# Patient Record
Sex: MALE | Race: WHITE | NOT HISPANIC OR LATINO | Employment: OTHER | URBAN - METROPOLITAN AREA
[De-identification: names, ages, dates, MRNs, and addresses within clinical notes are randomized per-mention and may not be internally consistent; named-entity substitution may affect disease eponyms.]

---

## 2017-02-06 ENCOUNTER — ALLSCRIPTS OFFICE VISIT (OUTPATIENT)
Dept: OTHER | Facility: OTHER | Age: 77
End: 2017-02-06

## 2017-03-28 ENCOUNTER — GENERIC CONVERSION - ENCOUNTER (OUTPATIENT)
Dept: OTHER | Facility: OTHER | Age: 77
End: 2017-03-28

## 2017-03-28 ENCOUNTER — OFFICE VISIT (OUTPATIENT)
Dept: LAB | Facility: CLINIC | Age: 77
End: 2017-03-28
Payer: MEDICARE

## 2017-03-28 ENCOUNTER — APPOINTMENT (OUTPATIENT)
Dept: LAB | Facility: CLINIC | Age: 77
End: 2017-03-28
Payer: MEDICARE

## 2017-03-28 ENCOUNTER — APPOINTMENT (OUTPATIENT)
Dept: PREADMISSION TESTING | Facility: HOSPITAL | Age: 77
End: 2017-03-28
Payer: MEDICARE

## 2017-03-28 ENCOUNTER — TRANSCRIBE ORDERS (OUTPATIENT)
Dept: LAB | Facility: CLINIC | Age: 77
End: 2017-03-28

## 2017-03-28 DIAGNOSIS — C44.311 BASAL CELL CARCINOMA OF NASOLABIAL GROOVE: ICD-10-CM

## 2017-03-28 DIAGNOSIS — C44.311 BASAL CELL CARCINOMA OF NASOLABIAL GROOVE: Primary | ICD-10-CM

## 2017-03-28 LAB
ANION GAP SERPL CALCULATED.3IONS-SCNC: 9 MMOL/L (ref 4–13)
ATRIAL RATE: 38 BPM
BASOPHILS # BLD AUTO: 0.03 THOUSANDS/ΜL (ref 0–0.1)
BASOPHILS NFR BLD AUTO: 0 % (ref 0–1)
BUN SERPL-MCNC: 14 MG/DL (ref 5–25)
CALCIUM SERPL-MCNC: 8.8 MG/DL (ref 8.3–10.1)
CHLORIDE SERPL-SCNC: 104 MMOL/L (ref 100–108)
CO2 SERPL-SCNC: 28 MMOL/L (ref 21–32)
CREAT SERPL-MCNC: 0.87 MG/DL (ref 0.6–1.3)
EOSINOPHIL # BLD AUTO: 0.56 THOUSAND/ΜL (ref 0–0.61)
EOSINOPHIL NFR BLD AUTO: 7 % (ref 0–6)
ERYTHROCYTE [DISTWIDTH] IN BLOOD BY AUTOMATED COUNT: 13 % (ref 11.6–15.1)
GFR SERPL CREATININE-BSD FRML MDRD: >60 ML/MIN/1.73SQ M
GLUCOSE SERPL-MCNC: 119 MG/DL (ref 65–140)
HCT VFR BLD AUTO: 47.5 % (ref 36.5–49.3)
HGB BLD-MCNC: 16 G/DL (ref 12–17)
LYMPHOCYTES # BLD AUTO: 1.84 THOUSANDS/ΜL (ref 0.6–4.47)
LYMPHOCYTES NFR BLD AUTO: 23 % (ref 14–44)
MCH RBC QN AUTO: 30.5 PG (ref 26.8–34.3)
MCHC RBC AUTO-ENTMCNC: 33.7 G/DL (ref 31.4–37.4)
MCV RBC AUTO: 91 FL (ref 82–98)
MONOCYTES # BLD AUTO: 0.82 THOUSAND/ΜL (ref 0.17–1.22)
MONOCYTES NFR BLD AUTO: 10 % (ref 4–12)
NEUTROPHILS # BLD AUTO: 4.83 THOUSANDS/ΜL (ref 1.85–7.62)
NEUTS SEG NFR BLD AUTO: 60 % (ref 43–75)
PLATELET # BLD AUTO: 194 THOUSANDS/UL (ref 149–390)
PMV BLD AUTO: 11.3 FL (ref 8.9–12.7)
POTASSIUM SERPL-SCNC: 4 MMOL/L (ref 3.5–5.3)
QRS AXIS: 61 DEGREES
QRSD INTERVAL: 136 MS
QT INTERVAL: 422 MS
QTC INTERVAL: 464 MS
RBC # BLD AUTO: 5.24 MILLION/UL (ref 3.88–5.62)
SODIUM SERPL-SCNC: 141 MMOL/L (ref 136–145)
T WAVE AXIS: 18 DEGREES
VENTRICULAR RATE: 73 BPM
WBC # BLD AUTO: 8.08 THOUSAND/UL (ref 4.31–10.16)

## 2017-03-28 PROCEDURE — 93005 ELECTROCARDIOGRAM TRACING: CPT

## 2017-03-28 PROCEDURE — 80048 BASIC METABOLIC PNL TOTAL CA: CPT

## 2017-03-28 PROCEDURE — 85025 COMPLETE CBC W/AUTO DIFF WBC: CPT

## 2017-03-28 PROCEDURE — 36415 COLL VENOUS BLD VENIPUNCTURE: CPT

## 2017-03-28 RX ORDER — LANOLIN ALCOHOL/MO/W.PET/CERES
2 CREAM (GRAM) TOPICAL
COMMUNITY

## 2017-03-28 RX ORDER — CHLORAL HYDRATE 500 MG
1000 CAPSULE ORAL 2 TIMES DAILY
COMMUNITY

## 2017-03-28 RX ORDER — FUROSEMIDE 20 MG/1
20 TABLET ORAL DAILY PRN
COMMUNITY
End: 2018-01-30

## 2017-03-28 RX ORDER — DIPHENOXYLATE HYDROCHLORIDE AND ATROPINE SULFATE 2.5; .025 MG/1; MG/1
1 TABLET ORAL
COMMUNITY

## 2017-03-28 RX ORDER — SOTALOL HYDROCHLORIDE 160 MG/1
160 TABLET ORAL 2 TIMES DAILY
COMMUNITY
End: 2018-08-31 | Stop reason: DRUGHIGH

## 2017-03-28 RX ORDER — AMLODIPINE BESYLATE 5 MG/1
5 TABLET ORAL
COMMUNITY
End: 2018-07-09

## 2017-03-28 RX ORDER — VALSARTAN 160 MG/1
160 TABLET ORAL
COMMUNITY
End: 2018-03-01

## 2017-03-28 RX ORDER — WARFARIN SODIUM 5 MG/1
5 TABLET ORAL
COMMUNITY
End: 2018-01-24 | Stop reason: SDUPTHER

## 2017-03-28 RX ORDER — ATORVASTATIN CALCIUM 40 MG/1
40 TABLET, FILM COATED ORAL
COMMUNITY
End: 2020-09-21 | Stop reason: SDUPTHER

## 2017-04-02 ENCOUNTER — ANESTHESIA EVENT (OUTPATIENT)
Dept: PERIOP | Facility: HOSPITAL | Age: 77
End: 2017-04-02
Payer: MEDICARE

## 2017-04-03 ENCOUNTER — HOSPITAL ENCOUNTER (OUTPATIENT)
Facility: HOSPITAL | Age: 77
Setting detail: OUTPATIENT SURGERY
Discharge: HOME | End: 2017-04-03
Attending: SURGERY | Admitting: SURGERY
Payer: MEDICARE

## 2017-04-03 ENCOUNTER — GENERIC CONVERSION - ENCOUNTER (OUTPATIENT)
Dept: OTHER | Facility: OTHER | Age: 77
End: 2017-04-03

## 2017-04-03 ENCOUNTER — ANESTHESIA (OUTPATIENT)
Dept: PERIOP | Facility: HOSPITAL | Age: 77
End: 2017-04-03
Payer: MEDICARE

## 2017-04-03 VITALS
RESPIRATION RATE: 18 BRPM | SYSTOLIC BLOOD PRESSURE: 150 MMHG | WEIGHT: 225 LBS | TEMPERATURE: 98.8 F | DIASTOLIC BLOOD PRESSURE: 74 MMHG | HEART RATE: 95 BPM | OXYGEN SATURATION: 97 % | BODY MASS INDEX: 36.16 KG/M2 | HEIGHT: 66 IN

## 2017-04-03 DIAGNOSIS — C44.311 BASAL CELL CARCINOMA OF SKIN OF NOSE: ICD-10-CM

## 2017-04-03 PROCEDURE — 88305 TISSUE EXAM BY PATHOLOGIST: CPT | Performed by: SURGERY

## 2017-04-03 PROCEDURE — 88332 PATH CONSLTJ SURG EA ADD BLK: CPT | Performed by: SURGERY

## 2017-04-03 PROCEDURE — 88331 PATH CONSLTJ SURG 1 BLK 1SPC: CPT | Performed by: SURGERY

## 2017-04-03 RX ORDER — FENTANYL CITRATE/PF 50 MCG/ML
25 SYRINGE (ML) INJECTION
Status: DISCONTINUED | OUTPATIENT
Start: 2017-04-03 | End: 2017-04-03 | Stop reason: HOSPADM

## 2017-04-03 RX ORDER — MORPHINE SULFATE 4 MG/ML
4 INJECTION, SOLUTION INTRAMUSCULAR; INTRAVENOUS EVERY 4 HOURS PRN
Status: CANCELLED | OUTPATIENT
Start: 2017-04-03

## 2017-04-03 RX ORDER — ONDANSETRON 2 MG/ML
4 INJECTION INTRAMUSCULAR; INTRAVENOUS EVERY 6 HOURS PRN
Status: CANCELLED | OUTPATIENT
Start: 2017-04-03

## 2017-04-03 RX ORDER — SODIUM CHLORIDE, SODIUM LACTATE, POTASSIUM CHLORIDE, CALCIUM CHLORIDE 600; 310; 30; 20 MG/100ML; MG/100ML; MG/100ML; MG/100ML
INJECTION, SOLUTION INTRAVENOUS CONTINUOUS PRN
Status: DISCONTINUED | OUTPATIENT
Start: 2017-04-03 | End: 2017-04-03 | Stop reason: SURG

## 2017-04-03 RX ORDER — ACETAMINOPHEN AND CODEINE PHOSPHATE 300; 30 MG/1; MG/1
1 TABLET ORAL EVERY 4 HOURS PRN
Qty: 10 TABLET | Refills: 0 | Status: SHIPPED | OUTPATIENT
Start: 2017-04-03 | End: 2018-01-30

## 2017-04-03 RX ORDER — ONDANSETRON 2 MG/ML
4 INJECTION INTRAMUSCULAR; INTRAVENOUS ONCE
Status: DISCONTINUED | OUTPATIENT
Start: 2017-04-03 | End: 2017-04-03 | Stop reason: HOSPADM

## 2017-04-03 RX ORDER — LIDOCAINE HYDROCHLORIDE AND EPINEPHRINE 10; 10 MG/ML; UG/ML
INJECTION, SOLUTION INFILTRATION; PERINEURAL AS NEEDED
Status: DISCONTINUED | OUTPATIENT
Start: 2017-04-03 | End: 2017-04-03 | Stop reason: HOSPADM

## 2017-04-03 RX ORDER — MAGNESIUM HYDROXIDE 1200 MG/15ML
LIQUID ORAL AS NEEDED
Status: DISCONTINUED | OUTPATIENT
Start: 2017-04-03 | End: 2017-04-03 | Stop reason: HOSPADM

## 2017-04-03 RX ORDER — FENTANYL CITRATE 50 UG/ML
INJECTION, SOLUTION INTRAMUSCULAR; INTRAVENOUS AS NEEDED
Status: DISCONTINUED | OUTPATIENT
Start: 2017-04-03 | End: 2017-04-03 | Stop reason: SURG

## 2017-04-03 RX ORDER — ONDANSETRON 2 MG/ML
INJECTION INTRAMUSCULAR; INTRAVENOUS AS NEEDED
Status: DISCONTINUED | OUTPATIENT
Start: 2017-04-03 | End: 2017-04-03 | Stop reason: SURG

## 2017-04-03 RX ORDER — PROPOFOL 10 MG/ML
INJECTION, EMULSION INTRAVENOUS AS NEEDED
Status: DISCONTINUED | OUTPATIENT
Start: 2017-04-03 | End: 2017-04-03 | Stop reason: SURG

## 2017-04-03 RX ORDER — HYDROCODONE BITARTRATE AND ACETAMINOPHEN 5; 325 MG/1; MG/1
1 TABLET ORAL EVERY 4 HOURS PRN
Status: CANCELLED | OUTPATIENT
Start: 2017-04-03

## 2017-04-03 RX ADMIN — CEFAZOLIN SODIUM 2000 MG: 2 SOLUTION INTRAVENOUS at 10:17

## 2017-04-03 RX ADMIN — SODIUM CHLORIDE, SODIUM LACTATE, POTASSIUM CHLORIDE, AND CALCIUM CHLORIDE: .6; .31; .03; .02 INJECTION, SOLUTION INTRAVENOUS at 10:17

## 2017-04-03 RX ADMIN — FENTANYL CITRATE 25 MCG: 50 INJECTION INTRAMUSCULAR; INTRAVENOUS at 10:46

## 2017-04-03 RX ADMIN — FENTANYL CITRATE 50 MCG: 50 INJECTION INTRAMUSCULAR; INTRAVENOUS at 11:25

## 2017-04-03 RX ADMIN — PROPOFOL 200 MG: 10 INJECTION, EMULSION INTRAVENOUS at 10:18

## 2017-04-03 RX ADMIN — ONDANSETRON 4 MG: 2 INJECTION INTRAMUSCULAR; INTRAVENOUS at 11:25

## 2017-04-03 RX ADMIN — FENTANYL CITRATE 25 MCG: 50 INJECTION INTRAMUSCULAR; INTRAVENOUS at 10:18

## 2017-04-03 RX ADMIN — DEXAMETHASONE SODIUM PHOSPHATE 10 MG: 10 INJECTION INTRAMUSCULAR; INTRAVENOUS at 10:33

## 2017-04-07 ENCOUNTER — ALLSCRIPTS OFFICE VISIT (OUTPATIENT)
Dept: OTHER | Facility: OTHER | Age: 77
End: 2017-04-07

## 2017-04-17 ENCOUNTER — ALLSCRIPTS OFFICE VISIT (OUTPATIENT)
Dept: OTHER | Facility: OTHER | Age: 77
End: 2017-04-17

## 2017-04-19 ENCOUNTER — ALLSCRIPTS OFFICE VISIT (OUTPATIENT)
Dept: OTHER | Facility: OTHER | Age: 77
End: 2017-04-19

## 2017-04-26 ENCOUNTER — ALLSCRIPTS OFFICE VISIT (OUTPATIENT)
Dept: RADIOLOGY | Facility: CLINIC | Age: 77
End: 2017-04-26
Payer: MEDICARE

## 2017-05-01 ENCOUNTER — ALLSCRIPTS OFFICE VISIT (OUTPATIENT)
Dept: OTHER | Facility: OTHER | Age: 77
End: 2017-05-01

## 2017-05-04 ENCOUNTER — GENERIC CONVERSION - ENCOUNTER (OUTPATIENT)
Dept: OTHER | Facility: OTHER | Age: 77
End: 2017-05-04

## 2017-05-31 ENCOUNTER — ALLSCRIPTS OFFICE VISIT (OUTPATIENT)
Dept: OTHER | Facility: OTHER | Age: 77
End: 2017-05-31

## 2017-08-17 ENCOUNTER — TRANSCRIBE ORDERS (OUTPATIENT)
Dept: ADMINISTRATIVE | Facility: HOSPITAL | Age: 77
End: 2017-08-17

## 2017-08-17 ENCOUNTER — ALLSCRIPTS OFFICE VISIT (OUTPATIENT)
Dept: OTHER | Facility: OTHER | Age: 77
End: 2017-08-17

## 2017-08-17 DIAGNOSIS — G47.19 OTHER HYPERSOMNIA: ICD-10-CM

## 2017-08-17 DIAGNOSIS — E78.5 HYPERLIPIDEMIA: ICD-10-CM

## 2017-08-17 DIAGNOSIS — I87.2 VENOUS INSUFFICIENCY (CHRONIC) (PERIPHERAL): ICD-10-CM

## 2017-08-17 DIAGNOSIS — I10 ESSENTIAL (PRIMARY) HYPERTENSION: ICD-10-CM

## 2017-08-17 DIAGNOSIS — G47.19 TRANSIENT DISORDER OF INITIATING OR MAINTAINING WAKEFULNESS: Primary | ICD-10-CM

## 2017-08-28 ENCOUNTER — HOSPITAL ENCOUNTER (OUTPATIENT)
Dept: NON INVASIVE DIAGNOSTICS | Facility: CLINIC | Age: 77
Discharge: HOME | End: 2017-08-28
Payer: MEDICARE

## 2017-08-28 ENCOUNTER — GENERIC CONVERSION - ENCOUNTER (OUTPATIENT)
Dept: OTHER | Facility: OTHER | Age: 77
End: 2017-08-28

## 2017-08-28 ENCOUNTER — LAB (OUTPATIENT)
Dept: LAB | Facility: CLINIC | Age: 77
End: 2017-08-28
Payer: MEDICARE

## 2017-08-28 DIAGNOSIS — I87.2 VENOUS INSUFFICIENCY (CHRONIC) (PERIPHERAL): ICD-10-CM

## 2017-08-28 DIAGNOSIS — G47.19 OTHER HYPERSOMNIA: ICD-10-CM

## 2017-08-28 DIAGNOSIS — E78.5 HYPERLIPIDEMIA: ICD-10-CM

## 2017-08-28 DIAGNOSIS — I10 ESSENTIAL (PRIMARY) HYPERTENSION: ICD-10-CM

## 2017-08-28 DIAGNOSIS — I48.91 ATRIAL FIBRILLATION (HCC): ICD-10-CM

## 2017-08-28 LAB
ALBUMIN SERPL BCP-MCNC: 3.4 G/DL (ref 3.5–5)
ALP SERPL-CCNC: 86 U/L (ref 46–116)
ALT SERPL W P-5'-P-CCNC: 26 U/L (ref 12–78)
ANION GAP SERPL CALCULATED.3IONS-SCNC: 7 MMOL/L (ref 4–13)
AST SERPL W P-5'-P-CCNC: 16 U/L (ref 5–45)
BILIRUB SERPL-MCNC: 0.62 MG/DL (ref 0.2–1)
BUN SERPL-MCNC: 22 MG/DL (ref 5–25)
CALCIUM SERPL-MCNC: 8.8 MG/DL (ref 8.3–10.1)
CHLORIDE SERPL-SCNC: 109 MMOL/L (ref 100–108)
CO2 SERPL-SCNC: 27 MMOL/L (ref 21–32)
CREAT SERPL-MCNC: 1.04 MG/DL (ref 0.6–1.3)
GFR SERPL CREATININE-BSD FRML MDRD: 70 ML/MIN/1.73SQ M
GLUCOSE SERPL-MCNC: 118 MG/DL (ref 65–140)
INR PPP: 2.68 (ref 0.86–1.16)
POTASSIUM SERPL-SCNC: 3.9 MMOL/L (ref 3.5–5.3)
PROT SERPL-MCNC: 6.9 G/DL (ref 6.4–8.2)
PROTHROMBIN TIME: 28.9 SECONDS (ref 12.1–14.4)
SODIUM SERPL-SCNC: 143 MMOL/L (ref 136–145)

## 2017-08-28 PROCEDURE — 80053 COMPREHEN METABOLIC PANEL: CPT

## 2017-08-28 PROCEDURE — 85610 PROTHROMBIN TIME: CPT

## 2017-08-28 PROCEDURE — 36415 COLL VENOUS BLD VENIPUNCTURE: CPT

## 2017-08-28 PROCEDURE — 93225 XTRNL ECG REC<48 HRS REC: CPT

## 2017-08-28 PROCEDURE — 93306 TTE W/DOPPLER COMPLETE: CPT

## 2017-08-28 PROCEDURE — 93226 XTRNL ECG REC<48 HR SCAN A/R: CPT

## 2017-08-30 ENCOUNTER — GENERIC CONVERSION - ENCOUNTER (OUTPATIENT)
Dept: OTHER | Facility: OTHER | Age: 77
End: 2017-08-30

## 2017-08-31 ENCOUNTER — GENERIC CONVERSION - ENCOUNTER (OUTPATIENT)
Dept: OTHER | Facility: OTHER | Age: 77
End: 2017-08-31

## 2017-09-01 ENCOUNTER — ALLSCRIPTS OFFICE VISIT (OUTPATIENT)
Dept: OTHER | Facility: OTHER | Age: 77
End: 2017-09-01

## 2017-09-25 ENCOUNTER — TRANSCRIBE ORDERS (OUTPATIENT)
Dept: LAB | Facility: CLINIC | Age: 77
End: 2017-09-25

## 2017-09-25 ENCOUNTER — GENERIC CONVERSION - ENCOUNTER (OUTPATIENT)
Dept: OTHER | Facility: OTHER | Age: 77
End: 2017-09-25

## 2017-09-25 ENCOUNTER — APPOINTMENT (OUTPATIENT)
Dept: LAB | Facility: CLINIC | Age: 77
End: 2017-09-25
Payer: MEDICARE

## 2017-09-25 DIAGNOSIS — I48.91 CONTROLLED ATRIAL FIBRILLATION (HCC): ICD-10-CM

## 2017-09-25 DIAGNOSIS — I48.91 CONTROLLED ATRIAL FIBRILLATION (HCC): Primary | ICD-10-CM

## 2017-09-25 LAB
INR PPP: 2.42 (ref 0.86–1.16)
PROTHROMBIN TIME: 26.6 SECONDS (ref 12.1–14.4)

## 2017-09-25 PROCEDURE — 85610 PROTHROMBIN TIME: CPT

## 2017-09-25 PROCEDURE — 36415 COLL VENOUS BLD VENIPUNCTURE: CPT

## 2017-09-28 ENCOUNTER — ALLSCRIPTS OFFICE VISIT (OUTPATIENT)
Dept: OTHER | Facility: OTHER | Age: 77
End: 2017-09-28

## 2017-09-28 DIAGNOSIS — I48.91 ATRIAL FIBRILLATION (HCC): ICD-10-CM

## 2017-09-28 DIAGNOSIS — E78.5 HYPERLIPIDEMIA: ICD-10-CM

## 2017-09-28 DIAGNOSIS — I10 ESSENTIAL (PRIMARY) HYPERTENSION: ICD-10-CM

## 2017-09-28 DIAGNOSIS — R60.9 EDEMA: ICD-10-CM

## 2017-09-28 DIAGNOSIS — I87.2 VENOUS INSUFFICIENCY (CHRONIC) (PERIPHERAL): ICD-10-CM

## 2017-10-02 ENCOUNTER — ALLSCRIPTS OFFICE VISIT (OUTPATIENT)
Dept: OTHER | Facility: OTHER | Age: 77
End: 2017-10-02

## 2017-10-04 ENCOUNTER — GENERIC CONVERSION - ENCOUNTER (OUTPATIENT)
Dept: OTHER | Facility: OTHER | Age: 77
End: 2017-10-04

## 2017-10-10 ENCOUNTER — HOSPITAL ENCOUNTER (OUTPATIENT)
Dept: SLEEP CENTER | Facility: CLINIC | Age: 77
Discharge: HOME | End: 2017-10-10
Payer: MEDICARE

## 2017-10-10 DIAGNOSIS — G47.19 TRANSIENT DISORDER OF INITIATING OR MAINTAINING WAKEFULNESS: ICD-10-CM

## 2017-10-10 DIAGNOSIS — G47.33 OSA (OBSTRUCTIVE SLEEP APNEA): ICD-10-CM

## 2017-10-10 PROCEDURE — 95810 POLYSOM 6/> YRS 4/> PARAM: CPT

## 2017-10-17 ENCOUNTER — TRANSCRIBE ORDERS (OUTPATIENT)
Dept: SLEEP CENTER | Facility: CLINIC | Age: 77
End: 2017-10-17

## 2017-10-17 DIAGNOSIS — G47.33 OSA (OBSTRUCTIVE SLEEP APNEA): Primary | ICD-10-CM

## 2017-10-19 ENCOUNTER — HOSPITAL ENCOUNTER (OUTPATIENT)
Dept: SLEEP CENTER | Facility: CLINIC | Age: 77
Discharge: HOME | End: 2017-10-19
Payer: MEDICARE

## 2017-10-19 DIAGNOSIS — G47.33 OSA (OBSTRUCTIVE SLEEP APNEA): ICD-10-CM

## 2017-10-19 PROCEDURE — 95811 POLYSOM 6/>YRS CPAP 4/> PARM: CPT

## 2017-10-23 NOTE — PROGRESS NOTES
Assessment  Assessed    1  Hyperlipidemia (272 4) (E78 5)   2  Hypertension (401 9) (I10)   3  Chronic venous insufficiency (459 81) (I87 2)    Plan  Aortic valve disease, Edema    · Furosemide 40 MG Oral Tablet (Lasix)   Rx By: Shawna Berumen; Dispense: 0 Days ; #: Sufficient Tablet; Refill: 0;For: Aortic valve disease, Edema; HUMBERTO = N; Record; Last Updated By: Marleny Sprague; 8/17/2017 4:26:09 PM  Chronic venous insufficiency, Daytime hypersomnolence, Hyperlipidemia, Hypertension    · *Polysomnography, Sleep Study, Diagnostic; Status:Need Information - Financial  Authorization; Requested for:17Aug2017;    Perform:[facility]; Due:17Aug2018; Ordered; For:Chronic venous insufficiency, Daytime hypersomnolence, Hyperlipidemia, Hypertension; Ordered By:Pavithra Sales;  there any other medical conditions or medications that would explain these      symptoms? : No  is the sleep disturbance affecting the patient's ability to function? :      hypersomnolence, frequent naps  History/Symptoms: : excessive fatigue  Study Only or Consult : Sleep Study and Consult and F/U with Sleep Specialist   · Follow-up visit in 1 month Evaluation and Treatment  Follow-up  Status: Hold For -  Scheduling  Requested for: 17Aug2017   Ordered; For: Chronic venous insufficiency, Daytime hypersomnolence, Hyperlipidemia, Hypertension; Ordered By: Marleny Sprague Performed:  Due: 18RFY7541   · (1) COMPREHENSIVE METABOLIC PANEL; Status:Active; Requested for:28Aug2017;    Perform:[facility]; Due:28Aug2018; Ordered; For:Chronic venous insufficiency, Daytime hypersomnolence, Hyperlipidemia, Hypertension; Ordered By:Otf Sales;   · ECHO COMPLETE WITH CONTRAST IF INDICATED; Status:Hold For - Scheduling;  Requested for:17Aug2017;    Perform:[facility]; Due:17Aug2018; Ordered; For:Chronic venous insufficiency, Daytime hypersomnolence, Hyperlipidemia, Hypertension; Ordered By:Otf Sales;   · Gradient compression stocking, thigh length, 15-20 mm Hg, each; Status:Complete;    Done: 61SGR9760   Perform:Not Applicable; QTH:85ZAZ9880;WLNEWMY; For:Chronic venous insufficiency, Daytime hypersomnolence, Hyperlipidemia, Hypertension; Ordered By:Otf Sales;   · HOLTER MONITOR - 24 HOUR; Status:Hold For - Scheduling; Requested  for:17Aug2017;    Perform:[facility]; Due:17Aug2018; Ordered; For:Chronic venous insufficiency, Daytime hypersomnolence, Hyperlipidemia, Hypertension; Ordered By:Otf Sales;   · Prescription Compression Stockings; Status:Complete;   Done: 82IGC8212   Perform:Not Applicable; ZMY:31ZHK6695;NVEOJLE; For:Chronic venous insufficiency, Daytime hypersomnolence, Hyperlipidemia, Hypertension; Ordered By:Otf Sales;  Daytime hypersomnolence    · Torsemide 20 MG Oral Tablet; 1 tab po bid   Rx By: Bartolo Medrano; Dispense: 30 Days ; #:60 Tablet; Refill: 11; For: Daytime hypersomnolence; HUMBERTO = N; Verified Transmission to [address] [place] [address]; Last Updated By: [employer]; 8/17/2017 4:31:30 PM    Discussion/Summary  Cardiology Discussion Summary Free Text Note Form [facility]:   1  venous insuff edema, worse last few weeks2  pafib on sotalol/warfarin, in nsr, h/o normal lvef3  excess fatigue, hypersomnolence, clinically has obstructive sleep apnea4  hypertension, borderline suboptimal control5  hyperlipidemia, on statinPlan  Chest with patient is venous insufficiency edema  We'll stop his furosemide and start torsemide 20 mg twice a day with follow-up CMP  Discussed possible need for potassium supplementation  He takes angiotensin receptor blocker and we'll see what his potassium is an approximate 7-10 days of starting his torsemide  Also given him a prescription for compression stockings  Regards his hypersomnolence, this is likely clinically obstructive sleep apnea   Discuss obstructive sleep apnea and through old with parasystole fibrillation as well as possible chronic right-sided congestive heart failure  His 2-D echocardiograms have revealed both normal left ventricular and right ventricular systolic function the past  I'll will repeat his echo in follow-up with him in 4 weeks  Chief Complaint  Chief Complaint Free Text Note Form: OV Leg edema      History of Present Illness  Cardiology HPI Free Text Note Form [facility]: Patient here for an unscheduled visit, usual Dr Irma Mccoy patient, for progressive lower extremity edema  He takes furosemide 40 mg once daily for lower extremity swelling  In review of his history, he's had hyper-somnolence throughout the day time  Wife states that he snores excessively and looks as though that he is not going to take a breath at times  He has had a history of paroxysmal atrial fibrillation and is on sotalol  Is a history of normal left ventricular systolic function  Last stress testing was 2008 which was no other which showed no evidence of ischemia  He states that his furosemide is no longer working  He may urinate 2-3 times after taking it in the morning and certainly worse at the end of the day most improved in the morning when he wakes upHe has a history of hypertension, review of vital signs last few physician visits range 137-150  This is the systolic BP  Is on chronic and a coagulation with warfarin for his paroxysmal age fibrillation as well  She has a history of hyperlipidemia and takes atorvastatin 40 mg daily  A raphe is maintained in sinus rhythm on sotalol 80 mg two times a day  Review of Systems  Cardiology Male ROS:     Cardiac: as noted in HPI  Skin: No complaints of nonhealing sores or skin rash  Genitourinary: No complaints of recurrent urinary tract infections, frequent urination at night, difficult urination, blood in urine, kidney stones, loss of bladder control, no kidney or prostate problems, no erectile dysfunction     Psychological: No complaints of feeling depressed, anxiety, panic attacks, or difficulty concentrating  General: No complaints of trouble sleeping, lack of energy, fatigue, appetite changes, weight changes, fever, frequent infections, or night sweats  Respiratory: No complaints of shortness of breath, cough with sputum, or wheezing  HEENT: No complaints of serious problems, hearing problems, nose problems, throat problems, or snoring  Gastrointestinal: No complaints of liver problems, nausea, vomiting, heartburn, constipation, bloody stools, diarrhea, problems swallowing, adbominal pain, or rectal bleeding  Hematologic: No complaints of bleeding disorders, anemia, blood clots, or excessive brusing  Neurological: No complaints of numbness, tingling, dizziness, weakness, seizures, headaches, syncope or fainting, AM fatigue, daytime sleepiness, no witnessed apnea episodes  Musculoskeletal: No complaints of arthritis, back pain, or painfull swelling  Active Problems  Problems    1  Anxiety (300 00) (F41 9)   2  Aortic valve disease (424 1) (I35 9)   3  Atrial fibrillation (427 31) (I48 91)   4  Basal cell carcinoma of nose (173 31) (C44 311)   5  Degenerative arthritis (715 90) (M19 90)   6  Edema (782 3) (R60 9)   7  Hyperlipidemia (272 4) (E78 5)   8  Hypertension (401 9) (I10)   9  Low back pain (724 2) (M54 5)   10  Lumbar spinal stenosis (724 02) (M48 06)   11  Sacroiliac pain (724 6) (M53 3)   12  Sacroiliitis (720 2) (M46 1)   13  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)    Past Medical History  Problems    1  Anxiety (300 00) (F41 9)   2  Degenerative arthritis (715 90) (M19 90)   3  History of Gout (274 9) (M10 9)   4  History of cardioversion (V15 1) (Z98 890)   5  History of hypercholesterolemia (V12 29) (Z86 39)   6  Hypertension (401 9) (I10)   7  History of TIA (transient ischemic attack) (435 9) (G45 9)  Active Problems And Past Medical History Reviewed:    The active problems and past medical history were reviewed and updated today  Surgical History  Problems    1  History of Prostatectomy Radical  Surgical History Reviewed: The surgical history was reviewed and updated today  Family History  Mother    1  Family history of    2  Family history of Hypertension  Father    3  Family history of Alzheimer disease   4  Family history of    5  Family history of Hypertension  Uncle    6  Family history of Stroke  Family History Reviewed: The family history was reviewed and updated today  Social History  Problems    · Alcohol use   · Daily caffeine consumption   ·    · Never a smoker   · Non smoker (V49 89) (Z78 9)  Social History Reviewed: The social history was reviewed and updated today  Current Meds   1  Acetaminophen 325 MG CAPS; Therapy: (Recorded:2017) to Recorded   2  AmLODIPine Besylate 5 MG Oral Tablet; Take 1 tablet daily; Therapy: (Recorded:2017) to Recorded   3  Co Q 10 10 MG Oral Capsule; as directed; Therapy: 37Lbh1351 to Recorded   4  Diovan 160 MG Oral Tablet; Take 1 tablet daily; Therapy: (Recorded:2017) to Recorded   5  Fish Oil 1000 MG Oral Capsule; TAKE 1 CAPSULE DAILY; Therapy: 09IOR0072 to Recorded   6  Furosemide 40 MG Oral Tablet; take 1 tablet daily as needed; Therapy: 44GLQ6441 to  Requested for: 2017 Recorded   7  Glucosamine Chondroitin Complx Oral Capsule; TAKE 1 CAPSULE DAILY; Therapy: 26Wgf3077 to Recorded   8  Lipitor 40 MG Oral Tablet; take 1 tablet by mouth every day; Therapy: 09EZG8503 to Recorded   9  Multi-Vitamins Oral Tablet; TAKE 1 TABLET DAILY; Therapy: 14PRT9551 to Recorded   10  Sertraline HCl - 50 MG Oral Tablet; Take 1 tablet daily; Therapy: 44JPU4699 to Recorded   11  Sotalol HCl - 80 MG Oral Tablet; TAKE 2 TABLETS TWICE DAILY; Therapy: 41VQK8920 to (Evaluate:94Ovw0496)  Requested for: 12OOM0456; Last    Rx:2017 Ordered   12  Vitamin D3 CAPS; Therapy: (Recorded:69Sxg4101) to Recorded   13  Warfarin Sodium 5 MG Oral Tablet; TAKE 1 TABLET DAILY OR AS DIRECTEDRECTED; Therapy: 76IGU2440 to (Evaluate:20Hwp8774)  Requested for: 63OXV1316; Last    Rx:26Nad2868 Ordered  Medication List Reviewed: The medication list was reviewed and updated today  Allergies  Medication    1  No Known Drug Allergies    Vitals  Vital Signs    Recorded: 17Aug2017 04:02PM   Heart Rate 60   Systolic 972, RUE, Sitting   Diastolic 80, RUE, Sitting   Height 5 ft 6 in   Weight 229 lb 6 oz   BMI Calculated 37 02   BSA Calculated 2 12     Physical Exam    Constitutional   General appearance: No acute distress, well appearing and well nourished  Eyes   Conjunctiva and Sclera examination: Conjunctiva pink, sclera anicteric  Ears, Nose, Mouth, and Throat - Oropharynx: Clear, nares are clear, mucous membranes are moist    Neck   Neck and thyroid: Normal, supple, trachea midline, no thyromegaly  Pulmonary   Respiratory effort: No increased work of breathing or signs of respiratory distress  Auscultation of lungs: Clear to auscultation, no rales, no rhonchi, no wheezing, good air movement  Cardiovascular   Auscultation of heart: Normal rate and rhythm, normal S1 and S2, no murmurs  Carotid pulses: Normal, 2+ bilaterally  Peripheral vascular exam: Normal pulses throughout, no tenderness, erythema or swelling  Pedal pulses: Normal, 2+ bilaterally  Examination of extremities for edema and/or varicosities: Abnormal  --b03+ with chronic venous stasis changes  Abdomen   Abdomen: Non-tender and no distention  Liver and spleen: No hepatomegaly or splenomegaly  Musculoskeletal Gait and station: Normal gait  --s5Yzibrj and nails: Normal without clubbing or cyanosis  --i3Epanveiixe/palpation of joints, bones, and muscles: Normal, ROM normal     Skin - Skin and subcutaneous tissue: Normal without rashes or lesions  Skin is warm and well perfused, normal turgor      Neurologic - Cranial nerves: II - XII intact  --j0Kjdzhb: Normal     Psychiatric - Orientation to person, place, and time: Normal --b0Mood and affect: Normal       Future Appointments    Date/Time Provider Specialty Site   10/06/2017 09:20 AM REJI Connolly   Cardiology [facility] CARDIOLOGY [place]   09/01/2017 10:00 AM REJI Serrato  Plastic Surgery BODY EVOLUTION PLASTIC REC[facility] [id number]     Signatures   Electronically signed by : Glenford Jeans, DO; Aug 17 2017  4:39PM EST                       (Author)

## 2017-10-25 ENCOUNTER — APPOINTMENT (OUTPATIENT)
Dept: LAB | Facility: CLINIC | Age: 77
End: 2017-10-25
Payer: MEDICARE

## 2017-10-25 DIAGNOSIS — R60.9 EDEMA: ICD-10-CM

## 2017-10-25 DIAGNOSIS — E78.5 HYPERLIPIDEMIA: ICD-10-CM

## 2017-10-25 DIAGNOSIS — I87.2 VENOUS INSUFFICIENCY (CHRONIC) (PERIPHERAL): ICD-10-CM

## 2017-10-25 DIAGNOSIS — I48.91 ATRIAL FIBRILLATION (HCC): ICD-10-CM

## 2017-10-25 DIAGNOSIS — I10 ESSENTIAL (PRIMARY) HYPERTENSION: ICD-10-CM

## 2017-10-25 LAB
INR PPP: 2.51 (ref 0.86–1.16)
PROTHROMBIN TIME: 27.4 SECONDS (ref 12.1–14.4)

## 2017-10-25 PROCEDURE — 85610 PROTHROMBIN TIME: CPT

## 2017-10-25 PROCEDURE — 36415 COLL VENOUS BLD VENIPUNCTURE: CPT

## 2017-10-26 ENCOUNTER — GENERIC CONVERSION - ENCOUNTER (OUTPATIENT)
Dept: OTHER | Facility: OTHER | Age: 77
End: 2017-10-26

## 2017-10-31 DIAGNOSIS — I48.91 ATRIAL FIBRILLATION (HCC): ICD-10-CM

## 2017-11-08 ENCOUNTER — TRANSCRIBE ORDERS (OUTPATIENT)
Dept: SLEEP CENTER | Facility: CLINIC | Age: 77
End: 2017-11-08

## 2017-11-08 ENCOUNTER — HOSPITAL ENCOUNTER (OUTPATIENT)
Dept: SLEEP CENTER | Facility: CLINIC | Age: 77
Discharge: HOME | End: 2017-11-08
Payer: MEDICARE

## 2017-11-08 DIAGNOSIS — G47.33 OSA (OBSTRUCTIVE SLEEP APNEA): ICD-10-CM

## 2017-11-08 DIAGNOSIS — G47.33 OSA (OBSTRUCTIVE SLEEP APNEA): Primary | ICD-10-CM

## 2017-11-08 NOTE — PROGRESS NOTES
Consultation - [facility] : 1940  MRN: [id number]      Assessment:  The patient has moderate obstructive sleep apnea in conjunction with atrial fibrillation and hypertension  Treatment with positive airway pressure is necessary  Plan:  Initiate CPAP of 11 cm through [facility]    Follow up:  6 weeks for compliance check    History of Present Illness:  78 y  o male with history of atrial fibrillation as well as loud snoring and witnessed apneas  His wife has been, neck for years about his apneic episodes during sleep  He was urged by his cardiologist to undergo evaluation  His diagnostic study demonstrated AHI = 16 8 and his minimum oxygen saturation was 83%  His AHI was much higher during REM at 57 4  He was subsequently treated with CPAP of 11 cm for elimination of respiratory events and snoring  Sleep architecture was much improved with positive airway pressure therapy  He questions the necessity of treatment for CHASIDY, since he feels that he is sleeping well and is not drowsy during the day  After our discussion, he did recognize the relationship between CHASIDY and his atrial fibrillation and hypertension  His past medical history includes chronic venous insufficiency with lower extremity edema  He has normal left ventricular systolic function (ejection fraction = 60%)  Review of Systems:  The patient denies headache, bruxism, chronic pain, irrepressible sleep, sleep paralysis, restless legs, thrashing during sleep, nocturnal eating, nocturnal GERD, abnormal behavior during sleep, abnormal dreams, poor concentration or depression  He does report poor short-term memory, urinary frequency and anxiety      Historical Information    Past Medical History:  Anxiety, aortic valve insufficiency, atrial fibrillation, 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, arthritis, hypertension vertebral stenosis of the lumbar region    Past Surgical History:  Prostatectomy, removal of basal cell carcinoma of the face    Social History  History   Alcohol use: Not on file     History   Drug Use Not on file     History   Smoking Status    Not on file   Smokeless Tobacco    Not on file     Family History: non-contributory     Sleep History: See above     Sleep Schedule:  Unremarkable     Snoring/Apnea:  Yes to both    Medications/Allergies:  See chart    Objective:    Vital Signs:   See Chart    [place] Sleepiness Scale:  7    Physical Exam:    General: Alert, appropriate, cooperative, overweight    Head: NC/AT, no retrognathia    Nose: No septal deviation, nares partially obstructed, mucosa normal    Throat: Airway lumen narrowed, tongue base thickened, no tonsils visualized    Neck: Normal, no thyromegaly or lymphadenopathy, no JVD    Heart: RR, normal S1 and S2, no murmurs    Chest: Clear bilaterally    Extremity: No clubbing, cyanosis, 3+ edema    Skin: Warm, dry    Neuro: No motor abnormalities, cranial nerves appear intact    Sleep Study Results:   As above  PAP Pressure: CPAP: 11 cm  DME Provider: [employer]    Counseling / Coordination of Care  Total clinic time spent today 25 minutes  Greater than 50% of total time was spent with the patient and / or family counseling and / or coordination of care  A description of the counseling / coordination of care: We discussed the pathophysiology of obstructive sleep apnea as well as the possible treatment options  We also discussed the rationale for positive airway pressure therapy  REJI Gutierrez    Board Certified Sleep Specialist

## 2017-11-14 ENCOUNTER — ANESTHESIA EVENT (OUTPATIENT)
Dept: PERIOP | Facility: AMBULARY SURGERY CENTER | Age: 77
End: 2017-11-14

## 2017-11-15 ENCOUNTER — GENERIC CONVERSION - ENCOUNTER (OUTPATIENT)
Dept: OTHER | Facility: OTHER | Age: 77
End: 2017-11-15

## 2017-11-15 DIAGNOSIS — I48.91 ATRIAL FIBRILLATION (HCC): ICD-10-CM

## 2017-11-15 NOTE — H&P
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  Please see HPI  I recommended excision of the squamous cell carcinoma on the right cheek with frozen section, possible flap, possible full-thickness skin graft  We discussed with the patient the options, benefits, and risks of surgery such as anesthesia, bleeding, infection, scarring and the need for additional procedures  Consent was obtained and all questions answered to his satisfaction  We will plan for surgery at his earliest convenience  Chief Complaint  Chief Complaint Free Text Note Form: Right cheek skin cancer  History of Present Illness  HPI: Sean Prince is a pleasant 81-year-old male who presents to our office for 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  He was sent to us by Dr Dorinda Patel and had a biopsy done  He states that the lesion has been present for couple of months  It is not bothersome to him  It does become crusty and scaly at times  Review of Systems  Complete-Male:   Constitutional: no fever,-not feeling poorly,-no chills-and-not feeling tired  Eyes: eyesight problems-and-Wears glasses  ENT: no hearing loss  Cardiovascular: no chest pain  Respiratory: no shortness of breath  Gastrointestinal: no abdominal pain,-no nausea,-no vomiting,-no constipation,-no diarrhea-and-no blood in stools  Neurological: no headache,-no convulsions-and-no difficulty walking  Psychiatric: no anxiety-and-no depression  Hematologic/Lymphatic: a tendency for easy bleeding,-a tendency for easy bruising-and-On Coumadin  Active Problems  1  Anxiety (300 00) (F41 9)   2  Aortic valve disease (424 1) (I35 9)   3  Atrial fibrillation (427 31) (I48 91)   4  Basal cell carcinoma of nose (173 31) (C44 311)   5   Chronic venous insufficiency (459 81) (I87 2) 6  Daytime hypersomnolence (780 54) (G47 19)   7  Degenerative arthritis (715 90) (M19 90)   8  Edema (782 3) (R60 9)   9  Hyperlipidemia (272 4) (E78 5)   10  Hypertension (401 9) (I10)   11  Low back pain (724 2) (M54 5)   12  Lumbar spinal stenosis (724 02) (M48 061)   13  Sacroiliac pain (724 6) (M53 3)   14  Sacroiliitis (720 2) (M46 1)   15  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)     Past Medical History  1  Anxiety (300 00) (F41 9)   2  Degenerative arthritis (715 90) (M19 90)   3  History of Gout (274 9) (M10 9)   4  History of cardioversion (V15 1) (Z98 890)   5  History of hypercholesterolemia (V12 29) (Z86 39)   6  Hypertension (401 9) (I10)   7  History of TIA (transient ischemic attack) (435 9) (G45 9)  Active Problems And Past Medical History Reviewed: The active problems and past medical history were reviewed and updated today  Surgical History  1  History of Prostatectomy Radical  Surgical History Reviewed: The surgical history was reviewed and updated today  Family History  Mother    1  Family history of    2  Family history of Hypertension  Father    3  Family history of Alzheimer disease   4  Family history of    5  Family history of Hypertension  Uncle    6  Family history of Stroke  Family History Reviewed: The family history was reviewed and updated today  Social History   · Alcohol use   · Daily caffeine consumption   ·    · Never a smoker   · Non smoker (V49 89) (Z78 9)  Social History Reviewed: The social history was reviewed and updated today  The social history was reviewed and is unchanged  Current Meds   1  Acetaminophen 325 MG CAPS; Therapy: (Recorded:19Gxa3992) to Recorded   2  AmLODIPine Besylate 10 MG Oral Tablet; Take 1 tablet daily; Therapy: (Carmina Bushevelyn) to Recorded   3  Co Q 10 10 MG Oral Capsule; as directed; Therapy: 52Nxq1641 to Recorded   4   Fish Oil 1000 MG Oral Capsule; TAKE 1 CAPSULE DAILY; Therapy: 10Wtj9391 to Recorded   5  Glucosamine Chondroitin Complx Oral Capsule; TAKE 1 CAPSULE DAILY; Therapy: 40RVZ2782 to Recorded   6  Lipitor 40 MG Oral Tablet; take 1 tablet by mouth every day; Therapy: 09Lbv4355 to Recorded   7  Multi-Vitamins Oral Tablet; TAKE 1 TABLET DAILY; Therapy: 51Eql2215 to Recorded   8  Sertraline HCl - 50 MG Oral Tablet; Take 1 tablet daily; Therapy: 44VKF2393 to Recorded   9  Sotalol HCl - 80 MG Oral Tablet; TAKE 2 TABLETS TWICE DAILY; Therapy: 63ZYQ5854 to (Evaluate:64Bho4424)  Requested for: 29Bsw3722; Last   Rx:38Sir3059 Ordered   10  Torsemide 20 MG Oral Tablet; 1 tab PO prn; Therapy: 12ZNO2639 to  Requested for: 29PQY6489 Recorded   11  Valsartan 320 MG Oral Tablet; Take 1 tablet daily; Therapy: 33HYN5365 to (Viki Iglesias)  Requested for: 99Kar2319; Last    Rx:71Ocl9352 Ordered   12  Vicodin TABS; Therapy: (Ching Londono) to Recorded   13  Vitamin D3 CAPS; Therapy: (Recorded:62Lft8201) to Recorded   14  Warfarin Sodium 5 MG Oral Tablet; TAKE 1 TABLET DAILY OR AS DIRECTEDRECTED; Therapy: 71WDI2163 to 40-45-11-94)  Requested for: 48Avl5711; Last    Rx:41Lgz9915 Ordered  Medication List Reviewed: The medication list was reviewed and updated today  Allergies  1  No Known Drug Allergies     Vitals  Vital Signs     Recorded: 03XWY8298 01:30PM   BP Comments unobtainable   Height 5 ft 6 in   Weight 228 lb 8 oz   BMI Calculated 36 88   BSA Calculated 2 12      Physical Exam  General Complete Exam (Brief) Male:   Constitutional   General appearance: No acute distress, well appearing and well nourished  Eyes   Conjunctiva and lids: No swelling, erythema, or discharge  Pupils and irises: Equal, round and reactive to light  Ears, Nose, Mouth, and Throat   External inspection of ears and nose: Normal     Pulmonary   Respiratory effort: No increased work of breathing or signs of respiratory distress  Auscultation of lungs: Clear to auscultation, equal breath sounds bilaterally, no wheezes, no rales, no rhonci  Cardiovascular   Palpation of heart: Normal PMI, no thrills  Examination of extremities for edema and/or varicosities: Normal     Abdomen   Abdomen: Non-tender, no masses  Lymphatic   Palpation of lymph nodes in neck: No lymphadenopathy  Musculoskeletal   Gait and station: Normal     Skin   Skin and subcutaneous tissue: Abnormal  -Lesion on the right cheek area that is flat with irregular border it is skin colored and scaly  Picture taken  Neurologic   Cranial nerves: Cranial nerves 2-12 intact      Psychiatric   Orientation to person, place and time: Normal     Mood and affect: Normal

## 2017-11-20 ENCOUNTER — APPOINTMENT (OUTPATIENT)
Dept: LAB | Facility: CLINIC | Age: 77
End: 2017-11-20
Payer: MEDICARE

## 2017-11-20 DIAGNOSIS — I48.91 ATRIAL FIBRILLATION (HCC): ICD-10-CM

## 2017-11-20 LAB
INR PPP: 2.74 (ref 0.86–1.16)
PROTHROMBIN TIME: 29.4 SECONDS (ref 12.1–14.4)

## 2017-11-20 PROCEDURE — 85610 PROTHROMBIN TIME: CPT

## 2017-11-20 PROCEDURE — 36415 COLL VENOUS BLD VENIPUNCTURE: CPT

## 2017-11-21 ENCOUNTER — GENERIC CONVERSION - ENCOUNTER (OUTPATIENT)
Dept: OTHER | Facility: OTHER | Age: 77
End: 2017-11-21

## 2017-11-28 ENCOUNTER — ANESTHESIA (OUTPATIENT)
Dept: PERIOP | Facility: AMBULARY SURGERY CENTER | Age: 77
End: 2017-11-28

## 2017-12-04 ENCOUNTER — GENERIC CONVERSION - ENCOUNTER (OUTPATIENT)
Dept: OTHER | Facility: OTHER | Age: 77
End: 2017-12-04

## 2017-12-19 ENCOUNTER — ALLSCRIPTS OFFICE VISIT (OUTPATIENT)
Dept: RADIOLOGY | Facility: CLINIC | Age: 77
End: 2017-12-19
Payer: MEDICARE

## 2017-12-19 DIAGNOSIS — I48.91 ATRIAL FIBRILLATION (HCC): ICD-10-CM

## 2017-12-20 ENCOUNTER — APPOINTMENT (OUTPATIENT)
Dept: LAB | Facility: CLINIC | Age: 77
End: 2017-12-20
Payer: MEDICARE

## 2017-12-20 ENCOUNTER — GENERIC CONVERSION - ENCOUNTER (OUTPATIENT)
Dept: OTHER | Facility: OTHER | Age: 77
End: 2017-12-20

## 2017-12-20 DIAGNOSIS — I48.91 ATRIAL FIBRILLATION (HCC): ICD-10-CM

## 2017-12-20 LAB
INR PPP: 2.9 (ref 0.86–1.16)
PROTHROMBIN TIME: 30.7 SECONDS (ref 12.1–14.4)

## 2017-12-20 PROCEDURE — 85610 PROTHROMBIN TIME: CPT

## 2018-01-10 ENCOUNTER — TRANSCRIBE ORDERS (OUTPATIENT)
Dept: SLEEP CENTER | Facility: CLINIC | Age: 78
End: 2018-01-10

## 2018-01-10 ENCOUNTER — HOSPITAL ENCOUNTER (OUTPATIENT)
Dept: SLEEP CENTER | Facility: CLINIC | Age: 78
Discharge: HOME | End: 2018-01-11
Payer: MEDICARE

## 2018-01-10 DIAGNOSIS — G47.33 OSA (OBSTRUCTIVE SLEEP APNEA): ICD-10-CM

## 2018-01-10 DIAGNOSIS — G47.33 OSA (OBSTRUCTIVE SLEEP APNEA): Primary | ICD-10-CM

## 2018-01-10 NOTE — PROGRESS NOTES
Progress Note - Sleep Center   Alyce Alonso :1940 MRN: [id number]      Reason for Visit:  78 y  o male here for PAP compliance check    Assessment:  Doing well with new PAP device  Sleep quality is improved and the patient reports less daytime sleepiness  Compliance data show utilization for greater than or equal to 70% of nights for greater than or equal to 4 hours per night  Plan:  Continue same    Follow up: One year    History of Present Illness:  History of CHASIDY on PAP therapy  Fully compliant and deriving benefit  Historical Information    Past Medical History:   Past Medical History:   Diagnosis Date    [facility]fib ([facility] )     Anxiety     Arthritis     Depression     Hyperlipidemia     Hypertension     Irregular heart beat     Stroke ([facility] )     TIA (transient ischemic attack)     no residual problems         Past Surgical History:   Past Surgical History:   Procedure Laterality Date    FACIAL/NECK BIOPSY N/A 4/3/2017    Procedure: NOSE BCC EXCISION; FROZEN SECTION; RECONSTRUCTION ;  Surgeon: Jasmin Reynolds MD;  [facility];  Service:     FULL THICKNESS SKIN GRAFT N/A 4/3/2017    Procedure: FLAP VERSUS FULL THICKNESS SKIN GRAFT ;  Surgeon: Jasmin Reynolds MD;  [facility];  Service:    Daisypaulie Rasheed PROSTATECTOMY      TONSILLECTOMY AND ADENOIDECTOMY           Social History - see chart  History   Alcohol use: Not on file     History   Drug Use Not on file     History   Smoking Status    Not on file   Smokeless Tobacco    Not on file     Family History: No family history on file      Medications/Allergies:      Current Outpatient Prescriptions:     acetaminophen-codeine (TYLENOL #3) 300-30 mg per tablet, Take 1 tablet by mouth every 4 (four) hours as needed for moderate pain for up to 10 doses, Disp: 10 tablet, Rfl: 0    amLODIPine (NORVASC) 5 mg tablet, Take 5 mg by mouth daily in the early morning, Disp: , Rfl:     atorvastatin (LIPITOR) 40 mg tablet, Take 40 mg by mouth daily at bedtime, Disp: , Rfl:     Cholecalciferol (VITAMIN D-3 PO), Take 2,000 unit marking on U-100 syringe by mouth daily after dinner, Disp: , Rfl:     co-enzyme Q-10 30 MG capsule, Take 30 mg by mouth daily after dinner, Disp: , Rfl:     furosemide (LASIX) 20 mg tablet, Take 20 mg by mouth daily as needed, Disp: , Rfl:     glucosamine-chondroitin 500-400 MG tablet, Take 2 tablets by mouth daily in the early morning, Disp: , Rfl:     HYDROcodone-acetaminophen (VICODIN) 7 5-500 MG per tablet, Take 1 tablet by mouth every 8 (eight) hours as needed for moderate pain, Disp: , Rfl:     multivitamin (THERAGRAN) TABS, Take 1 tablet by mouth daily in the early morning, Disp: , Rfl:     Omega-3 Fatty Acids (FISH OIL) 1,000 mg, Take 1,000 mg by mouth 2 (two) times a day, Disp: , Rfl:     sertraline (ZOLOFT) 50 mg tablet, Take 50 mg by mouth daily in the early morning, Disp: , Rfl:     sotalol (BETAPACE) 160 MG tablet, Take 320 mg by mouth 2 (two) times a day, Disp: , Rfl:     valsartan (DIOVAN) 160 mg tablet, Take 160 mg by mouth daily at bedtime, Disp: , Rfl:     warfarin (COUMADIN) 5 mg tablet, Take 5 mg by mouth daily Takes 5 mg everyday except  will take 2 5, Disp: , Rfl:       Objective    Vital Signs:   See Chart    [place] Sleepiness Scale:     3    Physical Exam:    General: Alert, appropriate, cooperative, overweight    Head: NC/AT, mild retrognathia    Skin: Warm, dry    Neuro: No motor abnormalities, cranial nerves appear intact    Extremity: No clubbing, cyanosis    PAP settin cm  DME Provider: [employer]    Counseling / Coordination of Care  Total clinic time spent today 15 minutes  Greater than 50% of total time was spent with the patient and / or family counseling and / or coordination of care  A description of the counseling / coordination of care: Discussed equipment and response to treatment  Baby Primrose, M D    Board Certified Sleep Specialist

## 2018-01-11 NOTE — PROGRESS NOTES
REPORT NAME: Progress Notes Report  VISIT DATE: 8/28/2017  VISIT TIME: 3:31 PM EDT  PATIENT NAME: Jacquelyn Dumas Medico RECORD NUMBER: [id number]  YOB: 1940  AGE: 78  REFERRING PHYSICIAN: Leeann Tovar  SUPERVISING CLINICIAN: Jimenez Porter CARE PROVIDER: [address]   PATIENT HOME ADDRESS: [address] [place] [address]   PATIENT HOME PHONE: [phone]  SOCIAL SECURITY NUMBER:   DIAGNOSIS 1: Unspecified atrial fibrillation / I48 91  DIAGNOSIS 2:   INR RANGE: 2 - 3  INR GOAL: 2 5  TREATMENT START DATE:   TREATMENT END DATE:   NEXT VISIT: 9/25/2017  [facility]  VISIT RESULTS  ENCOUNTER NUMBER:   TEST LOCATION: [facility]  TEST TYPE:   VISIT TYPE:   CURRENT INR: 2 68 PROTIME:   SPECIMEN COL AND RPT DATE: 8/28/2017 3:31 PM  EDT  VITAL SIGNS  PULSE:  BP: / WEIGHT:  HEIGHT:  TEMP:   CURRENT ANTICOAGULANT DOSING SCHEDULE  DOSE SIZE: 5mg    ANTICOAGULANT TYPE: WARFARIN  DOSING REGIMEN  Sun       Mon       Tues      Wed       Thurs     Fri       Sat  Total/Wk  5         5         5         5         2 5       5         5         32 5  PATIENT MEDICATION INSTRUCTION: Yes  PATIENT NUTRITIONAL COUNSELING: Yes  PATIENT BRUISING INSTRUCTION: Yes  LAST EDUCATION DATE:   PREVIOUS VISIT INFORMATION  VISITDATE  INRGoal INR   Sun    Mon    Tues   Wed    Thurs  Fri    Sat  Total/wk  8/28/2017   2 5     2 68  5      5      5      5      2 5    5      5  32 5  8/1/2017    2 5     2 7   5      5      5      5      2 5    5      5  32 5  7/10/2017   2 5     3 2   5      5      2 5    5      2 5    5      5  30  5      5      5      5      2 5    5      5  32 5  6/12/2017   2 5     2 6   5      5      5      5      2 5    5      5  32 5  ADDITIONAL PREVIOUS VISIT INFORMATION  VISITDATE   PRIMARY RX               DOSE      CrCl  8/28/2017   WARFARIN                 5mg  8/1/2017    WARFARIN                 5mg  7/10/2017   WARFARIN 5mg  6/12/2017   WARFARIN                 5mg  OTHER CURRENT MEDICATIONS:  WARFARIN  PROGRESS NOTES: spoke to pt / no changes / 4 wks  PATIENT INSTRUCTIONS:   TEST LOCATION: [facility], , ,   INBOUND LAB DATA:  Lab       Lab Value Col Date                 Rpt Date                 Lab  Reference Range  Electronically signed Lainey Bucio  on 8/28/2017 3:31 PM EDT

## 2018-01-12 VITALS
WEIGHT: 229.38 LBS | BODY MASS INDEX: 36.86 KG/M2 | HEART RATE: 60 BPM | DIASTOLIC BLOOD PRESSURE: 80 MMHG | SYSTOLIC BLOOD PRESSURE: 148 MMHG | HEIGHT: 66 IN

## 2018-01-12 NOTE — CONSULTS
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  Please see HPI  I recommended excision of the squamous cell carcinoma on the right cheek with frozen section, possible flap, possible full-thickness skin graft  We discussed with the patient the options, benefits, and risks of surgery such as anesthesia, bleeding, infection, scarring and the need for additional procedures  Consent was obtained and all questions answered to his satisfaction  We will plan for surgery at his earliest convenience  Thank you very much for allowing me to participate in the care of this patient  If you have any questions, please do not hesitate to contact me        Signatures   Electronically signed by : Sushma Manuel, [facility]; Oct  2 2017  1:50PM EST                       (Author)    Electronically signed by : REJI Herrera ; Oct  9 2017 11:37AM EST

## 2018-01-12 NOTE — PROGRESS NOTES
REPORT NAME: Progress Notes Report  VISIT DATE: 9/25/2017  VISIT TIME: 3:31 PM EDT  PATIENT NAME: Jacquelyn Albrecht Medico RECORD NUMBER: [id number]  YOB: 1940  AGE: 78  REFERRING PHYSICIAN: Noé Rutledge  SUPERVISING CLINICIAN: Jimenez Porter CARE PROVIDER: [address] [place] [address]  PATIENT HOME ADDRESS: [address] [place] [address], [place]    PATIENT HOME PHONE: [phone]  SOCIAL SECURITY NUMBER:   DIAGNOSIS 1: Unspecified atrial fibrillation / I48 91  DIAGNOSIS 2:   INR RANGE: 2 - 3  INR GOAL: 2 5  TREATMENT START DATE:   TREATMENT END DATE:   NEXT VISIT: 10/23/2017  [facility]  VISIT RESULTS  ENCOUNTER NUMBER:   TEST LOCATION: [facility]  TEST TYPE:   VISIT TYPE:   CURRENT INR: 2 42 PROTIME:   SPECIMEN COL AND RPT DATE: 9/25/2017 3:31 PM  EDT  VITAL SIGNS  PULSE:  BP: / WEIGHT:  HEIGHT:  TEMP:   CURRENT ANTICOAGULANT DOSING SCHEDULE  DOSE SIZE: 5mg    ANTICOAGULANT TYPE: WARFARIN  DOSING REGIMEN  Sun       Mon       Tues      Wed       Thurs     Fri       Sat  Total/Wk  5         5         5         5         2 5       5         5         32 5  PATIENT MEDICATION INSTRUCTION: Yes  PATIENT NUTRITIONAL COUNSELING: No  PATIENT BRUISING INSTRUCTION: No  LAST EDUCATION DATE:   PREVIOUS VISIT INFORMATION  VISITDATE  INRGoal INR   Sun    Mon    Tues   Wed    Thurs  Fri    Sat  Total/wk  9/25/2017   2 5     2 42  5      5      5      5      2 5    5      5  32 5  8/28/2017   2 5     2 68  5      5      5      5      2 5    5      5  32 5  8/1/2017    2 5     2 7   5      5      5      5      2 5    5      5  32 5  7/10/2017   2 5     3 2   5      5      2 5    5      2 5    5      5  30  5      5      5      5      2 5    5      5  32 5  ADDITIONAL PREVIOUS VISIT INFORMATION  VISITDATE   PRIMARY RX               DOSE      CrCl  9/25/2017   WARFARIN                 5mg  8/28/2017   WARFARIN                 5mg  8/1/2017    WARFARIN 5mg  7/10/2017   WARFARIN                 5mg  OTHER CURRENT MEDICATIONS:  WARFARIN  PROGRESS NOTES: gave dosage to pt  retest inr in 4 wks    PATIENT INSTRUCTIONS:   TEST LOCATION: [facility], , ,   INBOUND LAB DATA:  Lab       Lab Value Col Date                 Rpt Date                 Lab  Reference Range  Electronically signed Jose Gallo on 9/25/2017 3:31 PM EDT

## 2018-01-13 VITALS
HEIGHT: 66 IN | DIASTOLIC BLOOD PRESSURE: 69 MMHG | WEIGHT: 226.13 LBS | TEMPERATURE: 98.3 F | SYSTOLIC BLOOD PRESSURE: 137 MMHG | HEART RATE: 51 BPM | BODY MASS INDEX: 36.34 KG/M2

## 2018-01-13 VITALS
SYSTOLIC BLOOD PRESSURE: 148 MMHG | HEART RATE: 52 BPM | DIASTOLIC BLOOD PRESSURE: 80 MMHG | HEIGHT: 66 IN | WEIGHT: 231.5 LBS | BODY MASS INDEX: 37.21 KG/M2

## 2018-01-13 VITALS
HEIGHT: 66 IN | WEIGHT: 226.38 LBS | HEART RATE: 60 BPM | BODY MASS INDEX: 36.38 KG/M2 | DIASTOLIC BLOOD PRESSURE: 80 MMHG | SYSTOLIC BLOOD PRESSURE: 140 MMHG

## 2018-01-13 VITALS — BODY MASS INDEX: 37.77 KG/M2 | WEIGHT: 235 LBS | HEIGHT: 66 IN

## 2018-01-13 NOTE — RESULT NOTES
Verified Results  (1) PT WITH INR [id number] 12:26PM Yael Jimenez Order Number: [id number]     Test Name Result Flag Reference   INR 2 74 H 0 86-1 16   PT 29 4 seconds H 12 1-14 4

## 2018-01-13 NOTE — MISCELLANEOUS
Message   Recorded as Task   Date: 11/14/2017 09:29 AM, Created By: Librado Butt   Task Name: Miscellaneous   Assigned To: [facility] surgery sched,Team   Regarding Patient: Wolf Brown, Status: In Progress   Rufina Jonasla - 14 Nov 2017 9:29 AM     TASK CREATED  Pt called requesting to schedule B/L SIJ inj in January  Pt last had this done on 4/26 and those injections provided excellent relief, pain started to return a couple of weeks ago  Pain is the same as prior to last procedure  Pt is having a hard time going from sitting to standing position  Pt wants to wait until January bc of insurance  Is this OK to schedule? Bolivar Jay - 14 Nov 2017 4:39 PM     TASK REPLIED TO: Previously Assigned To Libertad Martin to schedule procedure and please schedule follow-up office visit for 4-6 weeks after injection   Librado Butt - 15 Nov 2017 9:31 AM     TASK IN PROGRESS   Arnol Oliveira - 15 Nov 2017 9:32 AM     TASK EDITED  Called pt, LMOM to cb to schedule  Arnol Oliveira - 15 Nov 2017 1:38 PM     TASK EDITED  Pt returned call, scheduled B/L SIJ inj on 1/3/18 in [facility] with Dr Jen Swain  Also scheduled 4w f/u on 1/30/18  Went over pre procedure instructions, NPO 1 hr prior, if sick or on abx needs to call to rs, wear loose, comf clothing - no buttons/zippers, needs   Pt verbalized understanding  Active Problems    1  Anxiety (300 00) (F41 9)   2  Aortic valve disease (424 1) (I35 9)   3  Atrial fibrillation (427 31) (I48 91)   4  Basal cell carcinoma of nose (173 31) (C44 311)   5  Chronic venous insufficiency (459 81) (I87 2)   6  Daytime hypersomnolence (780 54) (G47 19)   7  Degenerative arthritis (715 90) (M19 90)   8  Edema (782 3) (R60 9)   9  Hyperlipidemia (272 4) (E78 5)   10  Hypertension (401 9) (I10)   11  Low back pain (724 2) (M54 5)   12  Lumbar spinal stenosis (724 02) (M48 061)   13  Sacroiliac pain (724 6) (M53 3)   14  Sacroiliitis (720 2) (M46 1)   15  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) (M41 816)   16  Squamous cell carcinoma (173 92) (C44 92)    Current Meds   1  Acetaminophen 325 MG CAPS; Therapy: (Recorded:39Xvb0027) to Recorded   2  AmLODIPine Besylate 10 MG Oral Tablet; Take 1 tablet daily; Therapy: (Vale Longoria) to Recorded   3  Co Q 10 10 MG Oral Capsule; as directed; Therapy: 91Odi5957 to Recorded   4  Fish Oil 1000 MG Oral Capsule; TAKE 1 CAPSULE DAILY; Therapy: 31Ojy7840 to Recorded   5  Glucosamine Chondroitin Complx Oral Capsule; TAKE 1 CAPSULE DAILY; Therapy: 21KJF5382 to Recorded   6  Lipitor 40 MG Oral Tablet (Atorvastatin Calcium); take 1 tablet by mouth every day; Therapy: 11Brl9022 to Recorded   7  Multi-Vitamins Oral Tablet; TAKE 1 TABLET DAILY; Therapy: 05Dua0498 to Recorded   8  Sertraline HCl - 50 MG Oral Tablet; Take 1 tablet daily; Therapy: 94JOS3869 to Recorded   9  Sotalol HCl - 80 MG Oral Tablet; TAKE 2 TABLETS TWICE DAILY; Therapy: 62VAL6099 to (Evaluate:46Wyg8039)  Requested for: 28Jub5488; Last   Rx:40Tnd4903 Ordered   10  Torsemide 20 MG Oral Tablet; 1 tab PO prn; Therapy: 64DFA3522 to  Requested for: 34FAN0605 Recorded   11  Valsartan 320 MG Oral Tablet (Diovan); Take 1 tablet daily; Therapy: 76EJT8859 to (Bacilio Valdez)  Requested for: 14Aaf8489; Last    Rx:02Hun3136 Ordered   12  Vicodin TABS; Therapy: (Vale Promise) to Recorded   13  Vitamin D3 CAPS; Therapy: (Recorded:05Vey0467) to Recorded   14  Warfarin Sodium 5 MG Oral Tablet; TAKE 1 TABLET DAILY OR AS DIRECTEDRECTED; Therapy: 14FQI7753 to (Evaluate:50Uit5767)  Requested for: 19Pqx7244; Last    Rx:81Euk9290 Ordered    Allergies    1   No Known Drug Allergies    Signatures   Electronically signed by : Michelle Saul, ; Nov 15 2017  1:38PM EST                       (Author)

## 2018-01-14 VITALS
TEMPERATURE: 98.2 F | WEIGHT: 228.38 LBS | SYSTOLIC BLOOD PRESSURE: 158 MMHG | DIASTOLIC BLOOD PRESSURE: 66 MMHG | BODY MASS INDEX: 36.7 KG/M2 | HEIGHT: 66 IN

## 2018-01-14 VITALS — BODY MASS INDEX: 37.77 KG/M2 | HEIGHT: 66 IN | WEIGHT: 235 LBS

## 2018-01-14 VITALS — HEIGHT: 66 IN | BODY MASS INDEX: 36.72 KG/M2 | WEIGHT: 228.5 LBS

## 2018-01-15 NOTE — RESULT NOTES
Verified Results  (1) PT WITH INR [id number] 11:46AM Rupert Duarte     Test Name Result Flag Reference   INR 2 42 H 0 86-1 16   PT 26 6 seconds H 12 1-14 4

## 2018-01-15 NOTE — RESULT NOTES
Verified Results  HOLTER MONITOR - 24 HOUR [id number] 07:46AM Freada Scheuermann Order Number: [id number]    - Patient Instructions: To schedule this appointment, please contact Central Scheduling at [phone]  Test Name Result Flag Reference   HOLTER MONITOR - 24 HOUR (Report)     PT NAME: Uday Oconnor   : 1940 AGE: 78 y o  GENDER: male   MRN: [id number]  PROCEDURE: Holter monitor - 24 hour     INDICATIONS: Palpitations     DESCRIPTION OF FINDINGS:   The patient was monitored for a total of 24 hours  The patient was predominantly in sinus bradycardia throughout the study  The average heart rate was 52 beats per minute  The heart rate ranged from a low of 39 beats per minute in sinus bradycardia at    7:59 AM to a maximum of 103 beats per minute in a brief 20 beat run of atrial tachycardia at 4:48 AM      There was no ventricular ectopic activity noted during the monitoring period  There was no sustained or nonsustained ventricular tachycardia  Supraventricular ectopic activity consisted of 196 beats (0 2% of total beats), most of which were single PACs  There was a 20 beat run of atrial tachycardia at 4:48 AM corresponding to the maximum heart rate at 103 beats per minute  There was no atrial    fibrillation or atrial flutter idenitified  There was a 2 second sinus pause at 4:53 AM  There was no evidence of advanced degree heart block  The accompanying patient symptom diary did not have any symptoms noted during the study period  1  Predominantly sinus bradycardia, with an average heart rate of 52 beats per minute   2   Brief atrial tachycardia at 4:48 AM      Cardiology Fellow: Ondina Tan MD   Attending Physician: Rosa Augustin MD, [facility]

## 2018-01-16 NOTE — RESULT NOTES
Message   Recorded as Task   Date: 05/03/2017 02:19 PM, Created By: Oliverio Reddy   Task Name: Follow Up   Assigned To: [facility] [place] procedure,Team   Regarding Patient: Freddy Machuca, Status: Active   CommentVersa Ou - 03 May 6934 5:95 PM     TASK CREATED  S/P B/L SIJ INJ ON 4/26/2017 W/ DR De Luna Seek - F/U SCHEDULED ON 5/31/2017   Analy Weeks - 04 May 2017 9:08 AM     TASK EDITED  Patient reports he received 50% relief from his procedure  Patient aware of the 2wk wait  pain level today is a 4  F/u call next week  S/pB/L SIJ INJ- done 4/26/17     Cheryle Hipps - 04 May 2017 1:00 PM     TASK REPLIED TO: Previously Assigned To [facility]    Electronically signed by : Chely Ferrari, ; May  4 2017  1:14PM EST                       (Author)

## 2018-01-17 NOTE — RESULT NOTES
Verified Results  (1) PT WITH INR [id number] 09:06AM Sanjuana Pearce Order Number: [id number]     Test Name Result Flag Reference   INR 2 68 H 0 86-1 16   PT 28 9 seconds H 12 1-14 4

## 2018-01-18 NOTE — RESULT NOTES
Verified Results  (1) PT WITH INR [id number] 10:34AM Rima Bailon   TW Order Number: [id number]     Test Name Result Flag Reference   INR 2 51 H 0 86-1 16   PT 27 4 seconds H 12 1-14 4

## 2018-01-18 NOTE — RESULT NOTES
Verified Results  (1) COMPREHENSIVE METABOLIC PANEL [id number] 28:31DO Mini GUDINO Order Number: [id number]     Test Name Result Flag Reference   GLUCOSE,RANDM 118 mg/dL     If the patient is fasting, the ADA then defines impaired fasting glucose as > 100 mg/dL and diabetes as > or equal to 123 mg/dL  Specimen collection should occur prior to Sulfasalazine administration due to the potential for falsely depressed results  Specimen collection should occur prior to Sulfapyridine administration due to the potential for falsely elevated results  SODIUM 143 mmol/L  136-145   POTASSIUM 3 9 mmol/L  3 5-5 3   CHLORIDE 109 mmol/L H 100-108   CARBON DIOXIDE 27 mmol/L  21-32   ANION GAP (CALC) 7 mmol/L  4-13   BLOOD UREA NITROGEN 22 mg/dL  5-25   CREATININE 1 04 mg/dL  0 60-1 30   Standardized to IDMS reference method   CALCIUM 8 8 mg/dL  8 3-10 1   BILI, TOTAL 0 62 mg/dL  0 20-1 00   ALK PHOSPHATAS 86 U/L     ALT (SGPT) 26 U/L  12-78   Specimen collection should occur prior to Sulfasalazine and/or Sulfapyridine administration due to the potential for falsely depressed results  AST(SGOT) 16 U/L  5-45   Specimen collection should occur prior to Sulfasalazine administration due to the potential for falsely depressed results  ALBUMIN 3 4 g/dL L 3 5-5 0   TOTAL PROTEIN 6 9 g/dL  6 4-8 2   eGFR 70 ml/min/1 73sq m     [facility] Disease Education Program recommendations are as follows:  GFR calculation is accurate only with a steady state creatinine  Chronic Kidney disease less than 60 ml/min/1 73 sq  meters  Kidney failure less than 15 ml/min/1 73 sq  meters

## 2018-01-20 ENCOUNTER — ANTICOAG VISIT (OUTPATIENT)
Dept: CARDIOLOGY CLINIC | Facility: CLINIC | Age: 78
End: 2018-01-20

## 2018-01-20 DIAGNOSIS — I48.91 ATRIAL FIBRILLATION, UNSPECIFIED TYPE (HCC): ICD-10-CM

## 2018-01-23 ENCOUNTER — APPOINTMENT (OUTPATIENT)
Dept: LAB | Facility: CLINIC | Age: 78
End: 2018-01-23
Payer: MEDICARE

## 2018-01-23 DIAGNOSIS — I48.91 ATRIAL FIBRILLATION (HCC): ICD-10-CM

## 2018-01-23 LAB
INR PPP: 2.73 (ref 0.86–1.16)
INR PPP: 2.73 (ref 0.86–1.16)
PROTHROMBIN TIME: 29.3 SECONDS (ref 12.1–14.4)

## 2018-01-23 PROCEDURE — 85610 PROTHROMBIN TIME: CPT

## 2018-01-23 PROCEDURE — 36415 COLL VENOUS BLD VENIPUNCTURE: CPT

## 2018-01-23 NOTE — PROGRESS NOTES
REPORT NAME: Progress Notes Report  VISIT DATE: 12/20/2017  VISIT TIME: 11:42 AM EST  PATIENT NAME: Jule Koyanagi  MEDICAL RECORD NUMBER: [id number]  YOB: 1940  AGE: 78  REFERRING PHYSICIAN: JOSH CHRISTENSEN DO  SUPERVISING CLINICIAN: Raynelle Severin DO  HEALTH CARE PROVIDER: Viki Cantu  PATIENT HOME ADDRESS: [address] [place] [address], [place] [address]   PATIENT HOME PHONE: [phone]  SOCIAL SECURITY NUMBER:   DIAGNOSIS 1: Unspecified atrial fibrillation / I48 91  DIAGNOSIS 2:   INR RANGE: 2 - 3  INR GOAL: 2 5  TREATMENT START DATE:   TREATMENT END DATE:   NEXT VISIT: 1/17/2018  [facility]  VISIT RESULTS  ENCOUNTER NUMBER:   TEST LOCATION: [facility] [place] [facility]  TEST TYPE:   VISIT TYPE:   CURRENT INR: 2 9 PROTIME:   SPECIMEN COL AND RPT DATE: 12/20/2017 11:42 AM  EST  VITAL SIGNS  PULSE:  BP: / WEIGHT:  HEIGHT:  TEMP:   CURRENT ANTICOAGULANT DOSING SCHEDULE  DOSE SIZE: 5mg    ANTICOAGULANT TYPE: WARFARIN  DOSING REGIMEN  Sun       Mon Tues Wed Thurs Fri       Sat  Total/Wk  5         5         5         5         2 5       5         5         32 5  PATIENT MEDICATION INSTRUCTION: Yes  PATIENT NUTRITIONAL COUNSELING: No  PATIENT BRUISING INSTRUCTION: No  LAST EDUCATION DATE:   PREVIOUS VISIT INFORMATION  VISITDATE  INRGoal INR   Sun    Mon    Tues   Wed    Thurs  Fri    Sat  Total/wk  12/20/2017  2 5     2 9   5      5      5      5      2 5    5      5  32 5  11/20/2017  2 5     2 7   5      5      5      5      2 5    5      5  32 5  10/25/2017  2 5     2 5   5      5      5      5      2 5    5      5  32 5  9/25/2017   2 5     2 42  5      5      5      5      2 5    5      5  32 5  ADDITIONAL PREVIOUS VISIT INFORMATION  VISITDATE   PRIMARY RX               DOSE      CrCl  12/20/2017  WARFARIN                 5mg  11/20/2017  WARFARIN                 5mg  10/25/2017  WARFARIN                 5mg  9/25/2017   WARFARIN 5mg  OTHER CURRENT MEDICATIONS:  WARFARIN  PROGRESS NOTES: gave dosage to pt  retest inr in 4 wks    PATIENT INSTRUCTIONS:   TEST LOCATION: [facility] [place] [facility], , ,   INBOUND LAB DATA:  Lab       Lab Value Col Date                 Rpt Date                 Lab  Reference Range  Electronically signed byHerminia Salazar on 12/21/2017 11:42 AM EST

## 2018-01-23 NOTE — MISCELLANEOUS
Message   Recorded as Task   Date: 12/01/2017 11:56 AM, Created By: Gabby Dodd   Task Name: Miscellaneous   Assigned To: Arnol Oliveira   Regarding Patient: Marc Winter, Status: Active   CommentSalbador Iwona - 01 Dec 2017 11:56 AM     TASK CREATED  Pt called and was scheduled for B/L SIJ inj in January  Pt called to ask if appt could be moved to a sooner date  Moved appt to Tues 12/19  Pt states his pain is worsening and now pain is going down both legs, mostly Left leg and even has pain in Left thigh, which is a new symptom  Should we keep the B/L SIJ inj scheduled or would you like pt to come in for OV to evaluate since pain has changed? Joyce Waldron - 01 Dec 2017 4:53 PM     TASK REPLIED TO: Previously Assigned To Wilfrid Garcia                    Keep bilateral SI joint injections as scheduled  Arnol Oliveira - 04 Dec 2017 8:30 AM     TASK EDITED  Called pt, advised we will keep BL SIJ INJ  went over pre procedure instructions, NPO 1 hr prior, if sick or on abx needs to call to rs, wear loose, comf clothing - no buttons/ zippers, needs , pt verbalized understanding  Active Problems    1  Anxiety (300 00) (F41 9)   2  Aortic valve disease (424 1) (I35 9)   3  Atrial fibrillation (427 31) (I48 91)   4  Basal cell carcinoma of nose (173 31) (C44 311)   5  Chronic venous insufficiency (459 81) (I87 2)   6  Daytime hypersomnolence (780 54) (G47 19)   7  Degenerative arthritis (715 90) (M19 90)   8  Edema (782 3) (R60 9)   9  Hyperlipidemia (272 4) (E78 5)   10  Hypertension (401 9) (I10)   11  Low back pain (724 2) (M54 5)   12  Lumbar spinal stenosis (724 02) (M48 061)   13  Sacroiliac pain (724 6) (M53 3)   14  Sacroiliitis (720 2) (M46 1)   15  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)   16  Squamous cell carcinoma (173 92) (C44 92)    Current Meds   1  Acetaminophen 325 MG CAPS; Therapy: (Recorded:17Aug2017) to Recorded   2   AmLODIPine Besylate 10 MG Oral Tablet; Take 1 tablet daily; Therapy: (Maira Glad) to Recorded   3  Co Q 10 10 MG Oral Capsule; as directed; Therapy: 87Hyz5264 to Recorded   4  Fish Oil 1000 MG Oral Capsule; TAKE 1 CAPSULE DAILY; Therapy: 89Alj9271 to Recorded   5  Glucosamine Chondroitin Complx Oral Capsule; TAKE 1 CAPSULE DAILY; Therapy: 44HNQ9422 to Recorded   6  Lipitor 40 MG Oral Tablet (Atorvastatin Calcium); take 1 tablet by mouth every day; Therapy: 46Iea3998 to Recorded   7  Multi-Vitamins Oral Tablet; TAKE 1 TABLET DAILY; Therapy: 01Htf1406 to Recorded   8  Sertraline HCl - 50 MG Oral Tablet; Take 1 tablet daily; Therapy: 58RNT9811 to Recorded   9  Sotalol HCl - 80 MG Oral Tablet; TAKE 2 TABLETS TWICE DAILY; Therapy: 69SHZ4404 to (Evaluate:76Bhb1260)  Requested for: 72Hkv3646; Last   Rx:65Sut5984 Ordered   10  Torsemide 20 MG Oral Tablet; 1 tab PO prn; Therapy: 62MEE7958 to  Requested for: 03OCD1356 Recorded   11  Valsartan 320 MG Oral Tablet (Diovan); Take 1 tablet daily; Therapy: 36MSJ4584 to (Sukhdev Hendrix)  Requested for: 18Edz1335; Last    Rx:62Wzu7350 Ordered   12  Vicodin TABS; Therapy: (Maira Glad) to Recorded   13  Vitamin D3 CAPS; Therapy: (Recorded:60Jbj3686) to Recorded   14  Warfarin Sodium 5 MG Oral Tablet; Take 1/2 to 1 tablet daily by mouth or as directed by    physician; Therapy: 86AGF4371 to (Evaluate:03Xdw1822)  Requested for: 41GHC6298; Last    Rx:52Qzm3661 Ordered    Allergies    1   No Known Drug Allergies    Signatures   Electronically signed by : Emma Syed, ; Dec  4 2017  8:31AM EST                       (Author)

## 2018-01-23 NOTE — RESULT NOTES
Verified Results  (1) PT WITH INR 85Hxj4403 09:12AM Alessandro    TW Order Number: [id number]     Test Name Result Flag Reference   INR 2 90 H 0 86-1 16   PT 30 7 seconds H 12 1-14 4

## 2018-01-24 DIAGNOSIS — I10 ESSENTIAL (PRIMARY) HYPERTENSION: Primary | ICD-10-CM

## 2018-01-24 RX ORDER — WARFARIN SODIUM 5 MG/1
TABLET ORAL
Qty: 30 TABLET | Refills: 0 | Status: SHIPPED | OUTPATIENT
Start: 2018-01-24 | End: 2018-01-26 | Stop reason: SDUPTHER

## 2018-01-25 ENCOUNTER — ANTICOAG VISIT (OUTPATIENT)
Dept: CARDIOLOGY CLINIC | Facility: CLINIC | Age: 78
End: 2018-01-25

## 2018-01-25 DIAGNOSIS — I48.91 ATRIAL FIBRILLATION, UNSPECIFIED TYPE (HCC): ICD-10-CM

## 2018-01-26 DIAGNOSIS — I48.91 ATRIAL FIBRILLATION, UNSPECIFIED TYPE (HCC): Primary | ICD-10-CM

## 2018-01-26 DIAGNOSIS — I10 ESSENTIAL (PRIMARY) HYPERTENSION: ICD-10-CM

## 2018-01-26 RX ORDER — WARFARIN SODIUM 5 MG/1
5 TABLET ORAL DAILY
Qty: 30 TABLET | Refills: 11 | Status: SHIPPED | OUTPATIENT
Start: 2018-01-26 | End: 2019-03-06 | Stop reason: SDUPTHER

## 2018-01-26 RX ORDER — WARFARIN SODIUM 5 MG/1
5 TABLET ORAL
Qty: 30 TABLET | Refills: 11 | Status: CANCELLED | OUTPATIENT
Start: 2018-01-26

## 2018-01-30 ENCOUNTER — CLINICAL SUPPORT (OUTPATIENT)
Dept: PAIN MEDICINE | Facility: CLINIC | Age: 78
End: 2018-01-30
Payer: MEDICARE

## 2018-01-30 VITALS
HEART RATE: 59 BPM | HEIGHT: 66 IN | WEIGHT: 240.4 LBS | BODY MASS INDEX: 38.63 KG/M2 | DIASTOLIC BLOOD PRESSURE: 67 MMHG | TEMPERATURE: 98.1 F | SYSTOLIC BLOOD PRESSURE: 162 MMHG

## 2018-01-30 DIAGNOSIS — M48.061 SPINAL STENOSIS OF LUMBAR REGION WITHOUT NEUROGENIC CLAUDICATION: ICD-10-CM

## 2018-01-30 DIAGNOSIS — M46.1 SACROILIITIS (HCC): Primary | ICD-10-CM

## 2018-01-30 DIAGNOSIS — M47.816 SPONDYLOSIS OF LUMBAR REGION WITHOUT MYELOPATHY OR RADICULOPATHY: ICD-10-CM

## 2018-01-30 PROCEDURE — 99214 OFFICE O/P EST MOD 30 MIN: CPT | Performed by: ANESTHESIOLOGY

## 2018-01-30 NOTE — PROGRESS NOTES
Assessment:  1  Sacroiliitis ([facility] [address] )    2  Spondylosis of lumbar region without myelopathy or radiculopathy    3  Spinal stenosis of lumbar region without neurogenic claudication        Plan:  56-year-old male returning for follow-up of lumbosacral back pain that radiates into the buttocks bilaterally secondary to sacroiliitis  The patient does have a history of lumbar stenosis, but denies any radicular symptoms into his lower extremities  The patient did have excellent relief from SI joint injections in April 2017, however his most recent SI joint injections did not provide nearly as much relief  He continues to take Norco 7 5/325 mg q 8 hours p r n  which is provided by his PCP  The patient is currently anticoagulated for atrial fibrillation, therefore he is unable to take NSAIDs  He also takes Tylenol p r n  1   I will schedule the patient for bilateral SI joint injections to reduce the inflammatory component of his pain  2   If the patient does not respond to bilateral SI joint injections we may consider medial and lateral branch blocks in preparation for possible SI joint RFA  3  The patient may continue with Norco as prescribed by his PCP  4  Will avoid NSAIDs secondary to anticoagulation  5  The patient may continue with Tylenol p r n  and should not exceed more than 3000 mg 24 hours  6  Patient will continue with his home exercise program  7  I will follow up the patient in 6-8 weeks after injection    My impressions and treatment recommendations were discussed in detail with the patient who verbalized understanding and had no further questions  Discharge instructions were provided  I personally saw and examined the patient and I agree with the above discussed plan of care  No orders of the defined types were placed in this encounter  No orders of the defined types were placed in this encounter        History of Present Illness:    Jayla Giles is a 78 y o  male returns for follow-up of lumbosacral back pain that radiates into the buttocks secondary to sacroiliitis  The patient also has a history of lumbar degenerative disc disease, spondylosis, and stenosis  He denies any radicular symptoms into his lower extremities  He denies any bladder or bowel incontinence or saddle anesthesia  The patient had excellent relief from bilateral SI joint injections that were done in April 2017, however his most recent SI joint injections did not provide much relief at all  He is currently taking Norco 7 5/325 mg q 8 hours p r n  which is provided by his PCP and Tylenol without much relief at all  He is unable to take NSAIDs secondary to anticoagulation  The patient rates pain 8/10 on the pain does not follow any particular pattern throughout the day  The pain is constant and described sharp and shooting  The pain is worse with standing, walking, and bending and twisting at the waist   The pain is alleviated with sitting, lying down, and relaxation  I have personally reviewed and/or updated the patient's past medical history, past surgical history, family history, social history, current medications, allergies, and vital signs today  Other than as stated above, the patient denies any interval changes in medications, medical condition, mental condition, symptoms, or allergies since the last office visit  Review of Systems:    Review of Systems   Respiratory: Negative for shortness of breath  Cardiovascular: Negative for chest pain  Gastrointestinal: Negative for constipation, diarrhea, nausea and vomiting  Musculoskeletal: Negative for arthralgias, gait problem (difficulty walking), joint swelling (joint stiffness) and myalgias  Skin: Negative for rash  Neurological: Negative for dizziness, seizures and weakness (muscle)  All other systems reviewed and are negative        Patient Active Problem List   Diagnosis    Atrial fibrillation ([facility] [address] ) [I48 91]       Past Medical History: Diagnosis Date    A-fib [facility])     Anxiety     Arthritis     Depression     Hyperlipidemia     Hypertension     Irregular heart beat     Stroke ([facility] [address] )     TIA (transient ischemic attack)     no residual problems       Past Surgical History:   Procedure Laterality Date    FACIAL/NECK BIOPSY N/A 4/3/2017    Procedure: NOSE BCC EXCISION; FROZEN SECTION; RECONSTRUCTION ;  Surgeon: Brendon Plummer MD;  [facility];  Service:     FULL THICKNESS SKIN GRAFT N/A 4/3/2017    Procedure: FLAP VERSUS FULL THICKNESS SKIN GRAFT ;  Surgeon: Brendon Plummer MD;  [facility];  Service:     PROSTATECTOMY      TONSILLECTOMY AND ADENOIDECTOMY         No family history on file  Social History     Occupational History    Not on file       Social History Main Topics    Smoking status: Never Smoker    Smokeless tobacco: Never Used    Alcohol use 8 4 oz/week     14 Glasses of wine per week    Drug use: No    Sexual activity: Not on file       Current Outpatient Prescriptions on File Prior to Visit   Medication Sig    acetaminophen-codeine (TYLENOL #3) 300-30 mg per tablet Take 1 tablet by mouth every 4 (four) hours as needed for moderate pain for up to 10 doses    amLODIPine (NORVASC) 5 mg tablet Take 5 mg by mouth daily in the early morning    atorvastatin (LIPITOR) 40 mg tablet Take 40 mg by mouth daily at bedtime    Cholecalciferol (VITAMIN D-3 PO) Take 2,000 unit marking on U-100 syringe by mouth daily after dinner    co-enzyme Q-10 30 MG capsule Take 30 mg by mouth daily after dinner    furosemide (LASIX) 20 mg tablet Take 20 mg by mouth daily as needed    glucosamine-chondroitin 500-400 MG tablet Take 2 tablets by mouth daily in the early morning    HYDROcodone-acetaminophen (VICODIN) 7 5-500 MG per tablet Take 1 tablet by mouth every 8 (eight) hours as needed for moderate pain    multivitamin (THERAGRAN) TABS Take 1 tablet by mouth daily in the early morning    Omega-3 Fatty Acids (FISH OIL) 1,000 mg Take 1,000 mg by mouth 2 (two) times a day    sertraline (ZOLOFT) 50 mg tablet Take 50 mg by mouth daily in the early morning    sotalol (BETAPACE) 160 MG tablet Take 320 mg by mouth 2 (two) times a day    valsartan (DIOVAN) 160 mg tablet Take 160 mg by mouth daily at bedtime    warfarin (COUMADIN) 5 mg tablet Take 1 tablet (5 mg total) by mouth daily Or as directed     No current facility-administered medications on file prior to visit  No Known Allergies    Physical Exam:    There were no vitals taken for this visit  Constitutional: obese  Eyes: anicteric  HEENT: grossly intact  Neck: supple, symmetric, trachea midline and no masses   Pulmonary:even and unlabored  Cardiovascular:2+ pitting edema bilateral lower extremities from the knees to the feet  Skin:Normal without rashes or lesions and well hydrated  Psychiatric:Mood and affect appropriate  Neurologic:Cranial Nerves II-XII grossly intact  Musculoskeletal:antalgic gait  Bilateral lumbar paraspinals and SI joints tender to palpation  Positive Te's, Gaenslen's, and AP compression test bilaterally  Negative seated straight leg raise bilaterally    Bilateral lower extremity strength 5/5 in all muscle groups    Imaging  Imaging reviewed

## 2018-02-09 ENCOUNTER — TELEPHONE (OUTPATIENT)
Dept: PAIN MEDICINE | Facility: CLINIC | Age: 78
End: 2018-02-09

## 2018-02-09 DIAGNOSIS — I10 ESSENTIAL (PRIMARY) HYPERTENSION: Primary | ICD-10-CM

## 2018-02-09 RX ORDER — VALSARTAN 320 MG/1
TABLET ORAL
Qty: 90 TABLET | Refills: 0 | Status: SHIPPED | OUTPATIENT
Start: 2018-02-09 | End: 2018-08-14 | Stop reason: CLARIF

## 2018-02-09 NOTE — TELEPHONE ENCOUNTER
Pt called and stated he was put on Meloxicam 7 5mg daily due to an injured finger  Pt stated it is helping his pain in his back  Would like to know if he should still have his procedure done on 2/14  Please call [phone]

## 2018-02-09 NOTE — TELEPHONE ENCOUNTER
S/W pt  Pt stated he is taking meloxicam for a finger injury and it is helping his b/l lower back pain  Pt stated "pain it a lot better now" with taking the meloxicam   Pt is asking if he should still have the procedure? Please advise      --Pt stated okay to C/B on Monday and can leave a detailed message if he does not answer--

## 2018-02-09 NOTE — TELEPHONE ENCOUNTER
The patient should avoid all NSAIDs secondary to anticoagulation, or the patient should at least clear this with the prescriber of the anticoagulation    If the patient feels he does not need the procedure then we can either cancel or postpone it

## 2018-02-12 NOTE — TELEPHONE ENCOUNTER
**FYI**    S/w pt, informed him that [address] [place] [address] wants his Coumadin provider to be aware that he is on Meloxicam, Pt states that his PCP is aware and will f/u with them to get a refill  Pt reports significant relief with taking the Meloxicam and at this time would like to postpone the procedure, cx'd his inj on 2/14 and his 6 week f/u appt   aware as well

## 2018-02-21 ENCOUNTER — APPOINTMENT (OUTPATIENT)
Dept: LAB | Facility: CLINIC | Age: 78
End: 2018-02-21
Payer: MEDICARE

## 2018-02-21 ENCOUNTER — ANTICOAG VISIT (OUTPATIENT)
Dept: CARDIOLOGY CLINIC | Facility: CLINIC | Age: 78
End: 2018-02-21

## 2018-02-21 ENCOUNTER — TRANSCRIBE ORDERS (OUTPATIENT)
Dept: LAB | Facility: CLINIC | Age: 78
End: 2018-02-21

## 2018-02-21 DIAGNOSIS — I48.91 ATRIAL FIBRILLATION, UNSPECIFIED TYPE (HCC): Primary | ICD-10-CM

## 2018-02-21 DIAGNOSIS — I48.91 ATRIAL FIBRILLATION, UNSPECIFIED TYPE (HCC): ICD-10-CM

## 2018-02-21 LAB
INR PPP: 2.53 (ref 0.86–1.16)
PROTHROMBIN TIME: 27.6 SECONDS (ref 12.1–14.4)

## 2018-02-21 PROCEDURE — 85610 PROTHROMBIN TIME: CPT

## 2018-02-21 PROCEDURE — 36415 COLL VENOUS BLD VENIPUNCTURE: CPT

## 2018-02-23 ENCOUNTER — HOSPITAL ENCOUNTER (OUTPATIENT)
Dept: RADIOLOGY | Facility: CLINIC | Age: 78
Discharge: HOME | End: 2018-02-23
Admitting: ANESTHESIOLOGY
Payer: MEDICARE

## 2018-02-23 VITALS
RESPIRATION RATE: 20 BRPM | HEART RATE: 55 BPM | DIASTOLIC BLOOD PRESSURE: 70 MMHG | OXYGEN SATURATION: 98 % | SYSTOLIC BLOOD PRESSURE: 154 MMHG | TEMPERATURE: 98.1 F

## 2018-02-23 DIAGNOSIS — M46.1 SACROILIITIS (HCC): ICD-10-CM

## 2018-02-23 PROCEDURE — 27096 INJECT SACROILIAC JOINT: CPT | Performed by: ANESTHESIOLOGY

## 2018-02-23 RX ORDER — METHYLPREDNISOLONE ACETATE 80 MG/ML
80 INJECTION, SUSPENSION INTRA-ARTICULAR; INTRALESIONAL; INTRAMUSCULAR; PARENTERAL; SOFT TISSUE ONCE
Status: COMPLETED | OUTPATIENT
Start: 2018-02-23 | End: 2018-02-23

## 2018-02-23 RX ORDER — LIDOCAINE HYDROCHLORIDE 10 MG/ML
5 INJECTION, SOLUTION EPIDURAL; INFILTRATION; INTRACAUDAL; PERINEURAL ONCE
Status: COMPLETED | OUTPATIENT
Start: 2018-02-23 | End: 2018-02-23

## 2018-02-23 RX ORDER — BUPIVACAINE HCL/PF 2.5 MG/ML
30 VIAL (ML) INJECTION ONCE
Status: COMPLETED | OUTPATIENT
Start: 2018-02-23 | End: 2018-02-23

## 2018-02-23 RX ADMIN — METHYLPREDNISOLONE ACETATE 80 MG: 80 INJECTION, SUSPENSION INTRA-ARTICULAR; INTRALESIONAL; INTRAMUSCULAR; SOFT TISSUE at 16:10

## 2018-02-23 RX ADMIN — BUPIVACAINE HYDROCHLORIDE 4 ML: 2.5 INJECTION, SOLUTION EPIDURAL; INFILTRATION; INTRACAUDAL at 16:10

## 2018-02-23 RX ADMIN — LIDOCAINE HYDROCHLORIDE 4 ML: 10 INJECTION, SOLUTION EPIDURAL; INFILTRATION; INTRACAUDAL; PERINEURAL at 16:18

## 2018-02-23 RX ADMIN — IOHEXOL 1 ML: 300 INJECTION, SOLUTION INTRAVENOUS at 16:17

## 2018-02-23 NOTE — DISCHARGE INSTRUCTIONS
Steroid Joint Injection   WHAT YOU NEED TO KNOW:   A steroid joint injection is a procedure to inject steroid medicine into a joint  Steroid medicine decreases pain and inflammation  The injection may also contain an anesthetic (numbing medicine) to decrease pain  It may be done to treat conditions such as arthritis, gout, or carpal tunnel syndrome  The injections may be given in your knee, ankle, shoulder, elbow, wrist, ankle or sacroiliac joint  1  Do not apply heat to any area that is numb  If you have discomfort or soreness at the injection site, you may apply ice today, 20 minutes on and 20 minutes off  Tomorrow you may use ice or warm, moist heat  Do not apply ice or heat directly to the skin  2  You may have an increase or change in the discomfort for 36-48 hours after your treatment  Apply ice and continue with any pain medicine you have been prescribed  3  Do not do anything strenuous today  You may shower, but no tub baths or hot tubs today  You may resume your normal activities tomorrow, but do not overdo it  Resume normal activities slowly when you are feeling better  4  If you experience redness, drainage or swelling at the injection site, or if you develop a fever above 100 degrees, please call [facility] at [phone] or go to the Emergency Room  5  Continue to take all routine medicines prescribed by your primary care physician unless otherwise instructed by our staff  Most blood thinners should be started again according to your regularly scheduled dosing  If you have any questions, please give our office a call  If you have a problem specifically related to your procedure, please call our office at [phone]  Problems not related to your procedure should be directed to your primary care physician

## 2018-02-23 NOTE — H&P
History of Present Illness: The patient is a 78 y o  male who presents with complaints of low back pain      Patient Active Problem List   Diagnosis    Atrial fibrillation ([facility] ) [I48 91]    Lumbar spinal stenosis    Sacroiliitis (HCC)    Spondylosis of lumbar region without myelopathy or radiculopathy       Past Medical History:   Diagnosis Date    [facility]     Anxiety     Arthritis     Depression     Hyperlipidemia     Hypertension     Irregular heart beat     Stroke ([facility] )     TIA (transient ischemic attack)     no residual problems       Past Surgical History:   Procedure Laterality Date    FACIAL/NECK BIOPSY N/A 4/3/2017    Procedure: NOSE BCC EXCISION; FROZEN SECTION; RECONSTRUCTION ;  Surgeon: Ruel Pelaez MD;  [facility];  Service:     FULL THICKNESS SKIN GRAFT N/A 4/3/2017    Procedure: FLAP VERSUS FULL THICKNESS SKIN GRAFT ;  Surgeon: Ruel Pelaez MD;  [facility];  Service:    Romario Bales PROSTATECTOMY      TONSILLECTOMY AND ADENOIDECTOMY           Current Outpatient Prescriptions:     warfarin (COUMADIN) 5 mg tablet, Take 1 tablet (5 mg total) by mouth daily Or as directed (Patient taking differently: Take 5 mg by mouth daily Or as directed ), Disp: 30 tablet, Rfl: 11    Acetaminophen 325 MG CAPS, Take by mouth, Disp: , Rfl:     amLODIPine (NORVASC) 5 mg tablet, Take 5 mg by mouth daily in the early morning, Disp: , Rfl:     atorvastatin (LIPITOR) 40 mg tablet, Take 40 mg by mouth daily at bedtime, Disp: , Rfl:     Cholecalciferol (VITAMIN D-3 PO), Take 2,000 unit marking on U-100 syringe by mouth daily after dinner, Disp: , Rfl:     co-enzyme Q-10 30 MG capsule, Take 30 mg by mouth daily after dinner, Disp: , Rfl:     glucosamine-chondroitin 500-400 MG tablet, Take 2 tablets by mouth daily in the early morning, Disp: , Rfl:     HYDROcodone-acetaminophen (VICODIN) 7 5-500 MG per tablet, Take 1 tablet by mouth every 8 (eight) hours as needed for moderate pain, Disp: , Rfl:     multivitamin (THERAGRAN) TABS, Take 1 tablet by mouth daily in the early morning, Disp: , Rfl:     Omega-3 Fatty Acids (FISH OIL) 1,000 mg, Take 1,000 mg by mouth 2 (two) times a day, Disp: , Rfl:     sertraline (ZOLOFT) 50 mg tablet, Take 50 mg by mouth daily in the early morning, Disp: , Rfl:     sotalol (BETAPACE) 160 MG tablet, Take 320 mg by mouth 2 (two) times a day, Disp: , Rfl:     valsartan (DIOVAN) 160 mg tablet, Take 160 mg by mouth daily at bedtime, Disp: , Rfl:     valsartan (DIOVAN) 320 MG tablet, TAKE 1 TABLET DAILY, Disp: 90 tablet, Rfl: 0    No Known Allergies    Physical Exam:   Vitals:    02/23/18 1527   BP: 152/73   Pulse: (!) 54   Resp: 20   Temp: 98 1 °F (36 7 °C)   SpO2: 97%     General: Awake, Alert, Oriented x 3, Mood and affect appropriate  Respiratory: Respirations even and unlabored  Cardiovascular: Peripheral pulses intact; no edema  Musculoskeletal Exam:   Bilateral SI joints tender to palpation  ASA Score: 3    Assessment:  Sacroiliitis    Plan: sacroiliitis - bilateral SI joint injections      Assessment:  1  Sacroiliitis ([address] )    2  Spondylosis of lumbar region without myelopathy or radiculopathy    3  Spinal stenosis of lumbar region without neurogenic claudication          Plan:  79-year-old male returning for follow-up of lumbosacral back pain that radiates into the buttocks bilaterally secondary to sacroiliitis  The patient does have a history of lumbar stenosis, but denies any radicular symptoms into his lower extremities  The patient did have excellent relief from SI joint injections in April 2017, however his most recent SI joint injections did not provide nearly as much relief  He continues to take Norco 7 5/325 mg q 8 hours p r n  which is provided by his PCP  The patient is currently anticoagulated for atrial fibrillation, therefore he is unable to take NSAIDs  He also takes Tylenol p r n    1   I will schedule the patient for bilateral SI joint injections to reduce the inflammatory component of his pain  2   If the patient does not respond to bilateral SI joint injections we may consider medial and lateral branch blocks in preparation for possible SI joint RFA  3  The patient may continue with Norco as prescribed by his PCP  4  Will avoid NSAIDs secondary to anticoagulation  5  The patient may continue with Tylenol p r n  and should not exceed more than 3000 mg 24 hours  6  Patient will continue with his home exercise program  7  I will follow up the patient in 6-8 weeks after injection     My impressions and treatment recommendations were discussed in detail with the patient who verbalized understanding and had no further questions  Discharge instructions were provided  I personally saw and examined the patient and I agree with the above discussed plan of care      No orders of the defined types were placed in this encounter      No orders of the defined types were placed in this encounter         History of Present Illness:    Nacho Coleman is a 78 y o  male returns for follow-up of lumbosacral back pain that radiates into the buttocks secondary to sacroiliitis  The patient also has a history of lumbar degenerative disc disease, spondylosis, and stenosis  He denies any radicular symptoms into his lower extremities  He denies any bladder or bowel incontinence or saddle anesthesia  The patient had excellent relief from bilateral SI joint injections that were done in April 2017, however his most recent SI joint injections did not provide much relief at all  He is currently taking Norco 7 5/325 mg q 8 hours p r n  which is provided by his PCP and Tylenol without much relief at all  He is unable to take NSAIDs secondary to anticoagulation  The patient rates pain 8/10 on the pain does not follow any particular pattern throughout the day  The pain is constant and described sharp and shooting    The pain is worse with standing, walking, and bending and twisting at the waist   The pain is alleviated with sitting, lying down, and relaxation         I have personally reviewed and/or updated the patient's past medical history, past surgical history, family history, social history, current medications, allergies, and vital signs today       Other than as stated above, the patient denies any interval changes in medications, medical condition, mental condition, symptoms, or allergies since the last office visit                                                        Review of Systems:     Review of Systems   Respiratory: Negative for shortness of breath  Cardiovascular: Negative for chest pain  Gastrointestinal: Negative for constipation, diarrhea, nausea and vomiting  Musculoskeletal: Negative for arthralgias, gait problem (difficulty walking), joint swelling (joint stiffness) and myalgias  Skin: Negative for rash  Neurological: Negative for dizziness, seizures and weakness (muscle)     All other systems reviewed and are negative             Patient Active Problem List   Diagnosis    [facility] [I48 91]         Medical History        Past Medical History:   Diagnosis Date    A-fib ([facility] )      Anxiety      Arthritis      Depression      Hyperlipidemia      Hypertension      Irregular heart beat      Stroke ([facility] )      TIA (transient ischemic attack)       no residual problems            Surgical History         Past Surgical History:   Procedure Laterality Date    FACIAL/NECK BIOPSY N/A 4/3/2017     Procedure: NOSE BCC EXCISION; FROZEN SECTION; RECONSTRUCTION ;  Surgeon: Pina Aldana MD;  [facility];  Service:     FULL THICKNESS SKIN GRAFT N/A 4/3/2017     Procedure: FLAP VERSUS FULL THICKNESS SKIN GRAFT ;  Surgeon: Pina Aldana MD;  [facility];  Service:    Imaniyordan Salmeron PROSTATECTOMY        TONSILLECTOMY AND ADENOIDECTOMY                No family history on file      Social History          Occupational History    Not on file             Social History Main Topics    Smoking status: Never Smoker    Smokeless tobacco: Never Used    Alcohol use 8 4 oz/week       14 Glasses of wine per week    Drug use: No    Sexual activity: Not on file              Current Outpatient Prescriptions on File Prior to Visit   Medication Sig    acetaminophen-codeine (TYLENOL #3) 300-30 mg per tablet Take 1 tablet by mouth every 4 (four) hours as needed for moderate pain for up to 10 doses    amLODIPine (NORVASC) 5 mg tablet Take 5 mg by mouth daily in the early morning    atorvastatin (LIPITOR) 40 mg tablet Take 40 mg by mouth daily at bedtime    Cholecalciferol (VITAMIN D-3 PO) Take 2,000 unit marking on U-100 syringe by mouth daily after dinner    co-enzyme Q-10 30 MG capsule Take 30 mg by mouth daily after dinner    furosemide (LASIX) 20 mg tablet Take 20 mg by mouth daily as needed    glucosamine-chondroitin 500-400 MG tablet Take 2 tablets by mouth daily in the early morning    HYDROcodone-acetaminophen (VICODIN) 7 5-500 MG per tablet Take 1 tablet by mouth every 8 (eight) hours as needed for moderate pain    multivitamin (THERAGRAN) TABS Take 1 tablet by mouth daily in the early morning    Omega-3 Fatty Acids (FISH OIL) 1,000 mg Take 1,000 mg by mouth 2 (two) times a day    sertraline (ZOLOFT) 50 mg tablet Take 50 mg by mouth daily in the early morning    sotalol (BETAPACE) 160 MG tablet Take 320 mg by mouth 2 (two) times a day    valsartan (DIOVAN) 160 mg tablet Take 160 mg by mouth daily at bedtime    warfarin (COUMADIN) 5 mg tablet Take 1 tablet (5 mg total) by mouth daily Or as directed      No current facility-administered medications on file prior to visit           No Known Allergies     Physical Exam:     There were no vitals taken for this visit      Constitutional: obese  Eyes: anicteric  HEENT: grossly intact  Neck: supple, symmetric, trachea midline and no masses   Pulmonary:even and unlabored  Cardiovascular:2+ pitting edema bilateral lower extremities from the knees to the feet  Skin:Normal without rashes or lesions and well hydrated  Psychiatric:Mood and affect appropriate  Neurologic:Cranial Nerves II-XII grossly intact  Musculoskeletal:antalgic gait  Bilateral lumbar paraspinals and SI joints tender to palpation  Positive Te's, Gaenslen's, and AP compression test bilaterally  Negative seated straight leg raise bilaterally    Bilateral lower extremity strength 5/5 in all muscle groups

## 2018-03-01 ENCOUNTER — TELEPHONE (OUTPATIENT)
Dept: PAIN MEDICINE | Facility: CLINIC | Age: 78
End: 2018-03-01

## 2018-03-01 DIAGNOSIS — M79.18 MYOFASCIAL PAIN: Primary | ICD-10-CM

## 2018-03-01 NOTE — TELEPHONE ENCOUNTER
[facility]- patient called back stating he feels about the same  No relief  Current level 9/10  Not taking any meds   c/b [phone]

## 2018-03-02 RX ORDER — METHOCARBAMOL 500 MG/1
500 TABLET, FILM COATED ORAL 2 TIMES DAILY PRN
Qty: 60 TABLET | Refills: 1 | Status: SHIPPED | OUTPATIENT
Start: 2018-03-02 | End: 2018-07-09

## 2018-03-02 NOTE — TELEPHONE ENCOUNTER
Methocarbamol 500 mg q 12 hours p r n  sent to pharmacy to help with muscle spasms    Agree with the patient giving it more time to determine true effectiveness of injection

## 2018-03-02 NOTE — TELEPHONE ENCOUNTER
Phone call from patient stating he is still experiencing pain and questioning what his options are  Please call patient at [phone]

## 2018-03-02 NOTE — TELEPHONE ENCOUNTER
Looks like the patient is S/P a B/L AIDEE  On 2/23/18  S/w the patient and he stated he has had no relief so far  Reviewed the postoperative instructions again and he stated that he has to give the injection another week to get the full effects of the medication  He is wondering if you prescribe a MR to help with the pain  He denies taking any other medication for pain  Emotional support provided  JW to advise   Thanks

## 2018-03-05 NOTE — TELEPHONE ENCOUNTER
S/w pt to inform him that [address] [place] [address] sent Robaxin to his pharmacy for PRN muscle spasms, reviewed instructions and s/e  Pt states that he is improving each day from the injection  He wants to know if he has to make a f/u appt, when would you like to see him or should he f/u PRN? Please advise, thanks

## 2018-03-07 NOTE — PROCEDURES
Procedure      Sacroiliac Joint Injection     Indication:   Low back/buttock pain  Preoperative Diagnosis:  1  Sacroiliac Joint Pain  2  Sacroiliitis  Postoperative Diagnosis: 1  Sacroiliac Joint Pain  2  Sacroiliitis  Procedure:  Fluoroscopically-guided injection of the bilateral sacroiliac joint(s)    After discussing the risks, benefits, and alternatives to the procedure, the patient expressed understanding and wished to proceed  The patient was brought to the fluoroscopy suite and placed in the prone position  Procedural pause conducted to verify: correct patient identity, procedure to be performed and as applicable, correct side and site, correct patient position, and availability of implants, special equipment and special requirements  Using fluoroscopy, the inferior portion of the right sacroiliac joint was identified  The skin was sterilely prepped and draped in the usual fashion using Chloraprep skin prep  The skin and subcutaneous tissue were anesthetized with 1% lidocaine  Using fluoroscopic guidance, a 3-1/2 inch 22 gauge spinal needle was advanced into joint  Proper needle positioning was confirmed using multiple fluoroscopic views  After negative aspiration, 0 5 mL of Omnipaque 300 contrast was injected, showing intraarticular spread of contrast without any evidence of intravascular uptake  A 2 5 mL solution consisting of 40 mg of Depo-Medrol in 0 25% bupivacaine was injected slowly and incrementally into the joint  Following the injection, the needle was withdrawn slightly and flushed with lidocaine as it was fully extracted  The procedure was repeated in the exact same way on the opposite side  The patient tolerated the procedure well and there were no apparent complications  After appropriate observation, the patient was dismissed from the clinic in good condition under their own power  COMMENTS  The patient received a total steroid dose of 80 mg of Depo-Medrol        Signatures   Electronically signed by : Yudith Paredes DO; Dec 19 2017 12:54PM EST                       (Author)

## 2018-03-09 LAB — HBA1C MFR BLD HPLC: 6.4 %

## 2018-03-19 ENCOUNTER — APPOINTMENT (OUTPATIENT)
Dept: LAB | Facility: CLINIC | Age: 78
End: 2018-03-19
Payer: MEDICARE

## 2018-03-19 ENCOUNTER — OFFICE VISIT (OUTPATIENT)
Dept: CARDIOLOGY CLINIC | Facility: CLINIC | Age: 78
End: 2018-03-19
Payer: MEDICARE

## 2018-03-19 ENCOUNTER — ANTICOAG VISIT (OUTPATIENT)
Dept: CARDIOLOGY CLINIC | Facility: CLINIC | Age: 78
End: 2018-03-19

## 2018-03-19 VITALS
WEIGHT: 235 LBS | BODY MASS INDEX: 37.77 KG/M2 | SYSTOLIC BLOOD PRESSURE: 138 MMHG | DIASTOLIC BLOOD PRESSURE: 84 MMHG | HEART RATE: 52 BPM | HEIGHT: 66 IN

## 2018-03-19 DIAGNOSIS — M79.662 PAIN IN BOTH LOWER LEGS: ICD-10-CM

## 2018-03-19 DIAGNOSIS — I45.10 RIGHT BUNDLE BRANCH BLOCK (RBBB): ICD-10-CM

## 2018-03-19 DIAGNOSIS — I87.2 EDEMA OF BOTH LOWER LEGS DUE TO PERIPHERAL VENOUS INSUFFICIENCY: ICD-10-CM

## 2018-03-19 DIAGNOSIS — G89.29 CHRONIC PAIN OF BOTH LOWER EXTREMITIES: ICD-10-CM

## 2018-03-19 DIAGNOSIS — M79.661 PAIN IN BOTH LOWER LEGS: ICD-10-CM

## 2018-03-19 DIAGNOSIS — I48.91 ATRIAL FIBRILLATION, UNSPECIFIED TYPE (HCC): ICD-10-CM

## 2018-03-19 DIAGNOSIS — M79.604 CHRONIC PAIN OF BOTH LOWER EXTREMITIES: ICD-10-CM

## 2018-03-19 DIAGNOSIS — I48.0 PAROXYSMAL ATRIAL FIBRILLATION (HCC): ICD-10-CM

## 2018-03-19 DIAGNOSIS — I10 HYPERTENSION, UNSPECIFIED TYPE: Primary | ICD-10-CM

## 2018-03-19 DIAGNOSIS — R60.0 EDEMA OF BOTH LOWER LEGS DUE TO PERIPHERAL VENOUS INSUFFICIENCY: ICD-10-CM

## 2018-03-19 DIAGNOSIS — M79.605 CHRONIC PAIN OF BOTH LOWER EXTREMITIES: ICD-10-CM

## 2018-03-19 LAB
INR PPP: 2.81 (ref 0.86–1.16)
PROTHROMBIN TIME: 30 SECONDS (ref 12.1–14.4)

## 2018-03-19 PROCEDURE — 36415 COLL VENOUS BLD VENIPUNCTURE: CPT

## 2018-03-19 PROCEDURE — 85610 PROTHROMBIN TIME: CPT

## 2018-03-19 PROCEDURE — 93000 ELECTROCARDIOGRAM COMPLETE: CPT | Performed by: INTERNAL MEDICINE

## 2018-03-19 PROCEDURE — 99214 OFFICE O/P EST MOD 30 MIN: CPT | Performed by: INTERNAL MEDICINE

## 2018-03-19 RX ORDER — TORSEMIDE 20 MG/1
20 TABLET ORAL DAILY
COMMUNITY
End: 2019-07-11 | Stop reason: SDUPTHER

## 2018-03-19 NOTE — PROGRESS NOTES
Cardiology Follow Up Visit    Sammy José Miguel  1940  [id number]  HEART & VASCULAR [facility]  [facility]  [address] [place] [address] [address]    1  Hypertension, unspecified type  POCT ECG    NM myocardial perfusion spect (rx stress and/or rest)   2  Atrial fibrillation, unspecified type ([facility] )  POCT ECG    NM myocardial perfusion spect (rx stress and/or rest)   3  Pain in both lower legs  NM myocardial perfusion spect (rx stress and/or rest)    VAS lower limb arterial duplex, complete bilateral   4  Right bundle branch block (RBBB)     5  Chronic pain of both lower extremities     6  Edema of both lower legs due to peripheral venous insufficiency         Interval History:     Patient seen in follow-up history of paroxysmal atrial fibrillation  He has been on sotalol and warfarin now for years  Recently, had sleep study and is wearing CPAP due to diagnosis of sleep apnea  History normal left ventricular systolic function  Chronic venous insufficiency edema temporize with stockings intermittently and daily loop diuretic  No major interval change  Has chronic low back pain, states he gets sometimes weak in his legs  He sees pain management for that  No chest discomfort lightheadedness dizziness        Patient Active Problem List   Diagnosis    Paroxysmal atrial fibrillation (HCC)    Lumbar spinal stenosis    Sacroiliitis (HCC)    Spondylosis of lumbar region without myelopathy or radiculopathy    Right bundle branch block (RBBB)    Edema of both lower legs due to peripheral venous insufficiency    Chronic pain of both lower extremities     Past Medical History:   Diagnosis Date    A-fib ([facility] )     Anxiety     Arthritis     Depression     Hyperlipidemia     Hypertension     Irregular heart beat     Stroke ([facility] )     TIA (transient ischemic attack)     no residual problems     Social History     Social History    Marital status: /Civil Union     Spouse name: N/A    Number of children: N/A    Years of education: N/A     Occupational History    Not on file  Social History Main Topics    Smoking status: Never Smoker    Smokeless tobacco: Never Used    Alcohol use 8 4 oz/week     14 Glasses of wine per week    Drug use: No    Sexual activity: Not on file     Other Topics Concern    Not on file     Social History Narrative    No narrative on file      No family history on file    Past Surgical History:   Procedure Laterality Date    FACIAL/NECK BIOPSY N/A 4/3/2017    Procedure: NOSE BCC EXCISION; FROZEN SECTION; RECONSTRUCTION ;  Surgeon: Tita Blum MD;  [facility];  Service:     FULL THICKNESS SKIN GRAFT N/A 4/3/2017    Procedure: FLAP VERSUS FULL THICKNESS SKIN GRAFT ;  Surgeon: Tita Blum MD;  [facility];  Service:    [facility] PROSTATECTOMY      TONSILLECTOMY AND ADENOIDECTOMY         Current Outpatient Prescriptions:     Acetaminophen 325 MG CAPS, Take by mouth, Disp: , Rfl:     amLODIPine (NORVASC) 5 mg tablet, Take 5 mg by mouth daily in the early morning, Disp: , Rfl:     atorvastatin (LIPITOR) 40 mg tablet, Take 40 mg by mouth daily at bedtime, Disp: , Rfl:     Cholecalciferol (VITAMIN D-3 PO), Take 2,000 unit marking on U-100 syringe by mouth daily after dinner, Disp: , Rfl:     co-enzyme Q-10 30 MG capsule, Take 30 mg by mouth daily after dinner, Disp: , Rfl:     glucosamine-chondroitin 500-400 MG tablet, Take 2 tablets by mouth daily in the early morning, Disp: , Rfl:     HYDROcodone-acetaminophen (VICODIN) 7 5-500 MG per tablet, Take 1 tablet by mouth every 8 (eight) hours as needed for moderate pain, Disp: , Rfl:     multivitamin (THERAGRAN) TABS, Take 1 tablet by mouth daily in the early morning, Disp: , Rfl:     Omega-3 Fatty Acids (FISH OIL) 1,000 mg, Take 1,000 mg by mouth 2 (two) times a day, Disp: , Rfl:     sertraline (ZOLOFT) 50 mg tablet, Take 50 mg by mouth daily in the early morning, Disp: , Rfl:     sotalol (BETAPACE) 160 MG tablet, Take 320 mg by mouth 2 (two) times a day, Disp: , Rfl:     torsemide (DEMADEX) 20 mg tablet, Take 20 mg by mouth daily, Disp: , Rfl:     valsartan (DIOVAN) 320 MG tablet, TAKE 1 TABLET DAILY, Disp: 90 tablet, Rfl: 0    warfarin (COUMADIN) 5 mg tablet, Take 1 tablet (5 mg total) by mouth daily Or as directed (Patient taking differently: Take 5 mg by mouth daily Or as directed ), Disp: 30 tablet, Rfl: 11    methocarbamol (ROBAXIN) 500 mg tablet, Take 1 tablet (500 mg total) by mouth 2 (two) times a day as needed for muscle spasms, Disp: 60 tablet, Rfl: 1  No Known Allergies    Labs:     Lab Results   Component Value Date     08/28/2017    K 3 9 08/28/2017     (H) 08/28/2017    CO2 27 08/28/2017    BUN 22 08/28/2017    CREATININE 1 04 08/28/2017    CREATININE 0 87 03/28/2017    GLUCOSE 118 08/28/2017    CALCIUM 8 8 08/28/2017       Lab Results   Component Value Date    WBC 8 08 03/28/2017    HGB 16 0 03/28/2017    HCT 47 5 03/28/2017     03/28/2017       No results found for: CHOL  No results found for: HDL  No results found for: LDLCALC  No results found for: TRIG    Lab Results   Component Value Date    ALT 26 08/28/2017    AST 16 08/28/2017       Lab Results   Component Value Date    INR 2 53 (H) 02/21/2018    INR 2 73 (H) 01/23/2018       No results found for: NTBNP    No results found for: HGBA1C    Imaging: Reviewed in epic    ECG: sinus bradycardia,  RBBB    Review of Systems:  14 systems reviewed and negative with exception of the above and the following shortness of breath, fatigue and lower extremity pain    Review of Systems    PHYSICAL EXAM:    Physical Exam    Vitals:    03/19/18 1321   BP: 138/84   Pulse: (!) 52     Body mass index is 37 93 kg/m²    Weight (last 2 days)     Date/Time   Weight    03/19/18 1321  107 (235)              Gen: No acute distress  HEENT: anicteric, mucous membranes moist  Neck: supple, no jugular venous distention, or carotid bruit  Heart: regular, normal s1 and s2, no murmur/rub or gallop  Lungs :clear to auscultation bilaterally, no rales/rhonchi or wheeze  Abdomen: soft nontender, normoactive bowel sounds, no organomegaly  Ext: warm and perfused, normal femoral pulses, no edema, or clubbing  Skin: warm, no rashes  Neuro: AAO x 3, no focal findings  Psychiatric: normal affect  Musculoskeletal: no obvious joint deformities  Discussion/Summary:    1  Paroxysmal afib, in NSR on sotalol/warfarin, h/o Normal LVEF    2  Sleep apnea, on cpap  3  Chronic back pain, pain management  4  Venous insuff edema, chronic  5  Limb pain, likely related to #3  6  Hypertension    Plan:  Patient continues to do well clinically  Encouraged more use of his compression stockings but he states he has a hard time getting them on  He is deconditioned as he does not exercise with back pain    Complaining of limb pain will obtain a vascular lower extremity arterial duplex study and since it has been several years will undergo pharmacologic nuclear stress testing given his atrial fibrillation and antiarrhythmic therapy with sotalol

## 2018-03-26 ENCOUNTER — HOSPITAL ENCOUNTER (OUTPATIENT)
Dept: NON INVASIVE DIAGNOSTICS | Facility: CLINIC | Age: 78
Discharge: HOME | End: 2018-03-26
Payer: MEDICARE

## 2018-03-26 DIAGNOSIS — M79.661 PAIN IN BOTH LOWER LEGS: ICD-10-CM

## 2018-03-26 DIAGNOSIS — M79.662 PAIN IN BOTH LOWER LEGS: ICD-10-CM

## 2018-03-26 DIAGNOSIS — I48.91 ATRIAL FIBRILLATION, UNSPECIFIED TYPE (HCC): ICD-10-CM

## 2018-03-26 DIAGNOSIS — I10 HYPERTENSION, UNSPECIFIED TYPE: ICD-10-CM

## 2018-03-26 LAB
CHEST PAIN STATEMENT: NORMAL
MAX DIASTOLIC BP: 80 MMHG
MAX HEART RATE: 100 BPM
MAX PREDICTED HEART RATE: 145 BPM
MAX. SYSTOLIC BP: 150 MMHG
PROTOCOL NAME: NORMAL
REASON FOR TERMINATION: NORMAL
TARGET HR FORMULA: NORMAL
TEST INDICATION: NORMAL
TIME IN EXERCISE PHASE: NORMAL

## 2018-03-26 PROCEDURE — 93925 LOWER EXTREMITY STUDY: CPT

## 2018-03-26 PROCEDURE — A9502 TC99M TETROFOSMIN: HCPCS

## 2018-03-26 PROCEDURE — 93923 UPR/LXTR ART STDY 3+ LVLS: CPT

## 2018-03-26 PROCEDURE — 78452 HT MUSCLE IMAGE SPECT MULT: CPT

## 2018-03-26 PROCEDURE — 93922 UPR/L XTREMITY ART 2 LEVELS: CPT | Performed by: SURGERY

## 2018-03-26 PROCEDURE — 93925 LOWER EXTREMITY STUDY: CPT | Performed by: SURGERY

## 2018-03-26 PROCEDURE — 93017 CV STRESS TEST TRACING ONLY: CPT

## 2018-03-26 RX ADMIN — REGADENOSON 0.4 MG: 0.08 INJECTION, SOLUTION INTRAVENOUS at 12:40

## 2018-03-28 ENCOUNTER — TELEPHONE (OUTPATIENT)
Dept: PAIN MEDICINE | Facility: MEDICAL CENTER | Age: 78
End: 2018-03-28

## 2018-03-28 DIAGNOSIS — M54.16 LUMBAR RADICULOPATHY: Primary | ICD-10-CM

## 2018-03-28 RX ORDER — GABAPENTIN 100 MG/1
300 CAPSULE ORAL
Qty: 90 CAPSULE | Refills: 1 | Status: SHIPPED | OUTPATIENT
Start: 2018-03-28 | End: 2018-07-09 | Stop reason: SDUPTHER

## 2018-03-28 NOTE — TELEPHONE ENCOUNTER
S/w pt, he has been having shooting pains into his L leg that he believes is from sciatica  Pt says the pain is the worst at night, and he has not been able to sleep  Pt says the pain does get better once he gets up and starts standing on it during the day  Pt was prescribed Robaxin after injection, but he said that it did not help after a few days so he stopped taking it  Pt stated that "he can not live like this", he is requesting something for his pain to at least get him to his office visit appt on 4/16  Please advise

## 2018-03-28 NOTE — TELEPHONE ENCOUNTER
Pt called stating that the pain is getting worse, now seems to be a sciatica pain shooting down his left leg  Having trouble sleeping at night  Pt is scheduled for a f/u on 4/16 but needs to know what to to until then  Can Dr Sabrina Shelley prescribe something for the pain? Pt uses [facility] (on file)  Pt can be reached at [phone]

## 2018-03-28 NOTE — TELEPHONE ENCOUNTER
S/w pt, told him that Justine Bennett would like him to try Gabapentin 100 mg and increase it based on schedule listed above till he reaches 300 mg at HS  Pt wrote down instructions and was instructed that he should not start/stop the medication abruptly  Pt also informed of common s/e to watch for  Pt verbalized understanding and will call if he has any questions/side effects

## 2018-03-28 NOTE — TELEPHONE ENCOUNTER
Gabapentin 100 mg q h s  was called into pharmacy  After 5 days the patient can increase to 200 mg q h s  and if he tolerates this for 5 days he can then increase to 300 mg q h s  and continue taking this until his next office visit

## 2018-04-06 ENCOUNTER — TELEPHONE (OUTPATIENT)
Dept: PAIN MEDICINE | Facility: MEDICAL CENTER | Age: 78
End: 2018-04-06

## 2018-04-06 DIAGNOSIS — M46.1 SACROILIITIS (HCC): Primary | ICD-10-CM

## 2018-04-06 NOTE — TELEPHONE ENCOUNTER
S/w the patient and reviewed recommendations  He stated he already saw his cardiologist and they did a stress test and ultrasound of the LE's and they couldn't find anything  He will decrease the Gabapentin down to 1 pill at [facility] and f/u at the next office visit on 4/16

## 2018-04-06 NOTE — TELEPHONE ENCOUNTER
Pt is calling stating the pain is getting to be unbearable  He says he is starting to swell up like a balloon   Please call pt back at [phone]

## 2018-04-06 NOTE — TELEPHONE ENCOUNTER
This can be a side effect of the gabapentin, however this could be cardiac the etiology as well  Have the patient decrease back down to gabapentin to see if the edema improved    The patient should contact his primary care physician or cardiologist for further evaluation

## 2018-04-06 NOTE — TELEPHONE ENCOUNTER
S/w the patient and he stated he just increased the gabapentin to 2 pills daily  He is also on the methocarbamol and he does not feel any better or relief with the medications  His pain continues in his LB and the other day it felt like a lightning bolt in his left  leg  He feels like his body is filling up with fluid, but after reviewing his symptoms  He stated he feels like it has been happening for a couple of weeks  He denies SOB/TOLLIVER, but he does feel like the fluid is in his legs  He stated his cardiologist is aware  Inquired to see if he does take a daily diuretic and he stated he does not know  He stated he feels like his pain is getting worse and his next office visit is on 4/16  JW to advise  Could this be a SE of the gabapentin and do you think he should F/U c/ cardiology?  Thanks

## 2018-04-11 RX ORDER — METHYLPREDNISOLONE 4 MG/1
TABLET ORAL
Qty: 21 TABLET | Refills: 0 | Status: SHIPPED | OUTPATIENT
Start: 2018-04-11 | End: 2018-07-09

## 2018-04-11 NOTE — TELEPHONE ENCOUNTER
Pt called stating that he decreased the Gabapentin to 1 pill daily but states that he is in terrible pain and can hardly walk  Asking if there is anything else that he can take for the pain to get through the weekend until his appt on Monday  Pt uses [facility] in [place] (on file)  Pt would like a call back to discuss at [phone]

## 2018-04-11 NOTE — TELEPHONE ENCOUNTER
S/w the patient today and reviewed the previous task  He stated the swelling has not gone down  He is only taking the gabapentin 1 pill at HS and it does nothing for the pain  It continues in the LE, the knee on down to the shin  Reiterated to follow the instructions for the medrol dose pack and will f/u on Monday at the next office visit

## 2018-04-12 NOTE — TELEPHONE ENCOUNTER
--RUY--    S/W pt  Advised pt of the same  Pt stated he will stop the gabapentin and that the medrol dose pack will be delivered to his house today by the pharmacy  Advised pt to follow the directions on the medrol dose pack  Pt verbalized understanding

## 2018-04-16 ENCOUNTER — CLINICAL SUPPORT (OUTPATIENT)
Dept: PAIN MEDICINE | Facility: CLINIC | Age: 78
End: 2018-04-16
Payer: MEDICARE

## 2018-04-16 VITALS
HEIGHT: 66 IN | TEMPERATURE: 97.9 F | WEIGHT: 230 LBS | HEART RATE: 55 BPM | BODY MASS INDEX: 36.96 KG/M2 | DIASTOLIC BLOOD PRESSURE: 71 MMHG | SYSTOLIC BLOOD PRESSURE: 166 MMHG

## 2018-04-16 DIAGNOSIS — M46.1 SACROILIITIS (HCC): ICD-10-CM

## 2018-04-16 DIAGNOSIS — M47.816 SPONDYLOSIS OF LUMBAR REGION WITHOUT MYELOPATHY OR RADICULOPATHY: ICD-10-CM

## 2018-04-16 DIAGNOSIS — M47.816 LUMBAR SPONDYLOSIS: ICD-10-CM

## 2018-04-16 DIAGNOSIS — M48.062 SPINAL STENOSIS OF LUMBAR REGION WITH NEUROGENIC CLAUDICATION: ICD-10-CM

## 2018-04-16 DIAGNOSIS — M54.16 LUMBAR RADICULOPATHY: Primary | ICD-10-CM

## 2018-04-16 PROCEDURE — 99214 OFFICE O/P EST MOD 30 MIN: CPT | Performed by: ANESTHESIOLOGY

## 2018-04-16 NOTE — PROGRESS NOTES
Assessment:  1  Lumbar radiculopathy    2  Spinal stenosis of lumbar region with neurogenic claudication    3  Spondylosis of lumbar region without myelopathy or radiculopathy    4  Sacroiliitis ([address] )    5  Lumbar spondylosis        Plan:  62-year-old male returning for follow-up of lumbosacral back pain that radiates into the buttocks and now into the anterior and posterior aspect of left lower extremity secondary to radiculopathy in the L4 and S1 distribution  The patient does have lumbar stenosis most pronounced at L4-5  His last MRI of his lumbar spine was in 2013  The patient has not had lower extremity radicular symptoms in quite some time  He denies any recent trauma or inciting event  The patient has noted significant relief with a course of oral steroids  He has 2 days left of the oral steroids  He was taking gabapentin 300 mg q h s , however this was not really working and he was noticing that his lower extremities start were starting to swell so he discontinued this medication  He was also taking methocarbamol 500 mg q 8 hours p r n  without much relief  He is unable to take NSAIDs secondary to anticoagulation  The patient was initially getting relief of his low back pain from bilateral SI joint injections which eventually lost their effectiveness over time  Considering the patient has not had any imaging of his lumbar spine since 2013 and he has a new onset radiculopathy mostly in the S1 distribution but also in the L4 distribution of the left lower extremity I think it is reasonable to update the MRI of his lumbar spine  Furthermore, I would like the patient to try some Natali based physical therapy for his radicular symptoms  I did discuss an epidural steroid injection, however would like to review an updated MRI before pursuing this option    Furthermore, the patient's pain has significantly improved with a course of oral steroids and he would like to see how long this lasts before pursuing any more interventional therapy which I think is reasonable  1   I will order an MRI of the patient's lumbar spine  2  The patient will continue and finish his course of oral steroids since he is noticing significant relief with this  3  We will avoid NSAIDs secondary to anticoagulation  4  The patient will hold off on gabapentin for now  I did discuss with him that occasionally will titrate up to 1800 mg daily before reaching optimal therapeutic effect  The patient is unsure as to whether not the gabapentin was causing the swelling in his legs as he does have a chronic longstanding history of venous insufficiency  I did discuss with the patient that if the steroids wear off and he would like to restart the gabapentin will titrate back up on this, however if his lower extremity edema returns we will discontinue this as this can be a side effect of gabapentin  5   The patient may continue with methocarbamol 500 mg q 8 hours p r n   6   I will order Natali based physical therapy for the pat7  ient's radicular symptoms  7  The patient may continue with Norco as prescribed by PCP  8  I will follow up the patient in 3 months or sooner if needed    [place] Prescription Drug Monitoring Program report was reviewed and was appropriate       My impressions and treatment recommendations were discussed in detail with the patient who verbalized understanding and had no further questions  Discharge instructions were provided  I personally saw and examined the patient and I agree with the above discussed plan of care  No orders of the defined types were placed in this encounter  No orders of the defined types were placed in this encounter  History of Present Illness:    Buelah Meigs is a 78 y o  male with a history of lumbar stenosis most pronounced at L4-5 and sacroiliitis returning for follow-up    The patient has a new complaint of radiating left leg pain in the posterior and anterior aspect down to the ankle with occasional numbness and subjective weakness  He denies any paresthesias, or bladder bowel incontinence  He denies any right lower extremity symptoms  He denies any new trauma or inciting event  The patient states that the pain in the back of his leg is more prevalent than the symptoms he is feeling on the anterior aspect of his leg  The patient was taking gabapentin 300 mg q h s  however he did not feel that this was helpful and discontinued this  The patient also noted that his lower extremities were swelling, however he is unsure as to whether not this is from the gabapentin as he does have a history of venous insufficiency  The patient also takes methocarbamol 500 mg q 8 hours p r n  without much relief  He does take Tylenol p r n  with some relief  He is unable to take NSAIDs secondary to anticoagulation  The patient was prescribed a short course of oral steroids which she has noticed significant relief of his low back and lower extremity symptoms  He has 2 more days left of this medication to take  The patient rates his pain 8/10 and the pain does not follow any particular pattern throughout the day  The pain is intermittent and described sharp and numbness  The pain is worse with standing, walking, and bending  The pain is alleviated with sitting and lying down  I have personally reviewed and/or updated the patient's past medical history, past surgical history, family history, social history, current medications, allergies, and vital signs today  Other than as stated above, the patient denies any interval changes in medications, medical condition, mental condition, symptoms, or allergies since the last office visit  Review of Systems:    Review of Systems   Respiratory: Negative for shortness of breath  Cardiovascular: Negative for chest pain  Gastrointestinal: Negative for constipation, diarrhea, nausea and vomiting     Musculoskeletal: Negative for arthralgias, gait problem and myalgias  Joint swelling: Legs         Difficulty walking, Joint stiffness    Skin: Negative for rash  Neurological: Negative for dizziness, seizures and weakness  All other systems reviewed and are negative  Patient Active Problem List   Diagnosis    Paroxysmal atrial fibrillation (HCC)    Lumbar spinal stenosis    Sacroiliitis (HCC)    Spondylosis of lumbar region without myelopathy or radiculopathy    Right bundle branch block (RBBB)    Edema of both lower legs due to peripheral venous insufficiency    Chronic pain of both lower extremities       Past Medical History:   Diagnosis Date    A-fib ([facility] )     Anxiety     Arthritis     Depression     Hyperlipidemia     Hypertension     Irregular heart beat     Stroke ([facility] )     TIA (transient ischemic attack)     no residual problems       Past Surgical History:   Procedure Laterality Date    FACIAL/NECK BIOPSY N/A 4/3/2017    Procedure: NOSE BCC EXCISION; FROZEN SECTION; RECONSTRUCTION ;  Surgeon: Buck Fields MD;  [facility];  Service:     FULL THICKNESS SKIN GRAFT N/A 4/3/2017    Procedure: FLAP VERSUS FULL THICKNESS SKIN GRAFT ;  Surgeon: Buck Fields MD;  [facility];  Service:     PROSTATECTOMY      TONSILLECTOMY AND ADENOIDECTOMY         No family history on file  Social History     Occupational History    Not on file       Social History Main Topics    Smoking status: Never Smoker    Smokeless tobacco: Never Used    Alcohol use 8 4 oz/week     14 Glasses of wine per week    Drug use: No    Sexual activity: Not on file       Current Outpatient Prescriptions on File Prior to Visit   Medication Sig    Acetaminophen 325 MG CAPS Take by mouth    amLODIPine (NORVASC) 5 mg tablet Take 5 mg by mouth daily in the early morning    atorvastatin (LIPITOR) 40 mg tablet Take 40 mg by mouth daily at bedtime    Cholecalciferol (VITAMIN D-3 PO) Take 2,000 unit marking on U-100 syringe by mouth daily after dinner    co-enzyme Q-10 30 MG capsule Take 30 mg by mouth daily after dinner    gabapentin (NEURONTIN) 100 mg capsule Take 3 capsules (300 mg total) by mouth daily at bedtime    glucosamine-chondroitin 500-400 MG tablet Take 2 tablets by mouth daily in the early morning    HYDROcodone-acetaminophen (VICODIN) 7 5-500 MG per tablet Take 1 tablet by mouth every 8 (eight) hours as needed for moderate pain    methocarbamol (ROBAXIN) 500 mg tablet Take 1 tablet (500 mg total) by mouth 2 (two) times a day as needed for muscle spasms    Methylprednisolone 4 MG TBPK Use as directed on package    multivitamin (THERAGRAN) TABS Take 1 tablet by mouth daily in the early morning    Omega-3 Fatty Acids (FISH OIL) 1,000 mg Take 1,000 mg by mouth 2 (two) times a day    sertraline (ZOLOFT) 50 mg tablet Take 50 mg by mouth daily in the early morning    sotalol (BETAPACE) 160 MG tablet Take 320 mg by mouth 2 (two) times a day    torsemide (DEMADEX) 20 mg tablet Take 20 mg by mouth daily    valsartan (DIOVAN) 320 MG tablet TAKE 1 TABLET DAILY    warfarin (COUMADIN) 5 mg tablet Take 1 tablet (5 mg total) by mouth daily Or as directed (Patient taking differently: Take 5 mg by mouth daily Or as directed )     No current facility-administered medications on file prior to visit  No Known Allergies    Physical Exam:    /71   Pulse 55   Temp 97 9 °F (36 6 °C)   Ht 5' 6" (1 676 m)   Wt 104 kg (230 lb)   BMI 37 12 kg/m²     Constitutional: overweight  Eyes: anicteric  HEENT: grossly intact  Neck: supple, symmetric, trachea midline and no masses   Pulmonary:even and unlabored  Cardiovascular:No edema or pitting edema present  Skin:Normal without rashes or lesions and well hydrated  Psychiatric:Mood and affect appropriate  Neurologic:Cranial Nerves II-XII grossly intact  Musculoskeletal:normal gait  Bilateral lumbar paraspinals and SI joints tender to palpation    Bilateral lumbar paraspinals ropy in texture  Negative seated straight leg raise bilaterally    Bilateral lower extremity strength 5/5 in all muscle groups    Imaging  Imaging reviewed

## 2018-04-18 ENCOUNTER — ANTICOAG VISIT (OUTPATIENT)
Dept: CARDIOLOGY CLINIC | Facility: CLINIC | Age: 78
End: 2018-04-18

## 2018-04-18 ENCOUNTER — APPOINTMENT (OUTPATIENT)
Dept: LAB | Facility: CLINIC | Age: 78
End: 2018-04-18
Payer: MEDICARE

## 2018-04-18 ENCOUNTER — TRANSCRIBE ORDERS (OUTPATIENT)
Dept: LAB | Facility: CLINIC | Age: 78
End: 2018-04-18

## 2018-04-18 DIAGNOSIS — I48.91 CONTROLLED ATRIAL FIBRILLATION (HCC): Primary | ICD-10-CM

## 2018-04-18 DIAGNOSIS — I48.0 PAROXYSMAL ATRIAL FIBRILLATION (HCC): ICD-10-CM

## 2018-04-18 DIAGNOSIS — I48.91 CONTROLLED ATRIAL FIBRILLATION (HCC): ICD-10-CM

## 2018-04-18 LAB
INR PPP: 2.58 (ref 0.86–1.16)
PROTHROMBIN TIME: 28 SECONDS (ref 12.1–14.4)

## 2018-04-18 PROCEDURE — 85610 PROTHROMBIN TIME: CPT

## 2018-04-18 PROCEDURE — 36415 COLL VENOUS BLD VENIPUNCTURE: CPT

## 2018-04-23 ENCOUNTER — HOSPITAL ENCOUNTER (OUTPATIENT)
Dept: MRI IMAGING | Facility: HOSPITAL | Age: 78
Discharge: HOME | End: 2018-04-23
Attending: ANESTHESIOLOGY
Payer: MEDICARE

## 2018-04-23 DIAGNOSIS — M54.16 LUMBAR RADICULOPATHY: ICD-10-CM

## 2018-04-23 PROCEDURE — 72148 MRI LUMBAR SPINE W/O DYE: CPT

## 2018-04-26 ENCOUNTER — TELEPHONE (OUTPATIENT)
Dept: PAIN MEDICINE | Facility: MEDICAL CENTER | Age: 78
End: 2018-04-26

## 2018-04-26 ENCOUNTER — EVALUATION (OUTPATIENT)
Dept: PHYSICAL THERAPY | Facility: REHABILITATION | Age: 78
End: 2018-04-26
Payer: MEDICARE

## 2018-04-26 DIAGNOSIS — M54.16 LUMBAR RADICULOPATHY: Primary | ICD-10-CM

## 2018-04-26 PROCEDURE — 97110 THERAPEUTIC EXERCISES: CPT | Performed by: PHYSICAL THERAPIST

## 2018-04-26 PROCEDURE — G8990 OTHER PT/OT CURRENT STATUS: HCPCS | Performed by: PHYSICAL THERAPIST

## 2018-04-26 PROCEDURE — 97112 NEUROMUSCULAR REEDUCATION: CPT | Performed by: PHYSICAL THERAPIST

## 2018-04-26 PROCEDURE — 97161 PT EVAL LOW COMPLEX 20 MIN: CPT | Performed by: PHYSICAL THERAPIST

## 2018-04-26 PROCEDURE — G8991 OTHER PT/OT GOAL STATUS: HCPCS | Performed by: PHYSICAL THERAPIST

## 2018-04-26 RX ORDER — MELOXICAM 7.5 MG/1
7.5 TABLET ORAL DAILY
COMMUNITY
End: 2018-09-17

## 2018-04-26 NOTE — TELEPHONE ENCOUNTER
Please notify the patient the MRI of his lumbar spine revealed degenerative disc disease and spondylosis/arthritis from L1-2 to L5-S1  He has some small disc herniations/bulges at L1-2, L3-4, and L5-S1  There is mild left foraminal stenosis at L1-2  There was mild left foraminal stenosis at L3-4  There is moderate bilateral foraminal stenosis and mild central stenosis at L4-5    I can offer the patient a left L4 TFESI

## 2018-04-26 NOTE — TELEPHONE ENCOUNTER
S/w the patient and reviewed the previous findings  He stated he just started PT today and he would like to wait a couple of weeks to see if it is helping  He is also on coumadin and we would need to receive consent to hold from Dr Ines Duarte  The patient stated he will CB and start the process if his pain gets worse

## 2018-04-26 NOTE — PROGRESS NOTES
PT Evaluation     Today's date: 2018  Patient name: Senia El  : 1940  MRN: [id number]  Referring provider: Rolando Roberts DO  Dx:   Encounter Diagnosis     ICD-10-CM    1  Lumbar radiculopathy M54 16 Ambulatory referral to Physical Therapy       Start Time: 1100  Stop Time: 1200  Total time in clinic (min): 60 minutes    Assessment  Impairments: abnormal muscle firing, abnormal muscle tone, abnormal or restricted ROM, abnormal movement, impaired physical strength, lacks appropriate home exercise program and pain with function    Assessment details: Senia El is a 70 y o  male who presents with complaints of Lumbar radiculopathy  (primary encounter diagnosis)  No further referral appears necessary at this time based upon examination results  Probable movement impairment diagnosis of lumbar extension rotation syndrom resulting in pathoanatomical symptoms of low back pain radiating down posterior aspect of LLE not into foot  No changes in strength or sensation noted at this time  Patient reports occasional lateral femoral cutaneous sensation changes that most likely is contributed to compression when sitting  Pain is present with transitions and does not limit patient from sitting for long periods of time nor with standing/walking  Prognosis is good given HEP compliance and PT 2-3x/wk  Positive prognostic indicators include positive attitude toward recovery  Please contact me if you have any questions or recommendations  Thank you for the opportunity to share in  Sergio's care       Understanding of Dx/Px/POC: good   Prognosis: good    Plan  Patient would benefit from: skilled PT  Planned therapy interventions: joint mobilization, strengthening, stretching, therapeutic exercise, therapeutic training, transfer training, graded activity, graded exercise, graded motor, home exercise program, functional ROM exercises, flexibility and abdominal trunk stabilization  Frequency: 2x week  Duration in weeks: 8  Treatment plan discussed with: PCP        Subjective Evaluation    History of Present Illness  Mechanism of injury: Patient reports chronic history of low back pain  Had 3 cortisone injections in the past with slight relief  Reports he had recent MRI and is awaiting results  Had aquatic therapy about 5 years ago with slight to no improvement  Patient denies bowel/bladder dysfunction, nor cough/sneeze, nor saddle parasthesias  Patient reports pain with transitions, ascending/descending steps, rising from seated position, and getting out of chair  Doctor may be recommending epidural injection (patient worrisome and does not want surgery)  No pain with sitting  Standing and walking are not as irritating  Reports bilateral weakness in legs  Patient reports limitations with bending forward  Pain reported as shooting down posterior aspect of leg and down ankle  Slight decrease in sensation reported left lateral leg  Lumbar pain reported midline low back, patient also reports bilateral lower extremity edema  Recurrent probem    Quality of life: good    Pain  Current pain ratin  At best pain rating: 3  At worst pain ratin  Quality: burning, sharp, dull ache, needle-like and radiating    Social Support  Steps to enter house: yes  Stairs in house: yes     Patient Goals  Patient goals for therapy: increased motion, decreased pain, increased strength and independence with ADLs/IADLs          Objective    GAIT: decreased gait speed  Squat assess: WNL  ¼ squat assess: decreased SLS BLE      Lumbar  % of normal   Flex  75%   Extn  100   SB Left 75%   SB Right 100   ROT Left 100   ROT Right 100   Repetitive testing: extension= no change    Flexion= no change      MMT         AROM    Hip       L       R        L           R   Flex  5 5     Extn  4 4 5 5   Abd  5 5     Add  4 4     IR  4+ 4+     ER  4+ 4+            G  Max       G  Med  Iliop                       MMT    Knee         L R   Flex  5 5   Extn  5 5                     MMT    Ankle       L        R   PF 4 4   DF  5 5   EHL 5 5   Inv  5 5   Ever  5 5     Palpation:   Myotomes (L/R):intact BLE  Dermatome: (pinprick- L/R): Intact BLE     Reflexes:  (L/R) L4:  2+ BLE      S1: 2+ BLE           Babinski=  -    Clonus= 1 beat BLE    Slump test: L=   -  R=  -     Straight leg raise:   L=   -   R=  -           Transverse abdominis: Bent knee fall out= Poor     Hip/SI joint:   [place] scan= -  Hamstring dominance test=  +        Hip abd/lat rot  =  +       prone knee flexion=  +    Multifidus activation = fair  Joint mobility:  L2 hypomobility         Precautions: Hx of TIA, HTN, CHF, A-fib     Daily Treatment Diary     Manual                                                                                   Exercise Diary  4/26            Patient education FB            TA BKFO 2x10            Sit to stand 2x10            glute set with TA 2x10, 5"            SKTC 10x10" ea  Modalities                                                         Short Term:  1  Pt will report decreased levels of pain by at least 2 subjective ratings in 4 weeks  2  Pt will demonstrate improved ROM by at least 10 degrees in 4 weeks  3  Pt will demonstrate improved strength by 1/2 grade  4  Pt will be able to rise from seated position in 4 weeks without pain  Long Term:   1  Pt will be independent in their HEP in 8 weeks  2  Pt will be be pain free with IADL's  3  Pt will demonstrate improved FOTO, > 13  4   Pt will be able to get out car and bed without limitations or assistance in 8 weeks

## 2018-04-26 NOTE — TELEPHONE ENCOUNTER
Pt is calling asking about the results of his MRI on 4/23   Pt would like a call back at [phone]

## 2018-05-02 ENCOUNTER — OFFICE VISIT (OUTPATIENT)
Dept: PHYSICAL THERAPY | Facility: REHABILITATION | Age: 78
End: 2018-05-02
Payer: MEDICARE

## 2018-05-02 DIAGNOSIS — M54.16 LUMBAR RADICULOPATHY: Primary | ICD-10-CM

## 2018-05-02 PROCEDURE — 97110 THERAPEUTIC EXERCISES: CPT | Performed by: PHYSICAL THERAPIST

## 2018-05-02 PROCEDURE — 97112 NEUROMUSCULAR REEDUCATION: CPT | Performed by: PHYSICAL THERAPIST

## 2018-05-02 NOTE — PROGRESS NOTES
Daily Note     Today's date: 2018  Patient name: David Edmonds  : 1940  MRN: [id number]  Referring provider: Imani Salinas DO  Dx:   Encounter Diagnosis     ICD-10-CM    1  Lumbar radiculopathy M54 16        Start Time: 1000  Stop Time: 7202  Total time in clinic (min): 45 minutes    Subjective: Patient reports good compliance with HEP  Reports doing well today  Objective: See treatment diary below  Precautions: Hx of TIA, HTN, CHF, A-fib      Daily Treatment Diary      Manual                                                                                                                                                      Exercise Diary                     Patient education FB                     TA BKFO- 1 leg straight 2x10 2x10                   Sit to stand 2x10  1x10 (HEP)                   glute set with TA 2x10, 5"  HEP                   SKTC 10x10" ea   HEP                    recumbent bike   8'                    TA iso    10x10"                    hip add iso    2x10, 5"                    SL-hip hiker   1x10, 3" ea                     standing wall pelvic tilt    1x5, 5"                         Assessment: Tolerated treatment well  Patient reported slight fatigue with treatment today  Cueing to avoid lumbar extension with sit to stand  Plan: Continue per plan of care

## 2018-05-04 ENCOUNTER — OFFICE VISIT (OUTPATIENT)
Dept: PHYSICAL THERAPY | Facility: REHABILITATION | Age: 78
End: 2018-05-04
Payer: MEDICARE

## 2018-05-04 DIAGNOSIS — M54.16 LUMBAR RADICULOPATHY: Primary | ICD-10-CM

## 2018-05-04 PROCEDURE — 97112 NEUROMUSCULAR REEDUCATION: CPT | Performed by: PHYSICAL THERAPIST

## 2018-05-04 PROCEDURE — 97110 THERAPEUTIC EXERCISES: CPT | Performed by: PHYSICAL THERAPIST

## 2018-05-04 PROCEDURE — 97140 MANUAL THERAPY 1/> REGIONS: CPT | Performed by: PHYSICAL THERAPIST

## 2018-05-04 NOTE — PROGRESS NOTES
Daily Note     Today's date: 2018  Patient name: Kit Holstein  : 1940  MRN: [id number]  Referring provider: Jayro Chisholm DO  Dx:   Encounter Diagnosis     ICD-10-CM    1  Lumbar radiculopathy M54 16      1 on 1 with PT/PTA  Start Time: 1045  Stop Time: 1145  Total time in clinic (min): 60 minutes    Subjective: Patient reports that is he having numbness left lateral thigh area  Patient reports that he felt good following treatment last visit  Reports that he is no longer getting sharp shooting pain  Objective: See treatment diary below  Precautions: Hx of TIA, HTN, CHF, A-fib      Daily Treatment Diary      Manual                         P/A L2-3 FB                                                                                                                           Exercise Diary                     Patient education FB                     TA BKFO- 1 leg straight 2x10 2x10  2x10                 Sit to stand 2x10  1x10 (HEP)  np                 glute set, mini bridge 2x10, 5"  HEP 2x10, 5"                 SKTC 10x10" ea   HEP                    recumbent bike   8'  10'                  TA iso    10x10"  10x10"                  hip add iso    2x10, 5"  2x10, 5"                  SL-hip hiker   1x10, 3" ea   2x10, 3"                  standing wall pelvic tilt    1x5, 5"                         Assessment: Patient reported reduced leg symptoms with treatment today  Improved core control demonstrated leading to reduced lumbar lordosis  Plan: Continue per plan of care

## 2018-05-08 ENCOUNTER — OFFICE VISIT (OUTPATIENT)
Dept: PHYSICAL THERAPY | Facility: REHABILITATION | Age: 78
End: 2018-05-08
Payer: MEDICARE

## 2018-05-08 DIAGNOSIS — M54.16 LUMBAR RADICULOPATHY: Primary | ICD-10-CM

## 2018-05-08 PROCEDURE — 97110 THERAPEUTIC EXERCISES: CPT | Performed by: PHYSICAL THERAPIST

## 2018-05-08 PROCEDURE — 97112 NEUROMUSCULAR REEDUCATION: CPT | Performed by: PHYSICAL THERAPIST

## 2018-05-08 NOTE — PROGRESS NOTES
Daily Note     Today's date: 2018  Patient name: Raquel Mack  : 1940  MRN: [id number]  Referring provider: Heather Taylor DO  Dx:   Encounter Diagnosis     ICD-10-CM    1  Lumbar radiculopathy M54 16               1on1 with 2 PT's entire session  Subjective: Pt reports less pain overall, noting it is no longer constant  Objective: See treatment diary below  Manual                       P/A L2-3 FB NP                                                                                                                         Exercise Diary                 Patient education FB                     TA BKFO- 1 leg straight 2x10 2x10  2x10  2x10               Sit to stand 2x10  1x10 (HEP)  np                 glute set, mini bridge 2x10, 5"  HEP 2x10, 5"  2x10, 5"               SKTC 10x10" ea   HEP                    recumbent bike   8'  10'  10'                TA iso    10x10"  10x10"  10"x15                hip add iso    2x10, 5"  2x10, 5"  2x10  5"                SL-hip hiker   1x10, 3" ea   2x10, 3"  2x10, 3"                standing wall pelvic tilt    1x5, 5"    5"x10                     Assessment: Tolerated treatment well  VC's required for correct technique and dosage with TE's  Patient would benefit from continued PT      Plan: Continue per plan of care

## 2018-05-10 ENCOUNTER — OFFICE VISIT (OUTPATIENT)
Dept: PHYSICAL THERAPY | Facility: REHABILITATION | Age: 78
End: 2018-05-10
Payer: MEDICARE

## 2018-05-10 DIAGNOSIS — M54.16 LUMBAR RADICULOPATHY: Primary | ICD-10-CM

## 2018-05-10 PROCEDURE — 97110 THERAPEUTIC EXERCISES: CPT

## 2018-05-10 PROCEDURE — 97112 NEUROMUSCULAR REEDUCATION: CPT

## 2018-05-10 NOTE — PROGRESS NOTES
Daily Note     Today's date: 5/10/2018  Patient name: Neetu Silva  : 1940  MRN: [id number]  Referring provider: Francisco Aparicio DO  Dx:   Encounter Diagnosis     ICD-10-CM    1  Lumbar radiculopathy M54 16                Subjective: Pt reports having less pain overall but notes that he is just going to have to live with the pain  Notes having pain 1/10 today in his low back  Objective: See treatment diary below  Manual                       P/A L2-3 FB NP                                                                                                                         Exercise Diary  4/26  5/2  5/4     5/8  5/10             Patient education FB                     TA BKFO- 1 leg straight 2x10 2x10  2x10  2x10  2x10             Sit to stand 2x10  1x10 (HEP)  np                 glute set, mini bridge 2x10, 5"  HEP 2x10, 5"  2x10, 5"  2x10 5"             SKTC 10x10" ea   HEP                    recumbent bike   8'  10'  10'  10'              TA iso    10x10"  10x10"  10"x15  10"x15              hip add iso    2x10, 5"  2x10, 5"  2x10  5"  2x10 5"              SL-hip hiker   1x10, 3" ea   2x10, 3"  2x10, 3"  2x10 3"              standing wall pelvic tilt    1x5, 5"    5"x10  5"x10                   Assessment: Tolerated treatment well  VC's required for correct technique and dosage with TE's  Patient would benefit from continued PT      Plan: Continue per plan of care

## 2018-05-15 ENCOUNTER — OFFICE VISIT (OUTPATIENT)
Dept: PHYSICAL THERAPY | Facility: REHABILITATION | Age: 78
End: 2018-05-15
Payer: MEDICARE

## 2018-05-15 DIAGNOSIS — M54.16 LUMBAR RADICULOPATHY: Primary | ICD-10-CM

## 2018-05-15 PROCEDURE — 97110 THERAPEUTIC EXERCISES: CPT | Performed by: PHYSICAL THERAPIST

## 2018-05-15 PROCEDURE — 97112 NEUROMUSCULAR REEDUCATION: CPT | Performed by: PHYSICAL THERAPIST

## 2018-05-15 PROCEDURE — G8990 OTHER PT/OT CURRENT STATUS: HCPCS | Performed by: PHYSICAL THERAPIST

## 2018-05-15 PROCEDURE — G8991 OTHER PT/OT GOAL STATUS: HCPCS | Performed by: PHYSICAL THERAPIST

## 2018-05-15 NOTE — PROGRESS NOTES
Daily Note     Today's date: 5/15/2018  Patient name: Brad Camacho  : 1940  MRN: [id number]  Referring provider: Severiano Saunders, DO  Dx:   Encounter Diagnosis     ICD-10-CM    1  Lumbar radiculopathy M54 16        Start Time:   Stop Time: 1220    Subjective: Reports doing better overall but does have tingling lateral thigh region  Objective: See treatment diary below  Manual   5/4  5/8  5/15                  P/A L2-3 in sidely  FB NP  FB                                                                                                                       Exercise Diary  4/26  5/2  5/4     5/8  5/10  5/15       HEP- SKTC, sit>stand, SKTC, glute set, TA is           TA BKFO- 1 leg straight 2x10 2x10  2x10  2x10  2x10 2x10       glute set, mini bridge 2x10, 5"  HEP 2x10, 5"  2x10, 5"  2x10 5"  2x10, 5"        recumbent bike   8'  10'  10'  10'  10'        TA iso    10x10"  10x10"  10"x15  10"x15  np        hip add iso    2x10, 5"  2x10, 5"  2x10  5"  2x10 5"  2x10, 5"        SL-hip hiker   1x10, 3" ea   2x10, 3"  2x10, 3"  2x10 3"  2x10, 3"        standing wall pelvic tilt    1x5, 5"    5"x10  5"x10  np       Standing extn      1x10           Assessment: Patient reported relief in symptoms with manual intervention  Cueing needed to avoid sucking stomach in when performing TA based exercises  Good tolerance to addition of standing repeated extension  Plan: Continue per plan of care

## 2018-05-17 ENCOUNTER — TRANSCRIBE ORDERS (OUTPATIENT)
Dept: LAB | Facility: CLINIC | Age: 78
End: 2018-05-17

## 2018-05-17 ENCOUNTER — APPOINTMENT (OUTPATIENT)
Dept: LAB | Facility: CLINIC | Age: 78
End: 2018-05-17
Payer: MEDICARE

## 2018-05-17 ENCOUNTER — OFFICE VISIT (OUTPATIENT)
Dept: PHYSICAL THERAPY | Facility: REHABILITATION | Age: 78
End: 2018-05-17
Payer: MEDICARE

## 2018-05-17 ENCOUNTER — ANTICOAG VISIT (OUTPATIENT)
Dept: CARDIOLOGY CLINIC | Facility: CLINIC | Age: 78
End: 2018-05-17

## 2018-05-17 DIAGNOSIS — I48.91 ATRIAL FIBRILLATION, UNSPECIFIED TYPE (HCC): Primary | ICD-10-CM

## 2018-05-17 DIAGNOSIS — I48.91 ATRIAL FIBRILLATION, UNSPECIFIED TYPE (HCC): ICD-10-CM

## 2018-05-17 DIAGNOSIS — I48.0 PAROXYSMAL ATRIAL FIBRILLATION (HCC): ICD-10-CM

## 2018-05-17 DIAGNOSIS — M54.16 LUMBAR RADICULOPATHY: Primary | ICD-10-CM

## 2018-05-17 LAB
INR PPP: 3.14 (ref 0.86–1.09)
PROTHROMBIN TIME: 32.3 SECONDS (ref 11.8–14.2)

## 2018-05-17 PROCEDURE — 85610 PROTHROMBIN TIME: CPT

## 2018-05-17 PROCEDURE — 36415 COLL VENOUS BLD VENIPUNCTURE: CPT

## 2018-05-17 PROCEDURE — 97112 NEUROMUSCULAR REEDUCATION: CPT | Performed by: PHYSICAL THERAPIST

## 2018-05-17 PROCEDURE — 97110 THERAPEUTIC EXERCISES: CPT | Performed by: PHYSICAL THERAPIST

## 2018-05-17 PROCEDURE — 97140 MANUAL THERAPY 1/> REGIONS: CPT | Performed by: PHYSICAL THERAPIST

## 2018-05-17 NOTE — PROGRESS NOTES
Daily Note     Today's date: 2018  Patient name: Brad Camacho  : 1940  MRN: [id number]  Referring provider: Severiano Saunders, DO  Dx:   Encounter Diagnosis     ICD-10-CM    1  Lumbar radiculopathy M54 16      Start Time: 6333  Stop Time: 1100    Subjective: Patient reports leg pain in the morning that improves when he gets up and starts moving  Objective: See treatment diary below  Manual   5/4  5/8  5/15  5/17                P/A L2-3 in sidely  FB NP  FB  FB                                                                                                                     Exercise Diary  4/26  5/2  5/4     5/8  5/10  5/15  5/17     HEP- SKTC, sit>stand, SKTC, glute set, TA is           TA BKFO- 1 leg straight 2x10 2x10  2x10  2x10  2x10 2x10  np     glute set, mini bridge 2x10, 5"  HEP 2x10, 5"  2x10, 5"  2x10 5"  2x10, 5"  np     Nu-step       10'     recumbent bike   8'  10'  10'  10'  10'  np      TA iso    10x10"  10x10"  10"x15  10"x15  np  np      hip add iso    2x10, 5"  2x10, 5"  2x10  5"  2x10 5"  2x10, 5"  2x10, 5"      SL-hip hiker   1x10, 3" ea   2x10, 3"  2x10, 3"  2x10 3"  2x10, 3"  D/C      standing wall pelvic tilt    1x5, 5"    5"x10  5"x10  np  np     Standing extn      1x10 1x10    Multifidus press       2x10, 3" OTB ea  LLE clam shell       2x10, 3"        Assessment: Patient reported feeling good with treatment today  Cueing needed for gluteal activation with addition of multifidus press exercise  Plan: Continue per plan of care

## 2018-05-21 ENCOUNTER — TELEPHONE (OUTPATIENT)
Dept: PAIN MEDICINE | Facility: CLINIC | Age: 78
End: 2018-05-21

## 2018-05-21 ENCOUNTER — OFFICE VISIT (OUTPATIENT)
Dept: PHYSICAL THERAPY | Facility: REHABILITATION | Age: 78
End: 2018-05-21
Payer: MEDICARE

## 2018-05-21 DIAGNOSIS — M54.16 LUMBAR RADICULOPATHY: Primary | ICD-10-CM

## 2018-05-21 PROCEDURE — 97110 THERAPEUTIC EXERCISES: CPT

## 2018-05-21 PROCEDURE — 97112 NEUROMUSCULAR REEDUCATION: CPT

## 2018-05-21 NOTE — PROGRESS NOTES
Daily Note     Today's date: 2018  Patient name: Sofi Fairbanks  : 1940  MRN: [id number]  Referring provider: Marilu Silva DO  Dx:   Encounter Diagnosis     ICD-10-CM    1  Lumbar radiculopathy M54 16              Subjective: Patient states that his legs are swollen today  Reports back pain getting in and out of the car  Objective: See treatment diary below  Manual   5/4  5/8  5/15  5/17                P/A L2-3 in sidely  FB NP  FB  FB                                                                                                                     Exercise Diary  4/26  5/2  5/4     5/8  5/10  5/15  5/17  5/21   HEP- SKTC, sit>stand, SKTC, glute set, TA is           TA BKFO- 1 leg straight 2x10 2x10  2x10  2x10  2x10 2x10  np  2x10 ea   glute set, mini bridge 2x10, 5"  HEP 2x10, 5"  2x10, 5"  2x10 5"  2x10, 5"  np  5"   2x10   Nu-step       10' np    recumbent bike   8'  10'  10'  10'  10'  np  10'    TA iso    10x10"  10x10"  10"x15  10"x15  np  np  10"x20    hip add iso    2x10, 5"  2x10, 5"  2x10  5"  2x10 5"  2x10, 5"  2x10, 5"  5"  2x10    standing wall pelvic tilt    1x5, 5"    5"x10  5"x10  np  np     Standing extn      1x10 1x10 1x10   Multifidus press       2x10, 3" OTB ea  3"  2x10 ea   LLE clam shell       2x10, 3" 5" 2x10 ea       Assessment: Pt reports that he is going to the chiropractor later this week  Reports no increase in pain during or post tx  Plan: Continue per plan of care

## 2018-05-21 NOTE — TELEPHONE ENCOUNTER
----- Message from Claudell Carmin sent at 5/19/2018 11:18 PM EDT -----  Regarding: Test Results Question  Contact: [phone]  I have a question about MRI LUMBAR SPINE WO CONTRAST resulted on 4/24/18, 1:19 PM     How bad is my situation? Physical therapy is helping somewhat

## 2018-05-21 NOTE — TELEPHONE ENCOUNTER
S/w pt, he said he does not remember why he sent the message, that it was nothing significant and he can not really talk at the moment b/c he was going out the door  Informed pt to call us back if he wants to discuss further or has any questions  Pt verbalized understanding

## 2018-05-24 ENCOUNTER — OFFICE VISIT (OUTPATIENT)
Dept: PHYSICAL THERAPY | Facility: REHABILITATION | Age: 78
End: 2018-05-24
Payer: MEDICARE

## 2018-05-24 DIAGNOSIS — M54.16 LUMBAR RADICULOPATHY: Primary | ICD-10-CM

## 2018-05-24 PROCEDURE — 97112 NEUROMUSCULAR REEDUCATION: CPT

## 2018-05-24 PROCEDURE — 97110 THERAPEUTIC EXERCISES: CPT

## 2018-05-24 PROCEDURE — 97530 THERAPEUTIC ACTIVITIES: CPT

## 2018-05-24 NOTE — PROGRESS NOTES
Daily Note     Today's date: 2018  Patient name: Eric Cardoso  : 1940  MRN: [id number]  Referring provider: Kannan Nolasco DO  Dx:   Encounter Diagnosis     ICD-10-CM    1  Lumbar radiculopathy M54 16              Subjective: Patient reports still having the most pain getting in and out of the car  Objective: See treatment diary below  Manual   5/4  5/8  5/15  5/17                P/A L2-3 in sidely  FB NP  FB  FB                                                                                                                     Exercise Diary   5/10  5/15  5/17  5/21 5/24    HEP- SKTC, sit>stand, SKTC, glute set, TA is         TA BKFO- 1 leg straight  2x10 2x10  np  2x10 ea 2x10    glute set, mini bridge  2x10 5"  2x10, 5"  np  5"   2x10     Nu-step   10' np      recumbent bike  10'  10'  np  10' 10'     TA iso   10"x15  np  np  10"x20      hip add iso   2x10 5"  2x10, 5"  2x10, 5"  5"  2x10 5" 2x10     standing wall pelvic tilt  5"x10  np  np       Standing extn  1x10 1x10 1x10     Multifidus press   2x10, 3" OTB ea  3"  2x10 ea 3" 2x10    LLE clam shell   2x10, 3" 5" 2x10 ea 5" 2x10    Pt  transfer education     10'    Sit to stand high/low table     2x10        Assessment: educated on body mechanics with entering/exiting car as well as in and out of his bed  Patient reports decreased pain post session  Plan: Continue per plan of care

## 2018-05-29 ENCOUNTER — EVALUATION (OUTPATIENT)
Dept: PHYSICAL THERAPY | Facility: REHABILITATION | Age: 78
End: 2018-05-29
Payer: MEDICARE

## 2018-05-29 DIAGNOSIS — M54.16 LUMBAR RADICULOPATHY: Primary | ICD-10-CM

## 2018-05-29 PROCEDURE — 97112 NEUROMUSCULAR REEDUCATION: CPT | Performed by: PHYSICAL THERAPIST

## 2018-05-29 PROCEDURE — 97110 THERAPEUTIC EXERCISES: CPT | Performed by: PHYSICAL THERAPIST

## 2018-05-29 PROCEDURE — 97530 THERAPEUTIC ACTIVITIES: CPT | Performed by: PHYSICAL THERAPIST

## 2018-05-29 NOTE — PROGRESS NOTES
Re-evaluation    Today's date: 2018  Patient name: Lanetta Lesch  : 1940  MRN: [id number]  Referring provider: Veronica Ortega DO  Dx:   Encounter Diagnosis     ICD-10-CM    1  Lumbar radiculopathy M54 16      Start Time: 1030  Stop Time: 1125    Subjective: Patient reports feeling good with regards to his back  He reports good compliance with HEP at this time and felt great following last treatment session  Has been practicing getting in and out of car which has helped greatly  He reports stair navigation sideways  Reports that he has been sleeping better without radiating of symptoms  Pain  Current pain ratin  At best pain ratin  At worst pain rating: 3  Quality: dull ache    Objective: See treatment diary below  Manual   5/4  5/8  5/15  5/17          P/A L2-3 in sidely  FB NP  FB  FB                                                                                       Exercise Diary   5/10  5/15  5/17  5/21 5/24 5/29   HEP- SKTC, sit>stand, SKTC, glute set, TA is         TA BKFO- 1 leg straight  2x10 2x10  np  2x10 ea 2x10    glute set, mini bridge  2x10 5"  2x10, 5"  np  5"   2x10     Nu-step   10' np      recumbent bike  10'  10'  np  10' 10' 10'    TA iso   10"x15  np  np  10"x20      hip add iso   2x10 5"  2x10, 5"  2x10, 5"  5"  2x10 5" 2x10 np   Standing march with GTB hold      2x15   Multifidus press   2x10, 3" OTB ea  3"  2x10 ea 3" 2x10 3" 2x10 OTB ea  LLE clam shell   2x10, 3" 5" 2x10 ea 5" 2x10    Pt  transfer education     10' np   Sit to stand high/low table     2x10    Ant  Step up, blue      2x10 ea  Stand hip abd  3x10 ea  GAIT: decreased gait speed  Squat assess: WNL  ¼ squat assess: decreased SLS BLE        Lumbar  % of normal   Flex  75%   Extn  100   SB Left    SB Right 100   ROT Left 100   ROT Right 100      Repetitive testing: extension= no change    Flexion= no change            MMT          AROM     Hip       L       R        L           R   Flex  5 5       Extn  4 5 5 5   Abd  5 5       Add  4+ 4+       IR  4+ 4+       ER  4+ 4+                   G  Max           G  Med            Iliop                           MMT     Ankle       L        R   PF 4 4   DF  5 5   EHL 5 5   Inv  5 5   Ever  5 5      Palpation:   Myotomes (L/R):intact BLE  Dermatome: (pinprick- L/R): Intact BLE                Reflexes:  (L/R) L4:  2+ BLE      S1: 2+ BLE           Babinski=  -    Clonus= 1 beat BLE     Slump test: L=   -  R=  -     Straight leg raise:   L=   -   R=  -            Transverse abdominis: Bent knee fall out= Fair +      Hip/SI joint:   [place] scan= -  Hamstring dominance test=  +        Hip abd/lat rot  =  -       prone knee flexion=  -    Multifidus activation = good  Joint mobility:  L2 hypomobility- improved          Assessment: Patient is making great progress at this time  Difficulty noted with ascending step with LLE at this time  Improved core activation noted overall  He will continue to benefit from treatment to address remaining functional deficits  Plan: Continue per plan of care  1-2 weeks progressing to HEP/independent fitness program     Short Term:  1  Pt will report decreased levels of pain by at least 2 subjective ratings in 4 weeks  Met  2  Pt will demonstrate improved ROM by at least 10 degrees in 4 weeks  Met  3  Pt will demonstrate improved strength by 1/2 grade  Met  4  Pt will be able to rise from seated position in 4 weeks without pain  Met  Long Term:   1  Pt will be independent in their HEP in 8 weeks  met  2  Pt will be be pain free with IADL's  75% improved  3  Pt will demonstrate improved FOTO, > 13  met  4  Pt will be able to get out car and bed without limitations or assistance in 8 weeks  75% improved  5  Pt will be able to ascend steps with reciprocal gait pattern  Not met

## 2018-05-31 ENCOUNTER — OFFICE VISIT (OUTPATIENT)
Dept: PHYSICAL THERAPY | Facility: REHABILITATION | Age: 78
End: 2018-05-31
Payer: MEDICARE

## 2018-05-31 DIAGNOSIS — M54.16 LUMBAR RADICULOPATHY: Primary | ICD-10-CM

## 2018-05-31 PROCEDURE — 97112 NEUROMUSCULAR REEDUCATION: CPT | Performed by: PHYSICAL THERAPIST

## 2018-05-31 PROCEDURE — 97110 THERAPEUTIC EXERCISES: CPT | Performed by: PHYSICAL THERAPIST

## 2018-05-31 NOTE — PROGRESS NOTES
Discharge Summary    Today's date: 2018  Patient name: Neetu Silva  : 1940  MRN: [id number]  Referring provider: Francisco Aparicio DO  Dx:   Encounter Diagnosis     ICD-10-CM    1  Lumbar radiculopathy M54 16        Start Time: 6  Stop Time: 1130  Total time in clinic (min): 45 minutes    Subjective: Patient reports feeling good overall, no longer having pain with transitions  Reports good compliance with HEP      Exercise Diary   5/10  5/15  5/17  5/21 5/24 5/29 5/31   HEP- SKTC, sit>stand, SKTC, glute set, TA is                TA BKFO- 1 leg straight  2x10 2x10  np  2x10 ea 2x10      glute set, mini bridge  2x10 5"  2x10, 5"  np  5"   2x10        Nu-step     10' np         recumbent bike  10'  10'  np  10' 10' 10' 10'    TA iso   10"x15  np  np  10"x20         hip add iso   2x10 5"  2x10, 5"  2x10, 5"  5"  2x10 5" 2x10 np    Standing march with GTB hold           2x15    Multifidus press     2x10, 3" OTB ea  3"  2x10 ea 3" 2x10 3" 2x10 OTB ea  LLE clam shell     2x10, 3" 5" 2x10 ea 5" 2x10      Pt  transfer education         10' np    Sit to stand high/low table         2x10      Ant  Step up, blue           2x10 ea  Stand hip abd            3x10 ea      Precautions: Hx of TIA, HTN, CHF, A-fib     Objective: See treatment diary below      Assessment: Tolerated treatment well  Patient demonstrated good exercise performance today and HEP recall  Plan: discharge to HEP  Patient to contact clinic via phone PRN

## 2018-06-07 ENCOUNTER — APPOINTMENT (OUTPATIENT)
Dept: LAB | Facility: CLINIC | Age: 78
End: 2018-06-07
Payer: MEDICARE

## 2018-06-07 ENCOUNTER — ANTICOAG VISIT (OUTPATIENT)
Dept: CARDIOLOGY CLINIC | Facility: CLINIC | Age: 78
End: 2018-06-07

## 2018-06-07 DIAGNOSIS — I48.91 ATRIAL FIBRILLATION, UNSPECIFIED TYPE (HCC): ICD-10-CM

## 2018-06-07 DIAGNOSIS — I48.0 PAROXYSMAL ATRIAL FIBRILLATION (HCC): ICD-10-CM

## 2018-06-07 LAB
INR PPP: 2.95 (ref 0.86–1.17)
PROTHROMBIN TIME: 30.8 SECONDS (ref 11.8–14.2)

## 2018-06-07 PROCEDURE — 85610 PROTHROMBIN TIME: CPT

## 2018-06-07 PROCEDURE — 36415 COLL VENOUS BLD VENIPUNCTURE: CPT

## 2018-06-18 ENCOUNTER — TELEPHONE (OUTPATIENT)
Dept: CARDIOLOGY CLINIC | Facility: CLINIC | Age: 78
End: 2018-06-18

## 2018-06-18 DIAGNOSIS — I10 ESSENTIAL (PRIMARY) HYPERTENSION: Primary | ICD-10-CM

## 2018-06-18 RX ORDER — AMLODIPINE BESYLATE 10 MG/1
TABLET ORAL
Qty: 90 TABLET | Refills: 0 | Status: SHIPPED | OUTPATIENT
Start: 2018-06-18 | End: 2019-06-10 | Stop reason: SDUPTHER

## 2018-07-06 ENCOUNTER — ANTICOAG VISIT (OUTPATIENT)
Dept: CARDIOLOGY CLINIC | Facility: CLINIC | Age: 78
End: 2018-07-06

## 2018-07-06 ENCOUNTER — TRANSCRIBE ORDERS (OUTPATIENT)
Dept: LAB | Facility: CLINIC | Age: 78
End: 2018-07-06

## 2018-07-06 ENCOUNTER — APPOINTMENT (OUTPATIENT)
Dept: LAB | Facility: CLINIC | Age: 78
End: 2018-07-06
Payer: MEDICARE

## 2018-07-06 DIAGNOSIS — I48.91 ATRIAL FIBRILLATION, UNSPECIFIED TYPE (HCC): Primary | ICD-10-CM

## 2018-07-06 DIAGNOSIS — I48.0 PAROXYSMAL ATRIAL FIBRILLATION (HCC): ICD-10-CM

## 2018-07-06 LAB
INR PPP: 2.97 (ref 0.86–1.17)
PROTHROMBIN TIME: 30.9 SECONDS (ref 11.8–14.2)

## 2018-07-06 PROCEDURE — 36415 COLL VENOUS BLD VENIPUNCTURE: CPT

## 2018-07-06 PROCEDURE — 85610 PROTHROMBIN TIME: CPT

## 2018-07-09 ENCOUNTER — CLINICAL SUPPORT (OUTPATIENT)
Dept: PAIN MEDICINE | Facility: CLINIC | Age: 78
End: 2018-07-09
Payer: MEDICARE

## 2018-07-09 VITALS
HEART RATE: 54 BPM | DIASTOLIC BLOOD PRESSURE: 76 MMHG | SYSTOLIC BLOOD PRESSURE: 142 MMHG | BODY MASS INDEX: 38.41 KG/M2 | WEIGHT: 238 LBS

## 2018-07-09 DIAGNOSIS — M46.1 SACROILIITIS (HCC): ICD-10-CM

## 2018-07-09 DIAGNOSIS — M54.16 LUMBAR RADICULOPATHY: ICD-10-CM

## 2018-07-09 DIAGNOSIS — M47.816 LUMBAR SPONDYLOSIS: ICD-10-CM

## 2018-07-09 DIAGNOSIS — M48.061 SPINAL STENOSIS OF LUMBAR REGION, UNSPECIFIED WHETHER NEUROGENIC CLAUDICATION PRESENT: Primary | ICD-10-CM

## 2018-07-09 PROCEDURE — 99214 OFFICE O/P EST MOD 30 MIN: CPT | Performed by: ANESTHESIOLOGY

## 2018-07-09 RX ORDER — GABAPENTIN 100 MG/1
300 CAPSULE ORAL
Qty: 90 CAPSULE | Refills: 2 | Status: SHIPPED | OUTPATIENT
Start: 2018-07-09 | End: 2018-11-13

## 2018-07-09 NOTE — PROGRESS NOTES
Pt has lower back pain  Assessment:  1  Spinal stenosis of lumbar region, unspecified whether neurogenic claudication present    2  Lumbar radiculopathy    3  Lumbar spondylosis    4  Sacroiliitis [facility])         Plan:  37-year-old male returning for follow-up of lumbosacral back pain with radiculopathy in the L4 and S1 distribution of the left lower extremity  The patient recently had an MRI of his lumbar spine revealing lumbar degenerative disc disease and spondylosis with disc herniations in bulges at L1-2, L3-4, and L5-S1  There is varying degrees of foraminal stenosis at L1-2, L3-4, and L4-5; most pronounced at L4-5  The patient has been doing physical therapy and finished a course of oral steroids just after his last visit which has significantly improved the patient's low back and lower extremity radicular symptoms  He does continue to take gabapentin 300 milligrams q h s  and methocarbamol 500 milligrams q 8 hours p r n  with about 90 percent relief  He denies any side effects to the medication  1   If the patient's radicular symptoms return or worsen we will consider a left L4 TFESI  Since the patient is doing so well with conservative therapy will hold off on interventional therapy at this time  2   The patient will continue with gabapentin 300 milligrams q h s   3   The patient may continue with methocarbamol 500 milligrams q 8 hours p r n   4   The patient will continue with physical therapy  5  I will follow up the patient in 3 months or sooner if needed      History of Present Illness: The patient is a 70 y o  male who presents for a follow up office visit in regards to Back Pain  The patients current symptoms include low back pain that radiates into the anterior and posterior aspect of the left lower extremity to the foot with occasional subjective weakness  He denies any right lower extremity symptoms, bladder bowel incontinence, or saddle anesthesia      The patient recently had an MRI of his lumbar spine revealing lumbar degenerative disc disease and spondylosis with disc herniations in bulges at L1-2, L3-4, and L5-S1  There is varying degrees of foraminal stenosis at L1-2, L3-4, and L4-5; most pronounced at L4-5  Current pain medications includes:  Gabapentin 300 milligrams q h s  and methocarbamol 500 milligrams q 8 hours p r n     The patient reports that this regimen is providing 90 % pain relief  The patient is reporting no side effects from this pain medication regimen  I have personally reviewed and/or updated the patient's past medical history, past surgical history, family history, social history, current medications, allergies, and vital signs today  Review of Systems  Review of Systems   Respiratory: Negative for shortness of breath  Cardiovascular: Negative for chest pain  Gastrointestinal: Negative for constipation, diarrhea, nausea and vomiting  Musculoskeletal: Negative for arthralgias, gait problem, joint swelling and myalgias  Difficulty walking     Skin: Negative for rash  Neurological: Negative for dizziness, seizures and weakness  All other systems reviewed and are negative  Past Medical History:   Diagnosis Date    A-fib ([facility] )     Anxiety     Arthritis     Depression     Hyperlipidemia     Hypertension     Irregular heart beat     Stroke ([facility] )     TIA (transient ischemic attack)     no residual problems       Past Surgical History:   Procedure Laterality Date    FACIAL/NECK BIOPSY N/A 4/3/2017    Procedure: NOSE BCC EXCISION; FROZEN SECTION; RECONSTRUCTION ;  Surgeon: Lianne Long MD;  [facility];  Service:     FULL THICKNESS SKIN GRAFT N/A 4/3/2017    Procedure: FLAP VERSUS FULL THICKNESS SKIN GRAFT ;  Surgeon: Lianne Long MD;  [facility];  Service:     PROSTATECTOMY      TONSILLECTOMY AND ADENOIDECTOMY         No family history on file      Social History     Occupational History    Not on file      Social History Main Topics    Smoking status: Never Smoker    Smokeless tobacco: Never Used    Alcohol use 8 4 oz/week     14 Glasses of wine per week    Drug use: No    Sexual activity: Not on file         Current Outpatient Prescriptions:     Acetaminophen 325 MG CAPS, Take by mouth, Disp: , Rfl:     amLODIPine (NORVASC) 10 mg tablet, TAKE 1 TABLET DAILY, Disp: 90 tablet, Rfl: 0    atorvastatin (LIPITOR) 40 mg tablet, Take 40 mg by mouth daily at bedtime, Disp: , Rfl:     Cholecalciferol (VITAMIN D-3 PO), Take 2,000 unit marking on U-100 syringe by mouth daily after dinner, Disp: , Rfl:     co-enzyme Q-10 30 MG capsule, Take 30 mg by mouth daily after dinner, Disp: , Rfl:     gabapentin (NEURONTIN) 100 mg capsule, Take 3 capsules (300 mg total) by mouth daily at bedtime, Disp: 90 capsule, Rfl: 1    glucosamine-chondroitin 500-400 MG tablet, Take 2 tablets by mouth daily in the early morning, Disp: , Rfl:     meloxicam (MOBIC) 7 5 mg tablet, Take 7 5 mg by mouth daily, Disp: , Rfl:     multivitamin (THERAGRAN) TABS, Take 1 tablet by mouth daily in the early morning, Disp: , Rfl:     Omega-3 Fatty Acids (FISH OIL) 1,000 mg, Take 1,000 mg by mouth 2 (two) times a day, Disp: , Rfl:     sertraline (ZOLOFT) 50 mg tablet, Take 50 mg by mouth daily in the early morning, Disp: , Rfl:     sotalol (BETAPACE) 160 MG tablet, Take 320 mg by mouth 2 (two) times a day, Disp: , Rfl:     torsemide (DEMADEX) 20 mg tablet, Take 20 mg by mouth daily, Disp: , Rfl:     valsartan (DIOVAN) 320 MG tablet, TAKE 1 TABLET DAILY, Disp: 90 tablet, Rfl: 0    warfarin (COUMADIN) 5 mg tablet, Take 1 tablet (5 mg total) by mouth daily Or as directed (Patient taking differently: Take 5 mg by mouth daily Or as directed ), Disp: 30 tablet, Rfl: 11    No Known Allergies    Physical Exam:    /76   Pulse (!) 54   Wt 108 kg (238 lb)   BMI 38 41 kg/m²     Constitutional:overweight  Eyes:anicteric  HEENT:grossly intact  Neck:supple, symmetric, trachea midline and no masses   Pulmonary:even and unlabored  Cardiovascular:2+ pitting edema of bilateral lower extremities from the knees to the feet  Skin:Normal without rashes or lesions and well hydrated  Psychiatric:Mood and affect appropriate  Neurologic:Cranial Nerves II-XII grossly intact  Musculoskeletal:normal gait  Bilateral lumbar paraspinals mildly tender to palpation  Bilateral SI joints mildly tender to palpation  Bilateral lower extremity strength 5/5 in all muscle groups  Negative seated straight leg raise bilaterally  Imaging  No orders to display   MRI lumbar spine without contrast   Status: Final result   PACS Images     Show images for MRI lumbar spine without contrast   Order Report      Order Details   Study Result     MRI LUMBAR SPINE WITHOUT CONTRAST     INDICATION: M54 16: Radiculopathy, lumbar region chronic, worsening low back pain radiating into the legs      COMPARISON:  None      TECHNIQUE:  Sagittal T1, sagittal T2, sagittal inversion recovery, axial T1 and axial T2, coronal T2        IMAGE QUALITY:  Diagnostic     FINDINGS:     ALIGNMENT:  Mild S-shaped scoliotic deformity noted with a slight dextroscoliosis of the lower thoracic spine and a mild levoscoliosis mid lumbar spine  Trace grade 1 anterolisthesis of L4 on L5 noted      MARROW SIGNAL:  Scattered degenerative endplate changes  No focally suspicious marrow lesions  No bone marrow edema or compression abnormality      DISTAL CORD AND CONUS:  Normal size and signal within the distal cord and conus  The conus ends at the L2 level      PARASPINAL SOFT TISSUES:  A normal kidney is not identified in the expected location of the right renal fossa  There is a right pelvic kidney noted  It has a few small T2 hyperintense lesions, some of which are too small to characterize others likely   cysts    Visualized portions of the left kidney are unremarkable; the left kidney located in the left renal fossa      SACRUM:  Normal signal within the sacrum  No evidence of insufficiency or stress fracture      LOWER THORACIC DISC SPACES:  Multilevel loss of disc height and signal      T10-T11: There is no focal disk herniation, central canal or neural foraminal narrowing      T11-T12: Small central disc protrusion  Mild central canal narrowing  Neural foramina bilaterally patent      T12-L1: There is no focal disk herniation, central canal or neural foraminal narrowing      LUMBAR DISC SPACES:     L1-L2:  Small left paracentral disc protrusion  There is bilateral facet hypertrophy  Mild left neural foraminal narrowing  Central canal and right neural foramen patent      L2-L3:  There is no focal disk herniation, central canal or neural foraminal narrowing  Bilateral facet hypertrophy noted      L3-L4:  There is a left neural foraminal disc protrusion  There is bilateral facet hypertrophy  Mild left neural foraminal narrowing  Central canal and right neural foramen patent      L4-L5:  There is uncovering the intervertebral disc space  There is bilateral facet hypertrophy  There is no focal disc herniation  There is moderate bilateral neural foraminal narrowing  Mild central canal narrowing      L5-S1:  There is a diffuse disk bulge  No significant central canal or neural foraminal narrowing  Bilateral facet hypertrophy noted      IMPRESSION:        1  Multilevel spondylosis with a mild S-shaped scoliosis as described  2   Right pelvic kidney         Workstation performed: [id number]      Imaging     MRI lumbar spine without contrast (Order #[id number]) on 4/23/2018 - Imaging Information      No orders of the defined types were placed in this encounter

## 2018-08-02 ENCOUNTER — APPOINTMENT (OUTPATIENT)
Dept: LAB | Facility: CLINIC | Age: 78
End: 2018-08-02
Payer: MEDICARE

## 2018-08-02 ENCOUNTER — ANTICOAG VISIT (OUTPATIENT)
Dept: CARDIOLOGY CLINIC | Facility: CLINIC | Age: 78
End: 2018-08-02

## 2018-08-02 DIAGNOSIS — I48.91 ATRIAL FIBRILLATION, UNSPECIFIED TYPE (HCC): ICD-10-CM

## 2018-08-02 DIAGNOSIS — I48.0 PAROXYSMAL ATRIAL FIBRILLATION (HCC): ICD-10-CM

## 2018-08-02 LAB
INR PPP: 3.46 (ref 0.86–1.17)
PROTHROMBIN TIME: 34.8 SECONDS (ref 11.8–14.2)

## 2018-08-02 PROCEDURE — 85610 PROTHROMBIN TIME: CPT

## 2018-08-02 PROCEDURE — 36415 COLL VENOUS BLD VENIPUNCTURE: CPT

## 2018-08-14 DIAGNOSIS — I10 ESSENTIAL (PRIMARY) HYPERTENSION: ICD-10-CM

## 2018-08-14 DIAGNOSIS — I10 ESSENTIAL HYPERTENSION: Primary | ICD-10-CM

## 2018-08-14 RX ORDER — LOSARTAN POTASSIUM 100 MG/1
100 TABLET ORAL DAILY
Qty: 90 TABLET | Refills: 3 | Status: SHIPPED | OUTPATIENT
Start: 2018-08-14 | End: 2018-08-28 | Stop reason: SDUPTHER

## 2018-08-16 ENCOUNTER — ANTICOAG VISIT (OUTPATIENT)
Dept: CARDIOLOGY CLINIC | Facility: CLINIC | Age: 78
End: 2018-08-16

## 2018-08-16 ENCOUNTER — APPOINTMENT (OUTPATIENT)
Dept: LAB | Facility: CLINIC | Age: 78
End: 2018-08-16
Payer: MEDICARE

## 2018-08-16 DIAGNOSIS — I48.91 ATRIAL FIBRILLATION, UNSPECIFIED TYPE (HCC): ICD-10-CM

## 2018-08-16 DIAGNOSIS — I48.0 PAROXYSMAL ATRIAL FIBRILLATION (HCC): ICD-10-CM

## 2018-08-16 LAB
INR PPP: 1.84 (ref 0.86–1.17)
PROTHROMBIN TIME: 21.3 SECONDS (ref 11.8–14.2)

## 2018-08-16 PROCEDURE — 85610 PROTHROMBIN TIME: CPT

## 2018-08-16 PROCEDURE — 36415 COLL VENOUS BLD VENIPUNCTURE: CPT

## 2018-08-21 DIAGNOSIS — G47.33 OSA (OBSTRUCTIVE SLEEP APNEA): Primary | ICD-10-CM

## 2018-08-27 DIAGNOSIS — I10 ESSENTIAL HYPERTENSION: ICD-10-CM

## 2018-08-27 RX ORDER — LOSARTAN POTASSIUM 100 MG/1
100 TABLET ORAL DAILY
Qty: 90 TABLET | Refills: 0 | Status: CANCELLED | OUTPATIENT
Start: 2018-08-27

## 2018-08-28 DIAGNOSIS — I10 ESSENTIAL HYPERTENSION: ICD-10-CM

## 2018-08-28 RX ORDER — LOSARTAN POTASSIUM 100 MG/1
100 TABLET ORAL DAILY
Qty: 90 TABLET | Refills: 3 | Status: SHIPPED | OUTPATIENT
Start: 2018-08-28 | End: 2019-08-07 | Stop reason: SDUPTHER

## 2018-08-31 ENCOUNTER — OFFICE VISIT (OUTPATIENT)
Dept: CARDIOLOGY CLINIC | Facility: CLINIC | Age: 78
End: 2018-08-31
Payer: MEDICARE

## 2018-08-31 VITALS
WEIGHT: 238 LBS | DIASTOLIC BLOOD PRESSURE: 68 MMHG | HEIGHT: 66 IN | HEART RATE: 60 BPM | SYSTOLIC BLOOD PRESSURE: 130 MMHG | BODY MASS INDEX: 38.25 KG/M2

## 2018-08-31 DIAGNOSIS — I48.0 PAROXYSMAL ATRIAL FIBRILLATION (HCC): Primary | ICD-10-CM

## 2018-08-31 DIAGNOSIS — I45.10 RIGHT BUNDLE BRANCH BLOCK (RBBB): ICD-10-CM

## 2018-08-31 PROCEDURE — 99214 OFFICE O/P EST MOD 30 MIN: CPT | Performed by: INTERNAL MEDICINE

## 2018-08-31 RX ORDER — SOTALOL HYDROCHLORIDE 80 MG/1
80 TABLET ORAL 2 TIMES DAILY
Qty: 180 TABLET | Refills: 3 | Status: SHIPPED | OUTPATIENT
Start: 2018-08-31 | End: 2019-09-06 | Stop reason: SDUPTHER

## 2018-08-31 NOTE — PROGRESS NOTES
Cardiology Follow Up Visit    Feroz Reeves  1940  [id number]  [address] [facility] [address] [facility] [place] [address]  [phone]    1  Paroxysmal atrial fibrillation (HCC)  sotalol (BETAPACE) 80 mg tablet    Ambulatory referral to Cardiac Electrophysiology       Interval History:     Patient presents to the office today follow-up history of paroxysmal atrial fibrillation  As detailed previously, on chronic higher dose of sotalol with unknown details presumed paroxysmal afib (prior Dr Shree Ferrara)  Did have Holter monitoring last year which showed no evidence of recurrent paroxysmal atrial fibrillation as well as an echocardiogram and stress test which showed no evidence of ischemia and normal left ventricular systolic function  His chronic venous insufficiency edema for which he takes diuretics for  Also on amlodipine 10 mg daily  Denies any palpitations or provide any insight into his details of paroxysmal atrial fibrillation  He is on anticoagulation  Patient Active Problem List   Diagnosis    Paroxysmal atrial fibrillation (HCC)    Lumbar spinal stenosis    Sacroiliitis (HCC)    Spondylosis of lumbar region without myelopathy or radiculopathy    Right bundle branch block (RBBB)    Edema of both lower legs due to peripheral venous insufficiency    Chronic pain of both lower extremities    Lumbar radiculopathy    Lumbar spondylosis     Past Medical History:   Diagnosis Date    [facility] ([facility] )     Anxiety     Arthritis     Depression     Hyperlipidemia     Hypertension     Irregular heart beat     [facility]     TIA (transient ischemic attack)     no residual problems     Social History     Social History    Marital status: /Civil Union     Spouse name: N/A    Number of children: N/A    Years of education: N/A     Occupational History    Not on file       Social History Main Topics    Smoking status: Never Smoker    Smokeless tobacco: Never Used    Alcohol use 8 4 oz/week     14 Glasses of wine per week    Drug use: No    Sexual activity: Not on file     Other Topics Concern    Not on file     Social History Narrative    No narrative on file      No family history on file    Past Surgical History:   Procedure Laterality Date    FACIAL/NECK BIOPSY N/A 4/3/2017    Procedure: NOSE BCC EXCISION; FROZEN SECTION; RECONSTRUCTION ;  Surgeon: Kay Bridges MD;  [facility];  Service:     FULL THICKNESS SKIN GRAFT N/A 4/3/2017    Procedure: FLAP VERSUS FULL THICKNESS SKIN GRAFT ;  Surgeon: Kay Bridges MD;  [facility];  Service:    Mayme Roma PROSTATECTOMY      TONSILLECTOMY AND ADENOIDECTOMY         Current Outpatient Prescriptions:     Acetaminophen 325 MG CAPS, Take by mouth, Disp: , Rfl:     amLODIPine (NORVASC) 10 mg tablet, TAKE 1 TABLET DAILY, Disp: 90 tablet, Rfl: 0    atorvastatin (LIPITOR) 40 mg tablet, Take 40 mg by mouth daily at bedtime, Disp: , Rfl:     Cholecalciferol (VITAMIN D-3 PO), Take 2,000 unit marking on U-100 syringe by mouth daily after dinner, Disp: , Rfl:     co-enzyme Q-10 30 MG capsule, Take 30 mg by mouth daily after dinner, Disp: , Rfl:     gabapentin (NEURONTIN) 100 mg capsule, Take 3 capsules (300 mg total) by mouth daily at bedtime (Patient taking differently: Take 300 mg by mouth daily at bedtime 2 tabs daily ), Disp: 90 capsule, Rfl: 2    glucosamine-chondroitin 500-400 MG tablet, Take 2 tablets by mouth daily in the early morning, Disp: , Rfl:     losartan (COZAAR) 100 MG tablet, Take 1 tablet (100 mg total) by mouth daily, Disp: 90 tablet, Rfl: 3    multivitamin (THERAGRAN) TABS, Take 1 tablet by mouth daily in the early morning, Disp: , Rfl:     Omega-3 Fatty Acids (FISH OIL) 1,000 mg, Take 1,000 mg by mouth 2 (two) times a day, Disp: , Rfl:     sertraline (ZOLOFT) 50 mg tablet, Take 50 mg by mouth daily in the early morning, Disp: , Rfl:     torsemide (DEMADEX) 20 mg tablet, Take 20 mg by mouth daily, Disp: , Rfl:     warfarin (COUMADIN) 5 mg tablet, Take 1 tablet (5 mg total) by mouth daily Or as directed (Patient taking differently: Take 5 mg by mouth daily Or as directed ), Disp: 30 tablet, Rfl: 11    meloxicam (MOBIC) 7 5 mg tablet, Take 7 5 mg by mouth daily, Disp: , Rfl:     sotalol (BETAPACE) 80 mg tablet, Take 1 tablet (80 mg total) by mouth 2 (two) times a day, Disp: 180 tablet, Rfl: 3  No Known Allergies      Social, Family, Medication history reviewed and updated as necessary      Labs:     Lab Results   Component Value Date     08/28/2017    K 3 9 08/28/2017     (H) 08/28/2017    CO2 27 08/28/2017    BUN 22 08/28/2017    CREATININE 1 04 08/28/2017    CREATININE 0 87 03/28/2017    CALCIUM 8 8 08/28/2017       Lab Results   Component Value Date    WBC 8 08 03/28/2017    HGB 16 0 03/28/2017    HCT 47 5 03/28/2017     03/28/2017       No results found for: CHOL  No results found for: HDL  No results found for: LDLCALC  No results found for: TRIG    Lab Results   Component Value Date    ALT 26 08/28/2017    AST 16 08/28/2017       Lab Results   Component Value Date    INR 1 84 (H) 08/16/2018    INR 3 46 (H) 08/02/2018       No results found for: NTBNP    Lab Results   Component Value Date    HGBA1C 6 4 03/09/2018           Imaging: Reviewed in epic        Review of Systems:  14 systems reviewed and negative with exception of the above    Review of Systems    PHYSICAL EXAM:    Physical Exam    Vitals:    08/31/18 0959   BP: 130/68   Pulse: 60     Body mass index is 38 41 kg/m²    Weight (last 2 days)     Date/Time   Weight    08/31/18 0959  108 (238)              Gen: No acute distress  HEENT: anicteric, mucous membranes moist  Neck: supple, no jugular venous distention, or carotid bruit  Heart: regular, normal s1 and s2, no murmur/rub or gallop  Lungs :clear to auscultation bilaterally, no rales/rhonchi or wheeze  Abdomen: soft nontender, normoactive bowel sounds, no organomegaly  Ext: warm and perfused, normal femoral pulses, 2+ edema, or clubbing  Skin: warm, no rashes  Neuro: AAO x 3, no focal findings  Psychiatric: normal affect  Musculoskeletal: no obvious joint deformities  Discussion/Summary:    1  Paroxysmal afib, in NSR on sotalol/warfarin, h/o Normal LVEF     2  Sleep apnea, on cpap  3  Chronic back pain, pain management, normal fatou's   4  Venous insuff edema, chronic  6  Hypertension    Plan: details of pafib unknown, long standing patient of Dr Renetta Reyes and cannot see comment for high dose sotalol  Therefore, will reduce to 80mg bid given underlying RBBB/bradycardia  and obtain holter after dose adjustment    EP eval for need continued sotalol/dose  Compression stockings/diuretics for venous insuff edema

## 2018-09-04 ENCOUNTER — PROCEDURE VISIT (OUTPATIENT)
Dept: CARDIOLOGY CLINIC | Facility: CLINIC | Age: 78
End: 2018-09-04
Payer: MEDICARE

## 2018-09-04 DIAGNOSIS — I48.91 ATRIAL FIBRILLATION, UNSPECIFIED TYPE (HCC): Primary | ICD-10-CM

## 2018-09-04 PROCEDURE — 93224 XTRNL ECG REC UP TO 48 HRS: CPT | Performed by: INTERNAL MEDICINE

## 2018-09-06 ENCOUNTER — HOSPITAL ENCOUNTER (OUTPATIENT)
Dept: NON INVASIVE DIAGNOSTICS | Facility: HOSPITAL | Age: 78
Discharge: HOME | End: 2018-09-06
Payer: MEDICARE

## 2018-09-06 DIAGNOSIS — I48.0 PAROXYSMAL ATRIAL FIBRILLATION (HCC): ICD-10-CM

## 2018-09-06 PROCEDURE — 93227 XTRNL ECG REC<48 HR R&I: CPT | Performed by: INTERNAL MEDICINE

## 2018-09-06 PROCEDURE — 93226 XTRNL ECG REC<48 HR SCAN A/R: CPT

## 2018-09-06 PROCEDURE — 93225 XTRNL ECG REC<48 HRS REC: CPT

## 2018-09-11 ENCOUNTER — APPOINTMENT (OUTPATIENT)
Dept: LAB | Facility: CLINIC | Age: 78
End: 2018-09-11
Payer: MEDICARE

## 2018-09-11 ENCOUNTER — ANTICOAG VISIT (OUTPATIENT)
Dept: CARDIOLOGY CLINIC | Facility: CLINIC | Age: 78
End: 2018-09-11

## 2018-09-11 DIAGNOSIS — I48.91 ATRIAL FIBRILLATION, UNSPECIFIED TYPE (HCC): Primary | ICD-10-CM

## 2018-09-11 DIAGNOSIS — I48.0 PAROXYSMAL ATRIAL FIBRILLATION (HCC): ICD-10-CM

## 2018-09-11 DIAGNOSIS — I48.91 ATRIAL FIBRILLATION, UNSPECIFIED TYPE (HCC): ICD-10-CM

## 2018-09-11 LAB
INR PPP: 3.55 (ref 0.86–1.17)
PROTHROMBIN TIME: 35.5 SECONDS (ref 11.8–14.2)

## 2018-09-11 PROCEDURE — 85610 PROTHROMBIN TIME: CPT

## 2018-09-11 PROCEDURE — 36415 COLL VENOUS BLD VENIPUNCTURE: CPT

## 2018-09-13 ENCOUNTER — OFFICE VISIT (OUTPATIENT)
Dept: SLEEP CENTER | Facility: CLINIC | Age: 78
End: 2018-09-13
Payer: MEDICARE

## 2018-09-13 VITALS
DIASTOLIC BLOOD PRESSURE: 66 MMHG | BODY MASS INDEX: 37.77 KG/M2 | HEART RATE: 76 BPM | HEIGHT: 66 IN | WEIGHT: 235 LBS | RESPIRATION RATE: 16 BRPM | SYSTOLIC BLOOD PRESSURE: 142 MMHG

## 2018-09-13 DIAGNOSIS — G47.33 OSA (OBSTRUCTIVE SLEEP APNEA): ICD-10-CM

## 2018-09-13 PROCEDURE — 99214 OFFICE O/P EST MOD 30 MIN: CPT | Performed by: INTERNAL MEDICINE

## 2018-09-13 NOTE — PROGRESS NOTES
Progress Note - Sleep Center   Sofi Fairbanks :1940 MRN: [id number]      Reason for Visit:  70 y o male here for annual follow-up    Assessment:  Doing well on current therapy of 11 cm for sleep apnea AHI 16 8 and during REM 57 4    Plan:  Continue same    Follow up: One year    History of Present Illness:  History of CHASIDY on PAP therapy  Fully compliant and deriving benefit  Pt reports that he is sleeping well  No snoring  Does not wake up unless he has to go to the bathroom but its not everyday  Wearing it every night  Does not wake up drowsy or sleeping but does get tired during the day and needs a nap  Has not been getting supplies       Review of Systems      Genitourinary none   Cardiology ankle/leg swelling   Gastrointestinal none   Neurology muscle weakness and balance problems   Constitutional fatigue   Integumentary none   Psychiatry anxiety   Musculoskeletal muscle aches, back pain and sciatica   Pulmonary none   ENT none   Endocrine none   Hematological none       Historical Information    Past Medical History:   Past Medical History:   Diagnosis Date    [facility]fib ([facility] )     Anxiety     Arthritis     Depression     Hyperlipidemia     Hypertension     Irregular heart beat     Stroke ([facility] )     TIA (transient ischemic attack)     no residual problems         Past Surgical History:   Past Surgical History:   Procedure Laterality Date    FACIAL/NECK BIOPSY N/A 4/3/2017    Procedure: NOSE BCC EXCISION; FROZEN SECTION; RECONSTRUCTION ;  Surgeon: Mckinley Castillo MD;  [facility];  Service:     FULL THICKNESS SKIN GRAFT N/A 4/3/2017    Procedure: FLAP VERSUS FULL THICKNESS SKIN GRAFT ;  Surgeon: Mckinley Castillo MD;  [facility];  Service:    [facility] PROSTATECTOMY      TONSILLECTOMY AND ADENOIDECTOMY         Social History:   Social History     Social History    Marital status: /Civil Union     Spouse name: N/A    Number of children: N/A    Years of education: N/A Social History Main Topics    Smoking status: Never Smoker    Smokeless tobacco: Never Used    Alcohol use 8 4 oz/week     14 Glasses of wine per week    Drug use: No    Sexual activity: Not Currently     Other Topics Concern    None     Social History Narrative    None       Family History: History reviewed  No pertinent family history      Medications/Allergies:      Current Outpatient Prescriptions:     Acetaminophen 325 MG CAPS, Take by mouth, Disp: , Rfl:     amLODIPine (NORVASC) 10 mg tablet, TAKE 1 TABLET DAILY, Disp: 90 tablet, Rfl: 0    atorvastatin (LIPITOR) 40 mg tablet, Take 40 mg by mouth daily at bedtime, Disp: , Rfl:     Cholecalciferol (VITAMIN D-3 PO), Take 2,000 unit marking on U-100 syringe by mouth daily after dinner, Disp: , Rfl:     co-enzyme Q-10 30 MG capsule, Take 30 mg by mouth daily after dinner, Disp: , Rfl:     gabapentin (NEURONTIN) 100 mg capsule, Take 3 capsules (300 mg total) by mouth daily at bedtime (Patient taking differently: Take 300 mg by mouth daily at bedtime 2 tabs daily ), Disp: 90 capsule, Rfl: 2    glucosamine-chondroitin 500-400 MG tablet, Take 2 tablets by mouth daily in the early morning, Disp: , Rfl:     losartan (COZAAR) 100 MG tablet, Take 1 tablet (100 mg total) by mouth daily, Disp: 90 tablet, Rfl: 3    meloxicam (MOBIC) 7 5 mg tablet, Take 7 5 mg by mouth daily, Disp: , Rfl:     multivitamin (THERAGRAN) TABS, Take 1 tablet by mouth daily in the early morning, Disp: , Rfl:     Omega-3 Fatty Acids (FISH OIL) 1,000 mg, Take 1,000 mg by mouth 2 (two) times a day, Disp: , Rfl:     sertraline (ZOLOFT) 50 mg tablet, Take 50 mg by mouth daily in the early morning, Disp: , Rfl:     sotalol (BETAPACE) 80 mg tablet, Take 1 tablet (80 mg total) by mouth 2 (two) times a day, Disp: 180 tablet, Rfl: 3    torsemide (DEMADEX) 20 mg tablet, Take 20 mg by mouth daily, Disp: , Rfl:     warfarin (COUMADIN) 5 mg tablet, Take 1 tablet (5 mg total) by mouth daily Or as directed (Patient taking differently: Take 5 mg by mouth daily Or as directed ), Disp: 30 tablet, Rfl: 11          Objective      Vital Signs:   Vitals:    18 1300   BP: 142/66   Pulse: 76   Resp: 16     Randolph Sleepiness Scale: Total score: 4        Physical Exam:    General: Alert, appropriate, cooperative, overweight    Head: NC/AT    Skin: Warm, dry    Neuro: No motor abnormalities, cranial nerves appear intact    Extremity: No clubbing, cyanosis, edema B/L LE       DME Provider:  [place]   PAP Settin cm

## 2018-09-17 ENCOUNTER — CLINICAL SUPPORT (OUTPATIENT)
Dept: PAIN MEDICINE | Facility: CLINIC | Age: 78
End: 2018-09-17
Payer: MEDICARE

## 2018-09-17 VITALS
SYSTOLIC BLOOD PRESSURE: 138 MMHG | BODY MASS INDEX: 37.93 KG/M2 | DIASTOLIC BLOOD PRESSURE: 74 MMHG | RESPIRATION RATE: 18 BRPM | HEIGHT: 66 IN | WEIGHT: 236 LBS | HEART RATE: 76 BPM

## 2018-09-17 DIAGNOSIS — M47.816 LUMBAR SPONDYLOSIS: ICD-10-CM

## 2018-09-17 DIAGNOSIS — M54.16 LUMBAR RADICULOPATHY: ICD-10-CM

## 2018-09-17 DIAGNOSIS — G89.4 CHRONIC PAIN SYNDROME: Primary | ICD-10-CM

## 2018-09-17 DIAGNOSIS — M48.062 SPINAL STENOSIS OF LUMBAR REGION WITH NEUROGENIC CLAUDICATION: ICD-10-CM

## 2018-09-17 DIAGNOSIS — M47.816 SPONDYLOSIS OF LUMBAR REGION WITHOUT MYELOPATHY OR RADICULOPATHY: ICD-10-CM

## 2018-09-17 DIAGNOSIS — G47.33 OSA (OBSTRUCTIVE SLEEP APNEA): Primary | ICD-10-CM

## 2018-09-17 PROCEDURE — 99214 OFFICE O/P EST MOD 30 MIN: CPT | Performed by: ANESTHESIOLOGY

## 2018-09-17 RX ORDER — TRAMADOL HYDROCHLORIDE 50 MG/1
50 TABLET ORAL 2 TIMES DAILY PRN
Qty: 60 TABLET | Refills: 2 | Status: SHIPPED | OUTPATIENT
Start: 2018-09-17 | End: 2020-09-30 | Stop reason: SDUPTHER

## 2018-09-17 NOTE — PROGRESS NOTES
Assessment:  1  Chronic pain syndrome    2  Lumbar radiculopathy    3  Spondylosis of lumbar region without myelopathy or radiculopathy    4  Lumbar spondylosis    5  Spinal stenosis of lumbar region with neurogenic claudication        Plan:  80-year-old male returning for follow-up of lumbosacral back pain with radiculopathy which was initially in the L4 and S1 distribution of left lower extremity, now in the L4 and S1 distribution of bilateral lower extremities  MRI of his lumbar spine reveals multilevel degenerative disc disease and spondylosis with varying degrees of foraminal stenosis at L1-2, L3-4, and L4-5 with this stenosis most severe at L4-5  The patient initially was doing quite well open after a course of oral steroids  He is also taking gabapentin 300 mg q h s , however this has caused some swelling in his lower extremities and he has been unable to increase the dose as result  He is also taking methocarbamol 500 mg q 8 hours with minimal relief  The patient did previously take hydrocodone which was prescribed by his PCP which did provide some moderate relief, however the patient states his PCP no longer wants to prescribe opioid medications  The patient has done physical therapy without much lasting relief  He does continue to do his home exercise program daily  1   I will trial the patient on tramadol 50 mg q 12 hours p r n     The patient will sign an opioid contract with us today and we will obtain a urine drug screen at today's visit  2   The patient may continue with gabapentin 300 mg q h s , however I did discuss with the patient about weaning off of the medication to see if his lower extremity edema improved  The patient states that he would like to start weaning off of the medication to see if it improves which I think is reasonable  I advised the patient to decrease his dose to 200 mg q h s  x5 days, then 100 mg q h s  x5 days, then off the medication completely    3   I asked the patient to discontinue methocarbamol since this is not providing any relief  4  Will avoid NSAIDs secondary to anticoagulation  5  I did offer the patient bilateral L4 TFESI, however the patient wished to avoid interventional therapy at this time  6  The patient will continue with his home exercise program  7  I will follow up the patient in 3 months    There are risks associated with opioid medications, including dependence, addiction and tolerance  The patient understands and agrees to use these medications only as prescribed  Potential side effects of the medications include, but are not limited to, constipation, drowsiness, addiction, impaired judgment and risk of fatal overdose if not taken as prescribed  The patient was warned against driving while taking sedation medications  Sharing medications is a felony  At this point in time, the patient is showing no signs of addiction, abuse, diversion or suicidal ideation  A urine drug screen was collected at today's office visit as part of our medication management protocol  The point of care testing results were appropriate for what was being prescribed  The specimen will be sent for confirmatory testing  The drug screen is medically necessary because the patient is either dependent on opioid medication or is being considered for opioid medication therapy and the results could impact ongoing or future treatment  The drug screen is to evaluate for the presences or absence of prescribed, non-prescribed, and/or illicit drugs/substances  [place] Prescription Drug Monitoring Program report was reviewed and was appropriate     My impressions and treatment recommendations were discussed in detail with the patient who verbalized understanding and had no further questions  Discharge instructions were provided  I personally saw and examined the patient and I agree with the above discussed plan of care      No orders of the defined types were placed in this encounter  No orders of the defined types were placed in this encounter  History of Present Illness:  Neetu Silva is a 70 y o  male returning for follow-up of lumbosacral back pain with radiculopathy which was initially in the L4 and S1 distribution of left lower extremity, now in the L4 and S1 distribution of bilateral lower extremities  He denies any bladder or bowel incontinence or saddle anesthesia  MRI of his lumbar spine reveals multilevel degenerative disc disease and spondylosis with varying degrees of foraminal stenosis at L1-2, L3-4, and L4-5 with this stenosis most severe at L4-5  The patient initially was doing quite well open after a course of oral steroids  He is also taking gabapentin 300 mg q h s , however this has caused some swelling in his lower extremities and he has been unable to increase the dose as result  He is also taking methocarbamol 500 mg q 8 hours with minimal relief  The patient did previously take hydrocodone which was prescribed by his PCP which did provide some moderate relief, however the patient states his PCP no longer wants to prescribe opioid medications  The patient has done physical therapy without much lasting relief  He does continue to do his home exercise program daily  He is getting approximately 10% relief from his current pain medicine regimen and denies any other side effects other than those mentioned above  The patient rates his pain a 2/10 and the pain is worse in the evening and morning  The pain is intermittent and described as dull and aching  The pain is worse with standing, walking, and changing position  The pain is alleviated with sitting and lying down  Pain Contract Signed: September 17, 2018    I have personally reviewed and/or updated the patient's past medical history, past surgical history, family history, social history, current medications, allergies, and vital signs today       Other than as stated above, the patient denies any interval changes in medications, medical condition, mental condition, symptoms, or allergies since the last office visit  Review of Systems   Respiratory: Negative for shortness of breath  Cardiovascular: Negative for chest pain  Gastrointestinal: Negative for constipation, diarrhea, nausea and vomiting  Musculoskeletal: Positive for arthralgias and gait problem  Negative for joint swelling and myalgias  DROM  Swelling: Feet, Ankles, Legs   Skin: Negative for rash  Neurological: Negative for dizziness, seizures and weakness  All other systems reviewed and are negative  Patient Active Problem List   Diagnosis    Paroxysmal atrial fibrillation (HCC)    Lumbar spinal stenosis    Sacroiliitis (HCC)    Spondylosis of lumbar region without myelopathy or radiculopathy    Right bundle branch block (RBBB)    Edema of both lower legs due to peripheral venous insufficiency    Chronic pain of both lower extremities    Lumbar radiculopathy    Lumbar spondylosis       Past Medical History:   Diagnosis Date    [facility]fib ([facility] )     Anxiety     Arthritis     Depression     Hyperlipidemia     Hypertension     Irregular heart beat     Stroke ([facility] )     TIA (transient ischemic attack)     no residual problems       Past Surgical History:   Procedure Laterality Date    FACIAL/NECK BIOPSY N/A 4/3/2017    Procedure: NOSE BCC EXCISION; FROZEN SECTION; RECONSTRUCTION ;  Surgeon: Cameron Mohs, MD;  [facility];  Service:     FULL THICKNESS SKIN GRAFT N/A 4/3/2017    Procedure: FLAP VERSUS FULL THICKNESS SKIN GRAFT ;  Surgeon: Cameron Mohs, MD;  [facility];  Service:     PROSTATECTOMY      TONSILLECTOMY AND ADENOIDECTOMY         No family history on file  Social History     Occupational History    Not on file       Social History Main Topics    Smoking status: Never Smoker    Smokeless tobacco: Never Used    Alcohol use 8 4 oz/week     14 Glasses of wine per week    Drug use: No    Sexual activity: Not Currently       Current Outpatient Prescriptions on File Prior to Visit   Medication Sig    Acetaminophen 325 MG CAPS Take by mouth    amLODIPine (NORVASC) 10 mg tablet TAKE 1 TABLET DAILY    atorvastatin (LIPITOR) 40 mg tablet Take 40 mg by mouth daily at bedtime    Cholecalciferol (VITAMIN D-3 PO) Take 2,000 unit marking on U-100 syringe by mouth daily after dinner    co-enzyme Q-10 30 MG capsule Take 30 mg by mouth daily after dinner    gabapentin (NEURONTIN) 100 mg capsule Take 3 capsules (300 mg total) by mouth daily at bedtime (Patient taking differently: Take 300 mg by mouth daily at bedtime 2 tabs daily )    glucosamine-chondroitin 500-400 MG tablet Take 2 tablets by mouth daily in the early morning    losartan (COZAAR) 100 MG tablet Take 1 tablet (100 mg total) by mouth daily    meloxicam (MOBIC) 7 5 mg tablet Take 7 5 mg by mouth daily    multivitamin (THERAGRAN) TABS Take 1 tablet by mouth daily in the early morning    Omega-3 Fatty Acids (FISH OIL) 1,000 mg Take 1,000 mg by mouth 2 (two) times a day    sertraline (ZOLOFT) 50 mg tablet Take 50 mg by mouth daily in the early morning    sotalol (BETAPACE) 80 mg tablet Take 1 tablet (80 mg total) by mouth 2 (two) times a day    torsemide (DEMADEX) 20 mg tablet Take 20 mg by mouth daily    warfarin (COUMADIN) 5 mg tablet Take 1 tablet (5 mg total) by mouth daily Or as directed (Patient taking differently: Take 5 mg by mouth daily Or as directed )     No current facility-administered medications on file prior to visit  No Known Allergies    Physical Exam:    There were no vitals taken for this visit      Constitutional:overweight  Eyes:anicteric  HEENT:grossly intact  Neck:supple, symmetric, trachea midline and no masses   Pulmonary:even and unlabored  Cardiovascular:2+ pretibial pitting edema  Skin:Normal without rashes or lesions and well hydrated  Psychiatric:Mood and affect appropriate  Neurologic:Cranial Nerves II-XII grossly intact  Musculoskeletal:antalgic gait  Bilateral lumbar paraspinals mildly tender to palpation from L3-L5  Bilateral lower extremity strength 5/5 in all muscle groups  Negative seated straight leg raise bilaterally      Imaging  Imaging reviewed

## 2018-09-19 ENCOUNTER — TELEPHONE (OUTPATIENT)
Dept: PAIN MEDICINE | Facility: MEDICAL CENTER | Age: 78
End: 2018-09-19

## 2018-09-19 NOTE — TELEPHONE ENCOUNTER
Lizbeth called stating that the Tramadol 50 mg needs an override from the doctor   Phone # to call is [phone]

## 2018-09-20 NOTE — TELEPHONE ENCOUNTER
S/w the pharmacist  Addi Davila Would be awesome if the call room clarified  He needs a PA to be started for the Tramadol  The number to call is [phone]  Can you please start the process?  Thanks

## 2018-10-03 ENCOUNTER — ANTICOAG VISIT (OUTPATIENT)
Dept: CARDIOLOGY CLINIC | Facility: CLINIC | Age: 78
End: 2018-10-03

## 2018-10-03 ENCOUNTER — APPOINTMENT (OUTPATIENT)
Dept: LAB | Facility: CLINIC | Age: 78
End: 2018-10-03
Payer: MEDICARE

## 2018-10-03 DIAGNOSIS — I48.0 PAROXYSMAL ATRIAL FIBRILLATION (HCC): ICD-10-CM

## 2018-10-03 LAB
INR PPP: 2.35 (ref 0.86–1.17)
PROTHROMBIN TIME: 25.8 SECONDS (ref 11.8–14.2)

## 2018-10-03 PROCEDURE — 85610 PROTHROMBIN TIME: CPT

## 2018-10-03 PROCEDURE — 36415 COLL VENOUS BLD VENIPUNCTURE: CPT

## 2018-10-04 ENCOUNTER — OFFICE VISIT (OUTPATIENT)
Dept: CARDIOLOGY CLINIC | Facility: CLINIC | Age: 78
End: 2018-10-04
Payer: MEDICARE

## 2018-10-04 VITALS
HEIGHT: 67 IN | WEIGHT: 233.2 LBS | HEART RATE: 50 BPM | BODY MASS INDEX: 36.6 KG/M2 | DIASTOLIC BLOOD PRESSURE: 76 MMHG | SYSTOLIC BLOOD PRESSURE: 128 MMHG

## 2018-10-04 DIAGNOSIS — I45.10 RIGHT BUNDLE BRANCH BLOCK (RBBB): Primary | ICD-10-CM

## 2018-10-04 DIAGNOSIS — I48.19 PERSISTENT ATRIAL FIBRILLATION (HCC): ICD-10-CM

## 2018-10-04 PROCEDURE — 99204 OFFICE O/P NEW MOD 45 MIN: CPT | Performed by: INTERNAL MEDICINE

## 2018-10-04 PROCEDURE — 93000 ELECTROCARDIOGRAM COMPLETE: CPT | Performed by: INTERNAL MEDICINE

## 2018-10-04 NOTE — PROGRESS NOTES
[facility] [address]    Outpatient New Consult   Today's Date: 10/04/18        Patient name: Joleen Newman  YOB: 1940  Sex: male         Chief Complaint: New consult for afib per Dr Jeison Aponte      ASSESSMENT:  71 yo male  1) H/o persistetn afib approximately 10 years ago, cardioverted by Dr Rob Manuel and put on Sotalol  HE has no symptomatic recurrence of afib in 10 years and was on sotalol 120mg bid all this time  Holter this year sows 200s   2) Anticoagulation on warfarin, does not like getting levels checked  [id number] 5  3) Prior TIA x 2 remotely,   4) RBBB   5) Sinus bradycardia 50bpm, has some fatigue  6) CHASIDY  7) ETOH 2 drinks/day        PLAN:    1  DIscussed options including weaning sotalol and seeing if maintains NSR w/o, loop recorder and seeing if any asympatomtic burden of afib  He actually seems satisfied w current care  Agree w Dr Jeison Aponte about lowering dose to 80mg bid given sinus cadence and Rbbb  2) Will look into cost of NOAC for him  Follow up in: 1 year    Orders Placed This Encounter   Procedures    POCT ECG     There are no discontinued medications    HPI/Subjective: 71 yo male  1) H/o persistetn afib approximately 10 years ago, cardioverted by Dr Rob Manuel and put on Sotalol  HE has no symptomatic recurrence of afib in 10 years and was on sotalol 120mg bid all this time  Holter this year sows 200s   2) Anticoagulation on warfarin, does not like getting levels checked  [id number] 5  3) Prior TIA x 2 remotely,   4) RBBB   5) Sinus bradycardia 50bpm, has some fatigue  6) CHASIDY  7) ETOH 2 drinks/day    He complains of chronic edema, non pitting  Denies CP/SOB  Does not have palpitations or any known afib in years  No syncope/dizzyness      Please note HPI is listed by problem with with update following it, it is copied again in the assessment above and reflects medical decision making as well  Complete 12 point ROS reviewed and otherwise non pertinent or negative except as per HPI pertinent positives in Cardiovascular and Respiratory emphasized  Please see paper chart for outpatient clinic patients where the patient completed the 12 point ROS survey  Past Medical History:   Diagnosis Date    [facility]fib ([facility] )     Anxiety     Arthritis     Depression     Hyperlipidemia     Hypertension     Irregular heart beat     Stroke ([facility] )     TIA (transient ischemic attack)     no residual problems       No Known Allergies  I reviewed the Home Medication list and Allergies in the chart     Scheduled Meds:  Current Outpatient Prescriptions   Medication Sig Dispense Refill    Acetaminophen 325 MG CAPS Take by mouth      amLODIPine (NORVASC) 10 mg tablet TAKE 1 TABLET DAILY 90 tablet 0    atorvastatin (LIPITOR) 40 mg tablet Take 40 mg by mouth daily at bedtime      Cholecalciferol (VITAMIN D-3 PO) Take 2,000 unit marking on U-100 syringe by mouth daily after dinner      co-enzyme Q-10 30 MG capsule Take 30 mg by mouth daily after dinner      gabapentin (NEURONTIN) 100 mg capsule Take 3 capsules (300 mg total) by mouth daily at bedtime (Patient taking differently: Take 100 mg by mouth daily at bedtime 2 tabs daily at bedtime ) 90 capsule 2    glucosamine-chondroitin 500-400 MG tablet Take 2 tablets by mouth daily in the early morning      losartan (COZAAR) 100 MG tablet Take 1 tablet (100 mg total) by mouth daily 90 tablet 3    multivitamin (THERAGRAN) TABS Take 1 tablet by mouth daily in the early morning      Omega-3 Fatty Acids (FISH OIL) 1,000 mg Take 1,000 mg by mouth 2 (two) times a day      sertraline (ZOLOFT) 50 mg tablet Take 50 mg by mouth daily in the early morning      sotalol (BETAPACE) 80 mg tablet Take 1 tablet (80 mg total) by mouth 2 (two) times a day 180 tablet 3    torsemide (DEMADEX) 20 mg tablet Take 20 mg by mouth daily      traMADol (ULTRAM) 50 mg tablet Take 1 tablet (50 mg total) by mouth 2 (two) times a day as needed for severe pain 60 tablet 2    warfarin (COUMADIN) 5 mg tablet Take 1 tablet (5 mg total) by mouth daily Or as directed (Patient taking differently: Take 5 mg by mouth daily Or as directed ) 30 tablet 11     No current facility-administered medications for this visit  PRN Meds:  No family history on file  Social History     Social History    Marital status: /Civil Union     Spouse name: N/A    Number of children: N/A    Years of education: N/A     Occupational History    Not on file  Social History Main Topics    Smoking status: Never Smoker    Smokeless tobacco: Never Used    Alcohol use 8 4 oz/week     14 Glasses of wine per week    Drug use: No    Sexual activity: Not Currently     Other Topics Concern    Not on file     Social History Narrative    No narrative on file         OBJECTIVE:    /76 (BP Location: Left arm, Patient Position: Sitting, Cuff Size: Large)   Pulse (!) 50   Ht 5' 7" (1 702 m)   Wt 106 kg (233 lb 3 2 oz)   BMI 36 52 kg/m²   Vitals:    10/04/18 1053   Weight: 106 kg (233 lb 3 2 oz)     GEN: No acute distress, Alert and oriented, well appearing  HEENT:Head, neck, ears, oral pharynx: Mucus membranes moist, oral pharynx clear, nares clear  External ears normal  EYES: Pupils equal, sclera anicteric, midline, normal conjuctiva  NECK: No JVD, supple, no obvious masses or thryomegaly or goiter  CARDIOVASCULAR:  RRR, No murmur, rub, gallops S1,S2  LUNGS: Clear To auscultation bilaterally, normal effort, no rales, rhonchi, crackles  ABDOMEN:  nondistended,  without obvious organomegaly or ascites  EXTREMITIES/VASCULAR: bilatera nonpitting edema  Radial pulses intact, pedal pulses difficult to palpate, warm an well perfused    PSYCH: Normal Affect, no overt suicidal ideation, linear speech pattern without evidence of psychosis  NEURO: Grossly intact, moving all extremiteis equal, face symmetric, alert and responsive, no obvious focal defecits  GAIT:  Ambulates normally without difficulty  HEME: No bleeding, bruising, petechia, purpura  SKIN: No significant rashes, warm, no diaphoresis or pallor  Lab Results:       LABS:      Chemistry        Component Value Date/Time     2017 0906    K 3 9 2017 0906     (H) 2017 0906    CO2 27 2017 0906    BUN 22 2017 0906    CREATININE 1 04 2017 0906        Component Value Date/Time    CALCIUM 8 8 2017 0906    ALKPHOS 86 2017 0906    AST 16 2017 0906    ALT 26 2017 0906            No results found for: CHOL  No results found for: HDL  No results found for: LDLCALC  No results found for: TRIG  No components found for: CHOLHDL    IMAGING: No results found  Cardiac testing:   Results for orders placed during the hospital encounter of 17   Echo complete with contrast if indicated    Narrative [address]  [phone]    Transthoracic Echocardiogram  2D, M-mode, Doppler, and Color Doppler    Study date:  28-Aug-2017    Patient: Alex Way  MR number: [id number]  Account number: [de-identified]  : 1940  Age: 78 years  Gender: Male  Status: Outpatient  Location: [address] [place] [address] and [facility] [place]  Height: 66 in  Weight: 228 6 lb  BP: 148/ 80 mmHg    Indications: Hypertension, hyperlipidemia    Diagnoses: E78 5 - Hyperlipidemia, unspecified, I10  - Essential (primary) hypertension    Sonographer:  Eulogoi Maldonado BS, RDCS, RVT, RDMS  Primary Physician:  Martinez Viveros MD  Referring Physician:  Anna Soto DO  Group:  [id number]  Interpreting Physician:  Won Aquino MD    SUMMARY    LEFT VENTRICLE:  Systolic function was normal  Ejection fraction was estimated to be 60 %    There were no regional wall motion abnormalities  Wall thickness was mildly increased  LEFT ATRIUM:  The atrium was mildly dilated  AORTIC VALVE:  There was trace to mild regurgitation  TRICUSPID VALVE:  There was mild regurgitation  Pulmonary artery systolic pressure was mildly increased  PULMONIC VALVE:  There was trace regurgitation  AORTA:  The root exhibited mild dilatation  3 8 cm  There was mild dilatation of the ascending aorta  4 0 cm    COMPARISONS:  There has been no significant interval change  Comparison was made with the previous study of 17-Jun-2016  HISTORY: PRIOR HISTORY: H/O A-fib and cardioversion, TIA, edema, HTN, HLD, venous insufficiency, hypersomnia, obesity with a BMI of 36  PROCEDURE: The study was performed in the [address] [facility] [place]  This was a routine study  The transthoracic approach was used  The study included complete 2D imaging, M-mode, complete spectral Doppler, and color Doppler  Images were obtained from the parasternal, apical, subcostal, and suprasternal notch acoustic windows  Image quality was adequate  LEFT VENTRICLE: Size was normal  Systolic function was normal  Ejection fraction was estimated to be 60 %  There were no regional wall motion abnormalities  Wall thickness was mildly increased  No evidence of apical thrombus  DOPPLER: Left  ventricular diastolic function parameters were normal     RIGHT VENTRICLE: The size was normal  Systolic function was normal  Wall thickness was normal     LEFT ATRIUM: The atrium was mildly dilated  RIGHT ATRIUM: Size was normal     MITRAL VALVE: Valve structure was normal  There was normal leaflet separation  DOPPLER: The transmitral velocity was within the normal range  There was no evidence for stenosis  There was no significant regurgitation  AORTIC VALVE: The valve was trileaflet  Leaflets exhibited mildly increased thickness and normal cuspal separation   DOPPLER: Transaortic velocity was within the normal range  There was no evidence for stenosis  There was trace to mild  regurgitation  TRICUSPID VALVE: The valve structure was normal  There was normal leaflet separation  DOPPLER: The transtricuspid velocity was within the normal range  There was no evidence for stenosis  There was mild regurgitation  Pulmonary artery  systolic pressure was mildly increased  PULMONIC VALVE: Leaflets exhibited normal thickness, no calcification, and normal cuspal separation  DOPPLER: The transpulmonic velocity was within the normal range  There was trace regurgitation  PERICARDIUM: There was no pericardial effusion  The pericardium was normal in appearance  AORTA: The root exhibited mild dilatation  3 8 cm There was mild dilatation of the ascending aorta  4 0 cm    SYSTEMIC VEINS: IVC: The inferior vena cava was normal in size  SYSTEM MEASUREMENT TABLES    2D  %FS: 35 72 %  AV Diam: 3 83 cm  EDV(Teich): 79 27 ml  EF(Cube): 73 44 %  EF(Teich): 65 61 %  ESV(Cube): 19 9 ml  ESV(Teich): 27 26 ml  IVSd: 1 16 cm  LA Area: 17 95 cm2  LA Diam: 4 04 cm  LVEDV MOD A4C: 126 ml  LVEF MOD A4C: 68 42 %  LVESV MOD A4C: 39 79 ml  LVIDd: 4 22 cm  LVIDs: 2 71 cm  LVLd A4C: 8 68 cm  LVLs A4C: 6 92 cm  LVPWd: 1 17 cm  RA Area: 16 39 cm2  RV Diam : 4 73 cm  SI(Cube): 25 95 ml/m2  SI(Teich): 24 53 ml/m2  SV MOD A4C: 86 21 ml  SV(Cube): 55 02 ml  SV(Teich): 52 01 ml    CW  AV Vmax: 1 4 m/s  AV maxP 89 mmHg  TR Vmax: 2 75 m/s  TR maxP 26 mmHg    MM  TAPSE: 2 56 cm    PW  E': 0 07 m/s  E/E': 9 49  LVOT Vmax: 1 18 m/s  LVOT maxP 6 mmHg  MV A Bronson: 0 89 m/s  MV Dec Henderson: 3 07 m/s2  MV DecT: 227 19 ms  MV E Bronson: 0 7 m/s  MV E/A Ratio: 0 78    [employer] Accredited Echocardiography Laboratory    Prepared and electronically signed by    Stacey Veliz MD  Signed [id number] 08:50:33       No results found for this or any previous visit  No results found for this or any previous visit    No results found for this or any previous visit          I reviewed and interpreted the following LABS/EKG/TELE/IMAGING and below is summary of my interpretation (if data available):    LABS:    Current EKG and Rhythm Strip: Sinus cadence QTc wnl  475ms, 50bpm rbbb  HOLTER/EVENT Monitor:Sinus cadence w PAC aberage 54bpm, 244 PAcs

## 2018-10-04 NOTE — LETTER
October 4, 2018     Joe Christianson [address] [place] [address]    Patient: Yohan Allison   YOB: 1940   Date of Visit: 10/4/2018       Dear Dr Rocio Luciano:    Thank you for referring Shruti Rosales to me for evaluation  Below are my notes for this consultation  If you have questions, please do not hesitate to call me  I look forward to following your patient along with you  Sincerely,        Aj Mauro MD        CC: No Recipients  Aj Mauro MD  10/4/2018 12:56 PM  Sign at close encounter  [facility]    Outpatient New Consult   Today's Date: 10/04/18        Patient name: Yohan Allison  YOB: 1940  Sex: male         Chief Complaint: New consult for afib per Dr Rocio Luciano      ASSESSMENT:  71 yo male  1) H/o persistetn afib approximately 10 years ago, cardioverted by Dr Lisset Reddy and put on Sotalol  HE has no symptomatic recurrence of afib in 10 years and was on sotalol 120mg bid all this time  Holter this year sows 200s   2) Anticoagulation on warfarin, does not like getting levels checked  [id number] 5  3) Prior TIA x 2 remotely,   4) RBBB   5) Sinus bradycardia 50bpm, has some fatigue  6) CHASIDY  7) ETOH 2 drinks/day        PLAN:    1  DIscussed options including weaning sotalol and seeing if maintains NSR w/o, loop recorder and seeing if any asympatomtic burden of afib  He actually seems satisfied w current care  Agree w Dr Rocio Luciano about lowering dose to 80mg bid given sinus cadence and Rbbb  2) Will look into cost of NOAC for him  Follow up in: 1 year    Orders Placed This Encounter   Procedures    POCT ECG     There are no discontinued medications        HPI/Subjective: 71 yo male  1) H/o persistetn afib approximately 10 years ago, cardioverted by Dr Lisset Reddy and put on Sotalol  HE has no symptomatic recurrence of afib in 10 years and was on sotalol 120mg bid all this time  Holter this year sows 200s   2) Anticoagulation on warfarin, does not like getting levels checked  [id number] 5  3) Prior TIA x 2 remotely,   4) RBBB   5) Sinus bradycardia 50bpm, has some fatigue  6) CHASIDY  7) ETOH 2 drinks/day    He complains of chronic edema, non pitting  Denies CP/SOB  Does not have palpitations or any known afib in years  No syncope/dizzyness  Please note HPI is listed by problem with with update following it, it is copied again in the assessment above and reflects medical decision making as well  Complete 12 point ROS reviewed and otherwise non pertinent or negative except as per HPI pertinent positives in Cardiovascular and Respiratory emphasized  Please see paper chart for outpatient clinic patients where the patient completed the 12 point ROS survey  Past Medical History:   Diagnosis Date    [facility] ([facility] )     Anxiety     Arthritis     Depression     Hyperlipidemia     Hypertension     Irregular heart beat     Stroke ([facility] )     TIA (transient ischemic attack)     no residual problems       No Known Allergies  I reviewed the Home Medication list and Allergies in the chart     Scheduled Meds:  Current Outpatient Prescriptions   Medication Sig Dispense Refill    Acetaminophen 325 MG CAPS Take by mouth      amLODIPine (NORVASC) 10 mg tablet TAKE 1 TABLET DAILY 90 tablet 0    atorvastatin (LIPITOR) 40 mg tablet Take 40 mg by mouth daily at bedtime      Cholecalciferol (VITAMIN D-3 PO) Take 2,000 unit marking on U-100 syringe by mouth daily after dinner      co-enzyme Q-10 30 MG capsule Take 30 mg by mouth daily after dinner      gabapentin (NEURONTIN) 100 mg capsule Take 3 capsules (300 mg total) by mouth daily at bedtime (Patient taking differently: Take 100 mg by mouth daily at bedtime 2 tabs daily at bedtime ) 90 capsule 2    glucosamine-chondroitin 500-400 MG tablet Take 2 tablets by mouth daily in the early morning      losartan (COZAAR) 100 MG tablet Take 1 tablet (100 mg total) by mouth daily 90 tablet 3    multivitamin (THERAGRAN) TABS Take 1 tablet by mouth daily in the early morning      Omega-3 Fatty Acids (FISH OIL) 1,000 mg Take 1,000 mg by mouth 2 (two) times a day      sertraline (ZOLOFT) 50 mg tablet Take 50 mg by mouth daily in the early morning      sotalol (BETAPACE) 80 mg tablet Take 1 tablet (80 mg total) by mouth 2 (two) times a day 180 tablet 3    torsemide (DEMADEX) 20 mg tablet Take 20 mg by mouth daily      traMADol (ULTRAM) 50 mg tablet Take 1 tablet (50 mg total) by mouth 2 (two) times a day as needed for severe pain 60 tablet 2    warfarin (COUMADIN) 5 mg tablet Take 1 tablet (5 mg total) by mouth daily Or as directed (Patient taking differently: Take 5 mg by mouth daily Or as directed ) 30 tablet 11     No current facility-administered medications for this visit  PRN Meds:  No family history on file  Social History     Social History    Marital status: /Civil Union     Spouse name: N/A    Number of children: N/A    Years of education: N/A     Occupational History    Not on file  Social History Main Topics    Smoking status: Never Smoker    Smokeless tobacco: Never Used    Alcohol use 8 4 oz/week     14 Glasses of wine per week    Drug use: No    Sexual activity: Not Currently     Other Topics Concern    Not on file     Social History Narrative    No narrative on file         OBJECTIVE:    /76 (BP Location: Left arm, Patient Position: Sitting, Cuff Size: Large)   Pulse (!) 50   Ht 5' 7" (1 702 m)   Wt 106 kg (233 lb 3 2 oz)   BMI 36 52 kg/m²    Vitals:    10/04/18 1053   Weight: 106 kg (233 lb 3 2 oz)     GEN: No acute distress, Alert and oriented, well appearing  HEENT:Head, neck, ears, oral pharynx: Mucus membranes moist, oral pharynx clear, nares clear   External ears normal  EYES: Pupils equal, sclera anicteric, midline, normal conjuctiva  NECK: No JVD, supple, no obvious masses or thryomegaly or goiter  CARDIOVASCULAR:  RRR, No murmur, rub, gallops S1,S2  LUNGS: Clear To auscultation bilaterally, normal effort, no rales, rhonchi, crackles  ABDOMEN:  nondistended,  without obvious organomegaly or ascites  EXTREMITIES/VASCULAR: bilatera nonpitting edema  Radial pulses intact, pedal pulses difficult to palpate, warm an well perfused  PSYCH: Normal Affect, no overt suicidal ideation, linear speech pattern without evidence of psychosis  NEURO: Grossly intact, moving all extremiteis equal, face symmetric, alert and responsive, no obvious focal defecits  GAIT:  Ambulates normally without difficulty  HEME: No bleeding, bruising, petechia, purpura  SKIN: No significant rashes, warm, no diaphoresis or pallor  Lab Results:       LABS:      Chemistry        Component Value Date/Time     2017 0906    K 3 9 2017 0906     (H) 2017 0906    CO2 27 2017 0906    BUN 22 2017 0906    CREATININE 1 04 2017 0906        Component Value Date/Time    CALCIUM 8 8 2017 0906    ALKPHOS 86 2017 0906    AST 16 2017 0906    ALT 26 2017 0906            No results found for: CHOL  No results found for: HDL  No results found for: LDLCALC  No results found for: TRIG  No components found for: CHOLHDL    IMAGING: No results found       Cardiac testing:   Results for orders placed during the hospital encounter of 17   Echo complete with contrast if indicated    Narrative [address]  [facility], [facility]  [phone]    Transthoracic Echocardiogram  2D, M-mode, Doppler, and Color Doppler    Study date:  28-Aug-2017    Patient: Leticia Castillo  MR number: [id number]  Account number: [de-identified]  : 1940  Age: 78 years  Gender: Male  Status: Outpatient  Location: [address] [place] [address] and [facility]  Height: 66 in  Weight: 228 6 lb  BP: 148/ 80 mmHg    Indications: Hypertension, hyperlipidemia    Diagnoses: E78 5 - Hyperlipidemia, unspecified, I10  - Essential (primary) hypertension    Sonographer:  Maira Alvarez BS, RDCS, RVT, RDMS  Primary Physician:  Edil Sommer MD  Referring Physician:  Dionte Rodrigez DO  Group:  [id number]  Interpreting Physician:  Sohan Thompson MD    SUMMARY    LEFT VENTRICLE:  Systolic function was normal  Ejection fraction was estimated to be 60 %  There were no regional wall motion abnormalities  Wall thickness was mildly increased  LEFT ATRIUM:  The atrium was mildly dilated  AORTIC VALVE:  There was trace to mild regurgitation  TRICUSPID VALVE:  There was mild regurgitation  Pulmonary artery systolic pressure was mildly increased  PULMONIC VALVE:  There was trace regurgitation  AORTA:  The root exhibited mild dilatation  3 8 cm  There was mild dilatation of the ascending aorta  4 0 cm    COMPARISONS:  There has been no significant interval change  Comparison was made with the previous study of 17-Jun-2016  HISTORY: PRIOR HISTORY: H/O A-fib and cardioversion, TIA, edema, HTN, HLD, venous insufficiency, hypersomnia, obesity with a BMI of 36  PROCEDURE: The study was performed in the [address]  and [facility] [place]  This was a routine study  The transthoracic approach was used  The study included complete 2D imaging, M-mode, complete spectral Doppler, and color Doppler  Images were obtained from the parasternal, apical, subcostal, and suprasternal notch acoustic windows  Image quality was adequate  LEFT VENTRICLE: Size was normal  Systolic function was normal  Ejection fraction was estimated to be 60 %  There were no regional wall motion abnormalities  Wall thickness was mildly increased  No evidence of apical thrombus   DOPPLER: Left  ventricular diastolic function parameters were normal     RIGHT VENTRICLE: The size was normal  Systolic function was normal  Wall thickness was normal     LEFT ATRIUM: The atrium was mildly dilated  RIGHT ATRIUM: Size was normal     MITRAL VALVE: Valve structure was normal  There was normal leaflet separation  DOPPLER: The transmitral velocity was within the normal range  There was no evidence for stenosis  There was no significant regurgitation  AORTIC VALVE: The valve was trileaflet  Leaflets exhibited mildly increased thickness and normal cuspal separation  DOPPLER: Transaortic velocity was within the normal range  There was no evidence for stenosis  There was trace to mild  regurgitation  TRICUSPID VALVE: The valve structure was normal  There was normal leaflet separation  DOPPLER: The transtricuspid velocity was within the normal range  There was no evidence for stenosis  There was mild regurgitation  Pulmonary artery  systolic pressure was mildly increased  PULMONIC VALVE: Leaflets exhibited normal thickness, no calcification, and normal cuspal separation  DOPPLER: The transpulmonic velocity was within the normal range  There was trace regurgitation  PERICARDIUM: There was no pericardial effusion  The pericardium was normal in appearance  AORTA: The root exhibited mild dilatation  3 8 cm There was mild dilatation of the ascending aorta  4 0 cm    SYSTEMIC VEINS: IVC: The inferior vena cava was normal in size      SYSTEM MEASUREMENT TABLES    2D  %FS: 35 72 %  AV Diam: 3 83 cm  EDV(Teich): 79 27 ml  EF(Cube): 73 44 %  EF(Teich): 65 61 %  ESV(Cube): 19 9 ml  ESV(Teich): 27 26 ml  IVSd: 1 16 cm  LA Area: 17 95 cm2  LA Diam: 4 04 cm  LVEDV MOD A4C: 126 ml  LVEF MOD A4C: 68 42 %  LVESV MOD A4C: 39 79 ml  LVIDd: 4 22 cm  LVIDs: 2 71 cm  LVLd A4C: 8 68 cm  LVLs A4C: 6 92 cm  LVPWd: 1 17 cm  RA Area: 16 39 cm2  RV Diam : 4 73 cm  SI(Cube): 25 95 ml/m2  SI(Teich): 24 53 ml/m2  SV MOD A4C: 86 21 ml  SV(Cube): 55 02 ml  SV(Teich): 52 01 ml    CW  AV Vmax: 1 4 m/s  AV maxP 89 mmHg  TR Vmax: 2 75 m/s  TR maxP 26 mmHg    MM  TAPSE: 2 56 cm    PW  E': 0 07 m/s  E/E': 9 49  LVOT Vmax: 1 18 m/s  LVOT maxP 6 mmHg  MV A Bronson: 0 89 m/s  MV Dec Sanborn: 3 07 m/s2  MV DecT: 227 19 ms  MV E Bronson: 0 7 m/s  MV E/A Ratio: 0 78    Inters\[facility] [place] [facility]\"" Commission Accredited Echocardiography Laboratory    Prepared and electronically signed by    Paul Dobson MD  Signed [id number] 08:50:33       No results found for this or any previous visit  No results found for this or any previous visit  No results found for this or any previous visit          I reviewed and interpreted the following LABS/EKG/TELE/IMAGING and below is summary of my interpretation (if data available):    LABS:    Current EKG and Rhythm Strip: Sinus cadence QTc wnl  475ms, 50bpm rbbb  HOLTER/EVENT Monitor:Sinus cadence w PAC aberage 54bpm, 244 PAcs

## 2018-10-24 ENCOUNTER — APPOINTMENT (OUTPATIENT)
Dept: LAB | Facility: CLINIC | Age: 78
End: 2018-10-24
Payer: MEDICARE

## 2018-10-24 ENCOUNTER — ANTICOAG VISIT (OUTPATIENT)
Dept: CARDIOLOGY CLINIC | Facility: CLINIC | Age: 78
End: 2018-10-24

## 2018-10-24 DIAGNOSIS — I48.0 PAROXYSMAL ATRIAL FIBRILLATION (HCC): ICD-10-CM

## 2018-11-13 ENCOUNTER — OFFICE VISIT (OUTPATIENT)
Dept: PAIN MEDICINE | Facility: CLINIC | Age: 78
End: 2018-11-13
Payer: MEDICARE

## 2018-11-13 VITALS
TEMPERATURE: 97.9 F | SYSTOLIC BLOOD PRESSURE: 155 MMHG | HEIGHT: 67 IN | WEIGHT: 234 LBS | BODY MASS INDEX: 36.73 KG/M2 | HEART RATE: 66 BPM | DIASTOLIC BLOOD PRESSURE: 65 MMHG

## 2018-11-13 DIAGNOSIS — M47.816 LUMBAR SPONDYLOSIS: ICD-10-CM

## 2018-11-13 DIAGNOSIS — M48.062 SPINAL STENOSIS OF LUMBAR REGION WITH NEUROGENIC CLAUDICATION: ICD-10-CM

## 2018-11-13 DIAGNOSIS — M47.816 SPONDYLOSIS OF LUMBAR REGION WITHOUT MYELOPATHY OR RADICULOPATHY: ICD-10-CM

## 2018-11-13 DIAGNOSIS — M54.16 LUMBAR RADICULOPATHY: Primary | ICD-10-CM

## 2018-11-13 PROCEDURE — 99214 OFFICE O/P EST MOD 30 MIN: CPT | Performed by: NURSE PRACTITIONER

## 2018-11-13 RX ORDER — METHOCARBAMOL 500 MG/1
500 TABLET, FILM COATED ORAL 4 TIMES DAILY
COMMUNITY
End: 2019-11-20

## 2018-11-13 NOTE — PROGRESS NOTES
Assessment:  1  Lumbar radiculopathy    2  Spondylosis of lumbar region without myelopathy or radiculopathy    3  Lumbar spondylosis    4  Spinal stenosis of lumbar region with neurogenic claudication        Plan:  1  I will schedule the patient for a bilateral L4 TFESI with Dr Gin Hunter for the inflammatory component of the patient's pain once we get clearance to hold the patient's Coumadin  Complete risks and benefits including bleeding, infection, tissue reaction, nerve injury and allergic reaction were discussed  The approach was demonstrated using models and literature was provided  Verbal consent was obtained  2   Patient can continue on tramadol 50 mg q 12 hours p r n  I advised the patient take the medication a little more frequently until we can get him relief from our interventional procedures  The patient still has 2 refills left on his RX, therefore he does not need a refill at today's office visit  [place] Prescription Drug Monitoring Program report was reviewed and was appropriate     There are risks associated with opioid medications, including dependence, addiction and tolerance  The patient understands and agrees to use these medications only as prescribed  Potential side effects of the medications include, but are not limited to, constipation, drowsiness, addiction, impaired judgment and risk of fatal overdose if not taken as prescribed  The patient was warned against driving while taking sedation medications  Sharing medications is a felony  At this point in time, the patient is showing no signs of addiction, abuse, diversion or suicidal ideation  3   Patient may benefit from bilateral L3-5 MBB  We will see how the patient responds to the epidural injection first   4   I will avoid NSAIDs secondary to anticoagulation  5  The patient will continue with his home exercise program  6  The patient will follow-up in 2 months for medication prescription refill and reevaluation   The patient was advised to contact the office should their symptoms worsen in the interim  The patient was agreeable and verbalized an understanding  History of Present Illness: The patient is a 70 y o  male last seen on 9/17/18 who presents for a follow up office visit in regards to chronic lumbosacral back pain with radiculopathy in an L4 dermatomal distribution of the left lower extremity secondary to lumbar degenerative disc disease, lumbar stenosis and spondylosis  Patient currently denies any radiating symptoms into the right lower extremity, bowel or bladder incontinence, or saddle anesthesia  Patient has had bilateral SI joint injections x2 in the past without any significant relief to his low back pain  MRI of the lumbar spine reveals multilevel degenerative disc disease and spondylosis with varying degrees of foraminal stenosis at L1-2, L3-4, and L4-5 with stenosis being most severe at L4-5  The patient also has followed up with cardiology regarding his bilateral lower extremity edema  He states he was diagnosed with chronic venous insufficiency  At today's office visit, the patient's main pain complaint is his bilateral low back pain, left greater than right, with numbness and tingling in the anterior thigh of the left lower extremity  He states the back pain wraps around to his hips and occasionally radiates to the buttocks  He currently rates his pain a 9/10 on the numeric pain rating scale  He states his pain is intermittent in nature most bothersome in the morning  He characterizes the pain as dull aching, sharp, shooting and numbness  He states the pain is exacerbated with changing positions from sitting to standing, and when standing still for long periods of time  Current pain medications includes:   Tramadol 50 mg 1 tablet Q 12 p r n  Pain  The patient states he has not been taking it because he does not feel it was working, only takes it a few times throughout the week   He currently weaned off of Gabapentin secondary to worsening lower extremity edema  The patient reports that this regimen is providing 0% pain relief  The patient is reporting no side effects from this pain medication regimen  Pain Contract Signed: 9/17/18  Last Urine Drug Screen: 9/17/18    I have personally reviewed and/or updated the patient's past medical history, past surgical history, family history, social history, current medications, allergies, and vital signs today  Review of Systems:    Review of Systems   Respiratory: Negative for shortness of breath  Cardiovascular: Negative for chest pain  Gastrointestinal: Negative for constipation, diarrhea, nausea and vomiting  Musculoskeletal: Positive for gait problem and joint swelling  Negative for arthralgias and myalgias  Decreased ROM, swelling, pain in extremity   Skin: Negative for rash  Neurological: Positive for weakness  Negative for dizziness and seizures  All other systems reviewed and are negative  Past Medical History:   Diagnosis Date    [facility]fib ([facility] )     Anxiety     Arthritis     Depression     Hyperlipidemia     Hypertension     Irregular heart beat     Stroke ([facility] )     TIA (transient ischemic attack)     no residual problems       Past Surgical History:   Procedure Laterality Date    FACIAL/NECK BIOPSY N/A 4/3/2017    Procedure: NOSE BCC EXCISION; FROZEN SECTION; RECONSTRUCTION ;  Surgeon: Ollie Owens MD;  [facility];  Service:     FULL THICKNESS SKIN GRAFT N/A 4/3/2017    Procedure: FLAP VERSUS FULL THICKNESS SKIN GRAFT ;  Surgeon: Ollie Owens MD;  [facility];  Service:     PROSTATECTOMY      TONSILLECTOMY AND ADENOIDECTOMY         No family history on file  Social History     Occupational History    Not on file       Social History Main Topics    Smoking status: Never Smoker    Smokeless tobacco: Never Used    Alcohol use 8 4 oz/week     14 Glasses of wine per week    Drug use: No    Sexual activity: Not Currently         Current Outpatient Prescriptions:     Acetaminophen 325 MG CAPS, Take by mouth, Disp: , Rfl:     amLODIPine (NORVASC) 10 mg tablet, TAKE 1 TABLET DAILY, Disp: 90 tablet, Rfl: 0    atorvastatin (LIPITOR) 40 mg tablet, Take 40 mg by mouth daily at bedtime, Disp: , Rfl:     Cholecalciferol (VITAMIN D-3 PO), Take 2,000 unit marking on U-100 syringe by mouth daily after dinner, Disp: , Rfl:     co-enzyme Q-10 30 MG capsule, Take 30 mg by mouth daily after dinner, Disp: , Rfl:     glucosamine-chondroitin 500-400 MG tablet, Take 2 tablets by mouth daily in the early morning, Disp: , Rfl:     losartan (COZAAR) 100 MG tablet, Take 1 tablet (100 mg total) by mouth daily, Disp: 90 tablet, Rfl: 3    methocarbamol (ROBAXIN) 500 mg tablet, Take 500 mg by mouth 4 (four) times a day, Disp: , Rfl:     multivitamin (THERAGRAN) TABS, Take 1 tablet by mouth daily in the early morning, Disp: , Rfl:     Omega-3 Fatty Acids (FISH OIL) 1,000 mg, Take 1,000 mg by mouth 2 (two) times a day, Disp: , Rfl:     sertraline (ZOLOFT) 50 mg tablet, Take 50 mg by mouth daily in the early morning, Disp: , Rfl:     sotalol (BETAPACE) 80 mg tablet, Take 1 tablet (80 mg total) by mouth 2 (two) times a day, Disp: 180 tablet, Rfl: 3    torsemide (DEMADEX) 20 mg tablet, Take 20 mg by mouth daily, Disp: , Rfl:     traMADol (ULTRAM) 50 mg tablet, Take 1 tablet (50 mg total) by mouth 2 (two) times a day as needed for severe pain, Disp: 60 tablet, Rfl: 2    warfarin (COUMADIN) 5 mg tablet, Take 1 tablet (5 mg total) by mouth daily Or as directed (Patient taking differently: Take 5 mg by mouth daily Or as directed ), Disp: 30 tablet, Rfl: 11    No Known Allergies    Tramadol last taken 11/10/18    Physical Exam:    /65   Pulse 66   Temp 97 9 °F (36 6 °C) (Oral)   Ht 5' 7" (1 702 m)   Wt 106 kg (234 lb)   BMI 36 65 kg/m²     Constitutional:overweight  Eyes:anicteric  HEENT:grossly intact  Neck:supple, symmetric, trachea midline and no masses   Pulmonary:even and unlabored  Cardiovascular:No edema or pitting edema present  Skin:Normal without rashes or lesions and well hydrated  Psychiatric:Mood and affect appropriate  Neurologic:Cranial Nerves II-XII grossly intact  Musculoskeletal:antalgic gait  Bilateral lumbar paraspinals tender to palpation from L3-L5, left greater than right  Bilateral lower extremity strength 5/5 in all muscle groups  Axial back pain reproduced with lumbar extension  Negative straight leg raise bilaterally  Positive Te's test on the left and negative on the right  Negative Stinchfield test bilaterally  Imaging  FL spine and pain procedure    (Results Pending)   MRI LUMBAR SPINE WITHOUT CONTRAST     INDICATION: M54 [id number]: Radiculopathy, lumbar region chronic, worsening low back pain radiating into the legs      COMPARISON:  None      TECHNIQUE:  Sagittal T1, sagittal T2, sagittal inversion recovery, axial T1 and axial T2, coronal T2        IMAGE QUALITY:  Diagnostic     FINDINGS:     ALIGNMENT:  Mild S-shaped scoliotic deformity noted with a slight dextroscoliosis of the lower thoracic spine and a mild levoscoliosis mid lumbar spine  Trace grade 1 anterolisthesis of L4 on L5 noted      MARROW SIGNAL:  Scattered degenerative endplate changes  No focally suspicious marrow lesions  No bone marrow edema or compression abnormality      DISTAL CORD AND CONUS:  Normal size and signal within the distal cord and conus  The conus ends at the L2 level      PARASPINAL SOFT TISSUES:  A normal kidney is not identified in the expected location of the right renal fossa  There is a right pelvic kidney noted  It has a few small T2 hyperintense lesions, some of which are too small to characterize others likely   cysts  Visualized portions of the left kidney are unremarkable; the left kidney located in the left renal fossa      SACRUM:  Normal signal within the sacrum  No evidence of insufficiency or stress fracture      LOWER THORACIC DISC SPACES:  Multilevel loss of disc height and signal      T10-T11: There is no focal disk herniation, central canal or neural foraminal narrowing      T11-T12: Small central disc protrusion  Mild central canal narrowing  Neural foramina bilaterally patent      T12-L1: There is no focal disk herniation, central canal or neural foraminal narrowing      LUMBAR DISC SPACES:     L1-L2:  Small left paracentral disc protrusion  There is bilateral facet hypertrophy  Mild left neural foraminal narrowing  Central canal and right neural foramen patent      L2-L3:  There is no focal disk herniation, central canal or neural foraminal narrowing  Bilateral facet hypertrophy noted      L3-L4:  There is a left neural foraminal disc protrusion  There is bilateral facet hypertrophy  Mild left neural foraminal narrowing  Central canal and right neural foramen patent      L4-L5:  There is uncovering the intervertebral disc space  There is bilateral facet hypertrophy  There is no focal disc herniation  There is moderate bilateral neural foraminal narrowing  Mild central canal narrowing      L5-S1:  There is a diffuse disk bulge  No significant central canal or neural foraminal narrowing  Bilateral facet hypertrophy noted      IMPRESSION:        1  Multilevel spondylosis with a mild S-shaped scoliosis as described  2   Right pelvic kidney        Orders Placed This Encounter   Procedures    [place] spine and pain procedure

## 2018-11-19 ENCOUNTER — ANTICOAG VISIT (OUTPATIENT)
Dept: CARDIOLOGY CLINIC | Facility: CLINIC | Age: 78
End: 2018-11-19

## 2018-11-19 ENCOUNTER — TELEPHONE (OUTPATIENT)
Dept: CARDIOLOGY CLINIC | Facility: CLINIC | Age: 78
End: 2018-11-19

## 2018-11-19 ENCOUNTER — APPOINTMENT (OUTPATIENT)
Dept: LAB | Facility: CLINIC | Age: 78
End: 2018-11-19
Payer: MEDICARE

## 2018-11-19 DIAGNOSIS — Z92.29 HX OF LONG TERM USE OF BLOOD THINNERS: Primary | ICD-10-CM

## 2018-11-19 DIAGNOSIS — I48.0 PAROXYSMAL ATRIAL FIBRILLATION (HCC): ICD-10-CM

## 2018-11-19 NOTE — TELEPHONE ENCOUNTER
Jacqueline Rodney called, he wants to get an epidural injection in his back and needs to hold Coumadin  Typically they hold 5 days prior  I did not receive request at this point but Pt states it was sent during o/v 11/13  Please advise     C/b # [phone]

## 2018-11-21 NOTE — TELEPHONE ENCOUNTER
S/w pt, scheduled him for TFESI on 12/4 to arrive at 10:35  LD of Coumadin 11/28, hold 11/29  Instructed pt he will have to get PT/INR on the morning of the procedure across the [facility] at outpatient lab prior to the procedure  Pt will go at 9:45  Reviewed instructions: pt will need a , NPO one hour prior, wear loose fitting clothing and call back if he gets sick or started on any abx  Pt verbalized understanding

## 2018-12-04 ENCOUNTER — ANTICOAG VISIT (OUTPATIENT)
Dept: CARDIOLOGY CLINIC | Facility: CLINIC | Age: 78
End: 2018-12-04

## 2018-12-04 ENCOUNTER — HOSPITAL ENCOUNTER (OUTPATIENT)
Dept: RADIOLOGY | Facility: CLINIC | Age: 78
Discharge: HOME | End: 2018-12-04
Attending: ANESTHESIOLOGY | Admitting: ANESTHESIOLOGY
Payer: MEDICARE

## 2018-12-04 ENCOUNTER — APPOINTMENT (OUTPATIENT)
Dept: LAB | Facility: HOSPITAL | Age: 78
End: 2018-12-04
Attending: INTERNAL MEDICINE
Payer: MEDICARE

## 2018-12-04 VITALS
RESPIRATION RATE: 18 BRPM | DIASTOLIC BLOOD PRESSURE: 98 MMHG | SYSTOLIC BLOOD PRESSURE: 179 MMHG | HEART RATE: 56 BPM | OXYGEN SATURATION: 96 % | TEMPERATURE: 98.3 F

## 2018-12-04 DIAGNOSIS — I48.0 PAROXYSMAL ATRIAL FIBRILLATION (HCC): ICD-10-CM

## 2018-12-04 DIAGNOSIS — M54.16 LUMBAR RADICULOPATHY: ICD-10-CM

## 2018-12-04 DIAGNOSIS — Z92.29 HX OF LONG TERM USE OF BLOOD THINNERS: ICD-10-CM

## 2018-12-04 PROCEDURE — 64483 NJX AA&/STRD TFRM EPI L/S 1: CPT | Performed by: ANESTHESIOLOGY

## 2018-12-04 RX ORDER — LIDOCAINE HYDROCHLORIDE 10 MG/ML
5 INJECTION, SOLUTION EPIDURAL; INFILTRATION; INTRACAUDAL; PERINEURAL ONCE
Status: COMPLETED | OUTPATIENT
Start: 2018-12-04 | End: 2018-12-04

## 2018-12-04 RX ORDER — PAPAVERINE HCL 150 MG
20 CAPSULE, EXTENDED RELEASE ORAL ONCE
Status: COMPLETED | OUTPATIENT
Start: 2018-12-04 | End: 2018-12-04

## 2018-12-04 RX ADMIN — DEXAMETHASONE SODIUM PHOSPHATE 15 MG: 10 INJECTION, SOLUTION INTRAMUSCULAR; INTRAVENOUS at 11:02

## 2018-12-04 RX ADMIN — LIDOCAINE HYDROCHLORIDE 2 ML: 20 INJECTION, SOLUTION EPIDURAL; INFILTRATION; INTRACAUDAL; PERINEURAL at 11:02

## 2018-12-04 RX ADMIN — IOHEXOL 2 ML: 300 INJECTION, SOLUTION INTRAVENOUS at 11:00

## 2018-12-04 RX ADMIN — LIDOCAINE HYDROCHLORIDE 4 ML: 10 INJECTION, SOLUTION EPIDURAL; INFILTRATION; INTRACAUDAL; PERINEURAL at 10:55

## 2018-12-04 NOTE — DISCHARGE INSTRUCTIONS
Epidural Steroid Injection   WHAT YOU NEED TO KNOW:   An epidural steroid injection (TIN) is a procedure to inject steroid medicine into the epidural space  The epidural space is between your spinal cord and vertebrae  Steroids reduce inflammation and fluid buildup in your spine that may be causing pain  You may be given pain medicine along with the steroids  ACTIVITY  · Do not drive or operate machinery today  · No strenuous activity today - bending, lifting, etc   · You may resume normal activites starting tomorrow - start slowly and as tolerated  · You may shower today, but no tub baths or hot tubs  · You may have numbness for several hours from the local anesthetic  Please use caution and common sense, especially with weight-bearing activities  CARE OF THE INJECTION SITE  · If you have soreness or pain, apply ice to the area today (20 minutes on/20 minutes off)  · Starting tomorrow, you may use warm, moist heat or ice if needed  · You may have an increase or change in your discomfort for 36-48 hours after your treatment  · Apply ice and continue with any pain medication you have been prescribed  · Notify [facility] if you have any of the following: redness, drainage, swelling, headache, stiff neck or fever above 100°F     SPECIAL INSTRUCTIONS  · Our office will contact you in approximately 7 days for a progress report  MEDICATIONS  · Continue to take all routine medications  · Our office may have instructed you to hold some medications  If you have a problem specifically related to your procedure, please call our office at [phone]  Problems not related to your procedure should be directed to your primary care physician

## 2018-12-04 NOTE — H&P
History of Present Illness: The patient is a 70 y o  male who presents with complaints of low back and leg pain      Patient Active Problem List   Diagnosis    Paroxysmal atrial fibrillation (HCC)    Lumbar spinal stenosis    Sacroiliitis (HCC)    Spondylosis of lumbar region without myelopathy or radiculopathy    Right bundle branch block (RBBB)    Edema of both lower legs due to peripheral venous insufficiency    Chronic pain of both lower extremities    Lumbar radiculopathy    Lumbar spondylosis    Chronic pain syndrome       Past Medical History:   Diagnosis Date    [facility] )     Anxiety     Arthritis     Depression     Hyperlipidemia     Hypertension     Irregular heart beat     [facility] )     TIA (transient ischemic attack)     no residual problems       Past Surgical History:   Procedure Laterality Date    FACIAL/NECK BIOPSY N/A 4/3/2017    Procedure: NOSE BCC EXCISION; FROZEN SECTION; RECONSTRUCTION ;  Surgeon: Elana Cox MD;  [facility];  Service:     FULL THICKNESS SKIN GRAFT N/A 4/3/2017    Procedure: FLAP VERSUS FULL THICKNESS SKIN GRAFT ;  Surgeon: Elana Cox MD;  [facility];  Service:    Juan Fowler PROSTATECTOMY      TONSILLECTOMY AND ADENOIDECTOMY           Current Outpatient Prescriptions:     Acetaminophen 325 MG CAPS, Take by mouth, Disp: , Rfl:     amLODIPine (NORVASC) 10 mg tablet, TAKE 1 TABLET DAILY, Disp: 90 tablet, Rfl: 0    atorvastatin (LIPITOR) 40 mg tablet, Take 40 mg by mouth daily at bedtime, Disp: , Rfl:     Cholecalciferol (VITAMIN D-3 PO), Take 2,000 unit marking on U-100 syringe by mouth daily after dinner, Disp: , Rfl:     co-enzyme Q-10 30 MG capsule, Take 30 mg by mouth daily after dinner, Disp: , Rfl:     glucosamine-chondroitin 500-400 MG tablet, Take 2 tablets by mouth daily in the early morning, Disp: , Rfl:     losartan (COZAAR) 100 MG tablet, Take 1 tablet (100 mg total) by mouth daily, Disp: 90 tablet, Rfl: 3   methocarbamol (ROBAXIN) 500 mg tablet, Take 500 mg by mouth 4 (four) times a day, Disp: , Rfl:     multivitamin (THERAGRAN) TABS, Take 1 tablet by mouth daily in the early morning, Disp: , Rfl:     Omega-3 Fatty Acids (FISH OIL) 1,000 mg, Take 1,000 mg by mouth 2 (two) times a day, Disp: , Rfl:     sertraline (ZOLOFT) 50 mg tablet, Take 50 mg by mouth daily in the early morning, Disp: , Rfl:     sotalol (BETAPACE) 80 mg tablet, Take 1 tablet (80 mg total) by mouth 2 (two) times a day, Disp: 180 tablet, Rfl: 3    torsemide (DEMADEX) 20 mg tablet, Take 20 mg by mouth daily, Disp: , Rfl:     traMADol (ULTRAM) 50 mg tablet, Take 1 tablet (50 mg total) by mouth 2 (two) times a day as needed for severe pain, Disp: 60 tablet, Rfl: 2    warfarin (COUMADIN) 5 mg tablet, Take 1 tablet (5 mg total) by mouth daily Or as directed (Patient taking differently: Take 5 mg by mouth daily Or as directed ), Disp: 30 tablet, Rfl: 11    No Known Allergies    Physical Exam:   Vitals:    12/04/18 1030   BP: (!) 176/81   Pulse: 59   Resp: 20   Temp: 98 3 °F (36 8 °C)   SpO2: 95%     General: Awake, Alert, Oriented x 3, Mood and affect appropriate  Respiratory: Respirations even and unlabored  Cardiovascular: Peripheral pulses intact; no edema  Musculoskeletal Exam:  Bilateral lumbar paraspinals tender to palpation  ASA Score: 3    Patient/Chart Verification  Patient ID Verified: Verbal  ID Band Applied: Yes  Consents Confirmed: Procedural  H&P( within 30 days) Verified: To be obtained in the Pre-Procedure area  Interval H&P(within 24 hr) Complete (required for Outpatients and Surgery Admit only): To be obtained in the Pre-Procedure area  Allergies Reviewed: Yes  Anticoag/NSAID held?: Yes (stopped coumadin on 11/29)  Currently on antibiotics?: No  Pre-op Lab/Test Results Available: N/A    Assessment:   1   Lumbar radiculopathy        Plan: B/L L4 TFESI

## 2018-12-11 ENCOUNTER — TELEPHONE (OUTPATIENT)
Dept: PAIN MEDICINE | Facility: CLINIC | Age: 78
End: 2018-12-11

## 2018-12-11 NOTE — TELEPHONE ENCOUNTER
Aware, will see how the patient does over the next week, if no further improvement will re-evaluate at next office visit

## 2018-12-18 ENCOUNTER — ANTICOAG VISIT (OUTPATIENT)
Dept: CARDIOLOGY CLINIC | Facility: CLINIC | Age: 78
End: 2018-12-18

## 2018-12-18 ENCOUNTER — APPOINTMENT (OUTPATIENT)
Dept: LAB | Facility: CLINIC | Age: 78
End: 2018-12-18
Payer: MEDICARE

## 2018-12-18 DIAGNOSIS — I48.0 PAROXYSMAL ATRIAL FIBRILLATION (HCC): ICD-10-CM

## 2018-12-26 ENCOUNTER — TELEPHONE (OUTPATIENT)
Dept: PAIN MEDICINE | Facility: MEDICAL CENTER | Age: 78
End: 2018-12-26

## 2018-12-26 NOTE — TELEPHONE ENCOUNTER
S/w patient requesting to speak w/nurse or [facility]   Per patient states she was doing ok post procedure 12/4/18, then on 12/23/18 is in severe pain, can not walk without a cane, symptoms remain to date    Call back # [phone]

## 2018-12-26 NOTE — TELEPHONE ENCOUNTER
S/w pt, he woke up on Sunday and developed "horrible pain" in his legs from his thighs to his ankles  Unable to describe his pain other than "pain"  Pt said he was doing fine after his injection but now he got this pain out of nowhere  Pt said he can barely walk and feels like he is going to fall every time he tries to walk  Pt said he has been having to use a cane now  Pt worried that he is going to become paralyzed from this  Pt said he does not think he can make till his next appt on 1/10, he is requesting stronger pain medication  Please advise

## 2018-12-26 NOTE — TELEPHONE ENCOUNTER
The injection seemed to provide great relief for approximately 3 weeks so we can either repeat the injection if we get clearance to hold Coumadin again or I can provide him with a surgical referral to discuss surgical intervention  If the pain is too severe, he can be seen in the ED  We do not adjust opioid medication without an office visit

## 2019-01-10 ENCOUNTER — OFFICE VISIT (OUTPATIENT)
Dept: PAIN MEDICINE | Facility: CLINIC | Age: 79
End: 2019-01-10
Payer: MEDICARE

## 2019-01-10 VITALS
DIASTOLIC BLOOD PRESSURE: 82 MMHG | SYSTOLIC BLOOD PRESSURE: 145 MMHG | BODY MASS INDEX: 36.26 KG/M2 | WEIGHT: 231 LBS | HEIGHT: 67 IN

## 2019-01-10 DIAGNOSIS — M54.16 LUMBAR RADICULOPATHY: Primary | ICD-10-CM

## 2019-01-10 PROCEDURE — 99213 OFFICE O/P EST LOW 20 MIN: CPT | Performed by: NURSE PRACTITIONER

## 2019-01-10 NOTE — PROGRESS NOTES
Assessment:  1  Lumbar radiculopathy        Plan:  Patient is doing well s/p B/L L4 TFESI  He received 90% relief from this procedure for approximately 1 month, and continues with ongoing 50% relief at this time  1   We can repeat B/L L4 TFESI PRN, however the patient agrees to hold off at this time as he continues to experience ongoing relief  If we were to repeat, he would need to hold his Coumadin  2   Patient is possibly interested in returning to physical therapy  A referral was provided at today's office visit  3  Patient may continue on tramadol 50mg Q12 hours prn pain  He hardly uses this medication, he thinks maybe over a month ago was the last time  He does not need a refill of this medication at todays office visit  4  Patient may continue on Tylenol p r n  should not exceed more than 3000 mg daily  5  Patient may benefit from bilateral L3-5 medial branch blocks in the future for his axial low back pain  6  Patient may continue with his home exercise program  7  I will avoid NSAIDs secondary to anticoagulation on Coumadin  8  The patient will follow-up in 3 months for medication prescription refill and reevaluation  The patient was advised to contact the office should their symptoms worsen in the interim  The patient was agreeable and verbalized an understanding  [facility] Prescription Drug Monitoring Program report was reviewed and was appropriate     History of Present Illness: The patient is a 70 y o  male last seen on 11/13/18 who presents for a follow up office visit in regards to chronic lumbosacral back pain with radiculopathy in the L4 dermatomal distribution of the bilateral lower extremities secondary to lumbar degenerative disc disease, lumbar stenosis and spondylosis  The patient is status post bilateral L4 TFESI  Per the patient, he received 90% relief from this procedure that lasted approximately 1 month    He then had a flare of significant pain in his lower extremities which are most caused him to go to the emergency room, however it subsided after few days  He states he has continued with ongoing 50% relief of his leg pain from this procedure at this time  He states he still has trouble initiating movement when he goes from a sitting position to standing, however the pain is not nearly as severe  He has had bilateral SI joint injections x2 in the past without any significant relief of his pain  MRI of the lumbar spine reveals multilevel degenerative disc disease and spondylosis with varying degrees of foraminal stenosis at L1-2, L3-4, and L4-5 with stenosis being most severe at L4-5  The patient also has followed up with cardiology regarding his bilateral lower extremity edema  He states he was diagnosed with chronic venous insufficiency  The patient currently rates his pain a 5/10 on the numeric pain rating scale  He states his pain is occasional in nature and follows no particular pattern throughout the day  He characterizes the pain as a dull aching  Current pain medications includes:   Tramadol 50 mg 1 tablet b i d  p r n  and Tylenol p r n     The patient reports that this regimen is providing 50% pain relief  The patient is reporting no side effects from this pain medication regimen  Pain Contract Signed: 11/13/18  Last Urine Drug Screen: 11/13/18  Tramadol 12/1/18    I have personally reviewed and/or updated the patient's past medical history, past surgical history, family history, social history, current medications, allergies, and vital signs today  Review of Systems:    Review of Systems   Respiratory: Negative for shortness of breath  Cardiovascular: Negative for chest pain  Gastrointestinal: Negative for constipation, diarrhea, nausea and vomiting  Musculoskeletal: Positive for gait problem (Difficulty walking)  Negative for arthralgias, joint swelling and myalgias  Joint stiffness   Skin: Negative for rash     Neurological: Negative for dizziness, seizures and weakness  All other systems reviewed and are negative  Past Medical History:   Diagnosis Date    [facility] [address] )     Anxiety     Arthritis     Depression     Hyperlipidemia     Hypertension     Irregular heart beat     [facility]     TIA (transient ischemic attack)     no residual problems       Past Surgical History:   Procedure Laterality Date    FACIAL/NECK BIOPSY N/A 4/3/2017    Procedure: NOSE BCC EXCISION; FROZEN SECTION; RECONSTRUCTION ;  Surgeon: Litzy Acevedo MD;  [facility];  Service:     FULL THICKNESS SKIN GRAFT N/A 4/3/2017    Procedure: FLAP VERSUS FULL THICKNESS SKIN GRAFT ;  Surgeon: Litzy Acevedo MD;  [facility];  Service:     PROSTATECTOMY      TONSILLECTOMY AND ADENOIDECTOMY         No family history on file  Social History     Occupational History    Not on file       Social History Main Topics    Smoking status: Never Smoker    Smokeless tobacco: Never Used    Alcohol use 8 4 oz/week     14 Glasses of wine per week    Drug use: No    Sexual activity: Not Currently         Current Outpatient Prescriptions:     Acetaminophen 325 MG CAPS, Take by mouth, Disp: , Rfl:     amLODIPine (NORVASC) 10 mg tablet, TAKE 1 TABLET DAILY, Disp: 90 tablet, Rfl: 0    atorvastatin (LIPITOR) 40 mg tablet, Take 40 mg by mouth daily at bedtime, Disp: , Rfl:     Cholecalciferol (VITAMIN D-3 PO), Take 2,000 unit marking on U-100 syringe by mouth daily after dinner, Disp: , Rfl:     co-enzyme Q-10 30 MG capsule, Take 30 mg by mouth daily after dinner, Disp: , Rfl:     glucosamine-chondroitin 500-400 MG tablet, Take 2 tablets by mouth daily in the early morning, Disp: , Rfl:     losartan (COZAAR) 100 MG tablet, Take 1 tablet (100 mg total) by mouth daily, Disp: 90 tablet, Rfl: 3    methocarbamol (ROBAXIN) 500 mg tablet, Take 500 mg by mouth 4 (four) times a day, Disp: , Rfl:     multivitamin (THERAGRAN) TABS, Take 1 tablet by mouth daily in the early morning, Disp: , Rfl:     Omega-3 Fatty Acids (FISH OIL) 1,000 mg, Take 1,000 mg by mouth 2 (two) times a day, Disp: , Rfl:     sertraline (ZOLOFT) 50 mg tablet, Take 50 mg by mouth daily in the early morning, Disp: , Rfl:     sotalol (BETAPACE) 80 mg tablet, Take 1 tablet (80 mg total) by mouth 2 (two) times a day, Disp: 180 tablet, Rfl: 3    torsemide (DEMADEX) 20 mg tablet, Take 20 mg by mouth daily, Disp: , Rfl:     traMADol (ULTRAM) 50 mg tablet, Take 1 tablet (50 mg total) by mouth 2 (two) times a day as needed for severe pain, Disp: 60 tablet, Rfl: 2    warfarin (COUMADIN) 5 mg tablet, Take 1 tablet (5 mg total) by mouth daily Or as directed (Patient taking differently: Take 5 mg by mouth daily Or as directed ), Disp: 30 tablet, Rfl: 11    No Known Allergies    Physical Exam:    /82   Ht 5' 7" (1 702 m)   Wt 105 kg (231 lb)   BMI 36 18 kg/m²     Constitutional:overweight  Eyes:anicteric  HEENT:grossly intact  Neck:supple, symmetric, trachea midline and no masses   Pulmonary:even and unlabored  Cardiovascular:No edema or pitting edema present  Skin:Normal without rashes or lesions and well hydrated  Psychiatric:Mood and affect appropriate  Neurologic:Cranial Nerves II-XII grossly intact  Musculoskeletal:Slightly antalgic gait, but steady without the use of assistive devices      Imaging  No orders to display     MRI LUMBAR SPINE WITHOUT CONTRAST     INDICATION: M54 [id number]: Radiculopathy, lumbar region chronic, worsening low back pain radiating into the legs      COMPARISON:  None      TECHNIQUE:  Sagittal T1, sagittal T2, sagittal inversion recovery, axial T1 and axial T2, coronal T2        IMAGE QUALITY:  Diagnostic     FINDINGS:     ALIGNMENT:  Mild S-shaped scoliotic deformity noted with a slight dextroscoliosis of the lower thoracic spine and a mild levoscoliosis mid lumbar spine    Trace grade 1 anterolisthesis of L4 on L5 noted      MARROW SIGNAL:  Scattered degenerative endplate changes  No focally suspicious marrow lesions  No bone marrow edema or compression abnormality      DISTAL CORD AND CONUS:  Normal size and signal within the distal cord and conus  The conus ends at the L2 level      PARASPINAL SOFT TISSUES:  A normal kidney is not identified in the expected location of the right renal fossa  There is a right pelvic kidney noted  It has a few small T2 hyperintense lesions, some of which are too small to characterize others likely   cysts  Visualized portions of the left kidney are unremarkable; the left kidney located in the left renal fossa      SACRUM:  Normal signal within the sacrum  No evidence of insufficiency or stress fracture      LOWER THORACIC DISC SPACES:  Multilevel loss of disc height and signal      T10-T11: There is no focal disk herniation, central canal or neural foraminal narrowing      T11-T12: Small central disc protrusion  Mild central canal narrowing  Neural foramina bilaterally patent      T12-L1: There is no focal disk herniation, central canal or neural foraminal narrowing      LUMBAR DISC SPACES:     L1-L2:  Small left paracentral disc protrusion  There is bilateral facet hypertrophy  Mild left neural foraminal narrowing  Central canal and right neural foramen patent      L2-L3:  There is no focal disk herniation, central canal or neural foraminal narrowing  Bilateral facet hypertrophy noted      L3-L4:  There is a left neural foraminal disc protrusion  There is bilateral facet hypertrophy  Mild left neural foraminal narrowing  Central canal and right neural foramen patent      L4-L5:  There is uncovering the intervertebral disc space  There is bilateral facet hypertrophy  There is no focal disc herniation  There is moderate bilateral neural foraminal narrowing  Mild central canal narrowing      L5-S1:  There is a diffuse disk bulge  No significant central canal or neural foraminal narrowing    Bilateral facet hypertrophy noted      IMPRESSION:        1  Multilevel spondylosis with a mild S-shaped scoliosis as described  2   Right pelvic kidney      Orders Placed This Encounter   Procedures    Ambulatory referral to Physical Therapy

## 2019-01-14 ENCOUNTER — ANTICOAG VISIT (OUTPATIENT)
Dept: CARDIOLOGY CLINIC | Facility: CLINIC | Age: 79
End: 2019-01-14

## 2019-01-14 ENCOUNTER — APPOINTMENT (OUTPATIENT)
Dept: LAB | Facility: CLINIC | Age: 79
End: 2019-01-14
Payer: MEDICARE

## 2019-01-14 DIAGNOSIS — I48.0 PAROXYSMAL ATRIAL FIBRILLATION (HCC): ICD-10-CM

## 2019-02-12 RX ORDER — METHOCARBAMOL 500 MG/1
TABLET, FILM COATED ORAL
Qty: 60 TABLET | Refills: 0 | OUTPATIENT
Start: 2019-02-12

## 2019-02-14 ENCOUNTER — ANTICOAG VISIT (OUTPATIENT)
Dept: CARDIOLOGY CLINIC | Facility: CLINIC | Age: 79
End: 2019-02-14

## 2019-02-14 ENCOUNTER — APPOINTMENT (OUTPATIENT)
Dept: LAB | Facility: CLINIC | Age: 79
End: 2019-02-14
Payer: MEDICARE

## 2019-02-14 DIAGNOSIS — I48.0 PAROXYSMAL ATRIAL FIBRILLATION (HCC): ICD-10-CM

## 2019-02-17 ENCOUNTER — TRANSCRIBE ORDERS (OUTPATIENT)
Dept: LAB | Facility: HOSPITAL | Age: 79
End: 2019-02-17

## 2019-03-06 DIAGNOSIS — I48.91 ATRIAL FIBRILLATION, UNSPECIFIED TYPE (HCC): ICD-10-CM

## 2019-03-06 RX ORDER — WARFARIN SODIUM 5 MG/1
TABLET ORAL
Qty: 30 TABLET | Refills: 0 | Status: SHIPPED | OUTPATIENT
Start: 2019-03-06 | End: 2019-03-29 | Stop reason: SDUPTHER

## 2019-03-15 ENCOUNTER — APPOINTMENT (OUTPATIENT)
Dept: LAB | Facility: CLINIC | Age: 79
End: 2019-03-15
Payer: MEDICARE

## 2019-03-15 ENCOUNTER — ANTICOAG VISIT (OUTPATIENT)
Dept: CARDIOLOGY CLINIC | Facility: CLINIC | Age: 79
End: 2019-03-15

## 2019-03-15 DIAGNOSIS — I48.0 PAROXYSMAL ATRIAL FIBRILLATION (HCC): ICD-10-CM

## 2019-03-29 DIAGNOSIS — I48.91 ATRIAL FIBRILLATION, UNSPECIFIED TYPE (HCC): ICD-10-CM

## 2019-03-29 RX ORDER — WARFARIN SODIUM 5 MG/1
TABLET ORAL
Qty: 30 TABLET | Refills: 0 | Status: SHIPPED | OUTPATIENT
Start: 2019-03-29 | End: 2019-06-10 | Stop reason: SDUPTHER

## 2019-04-02 ENCOUNTER — TRANSCRIBE ORDERS (OUTPATIENT)
Dept: LAB | Facility: CLINIC | Age: 79
End: 2019-04-02

## 2019-04-02 ENCOUNTER — ANTICOAG VISIT (OUTPATIENT)
Dept: CARDIOLOGY CLINIC | Facility: CLINIC | Age: 79
End: 2019-04-02

## 2019-04-02 ENCOUNTER — APPOINTMENT (OUTPATIENT)
Dept: LAB | Facility: CLINIC | Age: 79
End: 2019-04-02
Payer: MEDICARE

## 2019-04-02 DIAGNOSIS — I48.0 PAROXYSMAL ATRIAL FIBRILLATION (HCC): ICD-10-CM

## 2019-04-04 ENCOUNTER — OFFICE VISIT (OUTPATIENT)
Dept: PAIN MEDICINE | Facility: CLINIC | Age: 79
End: 2019-04-04
Payer: MEDICARE

## 2019-04-04 VITALS
HEART RATE: 60 BPM | DIASTOLIC BLOOD PRESSURE: 72 MMHG | HEIGHT: 67 IN | BODY MASS INDEX: 36.1 KG/M2 | WEIGHT: 230 LBS | SYSTOLIC BLOOD PRESSURE: 161 MMHG

## 2019-04-04 DIAGNOSIS — M54.16 LUMBAR RADICULOPATHY: Primary | ICD-10-CM

## 2019-04-04 DIAGNOSIS — M48.062 SPINAL STENOSIS OF LUMBAR REGION WITH NEUROGENIC CLAUDICATION: ICD-10-CM

## 2019-04-04 DIAGNOSIS — M47.816 LUMBAR SPONDYLOSIS: ICD-10-CM

## 2019-04-04 DIAGNOSIS — M47.816 SPONDYLOSIS OF LUMBAR REGION WITHOUT MYELOPATHY OR RADICULOPATHY: ICD-10-CM

## 2019-04-04 PROCEDURE — 99213 OFFICE O/P EST LOW 20 MIN: CPT | Performed by: NURSE PRACTITIONER

## 2019-04-04 RX ORDER — SERTRALINE HYDROCHLORIDE 100 MG/1
50 TABLET, FILM COATED ORAL DAILY
Refills: 3 | COMMUNITY
Start: 2019-02-01 | End: 2019-04-04

## 2019-04-22 ENCOUNTER — APPOINTMENT (OUTPATIENT)
Dept: LAB | Facility: CLINIC | Age: 79
End: 2019-04-22
Payer: MEDICARE

## 2019-04-22 ENCOUNTER — ANTICOAG VISIT (OUTPATIENT)
Dept: CARDIOLOGY CLINIC | Facility: CLINIC | Age: 79
End: 2019-04-22

## 2019-04-22 DIAGNOSIS — I48.0 PAROXYSMAL ATRIAL FIBRILLATION (HCC): ICD-10-CM

## 2019-04-29 ENCOUNTER — OFFICE VISIT (OUTPATIENT)
Dept: PAIN MEDICINE | Facility: CLINIC | Age: 79
End: 2019-04-29
Payer: MEDICARE

## 2019-04-29 VITALS
HEIGHT: 67 IN | SYSTOLIC BLOOD PRESSURE: 152 MMHG | HEART RATE: 53 BPM | WEIGHT: 228 LBS | DIASTOLIC BLOOD PRESSURE: 74 MMHG | BODY MASS INDEX: 35.79 KG/M2

## 2019-04-29 DIAGNOSIS — M48.062 SPINAL STENOSIS OF LUMBAR REGION WITH NEUROGENIC CLAUDICATION: ICD-10-CM

## 2019-04-29 DIAGNOSIS — Z01.812 PRE-PROCEDURAL LABORATORY EXAMINATION: ICD-10-CM

## 2019-04-29 DIAGNOSIS — M47.816 LUMBAR SPONDYLOSIS: Primary | ICD-10-CM

## 2019-04-29 DIAGNOSIS — M54.16 LUMBAR RADICULOPATHY: ICD-10-CM

## 2019-04-29 DIAGNOSIS — G89.4 CHRONIC PAIN SYNDROME: ICD-10-CM

## 2019-04-29 PROCEDURE — 99214 OFFICE O/P EST MOD 30 MIN: CPT | Performed by: NURSE PRACTITIONER

## 2019-04-29 RX ORDER — SERTRALINE HYDROCHLORIDE 100 MG/1
50 TABLET, FILM COATED ORAL DAILY
Refills: 3 | COMMUNITY
Start: 2019-04-09 | End: 2021-02-09

## 2019-05-13 ENCOUNTER — APPOINTMENT (OUTPATIENT)
Dept: LAB | Facility: CLINIC | Age: 79
End: 2019-05-13
Payer: MEDICARE

## 2019-05-13 ENCOUNTER — ANTICOAG VISIT (OUTPATIENT)
Dept: CARDIOLOGY CLINIC | Facility: CLINIC | Age: 79
End: 2019-05-13

## 2019-05-13 DIAGNOSIS — Z01.812 PRE-PROCEDURAL LABORATORY EXAMINATION: ICD-10-CM

## 2019-05-13 DIAGNOSIS — I48.0 PAROXYSMAL ATRIAL FIBRILLATION (HCC): ICD-10-CM

## 2019-05-21 ENCOUNTER — ANTICOAG VISIT (OUTPATIENT)
Dept: CARDIOLOGY CLINIC | Facility: CLINIC | Age: 79
End: 2019-05-21

## 2019-05-21 ENCOUNTER — APPOINTMENT (OUTPATIENT)
Dept: LAB | Facility: HOSPITAL | Age: 79
End: 2019-05-21
Attending: INTERNAL MEDICINE
Payer: MEDICARE

## 2019-05-21 DIAGNOSIS — Z01.812 PRE-OPERATIVE LABORATORY EXAMINATION: Primary | ICD-10-CM

## 2019-05-21 DIAGNOSIS — I48.0 PAROXYSMAL ATRIAL FIBRILLATION (HCC): ICD-10-CM

## 2019-05-22 ENCOUNTER — HOSPITAL ENCOUNTER (OUTPATIENT)
Dept: RADIOLOGY | Facility: CLINIC | Age: 79
Discharge: HOME | End: 2019-05-22
Attending: ANESTHESIOLOGY | Admitting: ANESTHESIOLOGY
Payer: MEDICARE

## 2019-05-22 VITALS
SYSTOLIC BLOOD PRESSURE: 154 MMHG | HEART RATE: 59 BPM | OXYGEN SATURATION: 95 % | TEMPERATURE: 98.5 F | DIASTOLIC BLOOD PRESSURE: 78 MMHG | RESPIRATION RATE: 18 BRPM

## 2019-05-22 DIAGNOSIS — M47.816 LUMBAR SPONDYLOSIS: ICD-10-CM

## 2019-05-22 PROCEDURE — 64493 INJ PARAVERT F JNT L/S 1 LEV: CPT | Performed by: ANESTHESIOLOGY

## 2019-05-22 PROCEDURE — 64495 INJ PARAVERT F JNT L/S 3 LEV: CPT | Performed by: ANESTHESIOLOGY

## 2019-05-22 PROCEDURE — 64494 INJ PARAVERT F JNT L/S 2 LEV: CPT | Performed by: ANESTHESIOLOGY

## 2019-05-22 RX ORDER — LIDOCAINE HYDROCHLORIDE 10 MG/ML
10 INJECTION, SOLUTION EPIDURAL; INFILTRATION; INTRACAUDAL; PERINEURAL ONCE
Status: COMPLETED | OUTPATIENT
Start: 2019-05-22 | End: 2019-05-22

## 2019-05-22 RX ADMIN — LIDOCAINE HYDROCHLORIDE 10 ML: 10 INJECTION, SOLUTION EPIDURAL; INFILTRATION; INTRACAUDAL; PERINEURAL at 10:09

## 2019-05-22 RX ADMIN — LIDOCAINE HYDROCHLORIDE 3 ML: 20 INJECTION, SOLUTION EPIDURAL; INFILTRATION; INTRACAUDAL; PERINEURAL at 10:17

## 2019-06-03 ENCOUNTER — TELEPHONE (OUTPATIENT)
Dept: PAIN MEDICINE | Facility: CLINIC | Age: 79
End: 2019-06-03

## 2019-06-05 ENCOUNTER — OFFICE VISIT (OUTPATIENT)
Dept: PAIN MEDICINE | Facility: CLINIC | Age: 79
End: 2019-06-05
Payer: MEDICARE

## 2019-06-05 VITALS
WEIGHT: 235 LBS | DIASTOLIC BLOOD PRESSURE: 74 MMHG | BODY MASS INDEX: 36.88 KG/M2 | HEIGHT: 67 IN | HEART RATE: 60 BPM | SYSTOLIC BLOOD PRESSURE: 144 MMHG | TEMPERATURE: 98.7 F

## 2019-06-05 DIAGNOSIS — G89.4 CHRONIC PAIN SYNDROME: ICD-10-CM

## 2019-06-05 DIAGNOSIS — F11.20 UNCOMPLICATED OPIOID DEPENDENCE (HCC): ICD-10-CM

## 2019-06-05 DIAGNOSIS — Z01.812 PRE-PROCEDURE LAB EXAM: ICD-10-CM

## 2019-06-05 DIAGNOSIS — Z79.891 ENCOUNTER FOR LONG-TERM USE OF OPIATE ANALGESIC: ICD-10-CM

## 2019-06-05 DIAGNOSIS — M54.16 LUMBAR RADICULOPATHY: Primary | ICD-10-CM

## 2019-06-05 PROCEDURE — 80305 DRUG TEST PRSMV DIR OPT OBS: CPT | Performed by: NURSE PRACTITIONER

## 2019-06-05 PROCEDURE — 99214 OFFICE O/P EST MOD 30 MIN: CPT | Performed by: NURSE PRACTITIONER

## 2019-06-06 ENCOUNTER — TELEPHONE (OUTPATIENT)
Dept: RADIOLOGY | Facility: CLINIC | Age: 79
End: 2019-06-06

## 2019-06-06 ENCOUNTER — TELEPHONE (OUTPATIENT)
Dept: CARDIOLOGY CLINIC | Facility: CLINIC | Age: 79
End: 2019-06-06

## 2019-06-10 DIAGNOSIS — I10 ESSENTIAL (PRIMARY) HYPERTENSION: ICD-10-CM

## 2019-06-10 DIAGNOSIS — I48.91 ATRIAL FIBRILLATION, UNSPECIFIED TYPE (HCC): ICD-10-CM

## 2019-06-10 RX ORDER — WARFARIN SODIUM 5 MG/1
TABLET ORAL
Qty: 30 TABLET | Refills: 0 | Status: SHIPPED | OUTPATIENT
Start: 2019-06-10 | End: 2019-07-23 | Stop reason: SDUPTHER

## 2019-06-10 RX ORDER — AMLODIPINE BESYLATE 10 MG/1
TABLET ORAL
Qty: 90 TABLET | Refills: 0 | Status: SHIPPED | OUTPATIENT
Start: 2019-06-10 | End: 2019-09-06 | Stop reason: SDUPTHER

## 2019-06-26 ENCOUNTER — HOSPITAL ENCOUNTER (OUTPATIENT)
Dept: RADIOLOGY | Facility: CLINIC | Age: 79
Discharge: HOME | End: 2019-06-26
Attending: ANESTHESIOLOGY
Payer: MEDICARE

## 2019-06-26 ENCOUNTER — ANTICOAG VISIT (OUTPATIENT)
Dept: CARDIOLOGY CLINIC | Facility: CLINIC | Age: 79
End: 2019-06-26

## 2019-06-26 ENCOUNTER — APPOINTMENT (OUTPATIENT)
Dept: LAB | Facility: HOSPITAL | Age: 79
End: 2019-06-26
Payer: MEDICARE

## 2019-06-26 VITALS
OXYGEN SATURATION: 95 % | RESPIRATION RATE: 18 BRPM | SYSTOLIC BLOOD PRESSURE: 156 MMHG | TEMPERATURE: 98.5 F | HEART RATE: 58 BPM | DIASTOLIC BLOOD PRESSURE: 71 MMHG

## 2019-06-26 DIAGNOSIS — I48.0 PAROXYSMAL ATRIAL FIBRILLATION (HCC): ICD-10-CM

## 2019-06-26 DIAGNOSIS — M54.16 LUMBAR RADICULOPATHY: ICD-10-CM

## 2019-06-26 DIAGNOSIS — Z01.812 PRE-PROCEDURE LAB EXAM: ICD-10-CM

## 2019-06-26 LAB
INR PPP: 1.1 (ref 0.84–1.19)
PROTHROMBIN TIME: 13.8 SECONDS (ref 11.6–14.5)

## 2019-06-26 PROCEDURE — 64483 NJX AA&/STRD TFRM EPI L/S 1: CPT | Performed by: ANESTHESIOLOGY

## 2019-06-26 PROCEDURE — 36415 COLL VENOUS BLD VENIPUNCTURE: CPT

## 2019-06-26 PROCEDURE — 85610 PROTHROMBIN TIME: CPT

## 2019-06-26 RX ORDER — PAPAVERINE HCL 150 MG
20 CAPSULE, EXTENDED RELEASE ORAL ONCE
Status: COMPLETED | OUTPATIENT
Start: 2019-06-26 | End: 2019-06-26

## 2019-06-26 RX ORDER — LIDOCAINE HYDROCHLORIDE 10 MG/ML
5 INJECTION, SOLUTION EPIDURAL; INFILTRATION; INTRACAUDAL; PERINEURAL ONCE
Status: COMPLETED | OUTPATIENT
Start: 2019-06-26 | End: 2019-06-26

## 2019-06-26 RX ADMIN — IOHEXOL 2 ML: 300 INJECTION, SOLUTION INTRAVENOUS at 14:14

## 2019-06-26 RX ADMIN — LIDOCAINE HYDROCHLORIDE 4 ML: 10 INJECTION, SOLUTION EPIDURAL; INFILTRATION; INTRACAUDAL; PERINEURAL at 14:08

## 2019-06-26 RX ADMIN — LIDOCAINE HYDROCHLORIDE 2 ML: 20 INJECTION, SOLUTION EPIDURAL; INFILTRATION; INTRACAUDAL; PERINEURAL at 14:20

## 2019-06-26 RX ADMIN — DEXAMETHASONE SODIUM PHOSPHATE 15 MG: 10 INJECTION, SOLUTION INTRAMUSCULAR; INTRAVENOUS at 14:20

## 2019-07-03 ENCOUNTER — TELEPHONE (OUTPATIENT)
Dept: PAIN MEDICINE | Facility: CLINIC | Age: 79
End: 2019-07-03

## 2019-07-03 NOTE — TELEPHONE ENCOUNTER
Aware, will see how the patient does over the next week    If the patient would like to try chiropractic treatment, that would be fine

## 2019-07-03 NOTE — TELEPHONE ENCOUNTER
Pt reports 75% improvement post inj   Pain level 4/10  Pt wants to know if he could try a chiropractor ?

## 2019-07-11 DIAGNOSIS — R60.9 EDEMA, UNSPECIFIED TYPE: Primary | ICD-10-CM

## 2019-07-11 RX ORDER — TORSEMIDE 20 MG/1
TABLET ORAL
Qty: 60 TABLET | Refills: 11 | Status: SHIPPED | OUTPATIENT
Start: 2019-07-11 | End: 2020-11-13

## 2019-07-17 ENCOUNTER — APPOINTMENT (OUTPATIENT)
Dept: LAB | Facility: CLINIC | Age: 79
End: 2019-07-17
Payer: MEDICARE

## 2019-07-17 ENCOUNTER — ANTICOAG VISIT (OUTPATIENT)
Dept: CARDIOLOGY CLINIC | Facility: CLINIC | Age: 79
End: 2019-07-17

## 2019-07-17 DIAGNOSIS — I48.0 PAROXYSMAL ATRIAL FIBRILLATION (HCC): ICD-10-CM

## 2019-07-23 ENCOUNTER — TELEPHONE (OUTPATIENT)
Dept: CARDIOLOGY CLINIC | Facility: CLINIC | Age: 79
End: 2019-07-23

## 2019-07-23 DIAGNOSIS — I48.91 ATRIAL FIBRILLATION, UNSPECIFIED TYPE (HCC): ICD-10-CM

## 2019-07-23 RX ORDER — WARFARIN SODIUM 5 MG/1
TABLET ORAL
Qty: 30 TABLET | Refills: 0 | Status: SHIPPED | OUTPATIENT
Start: 2019-07-23 | End: 2020-09-30 | Stop reason: SDUPTHER

## 2019-07-23 RX ORDER — WARFARIN SODIUM 5 MG/1
TABLET ORAL
Qty: 90 TABLET | Refills: 3 | OUTPATIENT
Start: 2019-07-23 | End: 2020-09-30

## 2019-08-05 DIAGNOSIS — I10 ESSENTIAL HYPERTENSION: ICD-10-CM

## 2019-08-07 ENCOUNTER — TELEPHONE (OUTPATIENT)
Dept: CARDIOLOGY CLINIC | Facility: CLINIC | Age: 79
End: 2019-08-07

## 2019-08-07 DIAGNOSIS — I10 ESSENTIAL HYPERTENSION: ICD-10-CM

## 2019-08-07 RX ORDER — LOSARTAN POTASSIUM 100 MG/1
100 TABLET ORAL DAILY
Qty: 90 TABLET | Refills: 3 | Status: SHIPPED | OUTPATIENT
Start: 2019-08-07 | End: 2020-07-27 | Stop reason: SDUPTHER

## 2019-08-07 RX ORDER — LOSARTAN POTASSIUM 100 MG/1
TABLET ORAL
Qty: 90 TABLET | Refills: 3 | Status: SHIPPED | OUTPATIENT
Start: 2019-08-07 | End: 2020-10-22

## 2019-08-07 NOTE — TELEPHONE ENCOUNTER
Return call about a script not being sent explained Losartan was sent today   But Valsartan is not on profile  [place]

## 2019-08-29 DIAGNOSIS — I48.0 PAROXYSMAL ATRIAL FIBRILLATION (HCC): Primary | ICD-10-CM

## 2019-08-30 ENCOUNTER — ANTICOAG VISIT (OUTPATIENT)
Dept: CARDIOLOGY CLINIC | Facility: CLINIC | Age: 79
End: 2019-08-30

## 2019-08-30 ENCOUNTER — APPOINTMENT (OUTPATIENT)
Dept: LAB | Facility: CLINIC | Age: 79
End: 2019-08-30
Payer: MEDICARE

## 2019-08-30 DIAGNOSIS — I48.0 PAROXYSMAL ATRIAL FIBRILLATION (HCC): ICD-10-CM

## 2019-09-06 DIAGNOSIS — I48.0 PAROXYSMAL ATRIAL FIBRILLATION (HCC): ICD-10-CM

## 2019-09-06 DIAGNOSIS — I10 ESSENTIAL (PRIMARY) HYPERTENSION: ICD-10-CM

## 2019-09-06 RX ORDER — AMLODIPINE BESYLATE 10 MG/1
TABLET ORAL
Qty: 90 TABLET | Refills: 0 | Status: SHIPPED | OUTPATIENT
Start: 2019-09-06 | End: 2019-12-06 | Stop reason: SDUPTHER

## 2019-09-06 RX ORDER — SOTALOL HYDROCHLORIDE 80 MG/1
TABLET ORAL
Qty: 180 TABLET | Refills: 3 | Status: SHIPPED | OUTPATIENT
Start: 2019-09-06 | End: 2020-06-08

## 2019-09-12 ENCOUNTER — OFFICE VISIT (OUTPATIENT)
Dept: SLEEP CENTER | Facility: CLINIC | Age: 79
End: 2019-09-12
Payer: MEDICARE

## 2019-09-12 VITALS
BODY MASS INDEX: 38.41 KG/M2 | HEIGHT: 66 IN | SYSTOLIC BLOOD PRESSURE: 142 MMHG | DIASTOLIC BLOOD PRESSURE: 70 MMHG | WEIGHT: 239 LBS

## 2019-09-12 DIAGNOSIS — G47.33 OSA (OBSTRUCTIVE SLEEP APNEA): Primary | ICD-10-CM

## 2019-09-12 PROCEDURE — 99213 OFFICE O/P EST LOW 20 MIN: CPT | Performed by: INTERNAL MEDICINE

## 2019-09-12 NOTE — PROGRESS NOTES
Progress Note - Sleep Center   Eric May :1940 MRN: [id number]      Reason for Visit:  70 y  o male here for annual follow-up    Assessment:  Doing well on current therapy of CPAP 11 cm for moderate CHASIDY (AHI = 16 8)  Plan:  Continue same    Follow up: One year    History of Present Illness:  History of CHASIDY on PAP therapy  Fully compliant and deriving benefit      Review of Systems      Genitourinary none   Cardiology ankle/leg swelling   Gastrointestinal none   Neurology difficulty with memory and balance problems   Constitutional fatigue   Integumentary none   Psychiatry anxiety   Musculoskeletal back pain   Pulmonary shortness of breath with activity   ENT none   Endocrine none   Hematological none         I have reviewed and updated the review of systems as necessary      Historical Information    Past Medical History:   Past Medical History:   Diagnosis Date    [facility] )     Anxiety     Arthritis     Depression     Hyperlipidemia     Hypertension     Irregular heart beat     Stroke ([facility] )     TIA (transient ischemic attack)     no residual problems         Past Surgical History:   Past Surgical History:   Procedure Laterality Date    FACIAL/NECK BIOPSY N/A 4/3/2017    Procedure: NOSE BCC EXCISION; FROZEN SECTION; RECONSTRUCTION ;  Surgeon: Suni Yañez MD;  [facility];  Service:     FULL THICKNESS SKIN GRAFT N/A 4/3/2017    Procedure: FLAP VERSUS FULL THICKNESS SKIN GRAFT ;  Surgeon: Suni Yañez MD;  [facility];  Service:    Adan Willis PROSTATECTOMY      TONSILLECTOMY AND ADENOIDECTOMY         Social History:   Social History     Socioeconomic History    Marital status: /Civil Union     Spouse name: None    Number of children: None    Years of education: None    Highest education level: None   Occupational History    None   Social Needs    Financial resource strain: None    Food insecurity:     Worry: None     Inability: None    Transportation needs: Medical: None     Non-medical: None   Tobacco Use    Smoking status: Never Smoker    Smokeless tobacco: Never Used   Substance and Sexual Activity    Alcohol use: Yes     Alcohol/week: 14 0 standard drinks     Types: 14 Glasses of wine per week    Drug use: No    Sexual activity: Not Currently   Lifestyle    Physical activity:     Days per week: None     Minutes per session: None    Stress: None   Relationships    Social connections:     Talks on phone: None     Gets together: None     Attends Confucianism service: None     Active member of club or organization: None     Attends meetings of clubs or organizations: None     Relationship status: None    Intimate partner violence:     Fear of current or ex partner: None     Emotionally abused: None     Physically abused: None     Forced sexual activity: None   Other Topics Concern    None   Social History Narrative    None       Family History: No family history on file      Medications/Allergies:      Current Outpatient Medications:     Acetaminophen 325 MG CAPS, Take by mouth, Disp: , Rfl:     amLODIPine (NORVASC) 10 mg tablet, TAKE 1 TABLET DAILY, Disp: 90 tablet, Rfl: 0    atorvastatin (LIPITOR) 40 mg tablet, Take 40 mg by mouth daily at bedtime, Disp: , Rfl:     Cholecalciferol (VITAMIN D-3 PO), Take 2,000 unit marking on U-100 syringe by mouth daily after dinner, Disp: , Rfl:     co-enzyme Q-10 30 MG capsule, Take 30 mg by mouth daily after dinner, Disp: , Rfl:     glucosamine-chondroitin 500-400 MG tablet, Take 2 tablets by mouth daily in the early morning, Disp: , Rfl:     losartan (COZAAR) 100 MG tablet, TAKE 1 TABLET DAILY, Disp: 90 tablet, Rfl: 3    losartan (COZAAR) 100 MG tablet, Take 1 tablet (100 mg total) by mouth daily, Disp: 90 tablet, Rfl: 3    methocarbamol (ROBAXIN) 500 mg tablet, Take 500 mg by mouth 4 (four) times a day, Disp: , Rfl:     multivitamin (THERAGRAN) TABS, Take 1 tablet by mouth daily in the early morning, Disp: , Rfl:     Omega-3 Fatty Acids (FISH OIL) 1,000 mg, Take 1,000 mg by mouth 2 (two) times a day, Disp: , Rfl:     sertraline (ZOLOFT) 100 mg tablet, 50 mg daily, Disp: , Rfl: 3    sotalol (BETAPACE) 80 mg tablet, TAKE 1 TABLET TWICE DAILY, Disp: 180 tablet, Rfl: 3    torsemide (DEMADEX) 20 mg tablet, TAKE 1 TABLET TWICE DAILY, Disp: 60 tablet, Rfl: 11    traMADol (ULTRAM) 50 mg tablet, Take 1 tablet (50 mg total) by mouth 2 (two) times a day as needed for severe pain, Disp: 60 tablet, Rfl: 2    warfarin (COUMADIN) 5 mg tablet, TAKE 1 TABLET DAILY AS DIRECTED, Disp: 30 tablet, Rfl: 0    warfarin (COUMADIN) 5 mg tablet, TAKE 1/2 TO 1 TABLET DAILY BY MOUTH OR AS DIRECTED BY PHYSICIAN , Disp: 90 tablet, Rfl: 3          Objective      Vital Signs:   Vitals:    09/12/19 1247   BP: 142/70     [place] Sleepiness Scale: Total score: 3        Physical Exam:    General: Alert, appropriate, cooperative, overweight    Head: NC/AT    Skin: Warm, dry    Neuro: No motor abnormalities, cranial nerves appear intact    Extremity: No clubbing, cyanosis      DME Provider: [employer]        Counseling / Coordination of Care   I have spent 15 minutes with the patient today in which greater than 50% of this time was spent in counseling/coordination of care regarding: equipment and compliance  Board Certified Sleep Specialist    Portions of the record may have been created with voice recognition software  Occasional wrong word or "sound a like" substitutions may have occurred due to the inherent limitations of voice recognition software  Read the chart carefully and recognize, using context, where substitutions have occurred

## 2019-09-27 ENCOUNTER — APPOINTMENT (OUTPATIENT)
Dept: LAB | Facility: CLINIC | Age: 79
End: 2019-09-27
Payer: MEDICARE

## 2019-09-27 ENCOUNTER — ANTICOAG VISIT (OUTPATIENT)
Dept: CARDIOLOGY CLINIC | Facility: CLINIC | Age: 79
End: 2019-09-27

## 2019-09-27 DIAGNOSIS — I48.0 PAROXYSMAL ATRIAL FIBRILLATION (HCC): ICD-10-CM

## 2019-10-24 ENCOUNTER — APPOINTMENT (OUTPATIENT)
Dept: LAB | Facility: CLINIC | Age: 79
End: 2019-10-24
Payer: MEDICARE

## 2019-10-24 ENCOUNTER — ANTICOAG VISIT (OUTPATIENT)
Dept: CARDIOLOGY CLINIC | Facility: CLINIC | Age: 79
End: 2019-10-24

## 2019-10-24 DIAGNOSIS — I48.0 PAROXYSMAL ATRIAL FIBRILLATION (HCC): ICD-10-CM

## 2019-11-20 ENCOUNTER — OFFICE VISIT (OUTPATIENT)
Dept: CARDIOLOGY CLINIC | Facility: CLINIC | Age: 79
End: 2019-11-20
Payer: MEDICARE

## 2019-11-20 VITALS
HEART RATE: 54 BPM | HEIGHT: 66 IN | DIASTOLIC BLOOD PRESSURE: 78 MMHG | BODY MASS INDEX: 37.21 KG/M2 | SYSTOLIC BLOOD PRESSURE: 122 MMHG | WEIGHT: 231.5 LBS

## 2019-11-20 DIAGNOSIS — R60.0 EDEMA OF BOTH LOWER LEGS DUE TO PERIPHERAL VENOUS INSUFFICIENCY: ICD-10-CM

## 2019-11-20 DIAGNOSIS — I48.0 PAROXYSMAL ATRIAL FIBRILLATION (HCC): ICD-10-CM

## 2019-11-20 DIAGNOSIS — I10 ESSENTIAL HYPERTENSION: ICD-10-CM

## 2019-11-20 DIAGNOSIS — I45.10 RIGHT BUNDLE BRANCH BLOCK (RBBB): Primary | ICD-10-CM

## 2019-11-20 DIAGNOSIS — I87.2 EDEMA OF BOTH LOWER LEGS DUE TO PERIPHERAL VENOUS INSUFFICIENCY: ICD-10-CM

## 2019-11-20 PROCEDURE — 93000 ELECTROCARDIOGRAM COMPLETE: CPT | Performed by: INTERNAL MEDICINE

## 2019-11-20 PROCEDURE — 99214 OFFICE O/P EST MOD 30 MIN: CPT | Performed by: INTERNAL MEDICINE

## 2019-11-20 NOTE — PROGRESS NOTES
Cardiology Follow Up Visit    Levell Sandifer  1940  [id number]  [address] [facility] [address]    1  Right bundle branch block (RBBB)  POCT ECG   2  Paroxysmal atrial fibrillation (HCC)     3  Essential hypertension     4  Edema of both lower legs due to peripheral venous insufficiency           Discussion/Summary:    1  History of paroxysmal atrial fibrillation, on sotalol and warfarin, normal LVEF    2  Chronic venous insufficiency edema, daily diuretic    3  Essential hypertension, controlled    4  Right bundle-branch block, ECG unchanged    Plan:  Patient continues to do well, no major interval change in his cardiac status  Continue current listed regimen  Does not take diuretic daily particularly if he has errands to run  Does not want compression stockings  Continue low-salt diet daily weights      Interval History:    Patient is a 51-year-old male here for follow-up history of paroxysmal atrial fibrillation for which he has been on sotalol longstanding for several years  Last year's dose decreased to 80 mg b i d  Remains on warfarin due to cost of direct oral anticoagulants  Offers no specific complaints  Takes daily diuretic for lower extremity venous insufficiency edema  Does not take on days when he has to run errands  Denies any shortness of breath or chest discomfort  Surveillance nuclear imaging March 2018, normal perfusion with normal LVEF  Holter monitoring in the past also with no evidence of atrial fibrillation while on sotalol  No palpitations  Blood pressures been well controlled      Patient Active Problem List   Diagnosis    Paroxysmal atrial fibrillation (HCC)    Lumbar spinal stenosis    Sacroiliitis (HCC)    Spondylosis of lumbar region without myelopathy or radiculopathy    Right bundle branch block (RBBB)    Edema of both lower legs due to peripheral venous insufficiency    Chronic pain of both lower extremities    Lumbar radiculopathy    Lumbar spondylosis    Chronic pain syndrome    Uncomplicated opioid dependence ([place] [address] )    Encounter for long-term use of opiate analgesic    Essential hypertension    Venous insufficiency     Past Medical History:   Diagnosis Date    A-fib ([place] [address] )     Anxiety     Arthritis     Depression     Hyperlipidemia     Hypertension     Irregular heart beat     Stroke ([place] [address] )     TIA (transient ischemic attack)     no residual problems     Social History     Socioeconomic History    Marital status: /Civil Union     Spouse name: Not on file    Number of children: Not on file    Years of education: Not on file    Highest education level: Not on file   Occupational History    Not on file   Social Needs    Financial resource strain: Not on file    Food insecurity:     Worry: Not on file     Inability: Not on file    Transportation needs:     Medical: Not on file     Non-medical: Not on file   Tobacco Use    Smoking status: Never Smoker    Smokeless tobacco: Never Used   Substance and Sexual Activity    Alcohol use:  Yes     Alcohol/week: 14 0 standard drinks     Types: 14 Glasses of wine per week    Drug use: No    Sexual activity: Not Currently   Lifestyle    Physical activity:     Days per week: Not on file     Minutes per session: Not on file    Stress: Not on file   Relationships    Social connections:     Talks on phone: Not on file     Gets together: Not on file     Attends Evangelical service: Not on file     Active member of club or organization: Not on file     Attends meetings of clubs or organizations: Not on file     Relationship status: Not on file    Intimate partner violence:     Fear of current or ex partner: Not on file     Emotionally abused: Not on file     Physically abused: Not on file     Forced sexual activity: Not on file   Other Topics Concern    Not on file   Social History Narrative    Not on file      No family history on file    Past Surgical History:   Procedure Laterality Date    FACIAL/NECK BIOPSY N/A 4/3/2017    Procedure: NOSE BCC EXCISION; FROZEN SECTION; RECONSTRUCTION ;  Surgeon: Saige Anderson MD;  [facility];  Service:     FULL THICKNESS SKIN GRAFT N/A 4/3/2017    Procedure: FLAP VERSUS FULL THICKNESS SKIN GRAFT ;  Surgeon: Saige Anderson MD;  [facility];  Service:    [place] PROSTATECTOMY      TONSILLECTOMY AND ADENOIDECTOMY         Current Outpatient Medications:     Acetaminophen 325 MG CAPS, Take by mouth, Disp: , Rfl:     amLODIPine (NORVASC) 10 mg tablet, TAKE 1 TABLET DAILY, Disp: 90 tablet, Rfl: 0    atorvastatin (LIPITOR) 40 mg tablet, Take 40 mg by mouth daily at bedtime, Disp: , Rfl:     Cholecalciferol (VITAMIN D-3 PO), Take 2,000 unit marking on U-100 syringe by mouth daily after dinner, Disp: , Rfl:     co-enzyme Q-10 30 MG capsule, Take 30 mg by mouth daily after dinner, Disp: , Rfl:     glucosamine-chondroitin 500-400 MG tablet, Take 2 tablets by mouth daily in the early morning, Disp: , Rfl:     losartan (COZAAR) 100 MG tablet, Take 1 tablet (100 mg total) by mouth daily, Disp: 90 tablet, Rfl: 3    multivitamin (THERAGRAN) TABS, Take 1 tablet by mouth daily in the early morning, Disp: , Rfl:     Omega-3 Fatty Acids (FISH OIL) 1,000 mg, Take 1,000 mg by mouth 2 (two) times a day, Disp: , Rfl:     sertraline (ZOLOFT) 100 mg tablet, 50 mg daily, Disp: , Rfl: 3    sotalol (BETAPACE) 80 mg tablet, TAKE 1 TABLET TWICE DAILY, Disp: 180 tablet, Rfl: 3    torsemide (DEMADEX) 20 mg tablet, TAKE 1 TABLET TWICE DAILY (Patient taking differently: No sig reported), Disp: 60 tablet, Rfl: 11    traMADol (ULTRAM) 50 mg tablet, Take 1 tablet (50 mg total) by mouth 2 (two) times a day as needed for severe pain, Disp: 60 tablet, Rfl: 2    warfarin (COUMADIN) 5 mg tablet, TAKE 1 TABLET DAILY AS DIRECTED, Disp: 30 tablet, Rfl: 0    warfarin (COUMADIN) 5 mg tablet, TAKE 1/2 TO 1 TABLET DAILY BY MOUTH OR AS DIRECTED BY PHYSICIAN , Disp: 90 tablet, Rfl: 3    losartan (COZAAR) 100 MG tablet, TAKE 1 TABLET DAILY (Patient not taking: Reported on 11/20/2019), Disp: 90 tablet, Rfl: 3  No Known Allergies      Social, Family, Medication history reviewed and updated as necessary      Labs:     Lab Results   Component Value Date    K 3 9 08/28/2017     (H) 08/28/2017    CO2 27 08/28/2017    BUN 22 08/28/2017    CREATININE 1 04 08/28/2017    CREATININE 0 87 03/28/2017    CALCIUM 8 8 08/28/2017       Lab Results   Component Value Date    WBC 8 08 03/28/2017    HGB 16 0 03/28/2017    HCT 47 5 03/28/2017     03/28/2017       No results found for: CHOL  No results found for: HDL  No results found for: LDLCALC  No results found for: LDLDIRECT          No results found for: TRIG    Lab Results   Component Value Date    ALT 26 08/28/2017    AST 16 08/28/2017       Lab Results   Component Value Date    INR 2 66 (H) 10/24/2019    INR 2 27 (H) 09/27/2019       No results found for: NTBNP    Lab Results   Component Value Date    HGBA1C 6 4 03/09/2018           Imaging: Reviewed in epic        Review of Systems:  14 systems reviewed and negative with exception of the above        PHYSICAL EXAM:      Vitals:    11/20/19 1318   BP: 122/78   Pulse: (!) 54     Body mass index is 37 37 kg/m²     Weight (last 2 days)     Date/Time   Weight    11/20/19 1318   105 (231 5)                Gen: No acute distress  HEENT: anicteric, mucous membranes moist  Neck: supple, no jugular venous distention, or carotid bruit  Heart: regular, normal s1 and s2, no murmur/rub or gallop  Lungs :clear to auscultation bilaterally, no rales/rhonchi or wheeze  Abdomen: soft nontender, normoactive bowel sounds, no organomegaly  Ext: warm and perfused, normal femoral pulses, 1+ edema, or clubbing  Skin: warm, no rashes  Neuro: AAO x 3, no focal findings  Psychiatric: normal affect  Musculoskeletal: no obvious joint deformities

## 2019-11-21 ENCOUNTER — ANTICOAG VISIT (OUTPATIENT)
Dept: CARDIOLOGY CLINIC | Facility: CLINIC | Age: 79
End: 2019-11-21

## 2019-11-21 ENCOUNTER — APPOINTMENT (OUTPATIENT)
Dept: LAB | Facility: CLINIC | Age: 79
End: 2019-11-21
Payer: MEDICARE

## 2019-11-21 DIAGNOSIS — I48.0 PAROXYSMAL ATRIAL FIBRILLATION (HCC): ICD-10-CM

## 2019-12-06 DIAGNOSIS — I10 ESSENTIAL (PRIMARY) HYPERTENSION: ICD-10-CM

## 2019-12-06 RX ORDER — AMLODIPINE BESYLATE 10 MG/1
TABLET ORAL
Qty: 90 TABLET | Refills: 0 | Status: SHIPPED | OUTPATIENT
Start: 2019-12-06 | End: 2020-03-06

## 2019-12-18 ENCOUNTER — ANTICOAG VISIT (OUTPATIENT)
Dept: CARDIOLOGY CLINIC | Facility: CLINIC | Age: 79
End: 2019-12-18

## 2019-12-18 ENCOUNTER — APPOINTMENT (OUTPATIENT)
Dept: LAB | Facility: CLINIC | Age: 79
End: 2019-12-18
Payer: MEDICARE

## 2019-12-18 DIAGNOSIS — I48.0 PAROXYSMAL ATRIAL FIBRILLATION (HCC): ICD-10-CM

## 2020-01-15 ENCOUNTER — ANTICOAG VISIT (OUTPATIENT)
Dept: CARDIOLOGY CLINIC | Facility: CLINIC | Age: 80
End: 2020-01-15

## 2020-01-15 ENCOUNTER — APPOINTMENT (OUTPATIENT)
Dept: LAB | Facility: CLINIC | Age: 80
End: 2020-01-15
Payer: MEDICARE

## 2020-01-15 DIAGNOSIS — I48.0 PAROXYSMAL ATRIAL FIBRILLATION (HCC): ICD-10-CM

## 2020-02-13 ENCOUNTER — APPOINTMENT (OUTPATIENT)
Dept: LAB | Facility: CLINIC | Age: 80
End: 2020-02-13
Payer: MEDICARE

## 2020-02-13 ENCOUNTER — ANTICOAG VISIT (OUTPATIENT)
Dept: CARDIOLOGY CLINIC | Facility: CLINIC | Age: 80
End: 2020-02-13

## 2020-02-13 DIAGNOSIS — I48.0 PAROXYSMAL ATRIAL FIBRILLATION (HCC): ICD-10-CM

## 2020-03-06 DIAGNOSIS — I10 ESSENTIAL (PRIMARY) HYPERTENSION: ICD-10-CM

## 2020-03-06 RX ORDER — AMLODIPINE BESYLATE 10 MG/1
TABLET ORAL
Qty: 90 TABLET | Refills: 0 | Status: SHIPPED | OUTPATIENT
Start: 2020-03-06 | End: 2020-06-08

## 2020-03-11 ENCOUNTER — OFFICE VISIT (OUTPATIENT)
Dept: PLASTIC SURGERY | Facility: CLINIC | Age: 80
End: 2020-03-11
Payer: MEDICARE

## 2020-03-11 VITALS
SYSTOLIC BLOOD PRESSURE: 140 MMHG | BODY MASS INDEX: 36.13 KG/M2 | WEIGHT: 230.2 LBS | RESPIRATION RATE: 18 BRPM | HEIGHT: 67 IN | TEMPERATURE: 98.9 F | HEART RATE: 102 BPM | DIASTOLIC BLOOD PRESSURE: 106 MMHG

## 2020-03-11 DIAGNOSIS — L98.9 LESION OF SKIN OF NOSE: Primary | ICD-10-CM

## 2020-03-11 PROCEDURE — 11104 PUNCH BX SKIN SINGLE LESION: CPT | Performed by: PHYSICIAN ASSISTANT

## 2020-03-11 PROCEDURE — 88305 TISSUE EXAM BY PATHOLOGIST: CPT | Performed by: PATHOLOGY

## 2020-03-11 PROCEDURE — 99214 OFFICE O/P EST MOD 30 MIN: CPT | Performed by: PHYSICIAN ASSISTANT

## 2020-03-11 RX ORDER — VALSARTAN 160 MG/1
160 TABLET ORAL DAILY
COMMUNITY
End: 2020-11-13

## 2020-03-11 RX ORDER — FUROSEMIDE 20 MG/1
20 TABLET ORAL 2 TIMES DAILY
COMMUNITY
End: 2020-11-13

## 2020-03-11 NOTE — PROGRESS NOTES
Assessment/Plan:  Juan Chand is a 70year old male who presents for evaluation of a new skin lesion of the nose  Please see HPI  In the office today I performed a biopsy  We will see him back in 1 week for suture removal and pathology results  Diagnoses and all orders for this visit:    Lesion of skin of nose          Subjective:      Patient ID: Ligia Holcomb is a 70 y o  male  HPI   He is a previous patient of ours who was previously treated for a BCC of the nose  He has not followed up with Dermatology  He reports having a new lesion on the bridge of his nose of which has been present for over 2 years  He reports that it does scab up and bleed from time to time  The following portions of the patient's history were reviewed and updated as appropriate: He  has a past medical history of A-fib ([facility] [address] ), Anxiety, Arthritis, Depression, Hyperlipidemia, Hypertension, Irregular heart beat, Stroke ([facility] [address] ), and TIA (transient ischemic attack)  He  has a past surgical history that includes Prostatectomy; Tonsillectomy and adenoidectomy; FACIAL/NECK BIOPSY (N/A, 4/3/2017); and FULL THICKNESS SKIN GRAFT (N/A, 4/3/2017)  His family history is not on file  He  reports that he has never smoked  He has never used smokeless tobacco  He reports that he drinks about 14 0 standard drinks of alcohol per week  He reports that he does not use drugs       Review of Systems   HENT: Negative for hearing loss  Eyes: Negative for visual disturbance  Wears eye glasses  Respiratory: Negative for shortness of breath  Cardiovascular: Negative for chest pain  Gastrointestinal: Negative for abdominal pain, blood in stool, constipation, diarrhea, nausea and vomiting  Genitourinary: Negative for hematuria  Musculoskeletal: Negative for gait problem  Skin:        As per HPI  Neurological: Negative for seizures and headaches  Hematological: Bruises/bleeds easily     Psychiatric/Behavioral: The patient is not nervous/anxious  Objective: There were no vitals taken for this visit  Physical Exam   Constitutional: He is oriented to person, place, and time  He appears well-developed and well-nourished  No distress  HENT:   Head: Normocephalic and atraumatic  Eyes: Pupils are equal, round, and reactive to light  EOM are normal  No scleral icterus  Neck: Neck supple  No tracheal deviation present  No thyromegaly present  Cardiovascular: Normal rate and regular rhythm  Exam reveals no gallop and no friction rub  No murmur heard  Pulmonary/Chest: Effort normal and breath sounds normal  He has no wheezes  He has no rales  Abdominal: Soft  Bowel sounds are normal  He exhibits no distension  There is no tenderness  There is no rebound and no guarding  Musculoskeletal: Normal range of motion  Lymphadenopathy:     He has no cervical adenopathy  Neurological: He is alert and oriented to person, place, and time  No cranial nerve deficit  Skin:   Dorsum of nose skin lesion noted  It measures approximately 3 mm it is raised and ulcerated  It is located 2 5 cm from the left naris and 4 cm from the left medial brow  Is located superiorly to the previous graft site  Please see photos  Psychiatric: He has a normal mood and affect

## 2020-03-11 NOTE — LETTER
March 11, 2020     Naomi Cuello MD  [id number] [facility] [address]    Patient: Hali Rodney   YOB: 1940   Date of Visit: 3/11/2020       Dear Dr Maegan Meraz: Thank you for referring Nick Rebollar to me for evaluation  Below are my notes for this consultation  If you have questions, please do not hesitate to call me  I look forward to following your patient along with you  Sincerely,        Gwen Clemente PA-C        CC: Referral Self  Raymon Sons  3/11/2020 10:18 AM  Sign at close encounter  Biopsy  Date/Time: 3/11/2020 10:18 AM  Performed by: Gwen Clemente PA-C  Authorized by: Gwen Clemente PA-C     Procedure Details - Skin Biopsy:     Biopsy tissue type: skin    Biopsy method: punch biopsy      Body area:  [address] [facility] [place] location:  Nose    Initial size (mm):  3    Malignancy: malignancy unknown            Gwen Clemente PA-C  3/11/2020 10:18 AM  Sign at close encounter  Assessment/Plan:  Marian Reyes is a 70year old male who presents for evaluation of a new skin lesion of the nose  Please see HPI  In the office today I performed a biopsy  We will see him back in 1 week for suture removal and pathology results  Diagnoses and all orders for this visit:    Lesion of skin of nose          Subjective:      Patient ID: Hali Rodney is a 70 y o  male  HPI   He is a previous patient of ours who was previously treated for a BCC of the nose  He has not followed up with Dermatology  He reports having a new lesion on the bridge of his nose of which has been present for over 2 years  He reports that it does scab up and bleed from time to time  The following portions of the patient's history were reviewed and updated as appropriate: He  has a past medical history of A-fib ([address] ), Anxiety, Arthritis, Depression, Hyperlipidemia, Hypertension, Irregular heart beat, Stroke ([address] ), and TIA (transient ischemic attack)    He  has a past surgical history that includes Prostatectomy; Tonsillectomy and adenoidectomy; FACIAL/NECK BIOPSY (N/A, 4/3/2017); and FULL THICKNESS SKIN GRAFT (N/A, 4/3/2017)  His family history is not on file  He  reports that he has never smoked  He has never used smokeless tobacco  He reports that he drinks about 14 0 standard drinks of alcohol per week  He reports that he does not use drugs       Review of Systems   HENT: Negative for hearing loss  Eyes: Negative for visual disturbance  Wears eye glasses  Respiratory: Negative for shortness of breath  Cardiovascular: Negative for chest pain  Gastrointestinal: Negative for abdominal pain, blood in stool, constipation, diarrhea, nausea and vomiting  Genitourinary: Negative for hematuria  Musculoskeletal: Negative for gait problem  Skin:        As per HPI  Neurological: Negative for seizures and headaches  Hematological: Bruises/bleeds easily  Psychiatric/Behavioral: The patient is not nervous/anxious  Objective: There were no vitals taken for this visit  Physical Exam   Constitutional: He is oriented to person, place, and time  He appears well-developed and well-nourished  No distress  HENT:   Head: Normocephalic and atraumatic  Eyes: Pupils are equal, round, and reactive to light  EOM are normal  No scleral icterus  Neck: Neck supple  No tracheal deviation present  No thyromegaly present  Cardiovascular: Normal rate and regular rhythm  Exam reveals no gallop and no friction rub  No murmur heard  Pulmonary/Chest: Effort normal and breath sounds normal  He has no wheezes  He has no rales  Abdominal: Soft  Bowel sounds are normal  He exhibits no distension  There is no tenderness  There is no rebound and no guarding  Musculoskeletal: Normal range of motion  Lymphadenopathy:     He has no cervical adenopathy  Neurological: He is alert and oriented to person, place, and time  No cranial nerve deficit     Skin:   Dorsum of nose skin lesion noted  It measures approximately 3 mm it is raised and ulcerated  It is located 2 5 cm from the left naris and 4 cm from the left medial brow  Is located superiorly to the previous graft site  Please see photos  Psychiatric: He has a normal mood and affect

## 2020-03-11 NOTE — PROGRESS NOTES
Biopsy  Date/Time: 3/11/2020 10:18 AM  Performed by: Baron Ross PA-C  Authorized by: Baron Ross PA-C     Procedure Details - Skin Biopsy:     Biopsy tissue type: skin    Biopsy method: punch biopsy      [facility]  [address] [facility] location:  Nose    Initial size (mm):  3    Malignancy: malignancy unknown

## 2020-03-12 ENCOUNTER — ANTICOAG VISIT (OUTPATIENT)
Dept: CARDIOLOGY CLINIC | Facility: CLINIC | Age: 80
End: 2020-03-12

## 2020-03-12 ENCOUNTER — APPOINTMENT (OUTPATIENT)
Dept: LAB | Facility: CLINIC | Age: 80
End: 2020-03-12
Payer: MEDICARE

## 2020-03-12 DIAGNOSIS — I48.0 PAROXYSMAL ATRIAL FIBRILLATION (HCC): ICD-10-CM

## 2020-03-16 ENCOUNTER — OFFICE VISIT (OUTPATIENT)
Dept: PLASTIC SURGERY | Facility: CLINIC | Age: 80
End: 2020-03-16

## 2020-03-16 VITALS — BODY MASS INDEX: 36.19 KG/M2 | RESPIRATION RATE: 18 BRPM | WEIGHT: 230.6 LBS | TEMPERATURE: 99.6 F | HEIGHT: 67 IN

## 2020-03-16 DIAGNOSIS — C44.311 BASAL CELL CARCINOMA OF DORSUM OF NOSE: Primary | ICD-10-CM

## 2020-03-16 PROCEDURE — 99024 POSTOP FOLLOW-UP VISIT: CPT | Performed by: PHYSICIAN ASSISTANT

## 2020-03-16 NOTE — PROGRESS NOTES
Assessment/Plan:   Danny Naranjo is a 70year old male who is 1 week status post biopsy of a lesion of the nose  Please see HPI  Path report is pending  Will call with results  Addendum:  Called patient with path results which revealed basal cell carcinoma  I discussed with him recommendation for Mohs procedure with reconstruction  He understood and agreed  I discussed with him reconstruction of a Mohs defect of the nose with flap reconstruction verses full-thickness skin graft  We discussed with the patient the options, benefits, and risks of surgery such as anesthesia, bleeding, infection, scarring and the need for additional procedures  Consent was obtained and all questions answered to his satisfaction  We will plan for surgery at his earliest convenience  Diagnoses and all orders for this visit:    Basal cell carcinoma of dorsum of nose          Subjective:     Patient ID: Eden Johnson is a 70 y o  male  HPI   He denies complaints regarding biopsy site  Review of Systems   Skin:        As per HPI  Objective:     Physical Exam   Skin:   Biopsy site is healing well  No suture remaining

## 2020-03-18 ENCOUNTER — TELEPHONE (OUTPATIENT)
Dept: PLASTIC SURGERY | Facility: HOSPITAL | Age: 80
End: 2020-03-18

## 2020-03-24 ENCOUNTER — TELEPHONE (OUTPATIENT)
Dept: PLASTIC SURGERY | Facility: CLINIC | Age: 80
End: 2020-03-24

## 2020-04-08 ENCOUNTER — APPOINTMENT (OUTPATIENT)
Dept: LAB | Age: 80
End: 2020-04-08
Payer: MEDICARE

## 2020-04-09 ENCOUNTER — ANTICOAG VISIT (OUTPATIENT)
Dept: CARDIOLOGY CLINIC | Facility: CLINIC | Age: 80
End: 2020-04-09

## 2020-04-09 DIAGNOSIS — I48.0 PAROXYSMAL ATRIAL FIBRILLATION (HCC): ICD-10-CM

## 2020-04-28 DIAGNOSIS — I48.91 ATRIAL FIBRILLATION, UNSPECIFIED TYPE (HCC): Primary | ICD-10-CM

## 2020-04-28 RX ORDER — WARFARIN SODIUM 5 MG/1
TABLET ORAL
Qty: 90 TABLET | Refills: 3 | Status: SHIPPED | OUTPATIENT
Start: 2020-04-28 | End: 2021-02-19 | Stop reason: SDUPTHER

## 2020-05-11 ENCOUNTER — ANTICOAG VISIT (OUTPATIENT)
Dept: CARDIOLOGY CLINIC | Facility: CLINIC | Age: 80
End: 2020-05-11

## 2020-05-11 ENCOUNTER — APPOINTMENT (OUTPATIENT)
Dept: LAB | Age: 80
End: 2020-05-11
Payer: MEDICARE

## 2020-05-11 DIAGNOSIS — I48.0 PAROXYSMAL ATRIAL FIBRILLATION (HCC): ICD-10-CM

## 2020-05-26 ENCOUNTER — ANTICOAG VISIT (OUTPATIENT)
Dept: CARDIOLOGY CLINIC | Facility: CLINIC | Age: 80
End: 2020-05-26

## 2020-05-26 ENCOUNTER — APPOINTMENT (OUTPATIENT)
Dept: LAB | Age: 80
End: 2020-05-26
Payer: MEDICARE

## 2020-05-26 DIAGNOSIS — I48.0 PAROXYSMAL ATRIAL FIBRILLATION (HCC): ICD-10-CM

## 2020-06-08 DIAGNOSIS — I10 ESSENTIAL (PRIMARY) HYPERTENSION: ICD-10-CM

## 2020-06-08 DIAGNOSIS — I48.0 PAROXYSMAL ATRIAL FIBRILLATION (HCC): ICD-10-CM

## 2020-06-08 RX ORDER — AMLODIPINE BESYLATE 10 MG/1
TABLET ORAL
Qty: 90 TABLET | Refills: 0 | Status: SHIPPED | OUTPATIENT
Start: 2020-06-08 | End: 2020-08-27

## 2020-06-08 RX ORDER — SOTALOL HYDROCHLORIDE 80 MG/1
TABLET ORAL
Qty: 180 TABLET | Refills: 3 | Status: SHIPPED | OUTPATIENT
Start: 2020-06-08 | End: 2021-05-14

## 2020-06-17 ENCOUNTER — ANTICOAG VISIT (OUTPATIENT)
Dept: CARDIOLOGY CLINIC | Facility: CLINIC | Age: 80
End: 2020-06-17

## 2020-06-17 ENCOUNTER — APPOINTMENT (OUTPATIENT)
Dept: LAB | Age: 80
End: 2020-06-17
Payer: MEDICARE

## 2020-06-17 DIAGNOSIS — I48.0 PAROXYSMAL ATRIAL FIBRILLATION (HCC): ICD-10-CM

## 2020-07-14 ENCOUNTER — APPOINTMENT (OUTPATIENT)
Dept: LAB | Facility: CLINIC | Age: 80
End: 2020-07-14
Payer: MEDICARE

## 2020-07-14 ENCOUNTER — ANTICOAG VISIT (OUTPATIENT)
Dept: CARDIOLOGY CLINIC | Facility: CLINIC | Age: 80
End: 2020-07-14

## 2020-07-14 DIAGNOSIS — I48.0 PAROXYSMAL ATRIAL FIBRILLATION (HCC): Primary | ICD-10-CM

## 2020-07-14 DIAGNOSIS — I48.0 PAROXYSMAL ATRIAL FIBRILLATION (HCC): ICD-10-CM

## 2020-07-27 DIAGNOSIS — I10 ESSENTIAL HYPERTENSION: ICD-10-CM

## 2020-07-27 RX ORDER — LOSARTAN POTASSIUM 100 MG/1
100 TABLET ORAL DAILY
Qty: 90 TABLET | Refills: 3 | Status: SHIPPED | OUTPATIENT
Start: 2020-07-27 | End: 2021-04-23

## 2020-08-06 ENCOUNTER — APPOINTMENT (OUTPATIENT)
Dept: LAB | Facility: CLINIC | Age: 80
End: 2020-08-06
Payer: MEDICARE

## 2020-08-06 ENCOUNTER — ANTICOAG VISIT (OUTPATIENT)
Dept: CARDIOLOGY CLINIC | Facility: CLINIC | Age: 80
End: 2020-08-06

## 2020-08-06 DIAGNOSIS — I48.0 PAROXYSMAL ATRIAL FIBRILLATION (HCC): ICD-10-CM

## 2020-08-21 ENCOUNTER — APPOINTMENT (OUTPATIENT)
Dept: LAB | Facility: CLINIC | Age: 80
End: 2020-08-21
Payer: MEDICARE

## 2020-08-21 ENCOUNTER — ANTICOAG VISIT (OUTPATIENT)
Dept: CARDIOLOGY CLINIC | Facility: CLINIC | Age: 80
End: 2020-08-21

## 2020-08-21 DIAGNOSIS — I48.0 PAROXYSMAL ATRIAL FIBRILLATION (HCC): ICD-10-CM

## 2020-08-27 DIAGNOSIS — I10 ESSENTIAL (PRIMARY) HYPERTENSION: ICD-10-CM

## 2020-08-27 RX ORDER — AMLODIPINE BESYLATE 10 MG/1
TABLET ORAL
Qty: 90 TABLET | Refills: 0 | Status: SHIPPED | OUTPATIENT
Start: 2020-08-27 | End: 2020-11-13

## 2020-08-28 ENCOUNTER — TELEPHONE (OUTPATIENT)
Dept: CARDIOLOGY CLINIC | Facility: CLINIC | Age: 80
End: 2020-08-28

## 2020-08-28 NOTE — TELEPHONE ENCOUNTER
Pt to have nose mohs defect reconstruction on 10/27/20   Plastic and reconstructive surgery would like to know how long pt can hold coumadin?       Please advise

## 2020-08-28 NOTE — TELEPHONE ENCOUNTER
Routed to [facility]) plastic/ reconstructive surgery # [phone]

## 2020-09-15 ENCOUNTER — ANTICOAG VISIT (OUTPATIENT)
Dept: CARDIOLOGY CLINIC | Facility: CLINIC | Age: 80
End: 2020-09-15

## 2020-09-15 ENCOUNTER — APPOINTMENT (OUTPATIENT)
Dept: LAB | Facility: HOSPITAL | Age: 80
End: 2020-09-15
Attending: INTERNAL MEDICINE
Payer: MEDICARE

## 2020-09-15 ENCOUNTER — OFFICE VISIT (OUTPATIENT)
Dept: SLEEP CENTER | Facility: CLINIC | Age: 80
End: 2020-09-15
Payer: MEDICARE

## 2020-09-15 ENCOUNTER — OFFICE VISIT (OUTPATIENT)
Dept: LAB | Facility: HOSPITAL | Age: 80
End: 2020-09-15
Attending: SURGERY
Payer: MEDICARE

## 2020-09-15 VITALS
HEIGHT: 67 IN | BODY MASS INDEX: 37.48 KG/M2 | DIASTOLIC BLOOD PRESSURE: 60 MMHG | WEIGHT: 238.8 LBS | SYSTOLIC BLOOD PRESSURE: 118 MMHG

## 2020-09-15 DIAGNOSIS — I48.0 PAROXYSMAL ATRIAL FIBRILLATION (HCC): ICD-10-CM

## 2020-09-15 DIAGNOSIS — C44.311 BASAL CELL CARCINOMA (BCC) OF DORSUM OF NOSE: ICD-10-CM

## 2020-09-15 DIAGNOSIS — G47.33 OSA (OBSTRUCTIVE SLEEP APNEA): Primary | ICD-10-CM

## 2020-09-15 LAB
ANION GAP SERPL CALCULATED.3IONS-SCNC: 6 MMOL/L (ref 4–13)
BASOPHILS # BLD AUTO: 0.05 THOUSANDS/ΜL (ref 0–0.1)
BASOPHILS NFR BLD AUTO: 1 % (ref 0–1)
BUN SERPL-MCNC: 19 MG/DL (ref 5–25)
CALCIUM SERPL-MCNC: 9.2 MG/DL (ref 8.3–10.1)
CHLORIDE SERPL-SCNC: 110 MMOL/L (ref 100–108)
CO2 SERPL-SCNC: 26 MMOL/L (ref 21–32)
CREAT SERPL-MCNC: 0.91 MG/DL (ref 0.6–1.3)
EOSINOPHIL # BLD AUTO: 0.33 THOUSAND/ΜL (ref 0–0.61)
EOSINOPHIL NFR BLD AUTO: 4 % (ref 0–6)
ERYTHROCYTE [DISTWIDTH] IN BLOOD BY AUTOMATED COUNT: 13.2 % (ref 11.6–15.1)
GFR SERPL CREATININE-BSD FRML MDRD: 80 ML/MIN/1.73SQ M
GLUCOSE P FAST SERPL-MCNC: 113 MG/DL (ref 65–99)
HCT VFR BLD AUTO: 46.2 % (ref 36.5–49.3)
HGB BLD-MCNC: 15.5 G/DL (ref 12–17)
IMM GRANULOCYTES # BLD AUTO: 0.04 THOUSAND/UL (ref 0–0.2)
IMM GRANULOCYTES NFR BLD AUTO: 0 % (ref 0–2)
LYMPHOCYTES # BLD AUTO: 1.91 THOUSANDS/ΜL (ref 0.6–4.47)
LYMPHOCYTES NFR BLD AUTO: 21 % (ref 14–44)
MCH RBC QN AUTO: 31.2 PG (ref 26.8–34.3)
MCHC RBC AUTO-ENTMCNC: 33.5 G/DL (ref 31.4–37.4)
MCV RBC AUTO: 93 FL (ref 82–98)
MONOCYTES # BLD AUTO: 0.9 THOUSAND/ΜL (ref 0.17–1.22)
MONOCYTES NFR BLD AUTO: 10 % (ref 4–12)
NEUTROPHILS # BLD AUTO: 5.93 THOUSANDS/ΜL (ref 1.85–7.62)
NEUTS SEG NFR BLD AUTO: 64 % (ref 43–75)
NRBC BLD AUTO-RTO: 0 /100 WBCS
PLATELET # BLD AUTO: 195 THOUSANDS/UL (ref 149–390)
PMV BLD AUTO: 11.6 FL (ref 8.9–12.7)
POTASSIUM SERPL-SCNC: 4 MMOL/L (ref 3.5–5.3)
RBC # BLD AUTO: 4.97 MILLION/UL (ref 3.88–5.62)
SODIUM SERPL-SCNC: 142 MMOL/L (ref 136–145)
WBC # BLD AUTO: 9.16 THOUSAND/UL (ref 4.31–10.16)

## 2020-09-15 PROCEDURE — 80048 BASIC METABOLIC PNL TOTAL CA: CPT

## 2020-09-15 PROCEDURE — 93005 ELECTROCARDIOGRAM TRACING: CPT

## 2020-09-15 PROCEDURE — 85025 COMPLETE CBC W/AUTO DIFF WBC: CPT

## 2020-09-15 PROCEDURE — 99213 OFFICE O/P EST LOW 20 MIN: CPT | Performed by: INTERNAL MEDICINE

## 2020-09-15 NOTE — PROGRESS NOTES
Progress Note - Sleep Center   Jayla Giles :1940 MRN: [id number]      Reason for Visit:  68 y o male here for annual follow-up    Assessment:  Doing well on current therapy of CPAP 11 cm for moderate CHASIDY (AHI = 16 8)  Plan:  Continue same    Follow up: One year    History of Present Illness:  History of CHASIDY on PAP therapy  Fully compliant and deriving benefit      Review of Systems      Genitourinary difficulty with erection   Cardiology ankle/leg swelling   Gastrointestinal none   Neurology forgetfulness and poor concentration or confusion,    Constitutional weight change   Integumentary none   Psychiatry anxiety   Musculoskeletal joint pain and back pain   Pulmonary none   ENT none   Endocrine none   Hematological none         I have reviewed and updated the review of systems as necessary      Historical Information    Past Medical History:   Past Medical History:   Diagnosis Date    [facility]-fib ([facility] )     Anxiety     Arthritis     Depression     Hyperlipidemia     Hypertension     Irregular heart beat     Stroke ([facility] )     TIA (transient ischemic attack)     no residual problems         Past Surgical History:   Past Surgical History:   Procedure Laterality Date    FACIAL/NECK BIOPSY N/A 4/3/2017    Procedure: NOSE BCC EXCISION; FROZEN SECTION; RECONSTRUCTION ;  Surgeon: Katia Sevilla MD;  [facility];  Service:     FULL THICKNESS SKIN GRAFT N/A 4/3/2017    Procedure: FLAP VERSUS FULL THICKNESS SKIN GRAFT ;  Surgeon: Katia Sevilla MD;  Location[facility];  Service:    [place] PROSTATECTOMY      TONSILLECTOMY AND ADENOIDECTOMY         Social History:   Social History     Socioeconomic History    Marital status: /Civil Union     Spouse name: None    Number of children: None    Years of education: None    Highest education level: None   Occupational History    None   Social Needs    Financial resource strain: None    Food insecurity     Worry: None     Inability: None  Transportation needs     Medical: None     Non-medical: None   Tobacco Use    Smoking status: Former Smoker    Smokeless tobacco: Never Used    Tobacco comment: long time ago   Substance and Sexual Activity    Alcohol use: Yes     Alcohol/week: 14 0 standard drinks     Types: 14 Glasses of wine per week     Comment: glass of wine with dinner, maybe    Drug use: No    Sexual activity: Not Currently   Lifestyle    Physical activity     Days per week: None     Minutes per session: None    Stress: None   Relationships    Social connections     Talks on phone: None     Gets together: None     Attends Jain service: None     Active member of club or organization: None     Attends meetings of clubs or organizations: None     Relationship status: None    Intimate partner violence     Fear of current or ex partner: None     Emotionally abused: None     Physically abused: None     Forced sexual activity: None   Other Topics Concern    None   Social History Narrative    None       Family History: History reviewed  No pertinent family history      Medications/Allergies:      Current Outpatient Medications:     Acetaminophen 325 MG CAPS, Take by mouth, Disp: , Rfl:     amLODIPine (NORVASC) 10 mg tablet, TAKE 1 TABLET DAILY, Disp: 90 tablet, Rfl: 0    atorvastatin (LIPITOR) 40 mg tablet, Take 40 mg by mouth daily at bedtime, Disp: , Rfl:     Cholecalciferol (VITAMIN D-3 PO), Take 2,000 unit marking on U-100 syringe by mouth daily after dinner, Disp: , Rfl:     co-enzyme Q-10 30 MG capsule, Take 30 mg by mouth daily after dinner, Disp: , Rfl:     furosemide (LASIX) 20 mg tablet, Take 20 mg by mouth 2 (two) times a day, Disp: , Rfl:     glucosamine-chondroitin 500-400 MG tablet, Take 2 tablets by mouth daily in the early morning, Disp: , Rfl:     losartan (COZAAR) 100 MG tablet, TAKE 1 TABLET (100 MG TOTAL) BY MOUTH DAILY, Disp: 90 tablet, Rfl: 3    multivitamin (THERAGRAN) TABS, Take 1 tablet by mouth daily in the early morning, Disp: , Rfl:     Omega-3 Fatty Acids (FISH OIL) 1,000 mg, Take 1,000 mg by mouth 2 (two) times a day, Disp: , Rfl:     sertraline (ZOLOFT) 100 mg tablet, 50 mg daily, Disp: , Rfl: 3    sotalol (BETAPACE) 80 mg tablet, TAKE 1 TABLET TWICE DAILY, Disp: 180 tablet, Rfl: 3    traMADol (ULTRAM) 50 mg tablet, Take 1 tablet (50 mg total) by mouth 2 (two) times a day as needed for severe pain, Disp: 60 tablet, Rfl: 2    valsartan (DIOVAN) 160 mg tablet, Take 160 mg by mouth daily, Disp: , Rfl:     warfarin (COUMADIN) 5 mg tablet, TAKE 1 TABLET DAILY AS DIRECTED, Disp: 30 tablet, Rfl: 0    warfarin (COUMADIN) 5 mg tablet, TAKE 1/2 TO 1 TABLET DAILY OR AS DIRECTED, Disp: 90 tablet, Rfl: 3    losartan (COZAAR) 100 MG tablet, TAKE 1 TABLET DAILY (Patient not taking: Reported on 11/20/2019), Disp: 90 tablet, Rfl: 3    torsemide (DEMADEX) 20 mg tablet, TAKE 1 TABLET TWICE DAILY (Patient taking differently: No sig reported), Disp: 60 tablet, Rfl: 11    warfarin (COUMADIN) 5 mg tablet, TAKE 1/2 TO 1 TABLET DAILY BY MOUTH OR AS DIRECTED BY PHYSICIAN , Disp: 90 tablet, Rfl: 3          Objective      Vital Signs:   Vitals:    09/15/20 1200   BP: 118/60     [place] Sleepiness Scale: Total score: 3        Physical Exam:    General: Alert, appropriate, cooperative, overweight    Head: NC/AT    Skin: Warm, dry    Neuro: No motor abnormalities, cranial nerves appear intact    Extremity: No clubbing, cyanosis      DME Provider: [facility]        Counseling / Coordination of Care   I have spent 10 minutes with the patient today in which greater than 50% of this time was spent in counseling/coordination of care regarding: equipment and compliance  Board Certified Sleep Specialist    Portions of the record may have been created with voice recognition software    Occasional wrong word or "sound a like" substitutions may have occurred due to the inherent limitations of voice recognition software  Read the chart carefully and recognize, using context, where substitutions have occurred

## 2020-09-16 ENCOUNTER — TELEPHONE (OUTPATIENT)
Dept: SLEEP CENTER | Facility: CLINIC | Age: 80
End: 2020-09-16

## 2020-09-16 LAB
ATRIAL RATE: 64 BPM
P AXIS: 30 DEGREES
PR INTERVAL: 190 MS
QRS AXIS: 36 DEGREES
QRSD INTERVAL: 146 MS
QT INTERVAL: 508 MS
QTC INTERVAL: 524 MS
T WAVE AXIS: 13 DEGREES
VENTRICULAR RATE: 64 BPM

## 2020-09-16 PROCEDURE — 93010 ELECTROCARDIOGRAM REPORT: CPT | Performed by: INTERNAL MEDICINE

## 2020-09-21 DIAGNOSIS — E78.2 MIXED HYPERLIPIDEMIA: Primary | ICD-10-CM

## 2020-09-21 RX ORDER — ATORVASTATIN CALCIUM 40 MG/1
40 TABLET, FILM COATED ORAL
Qty: 90 TABLET | Refills: 0 | Status: SHIPPED | OUTPATIENT
Start: 2020-09-21 | End: 2020-12-14

## 2020-09-29 NOTE — PATIENT INSTRUCTIONS
Opioid Pain Management   AMBULATORY CARE:   An opioid  is a type of medicine used to treat pain  Examples of opioids are oxycodone, morphine, fentanyl, or codeine  Take opioid medicines as directed, for the condition it is prescribed:  Common problems that may occur when you do not take opioid medicines as directed include the following:  · Health problems  may occur  You may have trouble breathing  You may also develop liver or kidney damage, or stomach bleeding  Any of these health problems can become life-threatening  · Opioid dependence  means your body needs the opioid medicine to keep it from going through withdrawal      · Opioid tolerance  means the opioid does not control pain as well as it used to  You need higher doses of the opioid to get pain relief  · Opioid addiction  means you are not able to control the use of the opioid medicine  You use it when you do not have pain  You crave the opioid medicine  Call 911 or have someone call 911 for any of the following:   · You are breathing slower than normal, or you have trouble breathing  · You cannot be awakened  · You have a seizure  Seek care immediately if:   · Your heart is beating slower than usual     · Your heart feels like it is jumping or fluttering  · You are so sleepy that you cannot stay awake  · You have severe muscle pain or weakness  · You see or hear things that are not real   Contact your healthcare provider if:   · You are too dizzy to stand up  · Your pain gets worse or you have new pain  · You cannot do your usual activities because of side effects from the opioid  · You are constipated or have abdominal pain  · You have questions or concerns about your condition or care  Opioid safety measures:   · Take your medicine as directed  Ask if you need more information on how to take your medicine correctly  Follow up with your healthcare provider regularly  You may need to have your dose adjusted   Do not use opioid medicine if you are pregnant or breastfeeding  · Give your healthcare provider a list of all your medicines  Include any over-the-counter medicines, vitamins, and herbs  It can be dangerous to take opioids with certain other medicines, such as antihistamines  · Keep opioid medicine in a safe place  Store your opioid medicine in a locked cabinet to keep it away from children and others  · Do not drink alcohol while you use opioids  Alcohol use with an opioid medicine can make you sleepy and slow your breathing rate  You may stop breathing completely  · Do not drive or operate heavy machinery after you take opioid medicine  Opioid medicine can make you drowsy and make it hard to concentrate  You may injure yourself or others if you drive or operate heavy machinery while taking your medicine  · Drink fluids and eat high-fiber foods  Fluids and fiber will help prevent constipation  Ask your healthcare provider what fluids are right for you and how much you should drink  Also ask for a list of foods that contain fiber  Follow up with your healthcare provider as directed: You may need to have your dose adjusted  You may be referred to a pain specialist  Write down your questions so you remember to ask them during your visits  © 2017 [employer] Information is for End User's use only and may not be sold, redistributed or otherwise used for commercial purposes  All illustrations and images included in CareNotes® are the copyrighted property of [employer]  or [employer]  The above information is an  only  It is not intended as medical advice for individual conditions or treatments  Talk to your doctor, nurse or pharmacist before following any medical regimen to see if it is safe and effective for you

## 2020-09-30 ENCOUNTER — OFFICE VISIT (OUTPATIENT)
Dept: PAIN MEDICINE | Facility: CLINIC | Age: 80
End: 2020-09-30
Payer: MEDICARE

## 2020-09-30 VITALS
SYSTOLIC BLOOD PRESSURE: 170 MMHG | BODY MASS INDEX: 37.35 KG/M2 | HEART RATE: 62 BPM | HEIGHT: 67 IN | WEIGHT: 238 LBS | DIASTOLIC BLOOD PRESSURE: 75 MMHG | TEMPERATURE: 98.2 F

## 2020-09-30 DIAGNOSIS — G89.29 CHRONIC PAIN OF BOTH LOWER EXTREMITIES: ICD-10-CM

## 2020-09-30 DIAGNOSIS — Z79.891 ENCOUNTER FOR LONG-TERM USE OF OPIATE ANALGESIC: ICD-10-CM

## 2020-09-30 DIAGNOSIS — G89.4 CHRONIC PAIN SYNDROME: Primary | ICD-10-CM

## 2020-09-30 DIAGNOSIS — M79.604 CHRONIC PAIN OF BOTH LOWER EXTREMITIES: ICD-10-CM

## 2020-09-30 DIAGNOSIS — M79.605 CHRONIC PAIN OF BOTH LOWER EXTREMITIES: ICD-10-CM

## 2020-09-30 DIAGNOSIS — M54.16 LUMBAR RADICULOPATHY: ICD-10-CM

## 2020-09-30 DIAGNOSIS — M47.816 SPONDYLOSIS OF LUMBAR REGION WITHOUT MYELOPATHY OR RADICULOPATHY: ICD-10-CM

## 2020-09-30 DIAGNOSIS — M47.816 LUMBAR SPONDYLOSIS: ICD-10-CM

## 2020-09-30 DIAGNOSIS — F11.20 UNCOMPLICATED OPIOID DEPENDENCE (HCC): ICD-10-CM

## 2020-09-30 DIAGNOSIS — M46.1 SACROILIITIS (HCC): ICD-10-CM

## 2020-09-30 DIAGNOSIS — M48.062 SPINAL STENOSIS OF LUMBAR REGION WITH NEUROGENIC CLAUDICATION: ICD-10-CM

## 2020-09-30 PROCEDURE — 80305 DRUG TEST PRSMV DIR OPT OBS: CPT | Performed by: NURSE PRACTITIONER

## 2020-09-30 PROCEDURE — 99214 OFFICE O/P EST MOD 30 MIN: CPT | Performed by: NURSE PRACTITIONER

## 2020-09-30 RX ORDER — TRAMADOL HYDROCHLORIDE 50 MG/1
50 TABLET ORAL 2 TIMES DAILY PRN
Qty: 60 TABLET | Refills: 1 | Status: SHIPPED | OUTPATIENT
Start: 2020-09-30 | End: 2020-11-13

## 2020-09-30 RX ORDER — TRAMADOL HYDROCHLORIDE 50 MG/1
50 TABLET ORAL 2 TIMES DAILY PRN
Qty: 60 TABLET | Refills: 1 | Status: SHIPPED | OUTPATIENT
Start: 2020-09-30 | End: 2020-09-30 | Stop reason: SDUPTHER

## 2020-09-30 NOTE — PROGRESS NOTES
Assessment:  1  Chronic pain syndrome    2  Lumbar radiculopathy    3  Sacroiliitis ([address] )    4  Spondylosis of lumbar region without myelopathy or radiculopathy    5  Lumbar spondylosis    6  Spinal stenosis of lumbar region with neurogenic claudication    7  Chronic pain of both lower extremities    8  Uncomplicated opioid dependence ([address] )    9  Encounter for long-term use of opiate analgesic        Plan:  1  Patient may continue tramadol 50 mg twice a day as needed for pain #60 tablets for 30 days  The patient was given a 2 month supply of prescriptions with a Do Not Fill date(s) of today with 1 refill  While the patient was in the office today an opioid contract was thoroughly reviewed and signed by the patient  The patient was given adequate time to ask questions in regards to the contract and a signed copy was sent home for their records  [place] Prescription Drug Monitoring Program report was reviewed and was appropriate     A urine drug screen was collected at today's office visit as part of our medication management protocol  The point of care testing results were appropriate for what was being prescribed  The specimen will be sent for confirmatory testing  The drug screen is medically necessary because the patient is either dependent on opioid medication or is being considered for opioid medication therapy and the results could impact ongoing or future treatment  The drug screen is to evaluate for the presences or absence of prescribed, non-prescribed, and/or illicit drugs/substances  There are risks associated with opioid medications, including dependence, addiction and tolerance  The patient understands and agrees to use these medications only as prescribed  Potential side effects of the medications include, but are not limited to, constipation, drowsiness, addiction, impaired judgment and risk of fatal overdose if not taken as prescribed   The patient was warned against driving while taking sedation medications  Sharing medications is a felony  At this point in time, the patient is showing no signs of addiction, abuse, diversion or suicidal ideation  2  We can repeat bilateral L4 TFESI p r n     I discussed with the patient that since there has been moderate to significant improvement in the pain symptoms, we will hold off on any repeat injections at this point in time  However, I reviewed with the patient that if their symptoms should return or worsen,  they should call our office to schedule to discuss repeating the injection  3  The patient may continue Tylenol p r n  Should not exceed more than 3000 mg in 24 hours  4  I will avoid NSAIDs secondary to anticoagulation  5  Patient will continue with his home exercise program  6  The patient will follow-up in 6 months for medication prescription refill and reevaluation  The patient was advised to contact the office should their symptoms worsen in the interim  The patient was agreeable and verbalized an understanding  [employer] software was used to dictate this note  It may contain errors with dictating incorrect words or incorrect spelling  Please contact the provider directly with any questions  History of Present Illness: The patient is a 68 y o  male last seen on 6/5/19 who presents for a follow up office visit in regards to chronic lumbosacral back pain with radiation into the bilateral lower extremities secondary to lumbar degenerative disc disease, lumbar spondylosis and stenosis  The patient denies bowel or bladder incontinence or saddle anesthesia  The patient is status post bilateral L4 TFESI with Dr Stuart Erazo in June 2019 with ongoing relief  He unfortunately has not had relief with lumbar medial branch blocks and SI joint injections in the past     The patient currently rates pain as 3/10 on the numeric pain rating scale  States pain is occasional in nature and follows no particular pattern throughout the day    He characterizes the pain as sharp and shooting  Current pain medications includes:  Tramadol 50 mg b i d  P r n  Which he takes sparingly and Tylenol p r n     The patient reports that this regimen is providing 80% pain relief  The patient is reporting no side effects from this pain medication regimen  Pain Contract Signed: 9/30/20  Last Urine Drug Screen: 9/30/20    I have personally reviewed and/or updated the patient's past medical history, past surgical history, family history, social history, current medications, allergies, and vital signs today  Review of Systems:    Review of Systems   Respiratory: Negative for shortness of breath  Cardiovascular: Negative for chest pain  Gastrointestinal: Negative for constipation, diarrhea, nausea and vomiting  Musculoskeletal: Negative for arthralgias, gait problem, joint swelling and myalgias  Skin: Negative for rash  Neurological: Negative for dizziness, seizures and weakness  All other systems reviewed and are negative  Past Medical History:   Diagnosis Date    A-fib ([facility] )     Anxiety     Arthritis     Depression     Hyperlipidemia     Hypertension     Irregular heart beat     Stroke ([facility] )     TIA (transient ischemic attack)     no residual problems       Past Surgical History:   Procedure Laterality Date    FACIAL/NECK BIOPSY N/A 4/3/2017    Procedure: NOSE BCC EXCISION; FROZEN SECTION; RECONSTRUCTION ;  Surgeon: Ruel Garnica MD;  [facility];  Service:     FULL THICKNESS SKIN GRAFT N/A 4/3/2017    Procedure: FLAP VERSUS FULL THICKNESS SKIN GRAFT ;  Surgeon: Ruel Garnica MD;  [facility];  Service:     PROSTATECTOMY      TONSILLECTOMY AND ADENOIDECTOMY         No family history on file      Social History     Occupational History    Not on file   Tobacco Use    Smoking status: Former Smoker    Smokeless tobacco: Never Used    Tobacco comment: long time ago   Substance and Sexual Activity    Alcohol use: Yes     Alcohol/week: 14 0 standard drinks     Types: 14 Glasses of wine per week     Comment: glass of wine with dinner, maybe    Drug use: No    Sexual activity: Not Currently         Current Outpatient Medications:     Acetaminophen 325 MG CAPS, Take by mouth, Disp: , Rfl:     amLODIPine (NORVASC) 10 mg tablet, TAKE 1 TABLET DAILY, Disp: 90 tablet, Rfl: 0    atorvastatin (LIPITOR) 40 mg tablet, Take 1 tablet (40 mg total) by mouth daily at bedtime, Disp: 90 tablet, Rfl: 0    Cholecalciferol (VITAMIN D-3 PO), Take 2,000 unit marking on U-100 syringe by mouth daily after dinner, Disp: , Rfl:     co-enzyme Q-10 30 MG capsule, Take 30 mg by mouth daily after dinner, Disp: , Rfl:     furosemide (LASIX) 20 mg tablet, Take 20 mg by mouth 2 (two) times a day, Disp: , Rfl:     glucosamine-chondroitin 500-400 MG tablet, Take 2 tablets by mouth daily in the early morning, Disp: , Rfl:     losartan (COZAAR) 100 MG tablet, TAKE 1 TABLET (100 MG TOTAL) BY MOUTH DAILY, Disp: 90 tablet, Rfl: 3    multivitamin (THERAGRAN) TABS, Take 1 tablet by mouth daily in the early morning, Disp: , Rfl:     Omega-3 Fatty Acids (FISH OIL) 1,000 mg, Take 1,000 mg by mouth 2 (two) times a day, Disp: , Rfl:     sertraline (ZOLOFT) 100 mg tablet, 50 mg daily, Disp: , Rfl: 3    sotalol (BETAPACE) 80 mg tablet, TAKE 1 TABLET TWICE DAILY, Disp: 180 tablet, Rfl: 3    traMADol (ULTRAM) 50 mg tablet, Take 1 tablet (50 mg total) by mouth 2 (two) times a day as needed for severe pain, Disp: 60 tablet, Rfl: 1    valsartan (DIOVAN) 160 mg tablet, Take 160 mg by mouth daily, Disp: , Rfl:     warfarin (COUMADIN) 5 mg tablet, TAKE 1/2 TO 1 TABLET DAILY OR AS DIRECTED, Disp: 90 tablet, Rfl: 3    losartan (COZAAR) 100 MG tablet, TAKE 1 TABLET DAILY (Patient not taking: Reported on 11/20/2019), Disp: 90 tablet, Rfl: 3    torsemide (DEMADEX) 20 mg tablet, TAKE 1 TABLET TWICE DAILY (Patient taking differently: No sig reported), Disp: 60 tablet, Rfl: 11    No Known Allergies    Physical Exam:    /75   Pulse 62   Temp 98 2 °F (36 8 °C)   Ht 5' 7" (1 702 m)   Wt 108 kg (238 lb)   BMI 37 28 kg/m²     Constitutional:normal, well developed, well nourished, alert, in no distress and non-toxic and no overt pain behavior  Eyes:anicteric  HEENT:grossly intact  Neck:supple, symmetric, trachea midline and no masses   Pulmonary:even and unlabored  Cardiovascular:2+ pitting edema in the bilateral lower extremities  Skin:Normal without rashes or lesions and well hydrated  Psychiatric:Mood and affect appropriate  Neurologic:Cranial Nerves II-XII grossly intact  Musculoskeletal:Slightly antalgic gait but steady without the use of assistive devices      Imaging  No orders to display       MRI LUMBAR SPINE WITHOUT CONTRAST     INDICATION: M54 16: Radiculopathy, lumbar region chronic, worsening low back pain radiating into the legs      COMPARISON:  None      TECHNIQUE:  Sagittal T1, sagittal T2, sagittal inversion recovery, axial T1 and axial T2, coronal T2        IMAGE QUALITY:  Diagnostic     FINDINGS:     ALIGNMENT:  Mild S-shaped scoliotic deformity noted with a slight dextroscoliosis of the lower thoracic spine and a mild levoscoliosis mid lumbar spine  Trace grade 1 anterolisthesis of L4 on L5 noted      MARROW SIGNAL:  Scattered degenerative endplate changes  No focally suspicious marrow lesions  No bone marrow edema or compression abnormality      DISTAL CORD AND CONUS:  Normal size and signal within the distal cord and conus  The conus ends at the L2 level      PARASPINAL SOFT TISSUES:  A normal kidney is not identified in the expected location of the right renal fossa  There is a right pelvic kidney noted  It has a few small T2 hyperintense lesions, some of which are too small to characterize others likely   cysts    Visualized portions of the left kidney are unremarkable; the left kidney located in the left renal fossa      SACRUM:  Normal signal within the sacrum  No evidence of insufficiency or stress fracture      LOWER THORACIC DISC SPACES:  Multilevel loss of disc height and signal      T10-T11: There is no focal disk herniation, central canal or neural foraminal narrowing      T11-T12: Small central disc protrusion  Mild central canal narrowing  Neural foramina bilaterally patent      T12-L1: There is no focal disk herniation, central canal or neural foraminal narrowing      LUMBAR DISC SPACES:     L1-L2:  Small left paracentral disc protrusion  There is bilateral facet hypertrophy  Mild left neural foraminal narrowing  Central canal and right neural foramen patent      L2-L3:  There is no focal disk herniation, central canal or neural foraminal narrowing  Bilateral facet hypertrophy noted      L3-L4:  There is a left neural foraminal disc protrusion  There is bilateral facet hypertrophy  Mild left neural foraminal narrowing  Central canal and right neural foramen patent      L4-L5:  There is uncovering the intervertebral disc space  There is bilateral facet hypertrophy  There is no focal disc herniation  There is moderate bilateral neural foraminal narrowing  Mild central canal narrowing      L5-S1:  There is a diffuse disk bulge  No significant central canal or neural foraminal narrowing  Bilateral facet hypertrophy noted      IMPRESSION:        1  Multilevel spondylosis with a mild S-shaped scoliosis as described    2   Right pelvic kidney      Orders Placed This Encounter   Procedures    MM ALL_Prescribed Meds and Special Instructions    MM DT_Alprazolam Definitive Test    MM DT_Amphetamine Definitive Test    MM DT_Aripiprazole Definitive Test    MM DT_Bath Salts Definitive Test    MM DT_Buprenorphine Definitive Test    MM DT_Butalbital Definitive Test    MM DT_Clonazepam Definitive Test    MM DT_Clozapine Definitive Test    MM DT_Cocaine Definitive Test    MM DT_Codeine Definitive Test    MM DT_Desipramine Definitive Test    MM DT_Dextromethorphan Definitive Test    MM Diazepam Definitive Test    MM DT_Ethyl Glucuronide/Ethyl Sulfate Definitive Test    MM DT_Fentanyl Definitive Test    MM DT_Haloperidol Definitive Test    MM DT_Heroin Definitive Test    MM DT_Hydrocodone Definitive Test    MM DT_Hydromorphone Definitive Test    MM DT_Imipramine Definitive Test    MM DT_Kratom Definitive Test    MM DT_Levorphanol Definitive Test    MM Lorazepam Definitive Test    MM DT_MDMA Definitive Test    MM DT_Meperidine Definitive Test    MM DT_Methadone Definitive Test    MM DT_Methamphetamine Definitive Test    MM DT_Morphine Definitive Test    MM DT_Olanzapine Definitive Test    MM DT_Oxazepam Definitive Test    MM DT_Oxycodone Definitive Test    MM DT_Oxymorphone Definitive Test    MM DT_Phencyclidine Definitive Test    MM DT_Phenobarbital Definitive Test    MM DT_Phentermine Definitive Test    MM DT_Quetiapine Definitive Test    MM DT_Risperidone Definitive Test    MM DT_Secobarbital Definitive Test    MM DT_Spice Definitive Test    MM DT_Tapentadol Definitive Test    MM DT_Temazapam Definitive Test    MM DT_THC Definitive Test    MM DT_Tramadol Definitive Test    MM DT_Methylphenidate Definitive Test

## 2020-10-14 ENCOUNTER — LAB (OUTPATIENT)
Dept: LAB | Facility: CLINIC | Age: 80
End: 2020-10-14
Payer: MEDICARE

## 2020-10-14 ENCOUNTER — ANESTHESIA EVENT (OUTPATIENT)
Dept: PERIOP | Facility: AMBULARY SURGERY CENTER | Age: 80
End: 2020-10-14
Payer: MEDICARE

## 2020-10-15 ENCOUNTER — ANTICOAG VISIT (OUTPATIENT)
Dept: CARDIOLOGY CLINIC | Facility: CLINIC | Age: 80
End: 2020-10-15

## 2020-10-15 DIAGNOSIS — I48.0 PAROXYSMAL ATRIAL FIBRILLATION (HCC): ICD-10-CM

## 2020-10-20 PROCEDURE — NC001 PR NO CHARGE: Performed by: PHYSICIAN ASSISTANT

## 2020-10-27 ENCOUNTER — HOSPITAL ENCOUNTER (OUTPATIENT)
Facility: AMBULARY SURGERY CENTER | Age: 80
Setting detail: OUTPATIENT SURGERY
Discharge: HOME | End: 2020-10-27
Attending: SURGERY | Admitting: SURGERY
Payer: MEDICARE

## 2020-10-27 ENCOUNTER — ANESTHESIA (OUTPATIENT)
Dept: PERIOP | Facility: AMBULARY SURGERY CENTER | Age: 80
End: 2020-10-27
Payer: MEDICARE

## 2020-10-27 VITALS
DIASTOLIC BLOOD PRESSURE: 84 MMHG | OXYGEN SATURATION: 94 % | HEIGHT: 67 IN | WEIGHT: 234 LBS | BODY MASS INDEX: 36.73 KG/M2 | HEART RATE: 58 BPM | RESPIRATION RATE: 18 BRPM | SYSTOLIC BLOOD PRESSURE: 176 MMHG | TEMPERATURE: 97.7 F

## 2020-10-27 VITALS — HEART RATE: 68 BPM

## 2020-10-27 PROCEDURE — 15740 ISLAND PEDICLE FLAP GRAFT: CPT | Performed by: SURGERY

## 2020-10-27 RX ORDER — LIDOCAINE HYDROCHLORIDE 10 MG/ML
INJECTION, SOLUTION EPIDURAL; INFILTRATION; INTRACAUDAL; PERINEURAL AS NEEDED
Status: DISCONTINUED | OUTPATIENT
Start: 2020-10-27 | End: 2020-10-27

## 2020-10-27 RX ORDER — SODIUM CHLORIDE, SODIUM LACTATE, POTASSIUM CHLORIDE, CALCIUM CHLORIDE 600; 310; 30; 20 MG/100ML; MG/100ML; MG/100ML; MG/100ML
INJECTION, SOLUTION INTRAVENOUS CONTINUOUS PRN
Status: DISCONTINUED | OUTPATIENT
Start: 2020-10-27 | End: 2020-10-27

## 2020-10-27 RX ORDER — DEXAMETHASONE SODIUM PHOSPHATE 10 MG/ML
INJECTION, SOLUTION INTRAMUSCULAR; INTRAVENOUS AS NEEDED
Status: DISCONTINUED | OUTPATIENT
Start: 2020-10-27 | End: 2020-10-27

## 2020-10-27 RX ORDER — PROPOFOL 10 MG/ML
INJECTION, EMULSION INTRAVENOUS CONTINUOUS PRN
Status: DISCONTINUED | OUTPATIENT
Start: 2020-10-27 | End: 2020-10-27

## 2020-10-27 RX ORDER — PROPOFOL 10 MG/ML
INJECTION, EMULSION INTRAVENOUS AS NEEDED
Status: DISCONTINUED | OUTPATIENT
Start: 2020-10-27 | End: 2020-10-27

## 2020-10-27 RX ORDER — SUCCINYLCHOLINE/SOD CL,ISO/PF 100 MG/5ML
SYRINGE (ML) INTRAVENOUS AS NEEDED
Status: DISCONTINUED | OUTPATIENT
Start: 2020-10-27 | End: 2020-10-27

## 2020-10-27 RX ORDER — FENTANYL CITRATE/PF 50 MCG/ML
25 SYRINGE (ML) INJECTION
Status: DISCONTINUED | OUTPATIENT
Start: 2020-10-27 | End: 2020-10-27 | Stop reason: HOSPADM

## 2020-10-27 RX ORDER — ACETAMINOPHEN 325 MG/1
650 TABLET ORAL EVERY 6 HOURS
Status: DISCONTINUED | OUTPATIENT
Start: 2020-10-27 | End: 2020-10-27 | Stop reason: HOSPADM

## 2020-10-27 RX ORDER — CEFAZOLIN SODIUM 2 G/50ML
2000 SOLUTION INTRAVENOUS ONCE
Status: COMPLETED | OUTPATIENT
Start: 2020-10-27 | End: 2020-10-27

## 2020-10-27 RX ORDER — ONDANSETRON 2 MG/ML
INJECTION INTRAMUSCULAR; INTRAVENOUS AS NEEDED
Status: DISCONTINUED | OUTPATIENT
Start: 2020-10-27 | End: 2020-10-27

## 2020-10-27 RX ORDER — SODIUM CHLORIDE, SODIUM LACTATE, POTASSIUM CHLORIDE, CALCIUM CHLORIDE 600; 310; 30; 20 MG/100ML; MG/100ML; MG/100ML; MG/100ML
125 INJECTION, SOLUTION INTRAVENOUS CONTINUOUS
Status: DISCONTINUED | OUTPATIENT
Start: 2020-10-27 | End: 2020-10-27 | Stop reason: HOSPADM

## 2020-10-27 RX ORDER — EPHEDRINE SULFATE 50 MG/ML
INJECTION INTRAVENOUS AS NEEDED
Status: DISCONTINUED | OUTPATIENT
Start: 2020-10-27 | End: 2020-10-27

## 2020-10-27 RX ORDER — MAGNESIUM HYDROXIDE 1200 MG/15ML
LIQUID ORAL AS NEEDED
Status: DISCONTINUED | OUTPATIENT
Start: 2020-10-27 | End: 2020-10-27 | Stop reason: HOSPADM

## 2020-10-27 RX ORDER — LIDOCAINE HYDROCHLORIDE AND EPINEPHRINE 10; 10 MG/ML; UG/ML
INJECTION, SOLUTION INFILTRATION; PERINEURAL AS NEEDED
Status: DISCONTINUED | OUTPATIENT
Start: 2020-10-27 | End: 2020-10-27 | Stop reason: HOSPADM

## 2020-10-27 RX ORDER — ONDANSETRON 2 MG/ML
4 INJECTION INTRAMUSCULAR; INTRAVENOUS ONCE
Status: DISCONTINUED | OUTPATIENT
Start: 2020-10-27 | End: 2020-10-27 | Stop reason: HOSPADM

## 2020-10-27 RX ORDER — FENTANYL CITRATE 50 UG/ML
INJECTION, SOLUTION INTRAMUSCULAR; INTRAVENOUS AS NEEDED
Status: DISCONTINUED | OUTPATIENT
Start: 2020-10-27 | End: 2020-10-27

## 2020-10-27 RX ADMIN — FENTANYL CITRATE 25 MCG: 50 INJECTION, SOLUTION INTRAMUSCULAR; INTRAVENOUS at 16:34

## 2020-10-27 RX ADMIN — DEXAMETHASONE SODIUM PHOSPHATE 8 MG: 10 INJECTION, SOLUTION INTRAMUSCULAR; INTRAVENOUS at 16:08

## 2020-10-27 RX ADMIN — SODIUM CHLORIDE, SODIUM LACTATE, POTASSIUM CHLORIDE, AND CALCIUM CHLORIDE: .6; .31; .03; .02 INJECTION, SOLUTION INTRAVENOUS at 15:37

## 2020-10-27 RX ADMIN — FENTANYL CITRATE 25 MCG: 50 INJECTION, SOLUTION INTRAMUSCULAR; INTRAVENOUS at 16:22

## 2020-10-27 RX ADMIN — Medication 100 MG: at 15:49

## 2020-10-27 RX ADMIN — PROPOFOL 200 MG: 10 INJECTION, EMULSION INTRAVENOUS at 15:40

## 2020-10-27 RX ADMIN — EPHEDRINE SULFATE 5 MG: 50 INJECTION, SOLUTION INTRAVENOUS at 16:06

## 2020-10-27 RX ADMIN — CEFAZOLIN SODIUM 2000 MG: 2 SOLUTION INTRAVENOUS at 15:35

## 2020-10-27 RX ADMIN — PROPOFOL 50 MG: 10 INJECTION, EMULSION INTRAVENOUS at 15:49

## 2020-10-27 RX ADMIN — ONDANSETRON 4 MG: 2 INJECTION INTRAMUSCULAR; INTRAVENOUS at 16:08

## 2020-10-27 RX ADMIN — FENTANYL CITRATE 50 MCG: 50 INJECTION, SOLUTION INTRAMUSCULAR; INTRAVENOUS at 16:00

## 2020-10-27 RX ADMIN — PROPOFOL 60 MCG/KG/MIN: 10 INJECTION, EMULSION INTRAVENOUS at 16:08

## 2020-10-27 RX ADMIN — LIDOCAINE HYDROCHLORIDE 50 MG: 10 INJECTION, SOLUTION EPIDURAL; INFILTRATION; INTRACAUDAL at 15:40

## 2020-11-03 ENCOUNTER — OFFICE VISIT (OUTPATIENT)
Dept: PLASTIC SURGERY | Facility: CLINIC | Age: 80
End: 2020-11-03

## 2020-11-03 VITALS — TEMPERATURE: 98.3 F

## 2020-11-03 DIAGNOSIS — Z98.890 POST-OPERATIVE STATE: Primary | ICD-10-CM

## 2020-11-03 PROCEDURE — 99024 POSTOP FOLLOW-UP VISIT: CPT

## 2020-11-13 ENCOUNTER — OFFICE VISIT (OUTPATIENT)
Dept: INTERNAL MEDICINE CLINIC | Facility: CLINIC | Age: 80
End: 2020-11-13
Payer: MEDICARE

## 2020-11-13 VITALS
TEMPERATURE: 98 F | HEIGHT: 67 IN | HEART RATE: 57 BPM | OXYGEN SATURATION: 96 % | BODY MASS INDEX: 37.35 KG/M2 | SYSTOLIC BLOOD PRESSURE: 136 MMHG | WEIGHT: 238 LBS | DIASTOLIC BLOOD PRESSURE: 82 MMHG

## 2020-11-13 DIAGNOSIS — M17.0 PRIMARY OSTEOARTHRITIS OF BOTH KNEES: ICD-10-CM

## 2020-11-13 DIAGNOSIS — Z23 NEED FOR IMMUNIZATION AGAINST INFLUENZA: ICD-10-CM

## 2020-11-13 DIAGNOSIS — M51.16 INTERVERTEBRAL DISC DISORDERS WITH RADICULOPATHY, LUMBAR REGION: ICD-10-CM

## 2020-11-13 DIAGNOSIS — Z99.89 CPAP (CONTINUOUS POSITIVE AIRWAY PRESSURE) DEPENDENCE: ICD-10-CM

## 2020-11-13 DIAGNOSIS — F34.1 DYSTHYMIC DISORDER: ICD-10-CM

## 2020-11-13 DIAGNOSIS — I10 ESSENTIAL HYPERTENSION: Primary | ICD-10-CM

## 2020-11-13 DIAGNOSIS — I10 ESSENTIAL (PRIMARY) HYPERTENSION: ICD-10-CM

## 2020-11-13 DIAGNOSIS — E78.2 MIXED HYPERLIPIDEMIA: ICD-10-CM

## 2020-11-13 DIAGNOSIS — C61 PROSTATE CANCER (HCC): ICD-10-CM

## 2020-11-13 DIAGNOSIS — G47.33 OBSTRUCTIVE SLEEP APNEA SYNDROME: ICD-10-CM

## 2020-11-13 DIAGNOSIS — G45.9 TIA (TRANSIENT ISCHEMIC ATTACK): ICD-10-CM

## 2020-11-13 DIAGNOSIS — R73.01 IMPAIRED FASTING BLOOD SUGAR: ICD-10-CM

## 2020-11-13 DIAGNOSIS — I48.0 PAROXYSMAL ATRIAL FIBRILLATION (HCC): ICD-10-CM

## 2020-11-13 PROCEDURE — G0008 ADMIN INFLUENZA VIRUS VAC: HCPCS

## 2020-11-13 PROCEDURE — 99214 OFFICE O/P EST MOD 30 MIN: CPT | Performed by: INTERNAL MEDICINE

## 2020-11-13 PROCEDURE — 90662 IIV NO PRSV INCREASED AG IM: CPT

## 2020-11-13 RX ORDER — AMLODIPINE BESYLATE 10 MG/1
TABLET ORAL
Qty: 90 TABLET | Refills: 0 | Status: SHIPPED | OUTPATIENT
Start: 2020-11-13 | End: 2020-12-01

## 2020-11-13 RX ORDER — ALBUTEROL SULFATE 90 UG/1
2 AEROSOL, METERED RESPIRATORY (INHALATION) EVERY 6 HOURS PRN
COMMUNITY
End: 2021-03-17

## 2020-11-13 RX ORDER — TORSEMIDE 20 MG/1
20 TABLET ORAL DAILY
COMMUNITY
End: 2021-01-04

## 2020-11-16 ENCOUNTER — LAB (OUTPATIENT)
Dept: LAB | Facility: CLINIC | Age: 80
End: 2020-11-16
Payer: MEDICARE

## 2020-11-16 ENCOUNTER — ANTICOAG VISIT (OUTPATIENT)
Dept: CARDIOLOGY CLINIC | Facility: CLINIC | Age: 80
End: 2020-11-16

## 2020-11-16 DIAGNOSIS — I48.0 PAROXYSMAL ATRIAL FIBRILLATION (HCC): ICD-10-CM

## 2020-11-18 ENCOUNTER — OFFICE VISIT (OUTPATIENT)
Dept: CARDIOLOGY CLINIC | Facility: CLINIC | Age: 80
End: 2020-11-18
Payer: MEDICARE

## 2020-11-18 VITALS
DIASTOLIC BLOOD PRESSURE: 76 MMHG | BODY MASS INDEX: 37.49 KG/M2 | HEART RATE: 54 BPM | TEMPERATURE: 98.6 F | HEIGHT: 67 IN | WEIGHT: 238.9 LBS | SYSTOLIC BLOOD PRESSURE: 160 MMHG

## 2020-11-18 DIAGNOSIS — E66.3 OVERWEIGHT: Primary | ICD-10-CM

## 2020-11-18 DIAGNOSIS — I87.2 EDEMA OF BOTH LOWER LEGS DUE TO PERIPHERAL VENOUS INSUFFICIENCY: ICD-10-CM

## 2020-11-18 DIAGNOSIS — R60.0 EDEMA OF BOTH LOWER LEGS DUE TO PERIPHERAL VENOUS INSUFFICIENCY: ICD-10-CM

## 2020-11-18 DIAGNOSIS — I45.10 RIGHT BUNDLE BRANCH BLOCK (RBBB): ICD-10-CM

## 2020-11-18 DIAGNOSIS — Z99.89 CPAP (CONTINUOUS POSITIVE AIRWAY PRESSURE) DEPENDENCE: ICD-10-CM

## 2020-11-18 DIAGNOSIS — I48.0 PAROXYSMAL ATRIAL FIBRILLATION (HCC): ICD-10-CM

## 2020-11-18 PROCEDURE — 99214 OFFICE O/P EST MOD 30 MIN: CPT | Performed by: INTERNAL MEDICINE

## 2020-11-18 PROCEDURE — 93000 ELECTROCARDIOGRAM COMPLETE: CPT | Performed by: INTERNAL MEDICINE

## 2020-12-01 DIAGNOSIS — I10 ESSENTIAL (PRIMARY) HYPERTENSION: ICD-10-CM

## 2020-12-01 RX ORDER — AMLODIPINE BESYLATE 10 MG/1
TABLET ORAL
Qty: 90 TABLET | Refills: 0 | Status: SHIPPED | OUTPATIENT
Start: 2020-12-01 | End: 2021-05-14

## 2020-12-12 DIAGNOSIS — E78.2 MIXED HYPERLIPIDEMIA: ICD-10-CM

## 2020-12-14 ENCOUNTER — LAB (OUTPATIENT)
Dept: LAB | Facility: CLINIC | Age: 80
End: 2020-12-14
Payer: MEDICARE

## 2020-12-14 ENCOUNTER — ANTICOAG VISIT (OUTPATIENT)
Dept: CARDIOLOGY CLINIC | Facility: CLINIC | Age: 80
End: 2020-12-14

## 2020-12-14 DIAGNOSIS — I48.0 PAROXYSMAL ATRIAL FIBRILLATION (HCC): ICD-10-CM

## 2020-12-14 RX ORDER — ATORVASTATIN CALCIUM 40 MG/1
40 TABLET, FILM COATED ORAL
Qty: 90 TABLET | Refills: 0 | Status: SHIPPED | OUTPATIENT
Start: 2020-12-14 | End: 2021-03-15

## 2021-01-04 DIAGNOSIS — I87.2 VENOUS INSUFFICIENCY: Primary | ICD-10-CM

## 2021-01-04 RX ORDER — TORSEMIDE 20 MG/1
TABLET ORAL
Qty: 60 TABLET | Refills: 11 | Status: SHIPPED | OUTPATIENT
Start: 2021-01-04 | End: 2021-09-17

## 2021-01-08 ENCOUNTER — OFFICE VISIT (OUTPATIENT)
Dept: PLASTIC SURGERY | Facility: CLINIC | Age: 81
End: 2021-01-08

## 2021-01-08 VITALS — TEMPERATURE: 98.8 F

## 2021-01-08 DIAGNOSIS — C44.311 BASAL CELL CARCINOMA OF NOSE: Primary | ICD-10-CM

## 2021-01-08 PROCEDURE — 99024 POSTOP FOLLOW-UP VISIT: CPT | Performed by: PHYSICIAN ASSISTANT

## 2021-01-08 NOTE — PROGRESS NOTES
Assessment/Plan:   Gabriela Love is a 68year old male who is 2 months status post reconstruction of a Mohs defect of the nose with pedicled flap done in conjunction with Dr Candy Tabor  Please see HPI  He is pleased with the results  He will continue to use silicone scar gel for another month  He will also avoid excessive sun exposure  I have offered him a follow-up visit with us however, he would prefer to follow up as needed due to [facility]  Diagnoses and all orders for this visit:    Basal cell carcinoma of nose          Subjective:     Patient ID: Guillermo Hubbard is a 68 y o  male  HPI   He is feeling well  He is pleased with the appearance of his nose  He is using silicone scar gel  Review of Systems   Skin:        As per HPI  Objective:     Physical Exam  Skin:     Comments: Nose scar is well healed  There is no pigment change or evidence of hypertrophy  Please see photo

## 2021-01-08 NOTE — LETTER
January 8, 2021     Tali Duncan MD  [id number] [address]    Patient: Marta Chawla   YOB: 1940   Date of Visit: 1/8/2021       Dear Dr Glenn Eng: Thank you for referring Wolfgang Drummond to me for evaluation  Below are my notes for this consultation  If you have questions, please do not hesitate to call me  I look forward to following your patient along with you  Sincerely,        Yue Patel PA-C        CC: MD Yue Chávez PA-C  1/8/2021 11:18 AM  Sign when Signing Visit  Assessment/Plan:   Ronit Barber is a 68year old male who is 2 months status post reconstruction of a Mohs defect of the nose with pedicled flap done in conjunction with Dr Gillian Rivers  Please see HPI  He is pleased with the results  He will continue to use silicone scar gel for another month  He will also avoid excessive sun exposure  I have offered him a follow-up visit with us however, he would prefer to follow up as needed due to [facility]  Diagnoses and all orders for this visit:    Basal cell carcinoma of nose          Subjective:     Patient ID: Marta Chawla is a 68 y o  male  HPI   He is feeling well  He is pleased with the appearance of his nose  He is using silicone scar gel  Review of Systems   Skin:        As per HPI  Objective:     Physical Exam  Skin:     Comments: Nose scar is well healed  There is no pigment change or evidence of hypertrophy  Please see photo

## 2021-01-13 ENCOUNTER — ANTICOAG VISIT (OUTPATIENT)
Dept: CARDIOLOGY CLINIC | Facility: CLINIC | Age: 81
End: 2021-01-13

## 2021-01-13 ENCOUNTER — LAB (OUTPATIENT)
Dept: LAB | Facility: CLINIC | Age: 81
End: 2021-01-13
Payer: MEDICARE

## 2021-01-13 DIAGNOSIS — I48.0 PAROXYSMAL ATRIAL FIBRILLATION (HCC): ICD-10-CM

## 2021-02-09 DIAGNOSIS — F34.1 DYSTHYMIC DISORDER: Primary | ICD-10-CM

## 2021-02-09 RX ORDER — SERTRALINE HYDROCHLORIDE 100 MG/1
TABLET, FILM COATED ORAL
Qty: 30 TABLET | Refills: 2 | Status: SHIPPED | OUTPATIENT
Start: 2021-02-09 | End: 2021-07-30

## 2021-02-10 ENCOUNTER — LAB (OUTPATIENT)
Dept: LAB | Facility: CLINIC | Age: 81
End: 2021-02-10
Payer: MEDICARE

## 2021-02-10 ENCOUNTER — ANTICOAG VISIT (OUTPATIENT)
Dept: CARDIOLOGY CLINIC | Facility: CLINIC | Age: 81
End: 2021-02-10

## 2021-02-10 DIAGNOSIS — E78.2 MIXED HYPERLIPIDEMIA: ICD-10-CM

## 2021-02-10 DIAGNOSIS — I48.0 PAROXYSMAL ATRIAL FIBRILLATION (HCC): ICD-10-CM

## 2021-02-10 DIAGNOSIS — R73.01 IMPAIRED FASTING BLOOD SUGAR: ICD-10-CM

## 2021-02-10 DIAGNOSIS — I10 ESSENTIAL HYPERTENSION: ICD-10-CM

## 2021-02-12 DIAGNOSIS — Z23 ENCOUNTER FOR IMMUNIZATION: ICD-10-CM

## 2021-02-19 DIAGNOSIS — I48.91 ATRIAL FIBRILLATION, UNSPECIFIED TYPE (HCC): ICD-10-CM

## 2021-02-19 RX ORDER — WARFARIN SODIUM 5 MG/1
TABLET ORAL
Qty: 90 TABLET | Refills: 3 | Status: SHIPPED | OUTPATIENT
Start: 2021-02-19 | End: 2022-02-07

## 2021-03-08 ENCOUNTER — LAB (OUTPATIENT)
Dept: LAB | Facility: CLINIC | Age: 81
End: 2021-03-08
Payer: MEDICARE

## 2021-03-08 ENCOUNTER — ANTICOAG VISIT (OUTPATIENT)
Dept: CARDIOLOGY CLINIC | Facility: CLINIC | Age: 81
End: 2021-03-08

## 2021-03-08 DIAGNOSIS — I48.0 PAROXYSMAL ATRIAL FIBRILLATION (HCC): ICD-10-CM

## 2021-03-11 LAB — HBA1C MFR BLD HPLC: 6.7 %

## 2021-03-13 DIAGNOSIS — E78.2 MIXED HYPERLIPIDEMIA: ICD-10-CM

## 2021-03-15 RX ORDER — ATORVASTATIN CALCIUM 40 MG/1
40 TABLET, FILM COATED ORAL
Qty: 90 TABLET | Refills: 0 | Status: SHIPPED | OUTPATIENT
Start: 2021-03-15 | End: 2021-06-10

## 2021-03-17 ENCOUNTER — OFFICE VISIT (OUTPATIENT)
Dept: PAIN MEDICINE | Facility: CLINIC | Age: 81
End: 2021-03-17
Payer: MEDICARE

## 2021-03-17 VITALS
HEIGHT: 67 IN | SYSTOLIC BLOOD PRESSURE: 141 MMHG | HEART RATE: 99 BPM | BODY MASS INDEX: 36.73 KG/M2 | TEMPERATURE: 97.9 F | WEIGHT: 234 LBS | DIASTOLIC BLOOD PRESSURE: 85 MMHG

## 2021-03-17 DIAGNOSIS — M47.816 LUMBAR SPONDYLOSIS: ICD-10-CM

## 2021-03-17 DIAGNOSIS — M51.16 INTERVERTEBRAL DISC DISORDERS WITH RADICULOPATHY, LUMBAR REGION: ICD-10-CM

## 2021-03-17 DIAGNOSIS — M47.816 SPONDYLOSIS OF LUMBAR REGION WITHOUT MYELOPATHY OR RADICULOPATHY: ICD-10-CM

## 2021-03-17 DIAGNOSIS — M46.1 SACROILIITIS (HCC): ICD-10-CM

## 2021-03-17 DIAGNOSIS — F11.20 UNCOMPLICATED OPIOID DEPENDENCE (HCC): ICD-10-CM

## 2021-03-17 DIAGNOSIS — M48.062 SPINAL STENOSIS OF LUMBAR REGION WITH NEUROGENIC CLAUDICATION: ICD-10-CM

## 2021-03-17 DIAGNOSIS — Z79.891 ENCOUNTER FOR LONG-TERM USE OF OPIATE ANALGESIC: ICD-10-CM

## 2021-03-17 DIAGNOSIS — G89.4 CHRONIC PAIN SYNDROME: Primary | ICD-10-CM

## 2021-03-17 DIAGNOSIS — M17.0 PRIMARY OSTEOARTHRITIS OF BOTH KNEES: ICD-10-CM

## 2021-03-17 DIAGNOSIS — M54.16 LUMBAR RADICULOPATHY: ICD-10-CM

## 2021-03-17 PROCEDURE — 99214 OFFICE O/P EST MOD 30 MIN: CPT | Performed by: NURSE PRACTITIONER

## 2021-03-17 PROCEDURE — 80305 DRUG TEST PRSMV DIR OPT OBS: CPT | Performed by: NURSE PRACTITIONER

## 2021-03-17 RX ORDER — TRAMADOL HYDROCHLORIDE 50 MG/1
50 TABLET ORAL 2 TIMES DAILY PRN
Qty: 60 TABLET | Refills: 2 | Status: SHIPPED | OUTPATIENT
Start: 2021-03-17 | End: 2021-09-01 | Stop reason: SDUPTHER

## 2021-03-17 RX ORDER — TRAMADOL HYDROCHLORIDE 50 MG/1
TABLET ORAL
COMMUNITY
Start: 2021-02-09 | End: 2021-03-17 | Stop reason: SDUPTHER

## 2021-03-17 NOTE — PATIENT INSTRUCTIONS
Tylenol 1000mg every 8 hours as needed for pain  Also Tramadol 50mg twice a day as needed for pain  Can take these medications in combination or alternate throughout the day    Opioid Safety   AMBULATORY CARE:   Opioid safety  includes the correct use, storage, and disposal of opioids  Examples of opioid medicines to treat pain include oxycodone, morphine, fentanyl, and codeine  Call your local emergency number (911 in the [address] [facility]), or have someone else call if:   · You have a seizure  · You cannot be woken  · You have trouble staying awake and your breathing is slow or shallow  · Your speech is slurred, or you are confused  · You are dizzy or stumble when you walk  Call your doctor, or have someone close to you call if:   · You are extremely drowsy, or you have trouble staying awake or speaking  · You have pale or clammy skin  · You have blue fingernails or lips  · Your heartbeat is slower than normal     · You cannot stop vomiting  · You have questions or concerns about your condition or care  Use opioids safely:   · Take prescribed opioids exactly as directed  Opioids come with directions based on the kind of opioid and how it is given  Do not take more than the recommended amount, or for longer than needed  · Do not give opioids to others or take opioids that belong to someone else  Misuse of opioids can lead to an addiction or overdose  · Do not mix opioids with other medicines or alcohol  The combination can cause an overdose, or lead to a coma  · Do not drive or operate heavy machinery after you take the opioid  Your provider or pharmacist can tell you how long to wait after a dose before you do these activities  · Talk to your healthcare provider if you have any side effects  He or she can help you prevent or relieve side effects  Side effects include nausea, sleepiness, itching, and trouble thinking clearly      Manage constipation:  Constipation is the most common side effect of opioid medicine  Constipation is when you have hard, dry bowel movements, or you go longer than usual between bowel movements  Tell your healthcare provider about all changes in your bowel movements while you are taking opioids  He or she may recommend laxative medicine to help you have a bowel movement  He or she may also change the kind of opioid you are taking, or change when you take it  The following are more ways you can prevent or relieve constipation:  · Drink liquids as directed  You may need to drink extra liquids to help soften and move your bowels  Ask how much liquid to drink each day and which liquids are best for you  · Eat high-fiber foods  This may help decrease constipation by adding bulk to your bowel movements  High-fiber foods include fruits, vegetables, whole-grain breads and cereals, and beans  Your healthcare provider or dietitian can help you create a high-fiber meal plan  Your provider may also recommend a fiber supplement if you cannot get enough fiber from food  · Exercise regularly  Regular physical activity can help stimulate your intestines  Walking is a good exercise to prevent or relieve constipation  Ask which exercises are best for you  · Schedule a time each day to have a bowel movement  This may help train your body to have regular bowel movements  Bend forward while you are on the toilet to help move the bowel movement out  Sit on the toilet for at least 10 minutes, even if you do not have a bowel movement  Store opioids safely:   · Store opioids where others cannot easily get them  Keep them in a locked cabinet or secure area  Do not  keep them in a purse or other bag you carry with you  A person may be looking for something else and find the opioids  · Make sure opioids are stored out of the reach of children  A child can easily overdose on opioids  Opioids may look like candy to a small child      The best way to dispose of opioids: The laws vary by country and area  In the [place], the best way is to return the opioids through a take-back program  This program is offered by the [facility])  The following are options for using the program:  · Take the opioids to a [employer] collection site  The site is often a law enforcement center  Call your local law enforcement center for scheduled take-back days in your area  You will be given information on where to go if the collection site is in a different location  · Take the opioids to an approved pharmacy or hospital   A pharmacy or hospital may be set up as a collection site  You will need to ask if it is a UZMA collection site if you were not directed there  A pharmacy or doctor's office may not be able to take back opioids unless it is a UZMA site  · Use a mail-back system  This means you are given containers to put the opioids into  You will then mail them in the containers  · Use a take-back drop box  This is a place to leave the opioids at any time  People and animals will not be able to get into the box  Your local law enforcement agency can tell you where to find a drop box in your area  Other safe ways to dispose of opioids: The medicine may come with disposal instructions  The instructions may vary depending on the brand of medicine you are using  Instructions may come in a Medication Guide, but not every medicine has one  You may instead get instructions from your pharmacy or doctor  Follow instructions carefully  The following are general guidelines to follow:  · Find out if you can flush the opioid  Some opioids can be flushed down the toilet or poured into the sink  You will need to contact authorities in your area to see if this is an option for you  The FDA also offers a list of medicines that are safe to flush down the toilet  You can check the list if you cannot get the information for your local area      · Ask your waste management company about rules for putting opioids in the trash  The company will be able to give you specific directions  Scratch out personal information on the original medicine label so it cannot be read  Then put it in the trash  Do not label the trash or put any information on it about the opioids  It should look like regular household trash so no one is tempted to look for the opioids  Keep the trash out of the reach of children and animals  Always make sure trash is secure  · Talk to officials if you live in a facility  If you live in a nursing home or assisted living center, talk to an official  The person will know the rules for your area  Other ways to manage pain:   · Ask your healthcare provider about non-opioid medicines to control pain  Nonprescription medicines include NSAIDs (such as ibuprofen) and acetaminophen  Prescription medicines include muscle relaxers, antidepressants, and steroids  · Pain may be managed without any medicines  Some ways to relieve pain include massage, aromatherapy, or meditation  Physical or occupational therapy may also help  For more information:   · Drug Enforcement Administration  [facility] [address] [place] [address]  Phone: 4- 942 - [phone]  Web Address: [facility]/drug_disposal/    · [address] and Drug Administration  [facility] , [address] [facility]  Phone: 9- 095 - [phone]  Web Address: http://i[employer]/  Follow up with your doctor or pain specialist as directed: You may need to have your dose adjusted  Your doctor or pain specialist can also help you find ways to manage pain without opioids  Write down your questions so you remember to ask them during your visits  © [address] Information is for End User's use only and may not be sold, redistributed or otherwise used for commercial purposes   All illustrations and images included in CareNotes® are the copyrighted property of [employer]  or [address] [place] [address] Mary Beth   The above information is an  only  It is not intended as medical advice for individual conditions or treatments  Talk to your doctor, nurse or pharmacist before following any medical regimen to see if it is safe and effective for you

## 2021-03-17 NOTE — PROGRESS NOTES
Assessment:  1  Chronic pain syndrome    2  Lumbar radiculopathy    3  Intervertebral disc disorders with radiculopathy, lumbar region    4  Spondylosis of lumbar region without myelopathy or radiculopathy    5  Lumbar spondylosis    6  Sacroiliitis ([address] )    7  Primary osteoarthritis of both knees    8  Spinal stenosis of lumbar region with neurogenic claudication    9  Uncomplicated opioid dependence ([address] )    10  Encounter for long-term use of opiate analgesic        Plan:  1  Patient may continue tramadol 50 mg twice a day as needed for pain #60 tablets for 30 days  I explained that he may benefit from taking the medication more often during flare ups or if he knows he's going to be active  The patient was given a 3 month supply of prescriptions today  While the patient was in the office today an opioid contract was thoroughly reviewed and signed by the patient  The patient was given adequate time to ask questions in regards to the contract and a signed copy was sent home for their records  The patient also completed a BECKS depression inventory and SOAPP-R today, as part of our yearly opioid management program     [place] Prescription Drug Monitoring Program report was reviewed and was appropriate     A urine drug screen was collected at today's office visit as part of our medication management protocol  The point of care testing results were appropriate for what was being prescribed  The specimen will be sent for confirmatory testing  The drug screen is medically necessary because the patient is either dependent on opioid medication or is being considered for opioid medication therapy and the results could impact ongoing or future treatment  The drug screen is to evaluate for the presences or absence of prescribed, non-prescribed, and/or illicit drugs/substances  There are risks associated with opioid medications, including dependence, addiction and tolerance   The patient understands and agrees to use these medications only as prescribed  Potential side effects of the medications include, but are not limited to, constipation, drowsiness, addiction, impaired judgment and risk of fatal overdose if not taken as prescribed  The patient was warned against driving while taking sedation medications  Sharing medications is a felony  At this point in time, the patient is showing no signs of addiction, abuse, diversion or suicidal ideation  2   We can repeat bilateral L4 TFESI p r n  He is not interested in this at this time  3  The patient may continue Tylenol p r n  and should not exceed more than 3000 mg in 24 hours   4  Will avoid NSAIDs secondary to anticoagulation  5  The patient will continue with his home exercise program  6  The patient will follow-up in 3 months or sooner if needed     M*Modal software was used to dictate this note  It may contain errors with dictating incorrect words or incorrect spelling  Please contact the provider directly with any questions  History of Present Illness: The patient is a 68 y o  male last seen on 9/30/20 who presents for a follow up office visit in regards to chronic lumbosacral back pain radiates into the bilateral lower extremities secondary to  Lumbar degenerative disc disease, lumbar spondylosis, lumbar stenosis, lumbar radiculopathy and chronic pain syndrome  The patient denies bowel or bladder incontinence or saddle anesthesia  The patient has had good relief with bilateral L4 TFESI in 2019  He unfortunately failed lumbar medial branch blocks and SI joint injections in the past   He does rate his pain an 8/10 on the numeric pain rating scale  He states his pain is occasional in nature and follows no particular pattern throughout the day  He characterizes the pain as dull aching  He notes that the pain is worse  In his legs when he is standing up from a seated position  He rarely takes tramadol, may be once every 3 weeks    He also takes Tylenol p r n  as he is unable to take NSAIDs secondary to anticoagulation  Pain Contract Signed: 3/17/21  Last Urine Drug Screen: 3/17/21  Last tramadol taken per pt 2/17/21    I have personally reviewed and/or updated the patient's past medical history, past surgical history, family history, social history, current medications, allergies, and vital signs today  Review of Systems:    Review of Systems   Respiratory: Negative for shortness of breath  Cardiovascular: Negative for chest pain  Gastrointestinal: Negative for constipation, diarrhea, nausea and vomiting  Musculoskeletal: Negative for arthralgias, gait problem, joint swelling and myalgias  Skin: Negative for rash  Neurological: Negative for dizziness, seizures and weakness  All other systems reviewed and are negative          Past Medical History:   Diagnosis Date    A-fib ([facility] )     Anemia, unspecified     Anxiety     Arthritis     Basal cell carcinoma (BCC) of skin of nose     Cancer (HCC)     Chondrocostal junction syndrome     CPAP (continuous positive airway pressure) dependence     Depression     Dysthymic disorder     Edema, unspecified     Hyperlipidemia     Hypertension     Impaired fasting blood sugar     Intervertebral disc disorders with radiculopathy, lumbar region     Irregular heart beat     Pro[facility] )     s/p surgery    Sleep apnea     on CPAP    Spinal stenosis     Stroke [facility]     TIA (transient ischemic attack)     no residual problems    Unspecified osteoarthritis, unspecified site     Venous insufficiency of both lower extremities        Past Surgical History:   Procedure Laterality Date    BASAL CELL CARCINOMA EXCISION      on the nose    CATARACT EXTRACTION      COLONOSCOPY      2016, 2011    CT EPIDURAL STEROID INJECTION (TIN LUMBAR)      FACIAL/NECK BIOPSY N/A 4/3/2017    Procedure: NOSE BCC EXCISION; FROZEN SECTION; RECONSTRUCTION ;  Surgeon: Sandra Waters MD;  [facility];  Service:     FLAP LOCAL HEAD / NECK N/A 10/27/2020    Procedure: NOSE FLAP;  Surgeon: Radha Christianson MD;  [facility];  Service: Plastics    FULL THICKNESS SKIN GRAFT N/A 4/3/2017    Procedure: FLAP VERSUS FULL THICKNESS SKIN GRAFT ;  Surgeon: Radha Christianson MD;  [facility];  Service:     MOHS RECONSTRUCTION N/A 10/27/2020    Procedure: NOSE MOHS DEFECT RECONSTRUCTION;  Surgeon: Radha Christianson MD;  [facility];  Service: Plastics    PROSTATECTOMY      TONSILLECTOMY AND ADENOIDECTOMY         Family History   Problem Relation Age of Onset    Heart attack Father        Social History     Occupational History    Not on file   Tobacco Use    Smoking status: Former Smoker     Types: Cigarettes     Quit date:      Years since quittin 2    Smokeless tobacco: Never Used    Tobacco comment: long time ago   Substance and Sexual Activity    Alcohol use: Not Currently     Alcohol/week: 7 0 standard drinks     Types: 7 Glasses of wine per week     Comment: glass of wine with dinner, maybe    Drug use: No    Sexual activity: Not Currently         Current Outpatient Medications:     Acetaminophen 325 MG CAPS, Take by mouth as needed , Disp: , Rfl:     amLODIPine (NORVASC) 10 mg tablet, TAKE 1 TABLET DAILY, Disp: 90 tablet, Rfl: 0    atorvastatin (LIPITOR) 40 mg tablet, TAKE 1 TABLET (40 MG TOTAL) BY MOUTH DAILY AT BEDTIME, Disp: 90 tablet, Rfl: 0    Cholecalciferol (VITAMIN D-3 PO), Take 2,000 unit marking on U-100 syringe by mouth daily after dinner, Disp: , Rfl:     co-enzyme Q-10 30 MG capsule, Take 30 mg by mouth daily after dinner, Disp: , Rfl:     diclofenac sodium (VOLTAREN) 1 %, Apply 2 g topically 4 (four) times a day, Disp: , Rfl:     glucosamine-chondroitin 500-400 MG tablet, Take 2 tablets by mouth daily in the early morning, Disp: , Rfl:     losartan (COZAAR) 100 MG tablet, TAKE 1 TABLET (100 MG TOTAL) BY MOUTH DAILY, Disp: 90 tablet, Rfl: 3    multivitamin (THERAGRAN) TABS, Take 1 tablet by mouth daily in the early morning, Disp: , Rfl:     Omega-3 Fatty Acids (FISH OIL) 1,000 mg, Take 1,000 mg by mouth 2 (two) times a day, Disp: , Rfl:     sertraline (ZOLOFT) 100 mg tablet, TAKE 1/2 TABLET DAILY 60, Disp: 30 tablet, Rfl: 2    sotalol (BETAPACE) 80 mg tablet, TAKE 1 TABLET TWICE DAILY, Disp: 180 tablet, Rfl: 3    torsemide (DEMADEX) 20 mg tablet, TAKE 1 TABLET TWICE DAILY, Disp: 60 tablet, Rfl: 11    traMADol (ULTRAM) 50 mg tablet, Take 1 tablet (50 mg total) by mouth 2 (two) times a day as needed for moderate pain, Disp: 60 tablet, Rfl: 2    warfarin (COUMADIN) 5 mg tablet, TAKE 1/2 TO 1 TABLET DAILY OR AS DIRECTED, Disp: 90 tablet, Rfl: 3    No Known Allergies    Physical Exam:    /85   Pulse 99   Temp 97 9 °F (36 6 °C)   Ht 5' 7" (1 702 m)   Wt 106 kg (234 lb)   BMI 36 65 kg/m²     Constitutional:normal, well developed, well nourished, alert, in no distress and non-toxic and no overt pain behavior  Eyes:anicteric  HEENT:grossly intact  Neck:supple, symmetric, trachea midline and no masses   Pulmonary:even and unlabored  Cardiovascular:No edema or pitting edema present  Skin:Normal without rashes or lesions and well hydrated  Psychiatric:Mood and affect appropriate  Neurologic:Cranial Nerves II-XII grossly intact  Musculoskeletal:antalgic gait but steady without the use of assistive devices      Imaging  No orders to display   MRI LUMBAR SPINE WITHOUT CONTRAST     INDICATION: [id number]: Radiculopathy, lumbar region chronic, worsening low back pain radiating into the legs      COMPARISON:  None      TECHNIQUE:  Sagittal T1, sagittal T2, sagittal inversion recovery, axial T1 and axial T2, coronal T2        IMAGE QUALITY:  Diagnostic     FINDINGS:     ALIGNMENT:  Mild S-shaped scoliotic deformity noted with a slight dextroscoliosis of the lower thoracic spine and a mild levoscoliosis mid lumbar spine    Trace grade 1 anterolisthesis of L4 on L5 noted      MARROW SIGNAL:  Scattered degenerative endplate changes  No focally suspicious marrow lesions  No bone marrow edema or compression abnormality      DISTAL CORD AND CONUS:  Normal size and signal within the distal cord and conus  The conus ends at the L2 level      PARASPINAL SOFT TISSUES:  A normal kidney is not identified in the expected location of the right renal fossa  There is a right pelvic kidney noted  It has a few small T2 hyperintense lesions, some of which are too small to characterize others likely   cysts  Visualized portions of the left kidney are unremarkable; the left kidney located in the left renal fossa      SACRUM:  Normal signal within the sacrum  No evidence of insufficiency or stress fracture      LOWER THORACIC DISC SPACES:  Multilevel loss of disc height and signal      T10-T11: There is no focal disk herniation, central canal or neural foraminal narrowing      T11-T12: Small central disc protrusion  Mild central canal narrowing  Neural foramina bilaterally patent      T12-L1: There is no focal disk herniation, central canal or neural foraminal narrowing      LUMBAR DISC SPACES:     L1-L2:  Small left paracentral disc protrusion  There is bilateral facet hypertrophy  Mild left neural foraminal narrowing  Central canal and right neural foramen patent      L2-L3:  There is no focal disk herniation, central canal or neural foraminal narrowing  Bilateral facet hypertrophy noted      L3-L4:  There is a left neural foraminal disc protrusion  There is bilateral facet hypertrophy  Mild left neural foraminal narrowing  Central canal and right neural foramen patent      L4-L5:  There is uncovering the intervertebral disc space  There is bilateral facet hypertrophy  There is no focal disc herniation  There is moderate bilateral neural foraminal narrowing  Mild central canal narrowing      L5-S1:  There is a diffuse disk bulge    No significant central canal or neural foraminal narrowing  Bilateral facet hypertrophy noted      IMPRESSION:        1  Multilevel spondylosis with a mild S-shaped scoliosis as described    2   Right pelvic kidney          Orders Placed This Encounter   Procedures    MM ALL_Prescribed Meds and Special Instructions    MM DT_Alprazolam Definitive Test    MM DT_Amphetamine Definitive Test    MM DT_Aripiprazole Definitive Test    MM DT_Bath Salts Definitive Test    MM DT_Buprenorphine Definitive Test    MM DT_Butalbital Definitive Test    MM DT_Clonazepam Definitive Test    MM DT_Clozapine Definitive Test    MM DT_Cocaine Definitive Test    MM DT_Codeine Definitive Test    MM DT_Desipramine Definitive Test    MM DT_Dextromethorphan Definitive Test    MM Diazepam Definitive Test    MM DT_Ethyl Glucuronide/Ethyl Sulfate Definitive Test    MM DT_Fentanyl Definitive Test    MM DT_Haloperidol Definitive Test    MM DT_Heroin Definitive Test    MM DT_Hydrocodone Definitive Test    MM DT_Hydromorphone Definitive Test    MM DT_Imipramine Definitive Test    MM DT_Kratom Definitive Test    MM DT_Levorphanol Definitive Test    MM Lorazepam Definitive Test    MM DT_MDMA Definitive Test    MM DT_Meperidine Definitive Test    MM DT_Methadone Definitive Test    MM DT_Methamphetamine Definitive Test    MM DT_Morphine Definitive Test    MM DT_Olanzapine Definitive Test    MM DT_Oxazepam Definitive Test    MM DT_Oxycodone Definitive Test    MM DT_Oxymorphone Definitive Test    MM DT_Phencyclidine Definitive Test    MM DT_Phenobarbital Definitive Test    MM DT_Phentermine Definitive Test    MM DT_Quetiapine Definitive Test    MM DT_Risperidone Definitive Test    MM DT_Secobarbital Definitive Test    MM DT_Spice Definitive Test    MM DT_Tapentadol Definitive Test    MM DT_Temazapam Definitive Test    MM DT_THC Definitive Test    MM DT_Tramadol Definitive Test    MM DT_Methylphenidate Definitive Test

## 2021-03-23 LAB
6MAM UR QL CFM: NEGATIVE NG/ML
7AMINOCLONAZEPAM UR QL CFM: NEGATIVE NG/ML
A-OH ALPRAZ UR QL CFM: NEGATIVE NG/ML
AMPHET UR QL CFM: NEGATIVE NG/ML
AMPHET UR QL CFM: NEGATIVE NG/ML
BUPRENORPHINE UR QL CFM: NEGATIVE NG/ML
BUTALBITAL UR QL CFM: NEGATIVE NG/ML
BZE UR QL CFM: NEGATIVE NG/ML
CODEINE UR QL CFM: NEGATIVE NG/ML
DESIPRAMINE UR QL CFM: NEGATIVE NG/ML
DESIPRAMINE UR QL CFM: NEGATIVE NG/ML
EDDP UR QL CFM: NEGATIVE NG/ML
ETHYL GLUCURONIDE UR QL CFM: ABNORMAL NG/ML
ETHYL SULFATE UR QL SCN: NEGATIVE NG/ML
FENTANYL UR QL CFM: NEGATIVE NG/ML
GLIADIN IGG SER IA-ACNC: NEGATIVE NG/ML
GLUCOSE 30M P 50 G LAC PO SERPL-MCNC: NEGATIVE NG/ML
HYDROCODONE UR QL CFM: NEGATIVE NG/ML
HYDROCODONE UR QL CFM: NEGATIVE NG/ML
HYDROMORPHONE UR QL CFM: NEGATIVE NG/ML
IMIPRAMINE UR QL CFM: NEGATIVE NG/ML
LORAZEPAM UR QL CFM: NEGATIVE NG/ML
MDMA UR QL CFM: NEGATIVE NG/ML
ME-PHENIDATE UR QL CFM: NEGATIVE NG/ML
MEPERIDINE UR QL CFM: NEGATIVE NG/ML
MEPHEDRONE UR QL CFM: NEGATIVE NG/ML
METHADONE UR QL CFM: NEGATIVE NG/ML
METHAMPHET UR QL CFM: NEGATIVE NG/ML
MORPHINE UR QL CFM: NEGATIVE NG/ML
MORPHINE UR QL CFM: NEGATIVE NG/ML
NORBUPRENORPHINE UR QL CFM: NEGATIVE NG/ML
NORDIAZEPAM UR QL CFM: NEGATIVE NG/ML
NORFENTANYL UR QL CFM: NEGATIVE NG/ML
NORHYDROCODONE UR QL CFM: NEGATIVE NG/ML
NORHYDROCODONE UR QL CFM: NEGATIVE NG/ML
NORMEPERIDINE UR QL CFM: NEGATIVE NG/ML
NOROXYCODONE UR QL CFM: NEGATIVE NG/ML
OLANZAPINE QUANTIFICATION: NEGATIVE NG/ML
OPC-3373 QUANTIFICATION: NEGATIVE
OXAZEPAM UR QL CFM: NEGATIVE NG/ML
OXYCODONE UR QL CFM: NEGATIVE NG/ML
OXYMORPHONE UR QL CFM: NEGATIVE NG/ML
OXYMORPHONE UR QL CFM: NEGATIVE NG/ML
PCP UR QL CFM: NEGATIVE NG/ML
PHENOBARB UR QL CFM: NEGATIVE NG/ML
SECOBARBITAL UR QL CFM: NEGATIVE NG/ML
SL AMB 3-METHYL-FENTANYL QUANTIFICATION: NORMAL NG/ML
SL AMB 4-ANPP QUANTIFICATION: NORMAL NG/ML
SL AMB 4-FIBF QUANTIFICATION: NORMAL NG/ML
SL AMB 5F-ADB-M7 METABOLITE QUANTIFICATION: NEGATIVE NG/ML
SL AMB 7-OH-MITRAGYNINE (KRATOM ALKALOID) QUANTIFICATION: NEGATIVE NG/ML
SL AMB AB-FUBINACA-M3 METABOLITE QUANTIFICATION: NEGATIVE NG/ML
SL AMB ACETYL FENTANYL QUANTIFICATION: NORMAL NG/ML
SL AMB ACETYL NORFENTANYL QUANTIFICATION: NORMAL NG/ML
SL AMB ACRYL FENTANYL QUANTIFICATION: NORMAL NG/ML
SL AMB BATH SALTS: NEGATIVE NG/ML
SL AMB BUTRYL FENTANYL QUANTIFICATION: NORMAL NG/ML
SL AMB CARFENTANIL QUANTIFICATION: NORMAL NG/ML
SL AMB CLOZAPINE QUANTIFICATION: NEGATIVE NG/ML
SL AMB CTHC (MARIJUANA METABOLITE) QUANTIFICATION: NEGATIVE NG/ML
SL AMB CYCLOPROPYL FENTANYL QUANTIFICATION: NORMAL NG/ML
SL AMB DEXTROMETHORPHAN QUANTIFICATION: NEGATIVE NG/ML
SL AMB DEXTRORPHAN (DEXTROMETHORPHAN METABOLITE) QUANT: NEGATIVE NG/ML
SL AMB DEXTRORPHAN (DEXTROMETHORPHAN METABOLITE) QUANT: NEGATIVE NG/ML
SL AMB FURANYL FENTANYL QUANTIFICATION: NORMAL NG/ML
SL AMB HALOPERIDOL  QUANTIFICATION: NEGATIVE NG/ML
SL AMB HALOPERIDOL METABOLITE QUANTIFICATION: NEGATIVE NG/ML
SL AMB HYDROXYRISPERIDONE QUANTIFICATION: NEGATIVE NG/ML
SL AMB JWH018 METABOLITE QUANTIFICATION: NEGATIVE NG/ML
SL AMB JWH073 METABOLITE QUANTIFICATION: NEGATIVE NG/ML
SL AMB MDMB-FUBINACA-M1 METABOLITE QUANTIFICATION: NEGATIVE NG/ML
SL AMB METHOXYACETYL FENTANYL QUANTIFICATION: NORMAL NG/ML
SL AMB METHYLONE QUANTIFICATION: NEGATIVE NG/ML
SL AMB N-DESMETHYL U-47700 QUANTIFICATION: NORMAL NG/ML
SL AMB N-DESMETHYL-TRAMADOL QUANTIFICATION: NORMAL NG/ML
SL AMB N-DESMETHYLCLOZAPINE QUANTIFICATION: NEGATIVE NG/ML
SL AMB NORQUETIAPINE QUANTIFICATION: NEGATIVE NG/ML
SL AMB PHENTERMINE QUANTIFICATION: NEGATIVE NG/ML
SL AMB QUETIAPINE QUANTIFICATION: NEGATIVE NG/ML
SL AMB RCS4 METABOLITE QUANTIFICATION: NEGATIVE NG/ML
SL AMB RISPERIDONE QUANTIFICATION: NEGATIVE NG/ML
SL AMB RITALINIC ACID QUANTIFICATION: NEGATIVE NG/ML
SL AMB U-47700 QUANTIFICATION: NORMAL NG/ML
SPECIMEN DRAWN SERPL: NEGATIVE NG/ML
TAPENTADOL UR QL CFM: NEGATIVE NG/ML
TEMAZEPAM UR QL CFM: NEGATIVE NG/ML
TEMAZEPAM UR QL CFM: NEGATIVE NG/ML
TRAMADOL UR QL CFM: NEGATIVE NG/ML
URATE/CREAT 24H UR: NEGATIVE NG/ML

## 2021-03-26 ENCOUNTER — OFFICE VISIT (OUTPATIENT)
Dept: INTERNAL MEDICINE CLINIC | Facility: CLINIC | Age: 81
End: 2021-03-26
Payer: MEDICARE

## 2021-03-26 VITALS
TEMPERATURE: 97.5 F | HEIGHT: 67 IN | SYSTOLIC BLOOD PRESSURE: 142 MMHG | BODY MASS INDEX: 36.26 KG/M2 | WEIGHT: 231 LBS | HEART RATE: 60 BPM | OXYGEN SATURATION: 96 % | DIASTOLIC BLOOD PRESSURE: 86 MMHG

## 2021-03-26 DIAGNOSIS — I10 ESSENTIAL HYPERTENSION: Primary | ICD-10-CM

## 2021-03-26 DIAGNOSIS — Z12.11 SCREEN FOR COLON CANCER: ICD-10-CM

## 2021-03-26 DIAGNOSIS — Z99.89 CPAP (CONTINUOUS POSITIVE AIRWAY PRESSURE) DEPENDENCE: ICD-10-CM

## 2021-03-26 DIAGNOSIS — F34.1 DYSTHYMIC DISORDER: ICD-10-CM

## 2021-03-26 DIAGNOSIS — R73.01 IMPAIRED FASTING BLOOD SUGAR: ICD-10-CM

## 2021-03-26 DIAGNOSIS — J30.89 NON-SEASONAL ALLERGIC RHINITIS, UNSPECIFIED TRIGGER: ICD-10-CM

## 2021-03-26 DIAGNOSIS — C61 PROSTATE CANCER (HCC): ICD-10-CM

## 2021-03-26 DIAGNOSIS — I48.0 PAROXYSMAL ATRIAL FIBRILLATION (HCC): ICD-10-CM

## 2021-03-26 DIAGNOSIS — E66.01 OBESITY, MORBID (HCC): ICD-10-CM

## 2021-03-26 DIAGNOSIS — G47.33 OBSTRUCTIVE SLEEP APNEA SYNDROME: ICD-10-CM

## 2021-03-26 DIAGNOSIS — G89.4 CHRONIC PAIN SYNDROME: ICD-10-CM

## 2021-03-26 DIAGNOSIS — I87.2 VENOUS INSUFFICIENCY OF BOTH LOWER EXTREMITIES: ICD-10-CM

## 2021-03-26 DIAGNOSIS — M54.16 LUMBAR RADICULOPATHY: ICD-10-CM

## 2021-03-26 DIAGNOSIS — M17.0 PRIMARY OSTEOARTHRITIS OF BOTH KNEES: ICD-10-CM

## 2021-03-26 PROCEDURE — 99214 OFFICE O/P EST MOD 30 MIN: CPT | Performed by: INTERNAL MEDICINE

## 2021-03-26 RX ORDER — MONTELUKAST SODIUM 10 MG/1
10 TABLET ORAL
Qty: 30 TABLET | Refills: 1 | Status: SHIPPED | OUTPATIENT
Start: 2021-03-26 | End: 2022-05-25 | Stop reason: SDUPTHER

## 2021-03-26 NOTE — PROGRESS NOTES
Assessment/Plan:    BMI Counseling: Body mass index is 36 18 kg/m²  The BMI is above normal  Nutrition recommendations include decreasing portion sizes, encouraging healthy choices of fruits and vegetables and decreasing fast food intake  Exercise recommendations include moderate physical activity 150 minutes/week  1  Essential hypertension    2  Obstructive sleep apnea syndrome    3  Impaired fasting blood sugar    4  Venous insufficiency of both lower extremities    5  Paroxysmal atrial fibrillation (HCC)    6  Lumbar radiculopathy    7  Primary osteoarthritis of both knees    8  Screen for colon cancer  -     Cologuard; Future    9  Prostate cancer ([facility] )    10  Dysthymic disorder    11  CPAP (continuous positive airway pressure) dependence    12  Chronic pain syndrome    13  Obesity, morbid ([facility] )    14  Non-seasonal allergic rhinitis, unspecified trigger  -     montelukast (SINGULAIR) 10 mg tablet; Take 1 tablet (10 mg total) by mouth daily at bedtime           Subjective:      Patient ID: Brian Shell is a 68 y o  male  Follow-up on blood test done on 03/11/2021-discussed with him      The following portions of the patient's history were reviewed and updated as appropriate: He  has a past medical history of A-fib ([facility] ), Anemia, unspecified, Anxiety, Arthritis, Basal cell carcinoma (BCC) of skin of nose, Cancer (HCC), Chondrocostal junction syndrome, CPAP (continuous positive airway pressure) dependence, Depression, Dysthymic disorder, Edema, unspecified, Hyperlipidemia, Hypertension, Impaired fasting blood sugar, Intervertebral disc disorders with radiculopathy, lumbar region, Irregular heart beat, Prostate cancer ([facility] ), Sleep apnea, Spinal stenosis, Stroke ([facility] ), TIA (transient ischemic attack), Unspecified osteoarthritis, unspecified site, and Venous insufficiency of both lower extremities    He   Patient Active Problem List    Diagnosis Date Noted    Screen for colon cancer 03/26/2021    Obesity, morbid ([place] [address] ) 03/26/2021    Non-seasonal allergic rhinitis 03/26/2021    Primary osteoarthritis of both knees 11/13/2020    TIA (transient ischemic attack)     Prostate cancer ([place] [address] )     Intervertebral disc disorders with radiculopathy, lumbar region     Impaired fasting blood sugar     Hypertension     Hyperlipidemia     A-fib ([place] [address] )     Sleep apnea     CPAP (continuous positive airway pressure) dependence     Dysthymic disorder     Venous insufficiency of both lower extremities     Essential hypertension 11/20/2019    Venous insufficiency 04/09/3977    Uncomplicated opioid dependence ([place] [address] ) 06/05/2019    Encounter for long-term use of opiate analgesic 06/05/2019    Chronic pain syndrome 09/17/2018    Lumbar radiculopathy 04/16/2018    Lumbar spondylosis 04/16/2018    Right bundle branch block (RBBB) 03/19/2018    Edema of both lower legs due to peripheral venous insufficiency 03/19/2018    Chronic pain of both lower extremities 03/19/2018    Paroxysmal atrial fibrillation ([place] [address] ) 01/20/2018    Sacroiliitis ([place] [address] ) 04/17/2017    Spondylosis of lumbar region without myelopathy or radiculopathy 04/17/2017    Lumbar spinal stenosis 05/12/2015     He  has a past surgical history that includes Prostatectomy; Tonsillectomy and adenoidectomy; FACIAL/NECK BIOPSY (N/A, 4/3/2017); FULL THICKNESS SKIN GRAFT (N/A, 4/3/2017); Cataract extraction; CT epidural steroid injection (TIN lumbar); MOHS RECONSTRUCTION (N/A, 10/27/2020); FLAP LOCAL HEAD / NECK (N/A, 10/27/2020); Colonoscopy; and Excision basal cell carcinoma  His family history includes Heart attack in his father  He  reports that he quit smoking about 41 years ago  His smoking use included cigarettes  He has never used smokeless tobacco  He reports previous alcohol use of about 7 0 standard drinks of alcohol per week  He reports that he does not use drugs    Current Outpatient Medications   Medication Sig Dispense Refill    Acetaminophen 325 MG CAPS Take by mouth as needed       amLODIPine (NORVASC) 10 mg tablet TAKE 1 TABLET DAILY 90 tablet 0    atorvastatin (LIPITOR) 40 mg tablet TAKE 1 TABLET (40 MG TOTAL) BY MOUTH DAILY AT BEDTIME 90 tablet 0    Cholecalciferol (VITAMIN D-3 PO) Take 2,000 unit marking on U-100 syringe by mouth daily after dinner      co-enzyme Q-10 30 MG capsule Take 30 mg by mouth daily after dinner      diclofenac sodium (VOLTAREN) 1 % Apply 2 g topically 4 (four) times a day      glucosamine-chondroitin 500-400 MG tablet Take 2 tablets by mouth daily in the early morning      losartan (COZAAR) 100 MG tablet TAKE 1 TABLET (100 MG TOTAL) BY MOUTH DAILY 90 tablet 3    multivitamin (THERAGRAN) TABS Take 1 tablet by mouth daily in the early morning      Omega-3 Fatty Acids (FISH OIL) 1,000 mg Take 1,000 mg by mouth 2 (two) times a day      sertraline (ZOLOFT) 100 mg tablet TAKE 1/2 TABLET DAILY 60 30 tablet 2    sotalol (BETAPACE) 80 mg tablet TAKE 1 TABLET TWICE DAILY 180 tablet 3    torsemide (DEMADEX) 20 mg tablet TAKE 1 TABLET TWICE DAILY 60 tablet 11    traMADol (ULTRAM) 50 mg tablet Take 1 tablet (50 mg total) by mouth 2 (two) times a day as needed for moderate pain 60 tablet 2    warfarin (COUMADIN) 5 mg tablet TAKE 1/2 TO 1 TABLET DAILY OR AS DIRECTED 90 tablet 3    montelukast (SINGULAIR) 10 mg tablet Take 1 tablet (10 mg total) by mouth daily at bedtime 30 tablet 1     No current facility-administered medications for this visit        Current Outpatient Medications on File Prior to Visit   Medication Sig    Acetaminophen 325 MG CAPS Take by mouth as needed     amLODIPine (NORVASC) 10 mg tablet TAKE 1 TABLET DAILY    atorvastatin (LIPITOR) 40 mg tablet TAKE 1 TABLET (40 MG TOTAL) BY MOUTH DAILY AT BEDTIME    Cholecalciferol (VITAMIN D-3 PO) Take 2,000 unit marking on U-100 syringe by mouth daily after dinner    co-enzyme Q-10 30 MG capsule Take 30 mg by mouth daily after dinner    diclofenac sodium (VOLTAREN) 1 % Apply 2 g topically 4 (four) times a day    glucosamine-chondroitin 500-400 MG tablet Take 2 tablets by mouth daily in the early morning    losartan (COZAAR) 100 MG tablet TAKE 1 TABLET (100 MG TOTAL) BY MOUTH DAILY    multivitamin (THERAGRAN) TABS Take 1 tablet by mouth daily in the early morning    Omega-3 Fatty Acids (FISH OIL) 1,000 mg Take 1,000 mg by mouth 2 (two) times a day    sertraline (ZOLOFT) 100 mg tablet TAKE 1/2 TABLET DAILY 60    sotalol (BETAPACE) 80 mg tablet TAKE 1 TABLET TWICE DAILY    torsemide (DEMADEX) 20 mg tablet TAKE 1 TABLET TWICE DAILY    traMADol (ULTRAM) 50 mg tablet Take 1 tablet (50 mg total) by mouth 2 (two) times a day as needed for moderate pain    warfarin (COUMADIN) 5 mg tablet TAKE 1/2 TO 1 TABLET DAILY OR AS DIRECTED    [DISCONTINUED] warfarin (COUMADIN) 5 mg tablet TAKE 1/2 TO 1 TABLET DAILY OR AS DIRECTED     No current facility-administered medications on file prior to visit  He has No Known Allergies       Review of Systems   Constitutional: Negative for chills and fever  HENT: Negative for congestion, ear pain and sore throat  Eyes: Negative for pain  Respiratory: Positive for shortness of breath  Negative for cough  Cardiovascular: Negative for chest pain and leg swelling  Gastrointestinal: Negative for abdominal pain, nausea and vomiting  Endocrine: Negative for polyuria  Genitourinary: Negative for difficulty urinating, frequency and urgency  Musculoskeletal: Positive for arthralgias and back pain  Skin: Negative for rash  Neurological: Negative for weakness and headaches  Psychiatric/Behavioral: Negative for sleep disturbance  The patient is not nervous/anxious            Objective:      /86 (BP Location: Right arm, Patient Position: Sitting, Cuff Size: Standard)   Pulse 60   Temp 97 5 °F (36 4 °C) (Temporal)   Ht 5' 7" (1 702 m)   Wt 105 kg (231 lb)   SpO2 96%   BMI 36 18 kg/m²     Recent Results (from the past 1344 hour(s))   Protime-INR    Collection Time: 02/10/21 12:45 PM   Result Value Ref Range    Protime 24 6 (H) 11 6 - 14 5 seconds    INR 2 23 (H) 0 84 - 1 19   Protime-INR    Collection Time: 03/08/21 11:53 AM   Result Value Ref Range    Protime 24 8 (H) 11 6 - 14 5 seconds    INR 2 26 (H) 0 84 - 1 19   Hemoglobin A1C    Collection Time: 03/11/21 12:00 AM   Result Value Ref Range    Hemoglobin A1C 6 7    HEMOGLOBIN A1C    Collection Time: 03/11/21 10:05 AM   Result Value Ref Range    Hemoglobin A1C 6 7 (H) <5 7 %    eAG, EST AVG Glucose 146 <154 mg/dL   MM DT_Alprazolam Definitive Test    Collection Time: 03/17/21 10:54 AM   Result Value Ref Range    Alpha-Hydroxyalprazolam Quantification UR negative 20 ng/mL   MM DT_Amphetamine Definitive Test    Collection Time: 03/17/21 10:54 AM   Result Value Ref Range    Amphetamine Quantification negative 100 ng/mL   MM DT_Aripiprazole Definitive Test    Collection Time: 03/17/21 10:54 AM   Result Value Ref Range    Aripiprazole Quantification negative 5 ng/mL    [id number] QUANTIFICATION negative 15   MM DT_Bath Salts Definitive Test    Collection Time: 03/17/21 10:54 AM   Result Value Ref Range    BATH SALTS negative 3 ng/mL    Mephedrone Quantification negative 3 ng/mL    METHYLONE QUANTIFICATION negative 3 ng/mL   MM DT_Buprenorphine Definitive Test    Collection Time: 03/17/21 10:54 AM   Result Value Ref Range    Buprenorphine Quantification negative 5 ng/mL    Norbuprenorphine Quantification negative 20 ng/mL   MM DT_Butalbital Definitive Test    Collection Time: 03/17/21 10:54 AM   Result Value Ref Range    Butalbital Quantification negative 200 ng/mL   MM DT_Clonazepam Definitive Test    Collection Time: 03/17/21 10:54 AM   Result Value Ref Range    7-Amino-Clonazepam Quantification UR negative 20 ng/mL   MM DT_Clozapine Definitive Test    Collection Time: 03/17/21 10:54 AM   Result Value Ref Range    Clozapine Quantification negative 25 ng/mL N-desmethylclozapine Quantification negative 25 ng/mL   MM DT_Cocaine Definitive Test    Collection Time: 03/17/21 10:54 AM   Result Value Ref Range    Cocaine metabolite Quantification negative 50 ng/mL   MM DT_Codeine Definitive Test    Collection Time: 03/17/21 10:54 AM   Result Value Ref Range    Codeine Quantification negative 50 ng/mL    Morphine Quantification negative 50 ng/mL    Hydrocodone Quantification negative 50 ng/mL    Norhydrocodone Quantification negative 50 ng/mL    Hydromorphone Quantification negative 50 ng/mL   MM DT_Desipramine Definitive Test    Collection Time: 03/17/21 10:54 AM   Result Value Ref Range    Desipramine Quantification negative 50 ng/mL   MM DT_Dextromethorphan Definitive Test    Collection Time: 03/17/21 10:54 AM   Result Value Ref Range    Dextromethorphan Quantification negative 50 ng/mL    Dextrorphan (Dextromethorphan metabolite) Quant negative 50 ng/mL   MM Diazepam Definitive Test    Collection Time: 03/17/21 10:54 AM   Result Value Ref Range    Nordiazepam Quantification negative 40 ng/mL    Temazepam Quantification negative 50 ng/mL    Oxazepam Quantification negative 40 ng/mL   MM DT_Ethyl Glucuronide/Ethyl Sulfate Definitive Test    Collection Time: 03/17/21 10:54 AM   Result Value Ref Range    Ethyl Glucuronide Quantification positive-1595 953-I (A) 500 ng/mL    Ethyl Sulfate Quantification negative 500 ng/mL   MM DT_Fentanyl Definitive Test    Collection Time: 03/17/21 10:54 AM   Result Value Ref Range    Fentanyl Quantification negative 1 ng/mL    Norfentanyl Quantification negative 8 ng/mL    3-methyl-fentanyl Quantification Fen Neg 2 ng/mL    4-ANPP Quantification Fen Neg 2 ng/mL    4-FiBF Quantification Fen Neg 2 ng/mL    Acetyl fentanyl Quantification Fen Neg 2 ng/mL    Acetyl norfentanyl Quantification Fen Neg 5 ng/mL    Acryl fentanyl Quantification Fen Neg 1 ng/mL    Butryl fentanyl Quantification Fen Neg 1 ng/mL    Carfentanil Quantification Fen Neg 2 ng/mL    Cyclopropyl fentanyl Quantification Fen Neg 1 ng/mL    Furanyl fentanyl Quantification Fen Neg 2 ng/mL    Methoxyacetyl fentanyl Quantification Fen Neg 2 ng/mL    [id number] Quantification Fen Neg 2 ng/mL    N-desmethyl [id number] Quantification Fen Neg 2 ng/mL   MM DT_Haloperidol Definitive Test    Collection Time: 03/17/21 10:54 AM   Result Value Ref Range    Haloperidol  Quantification negative 5 ng/mL    Haloperidol metabolite Quantification negative 5 ng/mL   MM DT_Heroin Definitive Test    Collection Time: 03/17/21 10:54 AM   Result Value Ref Range    6-NANCY (Heroin metabolite) Quantification UR negative 10 ng/mL   MM DT_Hydrocodone Definitive Test    Collection Time: 03/17/21 10:54 AM   Result Value Ref Range    Hydrocodone Quantification negative 50 ng/mL    Norhydrocodone Quantification negative 50 ng/mL    Hydromorphone Quantification negative 50 ng/mL   MM DT_Hydromorphone Definitive Test    Collection Time: 03/17/21 10:54 AM   Result Value Ref Range    Hydromorphone Quantification negative 50 ng/mL   MM DT_Imipramine Definitive Test    Collection Time: 03/17/21 10:54 AM   Result Value Ref Range    Desipramine Quantification negative 50 ng/mL    Imipramine Quantification negative 50 ng/mL   MM DT_Kratom Definitive Test    Collection Time: 03/17/21 10:54 AM   Result Value Ref Range    Mitragynine (Kratom alkaloid) Quantification negative 1 ng/mL    8-LB-Ghuqlbqwiti (Kratom alkaloid) Quantification negative 1 ng/mL   MM DT_Levorphanol Definitive Test    Collection Time: 03/17/21 10:54 AM   Result Value Ref Range    Dextrorphan (Dextromethorphan metabolite) Quant negative 50 ng/mL   MM Lorazepam Definitive Test    Collection Time: 03/17/21 10:54 AM   Result Value Ref Range    Lorazepam Quantification negative 40 ng/mL   MM DT_MDMA Definitive Test    Collection Time: 03/17/21 10:54 AM   Result Value Ref Range    MDMA Quantification negative 100 ng/mL   MM DT_Meperidine Definitive Test    Collection Time: 03/17/21 10:54 AM   Result Value Ref Range    Meperidine Quantification negative 50 ng/mL    Normeperidine Quantification negative 50 ng/mL   MM DT_Methadone Definitive Test    Collection Time: 03/17/21 10:54 AM   Result Value Ref Range    Methadone Quantification negative 100 ng/mL    EDDP (Methadone metabolite) Quantification negative 100 ng/mL   MM DT_Methamphetamine Definitive Test    Collection Time: 03/17/21 10:54 AM   Result Value Ref Range    Amphetamine Quantification negative 100 ng/mL    Methamphetamine Quantification negative 100 ng/mL   MM DT_Morphine Definitive Test    Collection Time: 03/17/21 10:54 AM   Result Value Ref Range    Morphine Quantification negative 50 ng/mL    Hydromorphone Quantification negative 50 ng/mL   MM DT_Olanzapine Definitive Test    Collection Time: 03/17/21 10:54 AM   Result Value Ref Range    OLANZAPINE QUANTIFICATION negative 25 ng/mL   MM DT_Oxazepam Definitive Test    Collection Time: 03/17/21 10:54 AM   Result Value Ref Range    Oxazepam Quantification negative 40 ng/mL   MM DT_Oxycodone Definitive Test    Collection Time: 03/17/21 10:54 AM   Result Value Ref Range    Oxycodone Quantification negative 50 ng/mL    Noroxycodone Quantification negative 50 ng/mL    Oxymorphone Quantification negative 50 ng/mL   MM DT_Oxymorphone Definitive Test    Collection Time: 03/17/21 10:54 AM   Result Value Ref Range    Oxymorphone Quantification negative 50 ng/mL   MM DT_Phencyclidine Definitive Test    Collection Time: 03/17/21 10:54 AM   Result Value Ref Range    Phencyclidine Quantification negative 10 ng/mL   MM DT_Phenobarbital Definitive Test    Collection Time: 03/17/21 10:54 AM   Result Value Ref Range    Phenobarbital Quantification negative 200 ng/mL   MM DT_Phentermine Definitive Test    Collection Time: 03/17/21 10:54 AM   Result Value Ref Range    PHENTERMINE QUANTIFICATION negative 50 ng/mL   MM DT_Quetiapine Definitive Test    Collection Time: 03/17/21 10:54 AM   Result Value Ref Range    QUETIAPINE QUANTIFICATION negative 25 ng/mL    NORQUETIAPINE QUANTIFICATION negative 25 ng/mL   MM DT_Risperidone Definitive Test    Collection Time: 03/17/21 10:54 AM   Result Value Ref Range    Risperidone Quantification negative 10 ng/mL    Hydroxyrisperidone Quantification negative 25 ng/mL   MM DT_Secobarbital Definitive Test    Collection Time: 03/17/21 10:54 AM   Result Value Ref Range    Secobarbital Quantification negative 200 ng/mL   MM DT_Spice Definitive Test    Collection Time: 03/17/21 10:54 AM   Result Value Ref Range    5F-ADB-M7 negative 10 ng/mL    [id number] METABOLITE QUANTIFICATION  negative 10 ng/mL    [id number] METABOLITE QUANTIFICATION negative 10 ng/mL    VGQ965 metabolite Quantification negative 10 ng/mL    UEZ651 metabolite Quantification negative 10 ng/mL    RCS4 METABOLITE QUANTIFICATION negative 10 ng/mL    XLR11/ METABOLITE QUANTIFICATION negative 10 ng/mL   MM DT_Tapentadol Definitive Test    Collection Time: 03/17/21 10:54 AM   Result Value Ref Range    Tapentadol Quantification negative 50 ng/mL   MM DT_Temazapam Definitive Test    Collection Time: 03/17/21 10:54 AM   Result Value Ref Range    Temazepam Quantification negative 50 ng/mL    Oxazepam Quantification negative 40 ng/mL   MM DT_THC Definitive Test    Collection Time: 03/17/21 10:54 AM   Result Value Ref Range    cTHC (Marijuana metabolite) Quantification negative 15 ng/mL   MM DT_Tramadol Definitive Test    Collection Time: 03/17/21 10:54 AM   Result Value Ref Range    Tramadol Quantification negative 100 ng/mL    O-desmethyl-tramadol Quantification negative 100 ng/mL    N-DESMETHYL-TRAMADOL QUANTIFICATION positive-747 102 100 ng/mL   MM DT_Methylphenidate Definitive Test    Collection Time: 03/17/21 10:54 AM   Result Value Ref Range    Methylphenidate Quantification negative 50 ng/mL    RITALINIC ACID QUANTIFICATION negative 50 ng/mL        Physical Exam  Constitutional: Appearance: Normal appearance  HENT:      Head: Normocephalic  Right Ear: Tympanic membrane, ear canal and external ear normal       Left Ear: Tympanic membrane, ear canal and external ear normal       Nose: Nose normal  No congestion  Mouth/Throat:      Mouth: Mucous membranes are moist       Pharynx: Oropharynx is clear  No oropharyngeal exudate or posterior oropharyngeal erythema  Eyes:      Extraocular Movements: Extraocular movements intact  Conjunctiva/sclera: Conjunctivae normal       Pupils: Pupils are equal, round, and reactive to light  Neck:      Musculoskeletal: Normal range of motion and neck supple  Cardiovascular:      Rate and Rhythm: Normal rate and regular rhythm  Heart sounds: Normal heart sounds  No murmur  Pulmonary:      Effort: Pulmonary effort is normal       Breath sounds: Normal breath sounds  No wheezing or rales  Abdominal:      General: Bowel sounds are normal  There is no distension  Palpations: Abdomen is soft  Tenderness: There is no abdominal tenderness  Musculoskeletal: Normal range of motion  Right lower leg: Edema present  Left lower leg: Edema present  Lymphadenopathy:      Cervical: No cervical adenopathy  Skin:     General: Skin is warm  Neurological:      General: No focal deficit present  Mental Status: He is alert and oriented to person, place, and time

## 2021-04-05 ENCOUNTER — APPOINTMENT (OUTPATIENT)
Dept: LAB | Facility: CLINIC | Age: 81
End: 2021-04-05
Payer: MEDICARE

## 2021-04-05 ENCOUNTER — ANTICOAG VISIT (OUTPATIENT)
Dept: CARDIOLOGY CLINIC | Facility: CLINIC | Age: 81
End: 2021-04-05

## 2021-04-05 DIAGNOSIS — I48.0 PAROXYSMAL ATRIAL FIBRILLATION (HCC): ICD-10-CM

## 2021-04-14 ENCOUNTER — IMMUNIZATIONS (OUTPATIENT)
Dept: FAMILY MEDICINE CLINIC | Facility: HOSPITAL | Age: 81
End: 2021-04-14

## 2021-04-14 DIAGNOSIS — Z23 ENCOUNTER FOR IMMUNIZATION: Primary | ICD-10-CM

## 2021-04-14 PROCEDURE — 0001A SARS-COV-2 / COVID-19 MRNA VACCINE (PFIZER-BIONTECH) 30 MCG: CPT

## 2021-04-14 PROCEDURE — 91300 SARS-COV-2 / COVID-19 MRNA VACCINE (PFIZER-BIONTECH) 30 MCG: CPT

## 2021-04-23 DIAGNOSIS — I10 ESSENTIAL HYPERTENSION: ICD-10-CM

## 2021-04-23 RX ORDER — LOSARTAN POTASSIUM 100 MG/1
100 TABLET ORAL DAILY
Qty: 90 TABLET | Refills: 3 | Status: SHIPPED | OUTPATIENT
Start: 2021-04-23 | End: 2022-04-18

## 2021-04-27 ENCOUNTER — ANTICOAG VISIT (OUTPATIENT)
Dept: CARDIOLOGY CLINIC | Facility: CLINIC | Age: 81
End: 2021-04-27

## 2021-04-27 ENCOUNTER — APPOINTMENT (OUTPATIENT)
Dept: LAB | Facility: CLINIC | Age: 81
End: 2021-04-27
Payer: MEDICARE

## 2021-04-27 DIAGNOSIS — I48.0 PAROXYSMAL ATRIAL FIBRILLATION (HCC): ICD-10-CM

## 2021-05-09 ENCOUNTER — IMMUNIZATIONS (OUTPATIENT)
Dept: FAMILY MEDICINE CLINIC | Facility: HOSPITAL | Age: 81
End: 2021-05-09

## 2021-05-09 DIAGNOSIS — Z23 ENCOUNTER FOR IMMUNIZATION: Primary | ICD-10-CM

## 2021-05-09 PROCEDURE — 0002A SARS-COV-2 / COVID-19 MRNA VACCINE (PFIZER-BIONTECH) 30 MCG: CPT

## 2021-05-09 PROCEDURE — 91300 SARS-COV-2 / COVID-19 MRNA VACCINE (PFIZER-BIONTECH) 30 MCG: CPT

## 2021-05-14 DIAGNOSIS — I48.0 PAROXYSMAL ATRIAL FIBRILLATION (HCC): ICD-10-CM

## 2021-05-14 DIAGNOSIS — I10 ESSENTIAL (PRIMARY) HYPERTENSION: ICD-10-CM

## 2021-05-14 RX ORDER — SOTALOL HYDROCHLORIDE 80 MG/1
TABLET ORAL
Qty: 180 TABLET | Refills: 3 | Status: SHIPPED | OUTPATIENT
Start: 2021-05-14 | End: 2022-05-12

## 2021-05-14 RX ORDER — AMLODIPINE BESYLATE 10 MG/1
TABLET ORAL
Qty: 90 TABLET | Refills: 0 | Status: SHIPPED | OUTPATIENT
Start: 2021-05-14 | End: 2021-08-20

## 2021-05-28 ENCOUNTER — ANTICOAG VISIT (OUTPATIENT)
Dept: CARDIOLOGY CLINIC | Facility: CLINIC | Age: 81
End: 2021-05-28

## 2021-05-28 ENCOUNTER — APPOINTMENT (OUTPATIENT)
Dept: LAB | Facility: CLINIC | Age: 81
End: 2021-05-28
Payer: MEDICARE

## 2021-05-28 DIAGNOSIS — I48.0 PAROXYSMAL ATRIAL FIBRILLATION (HCC): ICD-10-CM

## 2021-06-09 ENCOUNTER — OFFICE VISIT (OUTPATIENT)
Dept: PAIN MEDICINE | Facility: CLINIC | Age: 81
End: 2021-06-09
Payer: MEDICARE

## 2021-06-09 VITALS
BODY MASS INDEX: 37.2 KG/M2 | TEMPERATURE: 98.5 F | WEIGHT: 237 LBS | HEIGHT: 67 IN | HEART RATE: 59 BPM | SYSTOLIC BLOOD PRESSURE: 188 MMHG | DIASTOLIC BLOOD PRESSURE: 78 MMHG

## 2021-06-09 DIAGNOSIS — F11.20 UNCOMPLICATED OPIOID DEPENDENCE (HCC): ICD-10-CM

## 2021-06-09 DIAGNOSIS — M54.16 LUMBAR RADICULOPATHY: ICD-10-CM

## 2021-06-09 DIAGNOSIS — Z79.891 ENCOUNTER FOR LONG-TERM USE OF OPIATE ANALGESIC: ICD-10-CM

## 2021-06-09 DIAGNOSIS — M51.16 INTERVERTEBRAL DISC DISORDERS WITH RADICULOPATHY, LUMBAR REGION: ICD-10-CM

## 2021-06-09 DIAGNOSIS — G89.4 CHRONIC PAIN SYNDROME: Primary | ICD-10-CM

## 2021-06-09 DIAGNOSIS — M47.816 SPONDYLOSIS OF LUMBAR REGION WITHOUT MYELOPATHY OR RADICULOPATHY: ICD-10-CM

## 2021-06-09 DIAGNOSIS — M47.816 LUMBAR SPONDYLOSIS: ICD-10-CM

## 2021-06-09 PROCEDURE — 99214 OFFICE O/P EST MOD 30 MIN: CPT | Performed by: NURSE PRACTITIONER

## 2021-06-09 NOTE — PROGRESS NOTES
Assessment:  1  Chronic pain syndrome    2  Lumbar radiculopathy    3  Intervertebral disc disorders with radiculopathy, lumbar region    4  Spondylosis of lumbar region without myelopathy or radiculopathy    5  Lumbar spondylosis    6  Uncomplicated opioid dependence ([facility] [address] )    7  Encounter for long-term use of opiate analgesic        Plan:  1  Patient may continue tramadol 50 mg as prescribed  He still has 2 refills on file at the pharmacy therefore does not require refills   Today  [place] Prescription Drug Monitoring Program report was reviewed and was appropriate      There are risks associated with opioid medications, including dependence, addiction and tolerance  The patient understands and agrees to use these medications only as prescribed  Potential side effects of the medications include, but are not limited to, constipation, drowsiness, addiction, impaired judgment and risk of fatal overdose if not taken as prescribed  The patient was warned against driving while taking sedation medications  Sharing medications is a felony  At this point in time, the patient is showing no signs of addiction, abuse, diversion or suicidal ideation  2    I offered to initiate the patient physical therapy for deconditioning and leg weakness, however he declines  3  The patient may continue Tylenol p r n  should not exceed more than 3000 mg in 24 hours   4  I will avoid NSAIDs secondary to anticoagulation  5  The patient will continue with his home exercise program  6  I will follow up the patient in 3 months or sooner if needed     M*Modal software was used to dictate this note  It may contain errors with dictating incorrect words or incorrect spelling  Please contact the provider directly with any questions  History of Present Illness:     The patient is a 80 y o  male last seen on 3/24/21 who presents for a follow up office visit in regards to chronic  Lumbosacral back pain with associated weakness in the lower extremities secondary to  Lumbar degenerative disc disease, lumbar spondylosis, lumbar stenosis and chronic pain syndrome  The patient denies bowel or bladder incontinence or saddle anesthesia  The patient has had good relief in the past bilateral L4 TFESI  He states that it helped with the pain, however never helped with the weakness which is what his main complaint is today  He has failed lumbar medial branch blocks and SI joint injections in the past   He currently rates his pain a 5/10 on the numeric pain rating scale  States pain is occasional nature and follows no particular pattern throughout day  He is managing his pain with tramadol 50 mg p r n  which he takes sparingly, and Tylenol p r n  He is unable to take NSAIDs secondary to anticoagulation    Pain Contract Signed: 3/24/21  Last Urine Drug Screen: 3/24/21  Last Tramadol per pt 5/28/21    I have personally reviewed and/or updated the patient's past medical history, past surgical history, family history, social history, current medications, allergies, and vital signs today  Review of Systems:    Review of Systems   Respiratory: Negative for shortness of breath  Cardiovascular: Negative for chest pain  Gastrointestinal: Negative for constipation, diarrhea, nausea and vomiting  Musculoskeletal: Negative for arthralgias, gait problem, joint swelling and myalgias  Skin: Negative for rash  Neurological: Negative for dizziness, seizures and weakness  All other systems reviewed and are negative          Past Medical History:   Diagnosis Date    [facility]     Anemia, unspecified     Anxiety     Arthritis     Basal cell carcinoma (BCC) of skin of nose     Cancer (HCC)     Chondrocostal junction syndrome     CPAP (continuous positive airway pressure) dependence     Depression     Dysthymic disorder     Edema, unspecified     Hyperlipidemia     Hypertension     Impaired fasting blood sugar     Intervertebral disc disorders with radiculopathy, lumbar region     Irregular heart beat     Prostate cancer [facility])     s/p surgery    Sleep apnea     on CPAP    Spinal stenosis     Stroke ([address] )     TIA (transient ischemic attack)     no residual problems    Unspecified osteoarthritis, unspecified site     Venous insufficiency of both lower extremities        Past Surgical History:   Procedure Laterality Date    BASAL CELL CARCINOMA EXCISION      on the nose    CATARACT EXTRACTION      COLONOSCOPY      ,     CT EPIDURAL STEROID INJECTION (TIN LUMBAR)      FACIAL/NECK BIOPSY N/A 4/3/2017    Procedure: NOSE BCC EXCISION; FROZEN SECTION; RECONSTRUCTION ;  Surgeon: Brendon Plummer MD;  [facility];  Service:     FLAP LOCAL HEAD / NECK N/A 10/27/2020    Procedure: NOSE FLAP;  Surgeon: Brendon Plummer MD;  [facility];  Service: Plastics    FULL THICKNESS SKIN GRAFT N/A 4/3/2017    Procedure: FLAP VERSUS FULL THICKNESS SKIN GRAFT ;  Surgeon: Brendon Plummer MD;  [facility];  Service:     MOHS RECONSTRUCTION N/A 10/27/2020    Procedure: NOSE MOHS DEFECT RECONSTRUCTION;  Surgeon: Brendon Plummer MD;  [facility];  Service: Plastics    PROSTATECTOMY      TONSILLECTOMY AND ADENOIDECTOMY         Family History   Problem Relation Age of Onset    Heart attack Father        Social History     Occupational History    Not on file   Tobacco Use    Smoking status: Former Smoker     Types: Cigarettes     Quit date:      Years since quittin 4    Smokeless tobacco: Never Used    Tobacco comment: long time ago   Substance and Sexual Activity    Alcohol use: Not Currently     Alcohol/week: 7 0 standard drinks     Types: 7 Glasses of wine per week     Comment: glass of wine with dinner, maybe    Drug use: No    Sexual activity: Not Currently         Current Outpatient Medications:     Acetaminophen 325 MG CAPS, Take by mouth as needed , Disp: , Rfl:     amLODIPine (NORVASC) 10 mg tablet, TAKE 1 TABLET DAILY, Disp: 90 tablet, Rfl: 0    atorvastatin (LIPITOR) 40 mg tablet, TAKE 1 TABLET (40 MG TOTAL) BY MOUTH DAILY AT BEDTIME, Disp: 90 tablet, Rfl: 0    Cholecalciferol (VITAMIN D-3 PO), Take 2,000 unit marking on U-100 syringe by mouth daily after dinner, Disp: , Rfl:     co-enzyme Q-10 30 MG capsule, Take 30 mg by mouth daily after dinner, Disp: , Rfl:     diclofenac sodium (VOLTAREN) 1 %, Apply 2 g topically 4 (four) times a day, Disp: , Rfl:     glucosamine-chondroitin 500-400 MG tablet, Take 2 tablets by mouth daily in the early morning, Disp: , Rfl:     losartan (COZAAR) 100 MG tablet, TAKE 1 TABLET (100 MG TOTAL) BY MOUTH DAILY, Disp: 90 tablet, Rfl: 3    montelukast (SINGULAIR) 10 mg tablet, Take 1 tablet (10 mg total) by mouth daily at bedtime, Disp: 30 tablet, Rfl: 1    multivitamin (THERAGRAN) TABS, Take 1 tablet by mouth daily in the early morning, Disp: , Rfl:     Omega-3 Fatty Acids (FISH OIL) 1,000 mg, Take 1,000 mg by mouth 2 (two) times a day, Disp: , Rfl:     sertraline (ZOLOFT) 100 mg tablet, TAKE 1/2 TABLET DAILY 60, Disp: 30 tablet, Rfl: 2    sotalol (BETAPACE) 80 mg tablet, TAKE 1 TABLET TWICE DAILY, Disp: 180 tablet, Rfl: 3    torsemide (DEMADEX) 20 mg tablet, TAKE 1 TABLET TWICE DAILY, Disp: 60 tablet, Rfl: 11    traMADol (ULTRAM) 50 mg tablet, Take 1 tablet (50 mg total) by mouth 2 (two) times a day as needed for moderate pain, Disp: 60 tablet, Rfl: 2    warfarin (COUMADIN) 5 mg tablet, TAKE 1/2 TO 1 TABLET DAILY OR AS DIRECTED, Disp: 90 tablet, Rfl: 3    No Known Allergies    Physical Exam:    BP (!) 188/78   Pulse 59   Temp 98 5 °F (36 9 °C)   Ht 5' 7" (1 702 m)   Wt 108 kg (237 lb)   BMI 37 12 kg/m²     Constitutional:normal, well developed, well nourished, alert, in no distress and non-toxic and no overt pain behavior    Eyes:anicteric  HEENT:grossly intact  Neck:supple, symmetric, trachea midline and no masses   Pulmonary:even and unlabored  Cardiovascular:+3 Pitting edema present bilaterally in the lower extremities  Skin:Normal without rashes or lesions and well hydrated  Psychiatric:Mood and affect appropriate  Neurologic:Cranial Nerves II-XII grossly intact  Musculoskeletal:antalgic gait but steady without the use of assistive devices      Imaging  No orders to display         No orders of the defined types were placed in this encounter

## 2021-06-10 DIAGNOSIS — E78.2 MIXED HYPERLIPIDEMIA: ICD-10-CM

## 2021-06-10 RX ORDER — ATORVASTATIN CALCIUM 40 MG/1
40 TABLET, FILM COATED ORAL
Qty: 90 TABLET | Refills: 0 | Status: SHIPPED | OUTPATIENT
Start: 2021-06-10 | End: 2021-09-14

## 2021-07-12 ENCOUNTER — HOSPITAL ENCOUNTER (OUTPATIENT)
Dept: NON INVASIVE DIAGNOSTICS | Facility: CLINIC | Age: 81
Discharge: HOME | End: 2021-07-12
Payer: MEDICARE

## 2021-07-12 DIAGNOSIS — I45.10 RIGHT BUNDLE BRANCH BLOCK (RBBB): ICD-10-CM

## 2021-07-12 DIAGNOSIS — I48.0 PAROXYSMAL ATRIAL FIBRILLATION (HCC): ICD-10-CM

## 2021-07-12 PROCEDURE — 93306 TTE W/DOPPLER COMPLETE: CPT

## 2021-07-12 PROCEDURE — 93306 TTE W/DOPPLER COMPLETE: CPT | Performed by: INTERNAL MEDICINE

## 2021-07-14 DIAGNOSIS — I48.0 PAF (PAROXYSMAL ATRIAL FIBRILLATION) (HCC): Primary | ICD-10-CM

## 2021-07-15 ENCOUNTER — ANTICOAG VISIT (OUTPATIENT)
Dept: CARDIOLOGY CLINIC | Facility: CLINIC | Age: 81
End: 2021-07-15

## 2021-07-15 ENCOUNTER — APPOINTMENT (OUTPATIENT)
Dept: LAB | Facility: CLINIC | Age: 81
End: 2021-07-15
Payer: MEDICARE

## 2021-07-15 DIAGNOSIS — I48.0 PAROXYSMAL ATRIAL FIBRILLATION (HCC): Primary | ICD-10-CM

## 2021-07-29 DIAGNOSIS — F34.1 DYSTHYMIC DISORDER: ICD-10-CM

## 2021-07-30 RX ORDER — SERTRALINE HYDROCHLORIDE 100 MG/1
TABLET, FILM COATED ORAL
Qty: 30 TABLET | Refills: 0 | Status: SHIPPED | OUTPATIENT
Start: 2021-07-30 | End: 2021-10-06

## 2021-08-04 ENCOUNTER — OFFICE VISIT (OUTPATIENT)
Dept: INTERNAL MEDICINE CLINIC | Facility: CLINIC | Age: 81
End: 2021-08-04
Payer: MEDICARE

## 2021-08-04 VITALS
DIASTOLIC BLOOD PRESSURE: 72 MMHG | HEART RATE: 59 BPM | BODY MASS INDEX: 37.04 KG/M2 | TEMPERATURE: 98.6 F | HEIGHT: 67 IN | SYSTOLIC BLOOD PRESSURE: 134 MMHG | WEIGHT: 236 LBS | OXYGEN SATURATION: 97 %

## 2021-08-04 DIAGNOSIS — R73.01 IMPAIRED FASTING BLOOD SUGAR: ICD-10-CM

## 2021-08-04 DIAGNOSIS — E78.2 MIXED HYPERLIPIDEMIA: ICD-10-CM

## 2021-08-04 DIAGNOSIS — C61 PROSTATE CANCER (HCC): ICD-10-CM

## 2021-08-04 DIAGNOSIS — F34.1 DYSTHYMIC DISORDER: ICD-10-CM

## 2021-08-04 DIAGNOSIS — G47.33 OBSTRUCTIVE SLEEP APNEA SYNDROME: ICD-10-CM

## 2021-08-04 DIAGNOSIS — R26.9 ABNORMAL GAIT: ICD-10-CM

## 2021-08-04 DIAGNOSIS — M17.0 PRIMARY OSTEOARTHRITIS OF BOTH KNEES: ICD-10-CM

## 2021-08-04 DIAGNOSIS — I87.2 VENOUS INSUFFICIENCY OF BOTH LOWER EXTREMITIES: ICD-10-CM

## 2021-08-04 DIAGNOSIS — G89.4 CHRONIC PAIN SYNDROME: ICD-10-CM

## 2021-08-04 DIAGNOSIS — Z00.00 MEDICARE ANNUAL WELLNESS VISIT, SUBSEQUENT: ICD-10-CM

## 2021-08-04 DIAGNOSIS — E66.01 OBESITY, MORBID (HCC): ICD-10-CM

## 2021-08-04 DIAGNOSIS — I48.0 PAROXYSMAL ATRIAL FIBRILLATION (HCC): ICD-10-CM

## 2021-08-04 DIAGNOSIS — I10 ESSENTIAL HYPERTENSION: Primary | ICD-10-CM

## 2021-08-04 DIAGNOSIS — M54.16 LUMBAR RADICULOPATHY: ICD-10-CM

## 2021-08-04 PROCEDURE — 99214 OFFICE O/P EST MOD 30 MIN: CPT | Performed by: INTERNAL MEDICINE

## 2021-08-04 PROCEDURE — G0439 PPPS, SUBSEQ VISIT: HCPCS | Performed by: INTERNAL MEDICINE

## 2021-08-04 PROCEDURE — 1123F ACP DISCUSS/DSCN MKR DOCD: CPT | Performed by: INTERNAL MEDICINE

## 2021-08-04 NOTE — PROGRESS NOTES
Assessment and Plan:     Problem List Items Addressed This Visit        Endocrine    Impaired fasting blood sugar       Respiratory    Sleep apnea       Cardiovascular and Mediastinum    Paroxysmal atrial fibrillation (HCC)    Essential hypertension - Primary    Venous insufficiency of both lower extremities       Nervous and Auditory    Lumbar radiculopathy       Musculoskeletal and Integument    Primary osteoarthritis of both knees       Genitourinary    Prostate cancer ([facility] [address] )       Other    Chronic pain syndrome    Mixed hyperlipidemia    Dysthymic disorder    Medicare annual wellness visit, subsequent    Obesity, morbid ([facility] )    Abnormal gait           Preventive health issues were discussed with patient, and age appropriate screening tests were ordered as noted in patient's After Visit Summary  Personalized health advice and appropriate referrals for health education or preventive services given if needed, as noted in patient's After Visit Summary       History of Present Illness:     Patient presents for Medicare Annual Wellness visit    Patient Care Team:  David Gudino MD as PCP - General (Internal Medicine)  MD Geovanni Hernandez DO (Pain Medicine)  MD Shai Lamar CRNP as Nurse Practitioner (Pain Medicine)     Problem List:     Patient Active Problem List   Diagnosis    Paroxysmal atrial fibrillation [facility])    Lumbar spinal stenosis    [facility]    Spondylosis of lumbar region without myelopathy or radiculopathy    Right bundle branch block (RBBB)    Edema of both lower legs due to peripheral venous insufficiency    Chronic pain of both lower extremities    Lumbar radiculopathy    Lumbar spondylosis    Chronic pain syndrome    Uncomplicated opioid dependence ([facility] [facility] )    Encounter for long-term use of opiate analgesic    Essential hypertension    Venous insufficiency    TIA (transient ischemic attack)    Prostate cancer ([facility] [address] )    Intervertebral disc disorders with radiculopathy, lumbar region    Impaired fasting blood sugar    Hypertension    Mixed hyperlipidemia    A-fib (HCC)    Sleep apnea    CPAP (continuous positive airway pressure) dependence    Dysthymic disorder    Venous insufficiency of both lower extremities    Primary osteoarthritis of both knees    Medicare annual wellness visit, subsequent    Obesity, morbid ([facility] )    Non-seasonal allergic rhinitis    Abnormal gait      Past Medical and Surgical History:     Past Medical History:   Diagnosis Date    A-fib ([facility] )     Anemia, unspecified     Anxiety     Arthritis     Basal cell carcinoma (BCC) of skin of nose     Cancer (HCC)     Chondrocostal junction syndrome     CPAP (continuous positive airway pressure) dependence     Depression     Dysthymic disorder     Edema, unspecified     Hyperlipidemia     Hypertension     Impaired fasting blood sugar     Intervertebral disc disorders with radiculopathy, lumbar region     Irregular heart beat     Prostate cancer ([facility] )     s/p surgery    Sleep apnea     on CPAP    Spinal stenosis     Stroke ([place] [address] )     TIA (transient ischemic attack)     no residual problems    Unspecified osteoarthritis, unspecified site     Venous insufficiency of both lower extremities      Past Surgical History:   Procedure Laterality Date    BASAL CELL CARCINOMA EXCISION      on the nose    CATARACT EXTRACTION      COLONOSCOPY      2016, 2011    CT EPIDURAL STEROID INJECTION (TIN LUMBAR)      FACIAL/NECK BIOPSY N/A 4/3/2017    Procedure: NOSE BCC EXCISION; FROZEN SECTION; RECONSTRUCTION ;  Surgeon: Kay Bridges MD;  [facility];  Service:     FLAP LOCAL HEAD / NECK N/A 10/27/2020    Procedure: NOSE FLAP;  Surgeon: Kay Bridges MD;  [facility];  Service: Plastics    FULL THICKNESS SKIN GRAFT N/A 4/3/2017    Procedure: FLAP VERSUS FULL THICKNESS SKIN GRAFT ;  Surgeon: Kay Bridges MD;  [facility]; Service:     MOHS RECONSTRUCTION N/A 10/27/2020    Procedure: NOSE MOHS DEFECT RECONSTRUCTION;  Surgeon: Buck Fields MD;  [facility];  Service: Plastics    PROSTATECTOMY      TONSILLECTOMY AND ADENOIDECTOMY        Family History:     Family History   Problem Relation Age of Onset    Heart attack Father       Social History:     Social History     Socioeconomic History    Marital status: /Civil Union     Spouse name: None    Number of children: None    Years of education: None    Highest education level: None   Occupational History    None   Tobacco Use    Smoking status: Former Smoker     Types: Cigarettes     Quit date:      Years since quittin 6    Smokeless tobacco: Never Used    Tobacco comment: long time ago   Vaping Use    Vaping Use: Never used   Substance and Sexual Activity    Alcohol use: Not Currently     Alcohol/week: 7 0 standard drinks     Types: 7 Glasses of wine per week     Comment: glass of wine with dinner, maybe    Drug use: No    Sexual activity: Not Currently   Other Topics Concern    None   Social History Narrative    None     Social Determinants of Health     Financial Resource Strain:     Difficulty of Paying Living Expenses:    Food Insecurity:     Worried About Running Out of Food in the Last Year:     Ran Out of Food in the Last Year:    Transportation Needs:     Lack of Transportation (Medical):      Lack of Transportation (Non-Medical):    Physical Activity:     Days of Exercise per Week:     Minutes of Exercise per Session:    Stress:     Feeling of Stress :    Social Connections:     Frequency of Communication with Friends and Family:     Frequency of Social Gatherings with Friends and Family:     Attends Yaz[employer] Services:     Active Member of Clubs or Organizations:     Attends Club or Organization Meetings:     Marital Status:    Intimate Partner Violence:     Fear of Current or Ex-Partner:     Emotionally Abused:     Physically Abused:     Sexually Abused:       Medications and Allergies:     Current Outpatient Medications   Medication Sig Dispense Refill    Acetaminophen 325 MG CAPS Take by mouth as needed       amLODIPine (NORVASC) 10 mg tablet TAKE 1 TABLET DAILY 90 tablet 0    atorvastatin (LIPITOR) 40 mg tablet TAKE 1 TABLET (40 MG TOTAL) BY MOUTH DAILY AT BEDTIME 90 tablet 0    Cholecalciferol (VITAMIN D-3 PO) Take 2,000 unit marking on U-100 syringe by mouth daily after dinner      co-enzyme Q-10 30 MG capsule Take 30 mg by mouth daily after dinner      diclofenac sodium (VOLTAREN) 1 % Apply 2 g topically 4 (four) times a day      glucosamine-chondroitin 500-400 MG tablet Take 2 tablets by mouth daily in the early morning      losartan (COZAAR) 100 MG tablet TAKE 1 TABLET (100 MG TOTAL) BY MOUTH DAILY 90 tablet 3    montelukast (SINGULAIR) 10 mg tablet Take 1 tablet (10 mg total) by mouth daily at bedtime 30 tablet 1    multivitamin (THERAGRAN) TABS Take 1 tablet by mouth daily in the early morning      Omega-3 Fatty Acids (FISH OIL) 1,000 mg Take 1,000 mg by mouth 2 (two) times a day      sertraline (ZOLOFT) 100 mg tablet TAKE 1/2 TABLET DAILY 60 30 tablet 0    sotalol (BETAPACE) 80 mg tablet TAKE 1 TABLET TWICE DAILY 180 tablet 3    torsemide (DEMADEX) 20 mg tablet TAKE 1 TABLET TWICE DAILY 60 tablet 11    traMADol (ULTRAM) 50 mg tablet Take 1 tablet (50 mg total) by mouth 2 (two) times a day as needed for moderate pain 60 tablet 2    warfarin (COUMADIN) 5 mg tablet TAKE 1/2 TO 1 TABLET DAILY OR AS DIRECTED 90 tablet 3     No current facility-administered medications for this visit       No Known Allergies   Immunizations:     Immunization History   Administered Date(s) Administered    INFLUENZA 11/11/2016, 10/22/2018    Influenza, high dose seasonal 0 7 mL 11/13/2020    Pneumococcal 11/02/2007    Pneumococcal Conjugate 13-Valent 01/10/2017    Pneumococcal Conjugate PCV 7 11/02/2007    SARS-CoV-2 / COVID-19 mRNA IM ([facility]) 04/14/2021, 05/09/2021    Td (adult), Unspecified 03/23/1997    Td (adult), adsorbed 03/23/1997    Zoster 05/17/2013      Health Maintenance:         Topic Date Due    Hepatitis C Screening  Never done    Colorectal Cancer Screening  02/04/2021         Topic Date Due    DTaP,Tdap,and Td Vaccines (1 - Tdap) 04/25/1961    Pneumococcal Vaccine: 65+ Years (2 of 2 - PPSV23) 01/10/2018    Influenza Vaccine (1) 09/01/2021      Medicare Health Risk Assessment:     /72 (BP Location: Right arm, Patient Position: Sitting, Cuff Size: Standard)   Pulse 59   Temp 98 6 °F (37 °C) (Temporal)   Ht 5' 7" (1 702 m)   Wt 107 kg (236 lb)   SpO2 97%   BMI 36 96 kg/m²      Abhilash Jackson is here for his Subsequent Wellness visit  Last Medicare Wellness visit information reviewed, patient interviewed and updates made to the record today  Health Risk Assessment:   Patient rates overall health as good  Patient feels that their physical health rating is slightly better  Patient is very satisfied with their life  Eyesight was rated as same  Hearing was rated as same  Patient feels that their emotional and mental health rating is slightly worse  Patients states they are never, rarely angry  Patient states they are often unusually tired/fatigued  Pain experienced in the last 7 days has been none  Patient states that he has experienced no weight loss or gain in last 6 months  Depression Screening:   PHQ-2 Score: 0  PHQ-9 Score: 6      Fall Risk Screening: In the past year, patient has experienced: history of falling in past year    Number of falls: 2 or more  Injured during fall?: No    Feels unsteady when standing or walking?: No    Worried about falling?: No      Home Safety:  Patient has trouble with stairs inside or outside of their home  Patient has working smoke alarms and has working carbon monoxide detector  Home safety hazards include: none       Nutrition:   Current diet is Regular  Medications:   Patient is currently taking over-the-counter supplements  OTC medications include: see medication list  Patient is able to manage medications  Activities of Daily Living (ADLs)/Instrumental Activities of Daily Living (IADLs):   Walk and transfer into and out of bed and chair?: Yes  Dress and groom yourself?: Yes    Bathe or shower yourself?: Yes    Feed yourself? Yes  Do your laundry/housekeeping?: Yes  Manage your money, pay your bills and track your expenses?: Yes  Make your own meals?: Yes    Do your own shopping?: Yes    Previous Hospitalizations:   Any hospitalizations or ED visits within the last 12 months?: No      Advance Care Planning:   Living will: Yes    Durable POA for healthcare: Yes    Advanced directive: Yes      PREVENTIVE SCREENINGS      Cardiovascular Screening:    General: Screening Not Indicated and History Lipid Disorder      Diabetes Screening:     General: Screening Current      Prostate Cancer Screening:    General: History Prostate Cancer and Screening Not Indicated      Abdominal Aortic Aneurysm (AAA) Screening:    Risk factors include: tobacco use        Lung Cancer Screening:     General: Screening Not Indicated    Screening, Brief Intervention, and Referral to Treatment (SBIRT)    Screening  Typical number of drinks in a day: 0  Typical number of drinks in a week: 0  Interpretation: Low risk drinking behavior      Single Item Drug Screening:  How often have you used an illegal drug (including marijuana) or a prescription medication for non-medical reasons in the past year? never    Single Item Drug Screen Score: 0  Interpretation: Negative screen for possible drug use disorder      Christina Rubio MD

## 2021-08-04 NOTE — PROGRESS NOTES
Assessment/Plan:      Falls Plan of Care: balance, strength, and gait training instructions were provided  1  Essential hypertension    2  Medicare annual wellness visit, subsequent    3  Mixed hyperlipidemia    4  Impaired fasting blood sugar    5  Lumbar radiculopathy  -     Walker    6  Venous insufficiency of both lower extremities    7  Primary osteoarthritis of both knees    8  Paroxysmal atrial fibrillation (HCC)    9  Obstructive sleep apnea syndrome    10  Dysthymic disorder    11  Prostate cancer ([facility] [address] )    12  Chronic pain syndrome    13  Obesity, morbid ([facility] )    14  Abnormal gait  -     Walker           Subjective:      Patient ID: Deonte Alvarenga is a 80 y o  male  FOLLOW-UP ON MULTIPLE MEDICAL PROBLEMS TO ENSURE THE STABLE ON CURRENT MEDICATION, NEEDS MEDICAL WELLNESS EXAM      The following portions of the patient's history were reviewed and updated as appropriate: He  has a past medical history of A-fib ([facility] [address] ), Anemia, unspecified, Anxiety, Arthritis, Basal cell carcinoma (BCC) of skin of nose, Cancer ([address] ), Chondrocostal junction syndrome, CPAP (continuous positive airway pressure) dependence, Depression, Dysthymic disorder, Edema, unspecified, Hyperlipidemia, Hypertension, Impaired fasting blood sugar, Intervertebral disc disorders with radiculopathy, lumbar region, Irregular heart beat, Prostate cancer ([facility] [address] ), Sleep apnea, Spinal stenosis, Stroke ([address] ), TIA (transient ischemic attack), Unspecified osteoarthritis, unspecified site, and Venous insufficiency of both lower extremities    He   Patient Active Problem List    Diagnosis Date Noted    Abnormal gait 08/04/2021    Medicare annual wellness visit, subsequent 03/26/2021    Obesity, morbid ([address] ) 03/26/2021    Non-seasonal allergic rhinitis 03/26/2021    Primary osteoarthritis of both knees 11/13/2020    TIA (transient ischemic attack)     Prostate cancer ([facility] [address] )     Intervertebral disc disorders with radiculopathy, lumbar region  Impaired fasting blood sugar     Hypertension     Mixed hyperlipidemia     A-fib (HCC)     Sleep apnea     CPAP (continuous positive airway pressure) dependence     Dysthymic disorder     Venous insufficiency of both lower extremities     Essential hypertension 11/20/2019    Venous insufficiency 93/44/3675    Uncomplicated opioid dependence ([facility] [address] ) 06/05/2019    Encounter for long-term use of opiate analgesic 06/05/2019    Chronic pain syndrome 09/17/2018    Lumbar radiculopathy 04/16/2018    Lumbar spondylosis 04/16/2018    Right bundle branch block (RBBB) 03/19/2018    Edema of both lower legs due to peripheral venous insufficiency 03/19/2018    Chronic pain of both lower extremities 03/19/2018    Paroxysmal atrial fibrillation ([facility] [address] ) 01/20/2018    Sacroiliitis ([facility] ) 04/17/2017    Spondylosis of lumbar region without myelopathy or radiculopathy 04/17/2017    Lumbar spinal stenosis 05/12/2015     He  has a past surgical history that includes Prostatectomy; Tonsillectomy and adenoidectomy; FACIAL/NECK BIOPSY (N/A, 4/3/2017); FULL THICKNESS SKIN GRAFT (N/A, 4/3/2017); Cataract extraction; CT epidural steroid injection (TIN lumbar); MOHS RECONSTRUCTION (N/A, 10/27/2020); FLAP LOCAL HEAD / NECK (N/A, 10/27/2020); Colonoscopy; and Excision basal cell carcinoma  His family history includes Heart attack in his father  He  reports that he quit smoking about 41 years ago  His smoking use included cigarettes  He has never used smokeless tobacco  He reports previous alcohol use of about 7 0 standard drinks of alcohol per week  He reports that he does not use drugs    Current Outpatient Medications   Medication Sig Dispense Refill    Acetaminophen 325 MG CAPS Take by mouth as needed       amLODIPine (NORVASC) 10 mg tablet TAKE 1 TABLET DAILY 90 tablet 0    atorvastatin (LIPITOR) 40 mg tablet TAKE 1 TABLET (40 MG TOTAL) BY MOUTH DAILY AT BEDTIME 90 tablet 0    Cholecalciferol (VITAMIN D-3 PO) Take 2,000 unit marking on U-100 syringe by mouth daily after dinner      co-enzyme Q-10 30 MG capsule Take 30 mg by mouth daily after dinner      diclofenac sodium (VOLTAREN) 1 % Apply 2 g topically 4 (four) times a day      glucosamine-chondroitin 500-400 MG tablet Take 2 tablets by mouth daily in the early morning      losartan (COZAAR) 100 MG tablet TAKE 1 TABLET (100 MG TOTAL) BY MOUTH DAILY 90 tablet 3    montelukast (SINGULAIR) 10 mg tablet Take 1 tablet (10 mg total) by mouth daily at bedtime 30 tablet 1    multivitamin (THERAGRAN) TABS Take 1 tablet by mouth daily in the early morning      Omega-3 Fatty Acids (FISH OIL) 1,000 mg Take 1,000 mg by mouth 2 (two) times a day      sertraline (ZOLOFT) 100 mg tablet TAKE 1/2 TABLET DAILY 60 30 tablet 0    sotalol (BETAPACE) 80 mg tablet TAKE 1 TABLET TWICE DAILY 180 tablet 3    torsemide (DEMADEX) 20 mg tablet TAKE 1 TABLET TWICE DAILY 60 tablet 11    traMADol (ULTRAM) 50 mg tablet Take 1 tablet (50 mg total) by mouth 2 (two) times a day as needed for moderate pain 60 tablet 2    warfarin (COUMADIN) 5 mg tablet TAKE 1/2 TO 1 TABLET DAILY OR AS DIRECTED 90 tablet 3     No current facility-administered medications for this visit       Current Outpatient Medications on File Prior to Visit   Medication Sig    Acetaminophen 325 MG CAPS Take by mouth as needed     amLODIPine (NORVASC) 10 mg tablet TAKE 1 TABLET DAILY    atorvastatin (LIPITOR) 40 mg tablet TAKE 1 TABLET (40 MG TOTAL) BY MOUTH DAILY AT BEDTIME    Cholecalciferol (VITAMIN D-3 PO) Take 2,000 unit marking on U-100 syringe by mouth daily after dinner    co-enzyme Q-10 30 MG capsule Take 30 mg by mouth daily after dinner    diclofenac sodium (VOLTAREN) 1 % Apply 2 g topically 4 (four) times a day    glucosamine-chondroitin 500-400 MG tablet Take 2 tablets by mouth daily in the early morning    losartan (COZAAR) 100 MG tablet TAKE 1 TABLET (100 MG TOTAL) BY MOUTH DAILY    montelukast (SINGULAIR) 10 mg tablet Take 1 tablet (10 mg total) by mouth daily at bedtime    multivitamin (THERAGRAN) TABS Take 1 tablet by mouth daily in the early morning    Omega-3 Fatty Acids (FISH OIL) 1,000 mg Take 1,000 mg by mouth 2 (two) times a day    sertraline (ZOLOFT) 100 mg tablet TAKE 1/2 TABLET DAILY 60    sotalol (BETAPACE) 80 mg tablet TAKE 1 TABLET TWICE DAILY    torsemide (DEMADEX) 20 mg tablet TAKE 1 TABLET TWICE DAILY    traMADol (ULTRAM) 50 mg tablet Take 1 tablet (50 mg total) by mouth 2 (two) times a day as needed for moderate pain    warfarin (COUMADIN) 5 mg tablet TAKE 1/2 TO 1 TABLET DAILY OR AS DIRECTED    [DISCONTINUED] warfarin (COUMADIN) 5 mg tablet TAKE 1/2 TO 1 TABLET DAILY OR AS DIRECTED     No current facility-administered medications on file prior to visit  He has No Known Allergies       Review of Systems   Constitutional: Negative for chills and fever  HENT: Negative for congestion, ear pain and sore throat  Eyes: Negative for pain  Respiratory: Negative for cough and shortness of breath  Cardiovascular: Negative for chest pain and leg swelling  Gastrointestinal: Negative for abdominal pain, nausea and vomiting  Endocrine: Negative for polyuria  Genitourinary: Negative for difficulty urinating, frequency and urgency  Musculoskeletal: Positive for arthralgias and back pain  Skin: Negative for rash  Neurological: Negative for weakness and headaches  Psychiatric/Behavioral: Negative for sleep disturbance  The patient is not nervous/anxious            Objective:      /72 (BP Location: Right arm, Patient Position: Sitting, Cuff Size: Standard)   Pulse 59   Temp 98 6 °F (37 °C) (Temporal)   Ht 5' 7" (1 702 m)   Wt 107 kg (236 lb)   SpO2 97%   BMI 36 96 kg/m²     Recent Results (from the past 1344 hour(s))   Protime-INR    Collection Time: 07/15/21 12:16 PM   Result Value Ref Range    Protime 31 4 (H) 11 6 - 14 5 seconds    INR 3 06 (H) 0 84 - 1 19 Physical Exam  Constitutional:       Appearance: Normal appearance  HENT:      Head: Normocephalic  Right Ear: Tympanic membrane, ear canal and external ear normal       Left Ear: Tympanic membrane, ear canal and external ear normal       Nose: Nose normal  No congestion  Mouth/Throat:      Mouth: Mucous membranes are moist       Pharynx: Oropharynx is clear  No oropharyngeal exudate or posterior oropharyngeal erythema  Eyes:      Extraocular Movements: Extraocular movements intact  Conjunctiva/sclera: Conjunctivae normal       Pupils: Pupils are equal, round, and reactive to light  Cardiovascular:      Rate and Rhythm: Normal rate and regular rhythm  Heart sounds: Normal heart sounds  No murmur heard  Pulmonary:      Effort: Pulmonary effort is normal       Breath sounds: Normal breath sounds  No wheezing or rales  Abdominal:      General: Bowel sounds are normal  There is no distension  Palpations: Abdomen is soft  Tenderness: There is no abdominal tenderness  Musculoskeletal:         General: Normal range of motion  Cervical back: Normal range of motion and neck supple  Right lower leg: Edema present  Left lower leg: Edema present  Lymphadenopathy:      Cervical: No cervical adenopathy  Skin:     General: Skin is warm  Neurological:      General: No focal deficit present  Mental Status: He is alert and oriented to person, place, and time

## 2021-08-05 ENCOUNTER — APPOINTMENT (OUTPATIENT)
Dept: LAB | Facility: CLINIC | Age: 81
End: 2021-08-05
Payer: MEDICARE

## 2021-08-05 ENCOUNTER — ANTICOAG VISIT (OUTPATIENT)
Dept: CARDIOLOGY CLINIC | Facility: CLINIC | Age: 81
End: 2021-08-05

## 2021-08-05 DIAGNOSIS — I48.0 PAROXYSMAL ATRIAL FIBRILLATION (HCC): Primary | ICD-10-CM

## 2021-08-20 DIAGNOSIS — I10 ESSENTIAL (PRIMARY) HYPERTENSION: ICD-10-CM

## 2021-08-20 RX ORDER — AMLODIPINE BESYLATE 10 MG/1
TABLET ORAL
Qty: 90 TABLET | Refills: 0 | Status: SHIPPED | OUTPATIENT
Start: 2021-08-20 | End: 2021-11-29

## 2021-08-25 DIAGNOSIS — E78.2 MIXED HYPERLIPIDEMIA: ICD-10-CM

## 2021-08-25 DIAGNOSIS — R35.0 URINARY FREQUENCY: ICD-10-CM

## 2021-08-25 DIAGNOSIS — I10 ESSENTIAL HYPERTENSION: Primary | ICD-10-CM

## 2021-08-25 DIAGNOSIS — R73.01 IMPAIRED FASTING BLOOD SUGAR: ICD-10-CM

## 2021-08-26 ENCOUNTER — APPOINTMENT (OUTPATIENT)
Dept: LAB | Facility: CLINIC | Age: 81
End: 2021-08-26
Payer: MEDICARE

## 2021-08-26 ENCOUNTER — ANTICOAG VISIT (OUTPATIENT)
Dept: CARDIOLOGY CLINIC | Facility: CLINIC | Age: 81
End: 2021-08-26

## 2021-08-26 DIAGNOSIS — I48.0 PAROXYSMAL ATRIAL FIBRILLATION (HCC): Primary | ICD-10-CM

## 2021-08-26 DIAGNOSIS — R35.0 URINARY FREQUENCY: ICD-10-CM

## 2021-08-26 DIAGNOSIS — I10 ESSENTIAL HYPERTENSION: ICD-10-CM

## 2021-08-26 DIAGNOSIS — R73.01 IMPAIRED FASTING BLOOD SUGAR: ICD-10-CM

## 2021-08-26 DIAGNOSIS — E78.2 MIXED HYPERLIPIDEMIA: ICD-10-CM

## 2021-08-26 LAB
ALBUMIN SERPL BCP-MCNC: 3.6 G/DL (ref 3.5–5)
ALP SERPL-CCNC: 88 U/L (ref 46–116)
ALT SERPL W P-5'-P-CCNC: 34 U/L (ref 12–78)
ANION GAP SERPL CALCULATED.3IONS-SCNC: 7 MMOL/L (ref 4–13)
AST SERPL W P-5'-P-CCNC: 21 U/L (ref 5–45)
BACTERIA UR QL AUTO: NORMAL /HPF
BASOPHILS # BLD AUTO: 0.04 THOUSANDS/ΜL (ref 0–0.1)
BASOPHILS NFR BLD AUTO: 1 % (ref 0–1)
BILIRUB SERPL-MCNC: 0.91 MG/DL (ref 0.2–1)
BILIRUB UR QL STRIP: NEGATIVE
BUN SERPL-MCNC: 16 MG/DL (ref 5–25)
CALCIUM SERPL-MCNC: 8.5 MG/DL (ref 8.3–10.1)
CHLORIDE SERPL-SCNC: 106 MMOL/L (ref 100–108)
CHOLEST SERPL-MCNC: 148 MG/DL (ref 50–200)
CLARITY UR: CLEAR
CO2 SERPL-SCNC: 25 MMOL/L (ref 21–32)
COLOR UR: YELLOW
CREAT SERPL-MCNC: 0.78 MG/DL (ref 0.6–1.3)
EOSINOPHIL # BLD AUTO: 0.36 THOUSAND/ΜL (ref 0–0.61)
EOSINOPHIL NFR BLD AUTO: 4 % (ref 0–6)
ERYTHROCYTE [DISTWIDTH] IN BLOOD BY AUTOMATED COUNT: 12.9 % (ref 11.6–15.1)
EST. AVERAGE GLUCOSE BLD GHB EST-MCNC: 128 MG/DL
GFR SERPL CREATININE-BSD FRML MDRD: 86 ML/MIN/1.73SQ M
GLUCOSE P FAST SERPL-MCNC: 112 MG/DL (ref 65–99)
GLUCOSE UR STRIP-MCNC: NEGATIVE MG/DL
HBA1C MFR BLD: 6.1 %
HCT VFR BLD AUTO: 46.8 % (ref 36.5–49.3)
HDLC SERPL-MCNC: 43 MG/DL
HGB BLD-MCNC: 15.4 G/DL (ref 12–17)
HGB UR QL STRIP.AUTO: NEGATIVE
HYALINE CASTS #/AREA URNS LPF: NORMAL /LPF
IMM GRANULOCYTES # BLD AUTO: 0.03 THOUSAND/UL (ref 0–0.2)
IMM GRANULOCYTES NFR BLD AUTO: 0 % (ref 0–2)
KETONES UR STRIP-MCNC: NEGATIVE MG/DL
LDLC SERPL CALC-MCNC: 74 MG/DL (ref 0–100)
LEUKOCYTE ESTERASE UR QL STRIP: NEGATIVE
LYMPHOCYTES # BLD AUTO: 1.59 THOUSANDS/ΜL (ref 0.6–4.47)
LYMPHOCYTES NFR BLD AUTO: 18 % (ref 14–44)
MCH RBC QN AUTO: 30.3 PG (ref 26.8–34.3)
MCHC RBC AUTO-ENTMCNC: 32.9 G/DL (ref 31.4–37.4)
MCV RBC AUTO: 92 FL (ref 82–98)
MONOCYTES # BLD AUTO: 0.97 THOUSAND/ΜL (ref 0.17–1.22)
MONOCYTES NFR BLD AUTO: 11 % (ref 4–12)
NEUTROPHILS # BLD AUTO: 5.68 THOUSANDS/ΜL (ref 1.85–7.62)
NEUTS SEG NFR BLD AUTO: 66 % (ref 43–75)
NITRITE UR QL STRIP: NEGATIVE
NON-SQ EPI CELLS URNS QL MICRO: NORMAL /HPF
NRBC BLD AUTO-RTO: 0 /100 WBCS
PH UR STRIP.AUTO: 7 [PH]
PLATELET # BLD AUTO: 192 THOUSANDS/UL (ref 149–390)
PMV BLD AUTO: 10.9 FL (ref 8.9–12.7)
POTASSIUM SERPL-SCNC: 3.9 MMOL/L (ref 3.5–5.3)
PROT SERPL-MCNC: 7.3 G/DL (ref 6.4–8.2)
PROT UR STRIP-MCNC: NEGATIVE MG/DL
RBC # BLD AUTO: 5.09 MILLION/UL (ref 3.88–5.62)
RBC #/AREA URNS AUTO: NORMAL /HPF
SODIUM SERPL-SCNC: 138 MMOL/L (ref 136–145)
SP GR UR STRIP.AUTO: 1.02 (ref 1–1.03)
TRIGL SERPL-MCNC: 153 MG/DL
TSH SERPL DL<=0.05 MIU/L-ACNC: 1.37 UIU/ML (ref 0.36–3.74)
UROBILINOGEN UR QL STRIP.AUTO: 0.2 E.U./DL
WBC # BLD AUTO: 8.67 THOUSAND/UL (ref 4.31–10.16)
WBC #/AREA URNS AUTO: NORMAL /HPF

## 2021-08-26 PROCEDURE — 36415 COLL VENOUS BLD VENIPUNCTURE: CPT

## 2021-08-26 PROCEDURE — 84443 ASSAY THYROID STIM HORMONE: CPT

## 2021-08-26 PROCEDURE — 83036 HEMOGLOBIN GLYCOSYLATED A1C: CPT

## 2021-08-26 PROCEDURE — 87086 URINE CULTURE/COLONY COUNT: CPT

## 2021-08-26 PROCEDURE — 85025 COMPLETE CBC W/AUTO DIFF WBC: CPT

## 2021-08-26 PROCEDURE — 81001 URINALYSIS AUTO W/SCOPE: CPT | Performed by: INTERNAL MEDICINE

## 2021-08-26 PROCEDURE — 80061 LIPID PANEL: CPT

## 2021-08-26 PROCEDURE — 80053 COMPREHEN METABOLIC PANEL: CPT

## 2021-08-27 LAB — BACTERIA UR CULT: NORMAL

## 2021-09-01 ENCOUNTER — OFFICE VISIT (OUTPATIENT)
Dept: PAIN MEDICINE | Facility: CLINIC | Age: 81
End: 2021-09-01
Payer: MEDICARE

## 2021-09-01 VITALS
SYSTOLIC BLOOD PRESSURE: 154 MMHG | BODY MASS INDEX: 36.88 KG/M2 | HEART RATE: 61 BPM | HEIGHT: 67 IN | WEIGHT: 235 LBS | DIASTOLIC BLOOD PRESSURE: 72 MMHG

## 2021-09-01 DIAGNOSIS — M48.062 SPINAL STENOSIS OF LUMBAR REGION WITH NEUROGENIC CLAUDICATION: ICD-10-CM

## 2021-09-01 DIAGNOSIS — M46.1 SACROILIITIS (HCC): ICD-10-CM

## 2021-09-01 DIAGNOSIS — Z79.891 ENCOUNTER FOR LONG-TERM USE OF OPIATE ANALGESIC: ICD-10-CM

## 2021-09-01 DIAGNOSIS — M79.604 CHRONIC PAIN OF BOTH LOWER EXTREMITIES: ICD-10-CM

## 2021-09-01 DIAGNOSIS — F11.20 UNCOMPLICATED OPIOID DEPENDENCE (HCC): ICD-10-CM

## 2021-09-01 DIAGNOSIS — G89.29 CHRONIC PAIN OF BOTH LOWER EXTREMITIES: ICD-10-CM

## 2021-09-01 DIAGNOSIS — M17.0 PRIMARY OSTEOARTHRITIS OF BOTH KNEES: ICD-10-CM

## 2021-09-01 DIAGNOSIS — M47.816 LUMBAR SPONDYLOSIS: ICD-10-CM

## 2021-09-01 DIAGNOSIS — M51.16 INTERVERTEBRAL DISC DISORDERS WITH RADICULOPATHY, LUMBAR REGION: ICD-10-CM

## 2021-09-01 DIAGNOSIS — M54.16 LUMBAR RADICULOPATHY: ICD-10-CM

## 2021-09-01 DIAGNOSIS — M79.605 CHRONIC PAIN OF BOTH LOWER EXTREMITIES: ICD-10-CM

## 2021-09-01 DIAGNOSIS — M47.816 SPONDYLOSIS OF LUMBAR REGION WITHOUT MYELOPATHY OR RADICULOPATHY: ICD-10-CM

## 2021-09-01 DIAGNOSIS — G89.4 CHRONIC PAIN SYNDROME: Primary | ICD-10-CM

## 2021-09-01 PROCEDURE — 99214 OFFICE O/P EST MOD 30 MIN: CPT | Performed by: NURSE PRACTITIONER

## 2021-09-01 PROCEDURE — 80305 DRUG TEST PRSMV DIR OPT OBS: CPT | Performed by: NURSE PRACTITIONER

## 2021-09-01 RX ORDER — TRAMADOL HYDROCHLORIDE 50 MG/1
50 TABLET ORAL 2 TIMES DAILY PRN
Qty: 60 TABLET | Refills: 2 | Status: SHIPPED | OUTPATIENT
Start: 2021-09-19 | End: 2021-09-16

## 2021-09-01 NOTE — PATIENT INSTRUCTIONS
Opioid Safety   WHAT YOU NEED TO KNOW:   An opioid medicine is used to treat pain  Examples are oxycodone, morphine, fentanyl, or codeine  Pain control and management may help you rest, heal, and return to your daily activities  You and your family will receive information about how to manage your pain at home  The instructions will include what to do if you have side effects as your pain is managed  You will get information on how to handle opioid medicine safely  You will also get suggestions on how to control pain without opioids  It is important to follow all instructions so your pain is managed effectively  DISCHARGE INSTRUCTIONS:   Call your local emergency number (911 in the [place]), or have someone else call if:   · You have a seizure  · You cannot be woken  · You have trouble staying awake and your breathing is slow or shallow  · Your speech is slurred, or you are confused  · You are dizzy or stumble when you walk  Call your doctor, or have someone close to you call if:   · You are extremely drowsy, or you have trouble staying awake or speaking  · You have pale or clammy skin  · You have blue fingernails or lips  · Your heartbeat is slower than normal     · You cannot stop vomiting  · You have questions or concerns about your condition or care  Use opioids safely:   · Take prescribed opioids exactly as directed  Opioids come with directions based on the kind and how it is given  Talk to your healthcare provider or a pharmacist if you have any questions  Do not take more than the recommended amount  Too much can cause a life-threatening overdose  Do not continue to take it after your pain stops  You may develop tolerance  This means you keep needing higher doses to get the same effect  You may also develop opioid use disorder  This means you are not able to control your opioid use  · Do not give opioids to others or take opioids that belong to someone else    The kind or amount one person takes may not be right for another  The person you share them with may also be taking medicines that do not mix with opioids  He or she may drink alcohol or use other drugs that can cause life-threatening problems when mixed with opioids  · Do not mix opioids with other medicines or alcohol  The combination can cause an overdose, or cause you to stop breathing  Alcohol, sleeping pills, and medicines such as antihistamines can make you sleepy  A combination with opioids can lead to a coma  · Do not drive or operate heavy machinery after you use an opioid  You may feel drowsy or have trouble concentrating  You can injure yourself or others if you drive or use heavy machinery when you are not alert  Your provider or pharmacist can tell you how long to wait after a dose before you do these activities  · Talk to your healthcare provider if you have any side effects  Side effects include nausea, sleepiness, itching, and trouble thinking clearly  Your provider may need to make changes to the kind or amount of opioid you are taking  He or she can also help you find ways to prevent or relieve side effects  Manage constipation:  Constipation is the most common side effect of opioid medicine  Constipation is when you have hard, dry bowel movements, or you go longer than usual between bowel movements  Tell your healthcare provider about all changes in your bowel movements while you are taking opioids  He or she may recommend laxative medicine to help you have a bowel movement  He or she may also change the kind of opioid you are taking, or change when you take it  The following are more ways you can prevent or relieve constipation:  · Drink liquids as directed  You may need to drink extra liquids to help soften and move your bowels  Ask how much liquid to drink each day and which liquids are best for you  · Eat high-fiber foods    This may help decrease constipation by adding bulk to your bowel movements  High-fiber foods include fruits, vegetables, whole-grain breads and cereals, and beans  Your healthcare provider or dietitian can help you create a high-fiber meal plan  Your provider may also recommend a fiber supplement if you cannot get enough fiber from food  · Exercise regularly  Regular physical activity can help stimulate your intestines  Walking is a good exercise to prevent or relieve constipation  Ask which exercises are best for you  · Schedule a time each day to have a bowel movement  This may help train your body to have regular bowel movements  Bend forward while you are on the toilet to help move the bowel movement out  Sit on the toilet for at least 10 minutes, even if you do not have a bowel movement  Store opioids safely:   · Store opioids where others cannot easily get them  Keep them in a locked cabinet or secure area  Do not  keep them in a purse or other bag you carry with you  A person may be looking for something else and find the opioids  · Make sure opioids are stored out of the reach of children  A child can easily overdose on opioids  Opioids may look like candy to a small child  The best way to dispose of opioids: The laws vary by country and area  In the [place], the best way is to return the opioids through a take-back program  This program is offered by the [employer]  The following are options for using the program:  · Take the opioids to a [employer] collection site  The site is often a law enforcement center  Call your local law enforcement center for scheduled take-back days in your area  You will be given information on where to go if the collection site is in a different location  · Take the opioids to an approved pharmacy or hospital   A pharmacy or hospital may be set up as a collection site  You will need to ask if it is a [employer] collection site if you were not directed there   A pharmacy or doctor's office may not be able to take back opioids unless it is a UZMA site  · Use a mail-back system  This means you are given containers to put the opioids into  You will then mail them in the containers  · Use a take-back drop box  This is a place to leave the opioids at any time  People and animals will not be able to get into the box  Your local law enforcement agency can tell you where to find a drop box in your area  Other safe ways to dispose of opioids: The medicine may come with disposal instructions  The instructions may vary depending on the brand of medicine you are using  Instructions may come in a Medication Guide, but not every medicine has one  You may instead get instructions from your pharmacy or doctor  Follow instructions carefully  The following are general guidelines to follow:  · Find out if you can flush the opioid  Some opioids can be flushed down the toilet or poured into the sink  You will need to contact authorities in your area to see if this is an option for you  The FDA also offers a list of medicines that are safe to flush down the toilet  You can check the list if you cannot get the information for your local area  · Ask your waste management company about rules for putting opioids in the trash  The company will be able to give you specific directions  Scratch out personal information on the original medicine label so it cannot be read  Then put it in the trash  Do not label the trash or put any information on it about the opioids  It should look like regular household trash so no one is tempted to look for the opioids  Keep the trash out of the reach of children and animals  Always make sure trash is secure  · Talk to officials if you live in a facility  If you live in a nursing home or assisted living center, talk to an official  The person will know the rules for your area  Other ways to manage pain:   · Ask your healthcare provider about non-opioid medicines to control pain  Some medicines may even work better than opioids, depending on the cause of your pain  Nonprescription medicines include NSAIDs (such as ibuprofen) and acetaminophen  Prescription medicines include muscle relaxers, antidepressants, and steroids  · Pain may be managed without any medicines  Some ways to relieve pain include massage, aromatherapy, or meditation  Physical or occupational therapy may also help  For more information:   · Drug Enforcement Administration  [address] [facility] [address]  Phone: 9- 120 - 004-5316  Web Address: [facility]/drug_disposal/    · [address] and Drug Administration  [place] , [address] [facility]  Phone: 3- 454 - [phone]  Web Address: http://[employer]/  Follow up with your doctor or pain specialist as directed: You may need to have your dose adjusted  Your doctor or pain specialist can also help you find ways to manage pain without opioids  Write down your questions so you remember to ask them during your visits  © Copyright [employer] 2021 Information is for End User's use only and may not be sold, redistributed or otherwise used for commercial purposes  All illustrations and images included in CareNotes® are the copyrighted property of [employer]  or [employer] Silver[employer]  The above information is an  only  It is not intended as medical advice for individual conditions or treatments  Talk to your doctor, nurse or pharmacist before following any medical regimen to see if it is safe and effective for you

## 2021-09-01 NOTE — PROGRESS NOTES
Assessment:  1  Chronic pain syndrome    2  Lumbar radiculopathy    3  Spondylosis of lumbar region without myelopathy or radiculopathy    4  Lumbar spondylosis    5  Primary osteoarthritis of both knees    6  Spinal stenosis of lumbar region with neurogenic claudication    7  Chronic pain of both lower extremities    8  Uncomplicated opioid dependence ([address] )    9  Encounter for long-term use of opiate analgesic        Plan:  1  The patient may continue Tramadol 50 mg b i d  p r n  pain as prescribed  The patient was given a 3 month supply of prescriptions with a Do Not Fill date(s) of September 19, 2021 with 2 refills    [facility] Prescription Drug Monitoring Program report was reviewed and was appropriate     A urine drug screen was collected at today's office visit as part of our medication management protocol  The point of care testing results were appropriate for what was being prescribed  The specimen will be sent for confirmatory testing  The drug screen is medically necessary because the patient is either dependent on opioid medication or is being considered for opioid medication therapy and the results could impact ongoing or future treatment  The drug screen is to evaluate for the presences or absence of prescribed, non-prescribed, and/or illicit drugs/substances  There are risks associated with opioid medications, including dependence, addiction and tolerance  The patient understands and agrees to use these medications only as prescribed  Potential side effects of the medications include, but are not limited to, constipation, drowsiness, addiction, impaired judgment and risk of fatal overdose if not taken as prescribed  The patient was warned against driving while taking sedation medications  Sharing medications is a felony  At this point in time, the patient is showing no signs of addiction, abuse, diversion or suicidal ideation      2   Patient may benefit from physical therapy for deconditioning and leg weakness, however he is not interested at this time   3  Patient may continue Tylenol p r n  and should not exceed more than 3000 mg in 24 hours   4  The patient will continue with his home exercise program  5  I will avoid NSAIDs secondary to anticoagulation  6  The patient will follow-up in 3 months or sooner if needed     [employer] software was used to dictate this note  It may contain errors with dictating incorrect words or incorrect spelling  Please contact the provider directly with any questions  History of Present Illness: The patient is a 80 y o  male last seen on 6/9/21 who presents for a follow up office visit in regards to chronicLow back pain with associated weakness in the lower extremities secondary to  For degenerative disc disease, lumbar spondylosis, lumbar stenosis and chronic pain syndrome  The patient a denies bowel or bladder incontinence or saddle anesthesia  The patient has had good relief in the past with bilateral L4 TFESI  He has failed lumbar medial branch blocks and SI joint injections  He rates pain a 4/10 on the numeric pain rating scale  He states his pain is occasional in nature and bothersome the morning  He characterizes the pain as dull aching  He manages his pain with tramadol 50 mg b i d  p r n  which she takes sparingly and Tylenol p r n  He is unable to take NSAIDs secondary to anticoagulation    Pain Contract Signed: 3/24/21  Last Urine Drug Screen: 9/1/21  Last tramadol per pt  9/1/21    I have personally reviewed and/or updated the patient's past medical history, past surgical history, family history, social history, current medications, allergies, and vital signs today  Review of Systems:    Review of Systems   Respiratory: Negative for shortness of breath  Cardiovascular: Negative for chest pain  Gastrointestinal: Negative for constipation, diarrhea, nausea and vomiting  Musculoskeletal: Positive for gait problem   Negative for arthralgias, joint swelling and myalgias  Skin: Negative for rash  Neurological: Negative for dizziness, seizures and weakness  All other systems reviewed and are negative          Past Medical History:   Diagnosis Date    A-fib ([place] [address] )     Anemia, unspecified     Anxiety     Arthritis     Basal cell carcinoma (BCC) of skin of nose     Cancer (HCC)     Chondrocostal junction syndrome     CPAP (continuous positive airway pressure) dependence     Depression     Dysthymic disorder     Edema, unspecified     Hyperlipidemia     Hypertension     Impaired fasting blood sugar     Intervertebral disc disorders with radiculopathy, lumbar region     Irregular heart beat     Prostate cancer ([place] [address] )     s/p surgery    Sleep apnea     on CPAP    Spinal stenosis     Stroke ([place] [address] )     TIA (transient ischemic attack)     no residual problems    Unspecified osteoarthritis, unspecified site     Venous insufficiency of both lower extremities        Past Surgical History:   Procedure Laterality Date    BASAL CELL CARCINOMA EXCISION      on the nose    CATARACT EXTRACTION      COLONOSCOPY      2016, 2011    CT EPIDURAL STEROID INJECTION (TIN LUMBAR)      FACIAL/NECK BIOPSY N/A 4/3/2017    Procedure: NOSE BCC EXCISION; FROZEN SECTION; RECONSTRUCTION ;  Surgeon: Ghassan Mcmillan MD;  [facility];  Service:     FLAP LOCAL HEAD / NECK N/A 10/27/2020    Procedure: NOSE FLAP;  Surgeon: Ghassan Mcmillan MD;  [facility];  Service: Plastics    FULL THICKNESS SKIN GRAFT N/A 4/3/2017    Procedure: FLAP VERSUS FULL THICKNESS SKIN GRAFT ;  Surgeon: Ghassan Mcmlilan MD;  [facility];  Service:     MOHS RECONSTRUCTION N/A 10/27/2020    Procedure: NOSE MOHS DEFECT RECONSTRUCTION;  Surgeon: Ghassan Mcmillan MD;  [facility];  Service: Plastics    PROSTATECTOMY      TONSILLECTOMY AND ADENOIDECTOMY         Family History   Problem Relation Age of Onset    Heart attack Father        Social History     Occupational History    Not on file   Tobacco Use    Smoking status: Former Smoker     Types: Cigarettes     Quit date:      Years since quittin 6    Smokeless tobacco: Never Used    Tobacco comment: long time ago   Vaping Use    Vaping Use: Never used   Substance and Sexual Activity    Alcohol use: Not Currently     Alcohol/week: 7 0 standard drinks     Types: 7 Glasses of wine per week     Comment: glass of wine with dinner, maybe    Drug use: No    Sexual activity: Not Currently         Current Outpatient Medications:     Acetaminophen 325 MG CAPS, Take by mouth as needed , Disp: , Rfl:     amLODIPine (NORVASC) 10 mg tablet, TAKE 1 TABLET DAILY, Disp: 90 tablet, Rfl: 0    atorvastatin (LIPITOR) 40 mg tablet, TAKE 1 TABLET (40 MG TOTAL) BY MOUTH DAILY AT BEDTIME, Disp: 90 tablet, Rfl: 0    Cholecalciferol (VITAMIN D-3 PO), Take 2,000 unit marking on U-100 syringe by mouth daily after dinner, Disp: , Rfl:     co-enzyme Q-10 30 MG capsule, Take 30 mg by mouth daily after dinner, Disp: , Rfl:     diclofenac sodium (VOLTAREN) 1 %, Apply 2 g topically 4 (four) times a day, Disp: , Rfl:     glucosamine-chondroitin 500-400 MG tablet, Take 2 tablets by mouth daily in the early morning, Disp: , Rfl:     losartan (COZAAR) 100 MG tablet, TAKE 1 TABLET (100 MG TOTAL) BY MOUTH DAILY, Disp: 90 tablet, Rfl: 3    montelukast (SINGULAIR) 10 mg tablet, Take 1 tablet (10 mg total) by mouth daily at bedtime, Disp: 30 tablet, Rfl: 1    multivitamin (THERAGRAN) TABS, Take 1 tablet by mouth daily in the early morning, Disp: , Rfl:     Omega-3 Fatty Acids (FISH OIL) 1,000 mg, Take 1,000 mg by mouth 2 (two) times a day, Disp: , Rfl:     sertraline (ZOLOFT) 100 mg tablet, TAKE 1/2 TABLET DAILY 60, Disp: 30 tablet, Rfl: 0    sotalol (BETAPACE) 80 mg tablet, TAKE 1 TABLET TWICE DAILY, Disp: 180 tablet, Rfl: 3    torsemide (DEMADEX) 20 mg tablet, TAKE 1 TABLET TWICE DAILY, Disp: 60 tablet, Rfl: 11    traMADol (ULTRAM) 50 mg tablet, Take 1 tablet (50 mg total) by mouth 2 (two) times a day as needed for moderate pain, Disp: 60 tablet, Rfl: 2    warfarin (COUMADIN) 5 mg tablet, TAKE 1/2 TO 1 TABLET DAILY OR AS DIRECTED, Disp: 90 tablet, Rfl: 3    No Known Allergies    Physical Exam:    /72   Pulse 61   Ht 5' 7" (1 702 m)   Wt 107 kg (235 lb)   BMI 36 81 kg/m²     Constitutional:normal, well developed, well nourished, alert, in no distress and non-toxic and no overt pain behavior  Eyes:anicteric  HEENT:grossly intact  Neck:supple, symmetric, trachea midline and no masses   Pulmonary:even and unlabored  Cardiovascular:No edema or pitting edema present  Skin:Normal without rashes or lesions and well hydrated  Psychiatric:Mood and affect appropriate  Neurologic:Cranial Nerves II-XII grossly intact  Musculoskeletal:antalgic gait but steady without the use of assistive devices      Imaging  No orders to display         No orders of the defined types were placed in this encounter

## 2021-09-04 LAB
6MAM UR QL CFM: NEGATIVE NG/ML
7AMINOCLONAZEPAM UR QL CFM: NEGATIVE NG/ML
A-OH ALPRAZ UR QL CFM: NEGATIVE NG/ML
AMPHET UR QL CFM: NEGATIVE NG/ML
AMPHET UR QL CFM: NEGATIVE NG/ML
BUPRENORPHINE UR QL CFM: NEGATIVE NG/ML
BUTALBITAL UR QL CFM: NEGATIVE NG/ML
BZE UR QL CFM: NEGATIVE NG/ML
CODEINE UR QL CFM: NEGATIVE NG/ML
DESIPRAMINE UR QL CFM: NEGATIVE NG/ML
DESIPRAMINE UR QL CFM: NEGATIVE NG/ML
EDDP UR QL CFM: NEGATIVE NG/ML
ETHYL GLUCURONIDE UR QL CFM: ABNORMAL NG/ML
ETHYL SULFATE UR QL SCN: ABNORMAL NG/ML
FENTANYL UR QL CFM: NEGATIVE NG/ML
GLIADIN IGG SER IA-ACNC: NEGATIVE NG/ML
GLUCOSE 30M P 50 G LAC PO SERPL-MCNC: NEGATIVE NG/ML
HYDROCODONE UR QL CFM: NEGATIVE NG/ML
HYDROCODONE UR QL CFM: NEGATIVE NG/ML
HYDROMORPHONE UR QL CFM: NEGATIVE NG/ML
IMIPRAMINE UR QL CFM: NEGATIVE NG/ML
LORAZEPAM UR QL CFM: NEGATIVE NG/ML
MDMA UR QL CFM: NEGATIVE NG/ML
ME-PHENIDATE UR QL CFM: NEGATIVE NG/ML
MEPERIDINE UR QL CFM: NEGATIVE NG/ML
MEPHEDRONE UR QL CFM: NEGATIVE NG/ML
METHADONE UR QL CFM: NEGATIVE NG/ML
METHAMPHET UR QL CFM: NEGATIVE NG/ML
MORPHINE UR QL CFM: NEGATIVE NG/ML
MORPHINE UR QL CFM: NEGATIVE NG/ML
NORBUPRENORPHINE UR QL CFM: NEGATIVE NG/ML
NORDIAZEPAM UR QL CFM: NEGATIVE NG/ML
NORFENTANYL UR QL CFM: NEGATIVE NG/ML
NORHYDROCODONE UR QL CFM: NEGATIVE NG/ML
NORHYDROCODONE UR QL CFM: NEGATIVE NG/ML
NORMEPERIDINE UR QL CFM: NEGATIVE NG/ML
NOROXYCODONE UR QL CFM: NEGATIVE NG/ML
OLANZAPINE QUANTIFICATION: NEGATIVE NG/ML
OPC-3373 QUANTIFICATION: NEGATIVE
OXAZEPAM UR QL CFM: NEGATIVE NG/ML
OXYCODONE UR QL CFM: NEGATIVE NG/ML
OXYMORPHONE UR QL CFM: NEGATIVE NG/ML
OXYMORPHONE UR QL CFM: NEGATIVE NG/ML
PCP UR QL CFM: NEGATIVE NG/ML
PHENOBARB UR QL CFM: NEGATIVE NG/ML
SECOBARBITAL UR QL CFM: NEGATIVE NG/ML
SL AMB 3-METHYL-FENTANYL QUANTIFICATION: NORMAL NG/ML
SL AMB 4-ANPP QUANTIFICATION: NORMAL NG/ML
SL AMB 4-FIBF QUANTIFICATION: NORMAL NG/ML
SL AMB 5F-ADB-M7 METABOLITE QUANTIFICATION: NEGATIVE NG/ML
SL AMB 7-OH-MITRAGYNINE (KRATOM ALKALOID) QUANTIFICATION: NEGATIVE NG/ML
SL AMB AB-FUBINACA-M3 METABOLITE QUANTIFICATION: NEGATIVE NG/ML
SL AMB ACETYL FENTANYL QUANTIFICATION: NORMAL NG/ML
SL AMB ACETYL NORFENTANYL QUANTIFICATION: NORMAL NG/ML
SL AMB ACRYL FENTANYL QUANTIFICATION: NORMAL NG/ML
SL AMB BATH SALTS: NEGATIVE NG/ML
SL AMB BUTRYL FENTANYL QUANTIFICATION: NORMAL NG/ML
SL AMB CARFENTANIL QUANTIFICATION: NORMAL NG/ML
SL AMB CLOZAPINE QUANTIFICATION: NEGATIVE NG/ML
SL AMB CTHC (MARIJUANA METABOLITE) QUANTIFICATION: NEGATIVE NG/ML
SL AMB CYCLOPROPYL FENTANYL QUANTIFICATION: NORMAL NG/ML
SL AMB DEXTROMETHORPHAN QUANTIFICATION: NEGATIVE NG/ML
SL AMB DEXTRORPHAN (DEXTROMETHORPHAN METABOLITE) QUANT: NEGATIVE NG/ML
SL AMB DEXTRORPHAN (DEXTROMETHORPHAN METABOLITE) QUANT: NEGATIVE NG/ML
SL AMB FURANYL FENTANYL QUANTIFICATION: NORMAL NG/ML
SL AMB HALOPERIDOL  QUANTIFICATION: NEGATIVE NG/ML
SL AMB HALOPERIDOL METABOLITE QUANTIFICATION: NEGATIVE NG/ML
SL AMB HYDROXYRISPERIDONE QUANTIFICATION: NEGATIVE NG/ML
SL AMB JWH018 METABOLITE QUANTIFICATION: NEGATIVE NG/ML
SL AMB JWH073 METABOLITE QUANTIFICATION: NEGATIVE NG/ML
SL AMB MDMB-FUBINACA-M1 METABOLITE QUANTIFICATION: NEGATIVE NG/ML
SL AMB METHOXYACETYL FENTANYL QUANTIFICATION: NORMAL NG/ML
SL AMB METHYLONE QUANTIFICATION: NEGATIVE NG/ML
SL AMB N-DESMETHYL U-47700 QUANTIFICATION: NORMAL NG/ML
SL AMB N-DESMETHYL-TRAMADOL QUANTIFICATION: NORMAL NG/ML
SL AMB N-DESMETHYLCLOZAPINE QUANTIFICATION: NEGATIVE NG/ML
SL AMB NORQUETIAPINE QUANTIFICATION: NEGATIVE NG/ML
SL AMB PHENTERMINE QUANTIFICATION: NEGATIVE NG/ML
SL AMB QUETIAPINE QUANTIFICATION: NEGATIVE NG/ML
SL AMB RCS4 METABOLITE QUANTIFICATION: NEGATIVE NG/ML
SL AMB RISPERIDONE QUANTIFICATION: NEGATIVE NG/ML
SL AMB RITALINIC ACID QUANTIFICATION: NEGATIVE NG/ML
SL AMB U-47700 QUANTIFICATION: NORMAL NG/ML
SPECIMEN DRAWN SERPL: NEGATIVE NG/ML
TAPENTADOL UR QL CFM: NEGATIVE NG/ML
TEMAZEPAM UR QL CFM: NEGATIVE NG/ML
TEMAZEPAM UR QL CFM: NEGATIVE NG/ML
TRAMADOL UR QL CFM: NORMAL NG/ML
URATE/CREAT 24H UR: NORMAL NG/ML

## 2021-09-14 DIAGNOSIS — E78.2 MIXED HYPERLIPIDEMIA: ICD-10-CM

## 2021-09-14 RX ORDER — ATORVASTATIN CALCIUM 40 MG/1
40 TABLET, FILM COATED ORAL
Qty: 90 TABLET | Refills: 0 | Status: SHIPPED | OUTPATIENT
Start: 2021-09-14 | End: 2021-11-29

## 2021-09-15 ENCOUNTER — OFFICE VISIT (OUTPATIENT)
Dept: SLEEP CENTER | Facility: CLINIC | Age: 81
End: 2021-09-15
Payer: MEDICARE

## 2021-09-15 VITALS
SYSTOLIC BLOOD PRESSURE: 128 MMHG | WEIGHT: 230 LBS | HEIGHT: 67 IN | BODY MASS INDEX: 36.1 KG/M2 | DIASTOLIC BLOOD PRESSURE: 72 MMHG

## 2021-09-15 DIAGNOSIS — G47.33 OSA (OBSTRUCTIVE SLEEP APNEA): Primary | ICD-10-CM

## 2021-09-15 PROCEDURE — 99213 OFFICE O/P EST LOW 20 MIN: CPT | Performed by: INTERNAL MEDICINE

## 2021-09-15 NOTE — PROGRESS NOTES
Progress Note - [facility]   Pattie Christiansen :1940 MRN: [id number]      Reason for Visit:  80 y o male here for annual follow-up    Assessment:  Doing well on current therapy of CPAP 11 cm for moderate CHASIDY (AHI=16 8)  Plan:  Continue same    Follow up: One year    History of Present Illness:  History of CHASIDY on PAP therapy  Fully compliant and deriving benefit      Review of Systems      Genitourinary none   Cardiology ankle/leg swelling   Gastrointestinal none   Neurology none   Constitutional none   Integumentary none   Psychiatry anxiety   Musculoskeletal joint pain and back pain   Pulmonary none   ENT none   Endocrine none   Hematological none         I have reviewed and updated the review of systems as necessary      Historical Information    Past Medical History:   Past Medical History:   Diagnosis Date    A-fib (HCC)     Anemia, unspecified     Anxiety     Arthritis     Basal cell carcinoma (BCC) of skin of nose     Cancer (HCC)     Chondrocostal junction syndrome     CPAP (continuous positive airway pressure) dependence     Depression     Dysthymic disorder     Edema, unspecified     Hyperlipidemia     Hypertension     Impaired fasting blood sugar     Intervertebral disc disorders with radiculopathy, lumbar region     Irregular heart beat     Prostate cancer (HCC)     s/p surgery    Sleep apnea     on CPAP    Spinal stenosis     Stroke ([address] )     TIA (transient ischemic attack)     no residual problems    Unspecified osteoarthritis, unspecified site     Venous insufficiency of both lower extremities          Past Surgical History:   Past Surgical History:   Procedure Laterality Date    BASAL CELL CARCINOMA EXCISION      on the nose    CATARACT EXTRACTION      COLONOSCOPY      ,     CT EPIDURAL STEROID INJECTION (TIN LUMBAR)      FACIAL/NECK BIOPSY N/A 4/3/2017    Procedure: NOSE BCC EXCISION; FROZEN SECTION; RECONSTRUCTION ;  Surgeon: Ora Bernard MD; [facility];  Service:     FLAP LOCAL HEAD / NECK N/A 10/27/2020    Procedure: NOSE FLAP;  Surgeon: Uday Waters MD;  [facility];  Service: Plastics    FULL THICKNESS SKIN GRAFT N/A 4/3/2017    Procedure: FLAP VERSUS FULL THICKNESS SKIN GRAFT ;  Surgeon: Uday Waters MD;  [facility];  Service:     MOHS RECONSTRUCTION N/A 10/27/2020    Procedure: NOSE MOHS DEFECT RECONSTRUCTION;  Surgeon: Uday Waters MD;  [facility];  Service: Plastics    PROSTATECTOMY      TONSILLECTOMY AND ADENOIDECTOMY         Social History:   Social History     Socioeconomic History    Marital status: /Civil Union     Spouse name: None    Number of children: None    Years of education: None    Highest education level: None   Occupational History    None   Tobacco Use    Smoking status: Former Smoker     Types: Cigarettes     Quit date:      Years since quittin 7    Smokeless tobacco: Never Used    Tobacco comment: long time ago   Vaping Use    Vaping Use: Never used   Substance and Sexual Activity    Alcohol use: Yes     Alcohol/week: 7 0 standard drinks     Types: 7 Glasses of wine per week     Comment: glass of wine with dinner, maybe    Drug use: No    Sexual activity: Not Currently   Other Topics Concern    None   Social History Narrative    None     Social Determinants of Health     Financial Resource Strain:     Difficulty of Paying Living Expenses:    Food Insecurity:     Worried About Running Out of Food in the Last Year:     Ran Out of Food in the Last Year:    Transportation Needs:     Lack of Transportation (Medical):      Lack of Transportation (Non-Medical):    Physical Activity:     Days of Exercise per Week:     Minutes of Exercise per Session:    Stress:     Feeling of Stress :    Social Connections:     Frequency of Communication with Friends and Family:     Frequency of Social Gatherings with Friends and Family:     Attends [employer] Services:     Active Member of Clubs or Organizations:     Attends Club or Organization Meetings:     Marital Status:    Intimate Partner Violence:     Fear of Current or Ex-Partner:     Emotionally Abused:     Physically Abused:     Sexually Abused:        Family History:   Family History   Problem Relation Age of Onset    Heart attack Father        Medications/Allergies:      Current Outpatient Medications:     Acetaminophen 325 MG CAPS, Take by mouth as needed , Disp: , Rfl:     amLODIPine (NORVASC) 10 mg tablet, TAKE 1 TABLET DAILY, Disp: 90 tablet, Rfl: 0    atorvastatin (LIPITOR) 40 mg tablet, TAKE 1 TABLET (40 MG TOTAL) BY MOUTH DAILY AT BEDTIME, Disp: 90 tablet, Rfl: 0    Cholecalciferol (VITAMIN D-3 PO), Take 2,000 unit marking on U-100 syringe by mouth daily after dinner, Disp: , Rfl:     co-enzyme Q-10 30 MG capsule, Take 30 mg by mouth daily after dinner, Disp: , Rfl:     diclofenac sodium (VOLTAREN) 1 %, Apply 2 g topically 4 (four) times a day, Disp: , Rfl:     glucosamine-chondroitin 500-400 MG tablet, Take 2 tablets by mouth daily in the early morning, Disp: , Rfl:     losartan (COZAAR) 100 MG tablet, TAKE 1 TABLET (100 MG TOTAL) BY MOUTH DAILY, Disp: 90 tablet, Rfl: 3    montelukast (SINGULAIR) 10 mg tablet, Take 1 tablet (10 mg total) by mouth daily at bedtime, Disp: 30 tablet, Rfl: 1    multivitamin (THERAGRAN) TABS, Take 1 tablet by mouth daily in the early morning, Disp: , Rfl:     Omega-3 Fatty Acids (FISH OIL) 1,000 mg, Take 1,000 mg by mouth 2 (two) times a day, Disp: , Rfl:     sertraline (ZOLOFT) 100 mg tablet, TAKE 1/2 TABLET DAILY 60, Disp: 30 tablet, Rfl: 0    sotalol (BETAPACE) 80 mg tablet, TAKE 1 TABLET TWICE DAILY, Disp: 180 tablet, Rfl: 3    torsemide (DEMADEX) 20 mg tablet, TAKE 1 TABLET TWICE DAILY, Disp: 60 tablet, Rfl: 11    [START ON 9/19/2021] traMADol (ULTRAM) 50 mg tablet, Take 1 tablet (50 mg total) by mouth 2 (two) times a day as needed for moderate pain, Disp: 60 tablet, Rfl: 2    warfarin (COUMADIN) 5 mg tablet, TAKE 1/2 TO 1 TABLET DAILY OR AS DIRECTED, Disp: 90 tablet, Rfl: 3          Objective      Vital Signs:   Vitals:    09/15/21 1300   BP: 128/72     [place] Sleepiness Scale: Total score: 6        Physical Exam:    General: Alert, appropriate, cooperative, overweight    Head: NC/AT    Skin: Warm, dry    Neuro: No motor abnormalities, cranial nerves appear intact    Extremity: No clubbing, cyanosis      DME Provider: EPI        Counseling / Coordination of Care   I have spent 15 minutes with the patient today in which greater than 50% of this time was spent in counseling/coordination of care regarding: equipment and compliance  Continuous Positive Airway Pressure (CPAP) therapy was prescribed to you as a medical necessity and there are risks to discontinuing use of the device, some of which may be long term  Symptoms you experienced before using CPAP may return such as snoring, apneas, excessive daytime sleepiness, hypertension, cardiac arrythmias, risk of stroke, congestive heart-failure, exacerbation of COPD and potential respiratory failure  Ultimately, it is a personal decision for you to make if you continue use of an affected device or discontinue until a replacement is provided  Unfortunately, [employer] has not yet provided us with information about available devices  According to [employer], potential risks of continuing to use an affected device include irritation of skin, eyes and respiratory tract, inflammatory response, headache, asthma, adverse effects to other organs such as kidneys and liver and toxic carcinogenic effects  Board Certified Sleep Specialist    Portions of the record may have been created with voice recognition software  Occasional wrong word or "sound a like" substitutions may have occurred due to the inherent limitations of voice recognition software    Read the chart carefully and recognize, using context, where substitutions have occurred

## 2021-09-16 ENCOUNTER — OFFICE VISIT (OUTPATIENT)
Dept: PAIN MEDICINE | Facility: CLINIC | Age: 81
End: 2021-09-16
Payer: MEDICARE

## 2021-09-16 VITALS
BODY MASS INDEX: 36.73 KG/M2 | HEIGHT: 67 IN | SYSTOLIC BLOOD PRESSURE: 185 MMHG | DIASTOLIC BLOOD PRESSURE: 83 MMHG | HEART RATE: 61 BPM | WEIGHT: 234 LBS

## 2021-09-16 DIAGNOSIS — G89.4 CHRONIC PAIN SYNDROME: Primary | ICD-10-CM

## 2021-09-16 DIAGNOSIS — M46.1 SACROILIITIS (HCC): ICD-10-CM

## 2021-09-16 DIAGNOSIS — Z79.891 ENCOUNTER FOR LONG-TERM USE OF OPIATE ANALGESIC: ICD-10-CM

## 2021-09-16 DIAGNOSIS — M51.16 INTERVERTEBRAL DISC DISORDERS WITH RADICULOPATHY, LUMBAR REGION: ICD-10-CM

## 2021-09-16 DIAGNOSIS — M54.16 LUMBAR RADICULOPATHY: ICD-10-CM

## 2021-09-16 DIAGNOSIS — F11.20 UNCOMPLICATED OPIOID DEPENDENCE (HCC): ICD-10-CM

## 2021-09-16 PROCEDURE — 99214 OFFICE O/P EST MOD 30 MIN: CPT | Performed by: NURSE PRACTITIONER

## 2021-09-16 RX ORDER — TRAMADOL HYDROCHLORIDE 50 MG/1
50 TABLET ORAL EVERY 8 HOURS PRN
Qty: 90 TABLET | Refills: 2 | Status: SHIPPED | OUTPATIENT
Start: 2021-09-16 | End: 2021-12-08 | Stop reason: SDUPTHER

## 2021-09-16 RX ORDER — METHYLPREDNISOLONE 4 MG/1
TABLET ORAL
Qty: 1 EACH | Refills: 0 | Status: SHIPPED | OUTPATIENT
Start: 2021-09-16 | End: 2021-12-17 | Stop reason: ALTCHOICE

## 2021-09-16 NOTE — PROGRESS NOTES
Assessment:  1  Chronic pain syndrome    2  Lumbar radiculopathy    3  Uncomplicated opioid dependence ([facility] [address] )    4  Encounter for long-term use of opiate analgesic    5  Intervertebral disc disorders with radiculopathy, lumbar region    6  Sacroiliitis ([facility] [address] )        Plan:  1  I will prescribe the patient a Medrol Dosepak to address the inflammatory component the patient's pain  2  I will order an updated MRI of the lumbar spine  3  Patient can move increase tramadol to 50 mg q 8 hours p r n  use for the next month  This medication was refilled today    [place] Prescription Drug Monitoring Program report was reviewed and was appropriate     There are risks associated with opioid medications, including dependence, addiction and tolerance  The patient understands and agrees to use these medications only as prescribed  Potential side effects of the medications include, but are not limited to, constipation, drowsiness, addiction, impaired judgment and risk of fatal overdose if not taken as prescribed  The patient was warned against driving while taking sedation medications  Sharing medications is a felony  At this point in time, the patient is showing no signs of addiction, abuse, diversion or suicidal ideation  4   Patient may continue Tylenol p r n  and should not exceed more than 3000 mg in 24 hours   5  I will avoid NSAIDs secondary to anticoagulation   6  The patient will follow-up in 4 weeks or sooner if needed     M*Modal software was used to dictate this note  It may contain errors with dictating incorrect words or incorrect spelling  Please contact the provider directly with any questions  History of Present Illness: The patient is a 80 y o  male last seen on 9/1/21 who presents for a follow up office visit in regards to chronic and mid axial low back pain that radiates into the buttocks    The patient denies any radiating symptoms further into the lower extremities, bowel or bladder incontinence or saddle anesthesia  The patient states that over the last week and half, the pain in his low back has significantly worsened without inciting event or trauma  He denies any falls  He states one night, he just woke up out of bed and had excruciating pain in his low back  He is now using a walker  He has been taking tramadol 50 mg twice a day without relief  He is unable to take NSAIDs secondary to anticoagulation and finds no relief with Tylenol  He has failed SI joint injections and lumbar medial branch blocks in the past   He has had good relief with bilateral L4 TFESI, however this was for his lower extremity symptoms  MRI of the lumbar spine from 2018 reveals multilevel degenerative disc disease, and spondylosis with moderate bilateral foraminal narrowing and mild central narrowing at L4-5  The patient rates his pain a 10/10 on the numeric pain rating scale  He states he constantly has pain in the morning in the evening which is described as sharp    Pain Contract Signed: 3/24/21  Last Urine Drug Screen: 9/1/21  Last Tramadol per pt  9/16/21    I have personally reviewed and/or updated the patient's past medical history, past surgical history, family history, social history, current medications, allergies, and vital signs today  Review of Systems:    Review of Systems   Respiratory: Negative for shortness of breath  Cardiovascular: Negative for chest pain  Gastrointestinal: Negative for constipation, diarrhea, nausea and vomiting  Musculoskeletal: Positive for gait problem  Negative for arthralgias, joint swelling and myalgias  Skin: Negative for rash  Neurological: Negative for dizziness, seizures and weakness  All other systems reviewed and are negative          Past Medical History:   Diagnosis Date    A-fib ([facility] [address] )     Anemia, unspecified     Anxiety     Arthritis     Basal cell carcinoma (BCC) of skin of nose     Cancer (HCC)     Chondrocostal junction syndrome     CPAP (continuous positive airway pressure) dependence     Depression     Dysthymic disorder     Edema, unspecified     Hyperlipidemia     Hypertension     Impaired fasting blood sugar     Intervertebral disc disorders with radiculopathy, lumbar region     Irregular heart beat     Prostate cancer ([facility] [address] )     s/p surgery    Sleep apnea     on CPAP    Spinal stenosis     [facility]     TIA (transient ischemic attack)     no residual problems    Unspecified osteoarthritis, unspecified site     Venous insufficiency of both lower extremities        Past Surgical History:   Procedure Laterality Date    BASAL CELL CARCINOMA EXCISION      on the nose    CATARACT EXTRACTION      COLONOSCOPY      2011    CT EPIDURAL STEROID INJECTION (TIN LUMBAR)      FACIAL/NECK BIOPSY N/A 4/3/2017    Procedure: NOSE BCC EXCISION; FROZEN SECTION; RECONSTRUCTION ;  Surgeon: Debby Zavala MD;  [facility];  Service:     FLAP LOCAL HEAD / NECK N/A 10/27/2020    Procedure: NOSE FLAP;  Surgeon: Debby Zavala MD;  [facility];  Service: Plastics    FULL THICKNESS SKIN GRAFT N/A 4/3/2017    Procedure: FLAP VERSUS FULL THICKNESS SKIN GRAFT ;  Surgeon: Debby Zavala MD;  [facility];  Service:     MOHS RECONSTRUCTION N/A 10/27/2020    Procedure: NOSE MOHS DEFECT RECONSTRUCTION;  Surgeon: Debby Zavala MD;  [facility];  Service: Plastics    PROSTATECTOMY      TONSILLECTOMY AND ADENOIDECTOMY         Family History   Problem Relation Age of Onset    Heart attack Father        Social History     Occupational History    Not on file   Tobacco Use    Smoking status: Former Smoker     Types: Cigarettes     Quit date:      Years since quittin 7    Smokeless tobacco: Never Used    Tobacco comment: long time ago   Vaping Use    Vaping Use: Never used   Substance and Sexual Activity    Alcohol use:  Yes     Alcohol/week: 7 0 standard drinks     Types: 7 Glasses of wine per week     Comment: glass of wine with dinner, maybe    Drug use: No    Sexual activity: Not Currently         Current Outpatient Medications:     Acetaminophen 325 MG CAPS, Take by mouth as needed , Disp: , Rfl:     amLODIPine (NORVASC) 10 mg tablet, TAKE 1 TABLET DAILY, Disp: 90 tablet, Rfl: 0    atorvastatin (LIPITOR) 40 mg tablet, TAKE 1 TABLET (40 MG TOTAL) BY MOUTH DAILY AT BEDTIME, Disp: 90 tablet, Rfl: 0    Cholecalciferol (VITAMIN D-3 PO), Take 2,000 unit marking on U-100 syringe by mouth daily after dinner, Disp: , Rfl:     co-enzyme Q-10 30 MG capsule, Take 30 mg by mouth daily after dinner, Disp: , Rfl:     diclofenac sodium (VOLTAREN) 1 %, Apply 2 g topically 4 (four) times a day, Disp: , Rfl:     glucosamine-chondroitin 500-400 MG tablet, Take 2 tablets by mouth daily in the early morning, Disp: , Rfl:     losartan (COZAAR) 100 MG tablet, TAKE 1 TABLET (100 MG TOTAL) BY MOUTH DAILY, Disp: 90 tablet, Rfl: 3    methylPREDNISolone 4 MG tablet therapy pack, Use as directed on package, Disp: 1 each, Rfl: 0    montelukast (SINGULAIR) 10 mg tablet, Take 1 tablet (10 mg total) by mouth daily at bedtime, Disp: 30 tablet, Rfl: 1    multivitamin (THERAGRAN) TABS, Take 1 tablet by mouth daily in the early morning, Disp: , Rfl:     Omega-3 Fatty Acids (FISH OIL) 1,000 mg, Take 1,000 mg by mouth 2 (two) times a day, Disp: , Rfl:     sertraline (ZOLOFT) 100 mg tablet, TAKE 1/2 TABLET DAILY 60, Disp: 30 tablet, Rfl: 0    sotalol (BETAPACE) 80 mg tablet, TAKE 1 TABLET TWICE DAILY, Disp: 180 tablet, Rfl: 3    torsemide (DEMADEX) 20 mg tablet, TAKE 1 TABLET TWICE DAILY, Disp: 60 tablet, Rfl: 11    traMADol (ULTRAM) 50 mg tablet, Take 1 tablet (50 mg total) by mouth every 8 (eight) hours as needed for moderate pain, Disp: 90 tablet, Rfl: 2    warfarin (COUMADIN) 5 mg tablet, TAKE 1/2 TO 1 TABLET DAILY OR AS DIRECTED, Disp: 90 tablet, Rfl: 3    No Known Allergies    Physical Exam:    BP (!) 185/83   Pulse 61   Ht 5' 7" (1 702 m)   Wt 106 kg (234 lb)   BMI 36 65 kg/m²     Constitutional:normal, well developed, well nourished, alert, in no distress and non-toxic and no overt pain behavior  Eyes:anicteric  HEENT:grossly intact  Neck:supple, symmetric, trachea midline and no masses   Pulmonary:even and unlabored  Cardiovascular:Bilateral lower extremity edema   Skin:Normal without rashes or lesions and well hydrated  Psychiatric:Mood and affect appropriate  Neurologic:Cranial Nerves II-XII grossly intact  Musculoskeletal:antalgic gait, ambulating with a walker  Bilateral lumbar paraspinal musculature tender to palpation  Bilateral lower extremity strength 5/5 in all muscle groups  Equivocal straight leg raise bilaterally  Equivocal Te's test bilaterally  Imaging  MRI lumbar spine without contrast    (Results Pending)     MRI LUMBAR SPINE WITHOUT CONTRAST   INDICATION: M54 [id number]: Radiculopathy, lumbar region chronic, worsening low back pain radiating into the legs  COMPARISON: None  TECHNIQUE: Sagittal T1, sagittal T2, sagittal inversion recovery, axial T1 and axial T2, coronal T2    IMAGE QUALITY: Diagnostic   FINDINGS:   ALIGNMENT: Mild S-shaped scoliotic deformity noted with a slight dextroscoliosis of the lower thoracic spine and a mild levoscoliosis mid lumbar spine  Trace grade 1 anterolisthesis of L4 on L5 noted  MARROW SIGNAL: Scattered degenerative endplate changes  No focally suspicious marrow lesions  No bone marrow edema or compression abnormality  DISTAL CORD AND CONUS: Normal size and signal within the distal cord and conus  The conus ends at the L2 level  PARASPINAL SOFT TISSUES: A normal kidney is not identified in the expected location of the right renal fossa  There is a right pelvic kidney noted  It has a few small T2 hyperintense lesions, some of which are too small to characterize others likely   cysts   Visualized portions of the left kidney are unremarkable; the left kidney located in the left renal fossa  SACRUM: Normal signal within the sacrum  No evidence of insufficiency or stress fracture  LOWER THORACIC DISC SPACES: Multilevel loss of disc height and signal    T10-T11: There is no focal disk herniation, central canal or neural foraminal narrowing  T11-T12: Small central disc protrusion  Mild central canal narrowing  Neural foramina bilaterally patent  T12-L1: There is no focal disk herniation, central canal or neural foraminal narrowing  LUMBAR DISC SPACES:   L1-L2: Small left paracentral disc protrusion  There is bilateral facet hypertrophy  Mild left neural foraminal narrowing  Central canal and right neural foramen patent  L2-L3: There is no focal disk herniation, central canal or neural foraminal narrowing  Bilateral facet hypertrophy noted  L3-L4: There is a left neural foraminal disc protrusion  There is bilateral facet hypertrophy  Mild left neural foraminal narrowing  Central canal and right neural foramen patent  L4-L5: There is uncovering the intervertebral disc space  There is bilateral facet hypertrophy  There is no focal disc herniation  There is moderate bilateral neural foraminal narrowing  Mild central canal narrowing  L5-S1: There is a diffuse disk bulge  No significant central canal or neural foraminal narrowing  Bilateral facet hypertrophy noted  IMPRESSION:   1  Multilevel spondylosis with a mild S-shaped scoliosis as described  2  Right pelvic kidney        Orders Placed This Encounter   Procedures    MRI lumbar spine without contrast

## 2021-09-17 ENCOUNTER — OFFICE VISIT (OUTPATIENT)
Dept: CARDIAC SURGERY | Facility: CLINIC | Age: 81
End: 2021-09-17
Payer: MEDICARE

## 2021-09-17 VITALS
SYSTOLIC BLOOD PRESSURE: 179 MMHG | OXYGEN SATURATION: 96 % | HEIGHT: 67 IN | BODY MASS INDEX: 36.58 KG/M2 | HEART RATE: 68 BPM | DIASTOLIC BLOOD PRESSURE: 83 MMHG | WEIGHT: 233.1 LBS

## 2021-09-17 DIAGNOSIS — I87.2 EDEMA OF BOTH LOWER LEGS DUE TO PERIPHERAL VENOUS INSUFFICIENCY: ICD-10-CM

## 2021-09-17 DIAGNOSIS — I10 ESSENTIAL HYPERTENSION: ICD-10-CM

## 2021-09-17 DIAGNOSIS — I87.2 VENOUS INSUFFICIENCY: ICD-10-CM

## 2021-09-17 DIAGNOSIS — R60.0 EDEMA OF BOTH LOWER LEGS DUE TO PERIPHERAL VENOUS INSUFFICIENCY: ICD-10-CM

## 2021-09-17 DIAGNOSIS — I87.2 VENOUS INSUFFICIENCY OF BOTH LOWER EXTREMITIES: ICD-10-CM

## 2021-09-17 DIAGNOSIS — I48.0 PAROXYSMAL ATRIAL FIBRILLATION (HCC): ICD-10-CM

## 2021-09-17 DIAGNOSIS — I45.10 RIGHT BUNDLE BRANCH BLOCK (RBBB): Primary | ICD-10-CM

## 2021-09-17 PROCEDURE — 93000 ELECTROCARDIOGRAM COMPLETE: CPT | Performed by: INTERNAL MEDICINE

## 2021-09-17 PROCEDURE — 99214 OFFICE O/P EST MOD 30 MIN: CPT | Performed by: INTERNAL MEDICINE

## 2021-09-17 NOTE — PROGRESS NOTES
Cardiology Follow Up Visit    Patite Christiansen  1940  [id number]  [facility] [id number]  [phone]  [phone]    1  Right bundle branch block (RBBB)  POCT ECG   2  Paroxysmal atrial fibrillation (HCC)  POCT ECG   3  Essential hypertension  POCT ECG   4  Venous insufficiency of both lower extremities     5  Edema of both lower legs due to peripheral venous insufficiency  POCT ECG   6  Venous insufficiency           Discussion/Summary:    1  History of paroxysmal atrial fibrillation, on sotalol and warfarin, normal LVEF     2  Chronic venous insufficiency edema, daily diuretic     3  Essential hypertension, bp elevated today, isolated, previous ok     4  Right bundle-branch block, ECG unchanged     5  Hyperlipidemia, on statin      plan:  Patient doing well from a cardiac standpoint  He does not take his torsemide for his venous insufficiency edema as he states a cause in the urinate too much and they are not bothersome  I have made no other changes  Otherwise well compensated  Predominant issue right now is his low back which he plans on getting imaging in following up with pain management  Interval History:      53-year-old male here for routine follow-up     Patient with history of paroxysmal atrial fibrillation on sotalol and warfarin  Right bundle-branch block with no further conduction disease    Hyperlipidemia on statin with controlled LDL     Essential hypertension intermittently occasional elevation BP  Overall controlled     Lower extremity edema, venous insufficiency, intermittent compliance with diuretic as he does not like to urinate much when he takes his water pill        Recent echocardiogram with preserved LV function, age related relaxation,  Type 1 diastolic dysfunction, no significant valvular disease        Patient Active Problem List   Diagnosis    Paroxysmal atrial fibrillation ([address] [address] )    Lumbar spinal stenosis    Sacroiliitis (HCC)    Spondylosis of lumbar region without myelopathy or radiculopathy    Right bundle branch block (RBBB)    Edema of both lower legs due to peripheral venous insufficiency    Chronic pain of both lower extremities    Lumbar radiculopathy    Lumbar spondylosis    Chronic pain syndrome    Uncomplicated opioid dependence ([facility] )    Encounter for long-term use of opiate analgesic    Essential hypertension    Venous insufficiency    TIA (transient ischemic attack)    Prostate cancer ([facility] )    Intervertebral disc disorders with radiculopathy, lumbar region    Impaired fasting blood sugar    Hypertension    Mixed hyperlipidemia    A-fib (HCC)    Sleep apnea    CPAP (continuous positive airway pressure) dependence    Dysthymic disorder    Venous insufficiency of both lower extremities    Primary osteoarthritis of both knees    Medicare annual wellness visit, subsequent    Obesity, morbid ([place] [address] )    Non-seasonal allergic rhinitis    Abnormal gait     Past Medical History:   Diagnosis Date    A-fib (HCC)     Anemia, unspecified     Anxiety     Arthritis     Basal cell carcinoma (BCC) of skin of nose     [facility]     Chondrocostal junction syndrome     CPAP (continuous positive airway pressure) dependence     Depression     Dysthymic disorder     Edema, unspecified     Hyperlipidemia     Hypertension     Impaired fasting blood sugar     Intervertebral disc disorders with radiculopathy, lumbar region     Irregular heart beat     Prostate cancer ([place] [address] )     s/p surgery    Sleep apnea     on CPAP    Spinal stenosis     Stroke ([place] [address] )     TIA (transient ischemic attack)     no residual problems    Unspecified osteoarthritis, unspecified site     Venous insufficiency of both lower extremities      Social History     Socioeconomic History    Marital status: /Civil Union     Spouse name: Not on file    Number of children: Not on file  Years of education: Not on file    Highest education level: Not on file   Occupational History    Not on file   Tobacco Use    Smoking status: Former Smoker     Types: Cigarettes     Quit date:      Years since quittin 7    Smokeless tobacco: Never Used    Tobacco comment: long time ago   Vaping Use    Vaping Use: Never used   Substance and Sexual Activity    Alcohol use: Yes     Alcohol/week: 7 0 standard drinks     Types: 7 Glasses of wine per week     Comment: glass of wine with dinner, maybe    Drug use: No    Sexual activity: Not Currently   Other Topics Concern    Not on file   Social History Narrative    Not on file     Social Determinants of Health     Financial Resource Strain:     Difficulty of Paying Living Expenses:    Food Insecurity:     Worried About Running Out of Food in the Last Year:     Ran Out of Food in the Last Year:    Transportation Needs:     Lack of Transportation (Medical):      Lack of Transportation (Non-Medical):    Physical Activity:     Days of Exercise per Week:     Minutes of Exercise per Session:    Stress:     Feeling of Stress :    Social Connections:     Frequency of Communication with Friends and Family:     Frequency of Social Gatherings with Friends and Family:     Attends Worship Services:     Active Member of Clubs or Organizations:     Attends Club or Organization Meetings:     Marital Status:    Intimate Partner Violence:     Fear of Current or Ex-Partner:     Emotionally Abused:     Physically Abused:     Sexually Abused:       Family History   Problem Relation Age of Onset    Heart attack Father      Past Surgical History:   Procedure Laterality Date    BASAL CELL CARCINOMA EXCISION      on the nose    CATARACT EXTRACTION      COLONOSCOPY      ,     CT EPIDURAL STEROID INJECTION (TIN LUMBAR)      FACIAL/NECK BIOPSY N/A 4/3/2017    Procedure: NOSE BCC EXCISION; FROZEN SECTION; RECONSTRUCTION ;  Surgeon: Tripp Montez Mert Amaro MD;  [facility];  Service:    [facility] HEAD / NECK N/A 10/27/2020    Procedure: NOSE FLAP;  Surgeon: Buck Fields MD;  [facility];  Service: Plastics    FULL THICKNESS SKIN GRAFT N/A 4/3/2017    Procedure: FLAP VERSUS FULL THICKNESS SKIN GRAFT ;  Surgeon: Buck Fields MD;  [facility];  Service:     MOHS RECONSTRUCTION N/A 10/27/2020    Procedure: NOSE MOHS DEFECT RECONSTRUCTION;  Surgeon: Buck Fields MD;  [facility];  Service: Plastics    PROSTATECTOMY      TONSILLECTOMY AND ADENOIDECTOMY         Current Outpatient Medications:     Acetaminophen 325 MG CAPS, Take by mouth as needed , Disp: , Rfl:     amLODIPine (NORVASC) 10 mg tablet, TAKE 1 TABLET DAILY, Disp: 90 tablet, Rfl: 0    atorvastatin (LIPITOR) 40 mg tablet, TAKE 1 TABLET (40 MG TOTAL) BY MOUTH DAILY AT BEDTIME, Disp: 90 tablet, Rfl: 0    Cholecalciferol (VITAMIN D-3 PO), Take 2,000 unit marking on U-100 syringe by mouth daily after dinner, Disp: , Rfl:     co-enzyme Q-10 30 MG capsule, Take 30 mg by mouth daily after dinner, Disp: , Rfl:     diclofenac sodium (VOLTAREN) 1 %, Apply 2 g topically 4 (four) times a day, Disp: , Rfl:     glucosamine-chondroitin 500-400 MG tablet, Take 2 tablets by mouth daily in the early morning, Disp: , Rfl:     losartan (COZAAR) 100 MG tablet, TAKE 1 TABLET (100 MG TOTAL) BY MOUTH DAILY, Disp: 90 tablet, Rfl: 3    methylPREDNISolone 4 MG tablet therapy pack, Use as directed on package, Disp: 1 each, Rfl: 0    montelukast (SINGULAIR) 10 mg tablet, Take 1 tablet (10 mg total) by mouth daily at bedtime, Disp: 30 tablet, Rfl: 1    multivitamin (THERAGRAN) TABS, Take 1 tablet by mouth daily in the early morning, Disp: , Rfl:     Omega-3 Fatty Acids (FISH OIL) 1,000 mg, Take 1,000 mg by mouth 2 (two) times a day, Disp: , Rfl:     sertraline (ZOLOFT) 100 mg tablet, TAKE 1/2 TABLET DAILY 60, Disp: 30 tablet, Rfl: 0    sotalol (BETAPACE) 80 mg tablet, TAKE 1 TABLET TWICE DAILY, Disp: 180 tablet, Rfl: 3    traMADol (ULTRAM) 50 mg tablet, Take 1 tablet (50 mg total) by mouth every 8 (eight) hours as needed for moderate pain, Disp: 90 tablet, Rfl: 2    warfarin (COUMADIN) 5 mg tablet, TAKE 1/2 TO 1 TABLET DAILY OR AS DIRECTED, Disp: 90 tablet, Rfl: 3  No Known Allergies      Social, Family, Medication history reviewed and updated as necessary      Labs:     Lab Results   Component Value Date    K 3 9 08/26/2021     08/26/2021    CO2 25 08/26/2021    BUN 16 08/26/2021    CREATININE 0 78 08/26/2021    CREATININE 0 91 09/15/2020    CALCIUM 8 5 08/26/2021       Lab Results   Component Value Date    WBC 8 67 08/26/2021    HGB 15 4 08/26/2021    HGB 15 5 09/15/2020    HCT 46 8 08/26/2021    HCT 46 2 09/15/2020     08/26/2021     09/15/2020       No results found for: CHOL  Lab Results   Component Value Date    HDL 43 08/26/2021     Lab Results   Component Value Date    LDLCALC 74 08/26/2021     No results found for: LDLDIRECT          Lab Results   Component Value Date    TRIG 153 (H) 08/26/2021       Lab Results   Component Value Date    ALT 34 08/26/2021    ALT 26 08/28/2017    AST 21 08/26/2021    AST 16 08/28/2017       Lab Results   Component Value Date    INR 2 22 (H) 08/26/2021    INR 3 05 (H) 08/05/2021       No results found for: NTBNP    Lab Results   Component Value Date    HGBA1C 6 1 (H) 08/26/2021    HGBA1C 6 7 (H) 03/11/2021    HGBA1C 6 7 03/11/2021           Imaging: Reviewed in epic        Review of Systems:  14 systems reviewed and negative with exception of the above        PHYSICAL EXAM:      Vitals:    09/17/21 1059   BP: (!) 179/83   Pulse: 68   SpO2: 96%     Body mass index is 36 51 kg/m²     Weight (last 2 days)     Date/Time   Weight    09/17/21 1059   106 (233 1)                Gen: No acute distress  HEENT: anicteric, mucous membranes moist  Neck: supple, no jugular venous distention, or carotid bruit  Heart: regular, normal s1 and s2, no murmur/rub or gallop  Lungs :clear to auscultation bilaterally, no rales/rhonchi or wheeze  Abdomen: soft nontender, normoactive bowel sounds, no organomegaly  Ext: warm and perfused, normal femoral pulses, no edema, or clubbing  Skin: warm, no rashes  Neuro: AAO x 3, no focal findings  Psychiatric: normal affect  Musculoskeletal: no obvious joint deformities  This note was completed in part utilizing [employer] direct voice recognition software  Grammatical errors, random word insertion, spelling mistakes, and incomplete sentences may be an occasional consequence of the system secondary to software limitations, ambient noise and hardware issues  At the time of dictation, efforts were made to edit, clarify and /or correct errors  Please read the chart carefully and recognize, using context, where substitutions have occurred  If you have any questions or concerns about the context, text or information contained within the body of this dictation, please contact myself, the provider, for further clarification

## 2021-09-23 ENCOUNTER — ANTICOAG VISIT (OUTPATIENT)
Dept: CARDIOLOGY CLINIC | Facility: CLINIC | Age: 81
End: 2021-09-23

## 2021-09-23 ENCOUNTER — APPOINTMENT (OUTPATIENT)
Dept: LAB | Facility: CLINIC | Age: 81
End: 2021-09-23
Payer: MEDICARE

## 2021-09-23 DIAGNOSIS — I48.0 PAROXYSMAL ATRIAL FIBRILLATION (HCC): Primary | ICD-10-CM

## 2021-10-01 ENCOUNTER — HOSPITAL ENCOUNTER (OUTPATIENT)
Dept: RADIOLOGY | Age: 81
Discharge: HOME | End: 2021-10-01
Payer: MEDICARE

## 2021-10-01 ENCOUNTER — TELEPHONE (OUTPATIENT)
Dept: PAIN MEDICINE | Facility: CLINIC | Age: 81
End: 2021-10-01

## 2021-10-01 DIAGNOSIS — Z79.01 CHRONIC ANTICOAGULATION: Primary | ICD-10-CM

## 2021-10-01 DIAGNOSIS — M54.16 LUMBAR RADICULOPATHY: ICD-10-CM

## 2021-10-01 PROCEDURE — 72148 MRI LUMBAR SPINE W/O DYE: CPT

## 2021-10-06 ENCOUNTER — OFFICE VISIT (OUTPATIENT)
Dept: INTERNAL MEDICINE CLINIC | Facility: CLINIC | Age: 81
End: 2021-10-06
Payer: MEDICARE

## 2021-10-06 VITALS
TEMPERATURE: 98.9 F | DIASTOLIC BLOOD PRESSURE: 74 MMHG | OXYGEN SATURATION: 98 % | HEIGHT: 67 IN | HEART RATE: 64 BPM | WEIGHT: 232 LBS | SYSTOLIC BLOOD PRESSURE: 136 MMHG | BODY MASS INDEX: 36.41 KG/M2

## 2021-10-06 DIAGNOSIS — E66.01 OBESITY, MORBID (HCC): ICD-10-CM

## 2021-10-06 DIAGNOSIS — I48.0 PAROXYSMAL ATRIAL FIBRILLATION (HCC): ICD-10-CM

## 2021-10-06 DIAGNOSIS — M17.0 PRIMARY OSTEOARTHRITIS OF BOTH KNEES: ICD-10-CM

## 2021-10-06 DIAGNOSIS — R73.01 IMPAIRED FASTING BLOOD SUGAR: ICD-10-CM

## 2021-10-06 DIAGNOSIS — M54.16 LUMBAR RADICULOPATHY: ICD-10-CM

## 2021-10-06 DIAGNOSIS — I87.2 VENOUS INSUFFICIENCY OF BOTH LOWER EXTREMITIES: ICD-10-CM

## 2021-10-06 DIAGNOSIS — E78.2 MIXED HYPERLIPIDEMIA: ICD-10-CM

## 2021-10-06 DIAGNOSIS — F34.1 DYSTHYMIC DISORDER: ICD-10-CM

## 2021-10-06 DIAGNOSIS — I10 ESSENTIAL HYPERTENSION: Primary | ICD-10-CM

## 2021-10-06 DIAGNOSIS — G47.33 OBSTRUCTIVE SLEEP APNEA SYNDROME: ICD-10-CM

## 2021-10-06 DIAGNOSIS — C61 PROSTATE CANCER (HCC): ICD-10-CM

## 2021-10-06 PROCEDURE — 99214 OFFICE O/P EST MOD 30 MIN: CPT | Performed by: INTERNAL MEDICINE

## 2021-10-11 DIAGNOSIS — M54.16 LUMBAR RADICULOPATHY: Primary | ICD-10-CM

## 2021-10-21 ENCOUNTER — TELEPHONE (OUTPATIENT)
Dept: CARDIOLOGY CLINIC | Facility: CLINIC | Age: 81
End: 2021-10-21

## 2021-10-22 ENCOUNTER — APPOINTMENT (OUTPATIENT)
Dept: LAB | Facility: CLINIC | Age: 81
End: 2021-10-22
Payer: MEDICARE

## 2021-10-22 ENCOUNTER — ANTICOAG VISIT (OUTPATIENT)
Dept: CARDIOLOGY CLINIC | Facility: CLINIC | Age: 81
End: 2021-10-22

## 2021-10-22 DIAGNOSIS — I48.0 PAROXYSMAL ATRIAL FIBRILLATION (HCC): Primary | ICD-10-CM

## 2021-11-02 ENCOUNTER — APPOINTMENT (OUTPATIENT)
Dept: LAB | Facility: HOSPITAL | Age: 81
End: 2021-11-02
Attending: INTERNAL MEDICINE
Payer: MEDICARE

## 2021-11-02 ENCOUNTER — HOSPITAL ENCOUNTER (OUTPATIENT)
Dept: RADIOLOGY | Facility: CLINIC | Age: 81
Discharge: HOME | End: 2021-11-02
Attending: ANESTHESIOLOGY
Payer: MEDICARE

## 2021-11-02 ENCOUNTER — APPOINTMENT (OUTPATIENT)
Dept: LAB | Facility: HOSPITAL | Age: 81
End: 2021-11-02
Attending: ANESTHESIOLOGY
Payer: MEDICARE

## 2021-11-02 VITALS
TEMPERATURE: 98 F | DIASTOLIC BLOOD PRESSURE: 76 MMHG | SYSTOLIC BLOOD PRESSURE: 170 MMHG | RESPIRATION RATE: 20 BRPM | OXYGEN SATURATION: 94 % | HEART RATE: 63 BPM

## 2021-11-02 DIAGNOSIS — Z79.01 CHRONIC ANTICOAGULATION: ICD-10-CM

## 2021-11-02 DIAGNOSIS — M54.16 LUMBAR RADICULOPATHY: ICD-10-CM

## 2021-11-02 LAB
INR PPP: 0.99 (ref 0.84–1.19)
PROTHROMBIN TIME: 12.7 SECONDS (ref 11.6–14.5)

## 2021-11-02 PROCEDURE — 85610 PROTHROMBIN TIME: CPT

## 2021-11-02 PROCEDURE — 36415 COLL VENOUS BLD VENIPUNCTURE: CPT

## 2021-11-02 PROCEDURE — 62323 NJX INTERLAMINAR LMBR/SAC: CPT | Performed by: ANESTHESIOLOGY

## 2021-11-02 RX ORDER — METHYLPREDNISOLONE ACETATE 80 MG/ML
80 INJECTION, SUSPENSION INTRA-ARTICULAR; INTRALESIONAL; INTRAMUSCULAR; PARENTERAL; SOFT TISSUE ONCE
Status: COMPLETED | OUTPATIENT
Start: 2021-11-02 | End: 2021-11-02

## 2021-11-02 RX ADMIN — IOHEXOL 1 ML: 300 INJECTION, SOLUTION INTRAVENOUS at 13:45

## 2021-11-02 RX ADMIN — METHYLPREDNISOLONE ACETATE 80 MG: 80 INJECTION, SUSPENSION INTRA-ARTICULAR; INTRALESIONAL; INTRAMUSCULAR; SOFT TISSUE at 13:45

## 2021-11-09 ENCOUNTER — TELEPHONE (OUTPATIENT)
Dept: PAIN MEDICINE | Facility: CLINIC | Age: 81
End: 2021-11-09

## 2021-11-23 ENCOUNTER — ANTICOAG VISIT (OUTPATIENT)
Dept: CARDIOLOGY CLINIC | Facility: CLINIC | Age: 81
End: 2021-11-23

## 2021-11-23 ENCOUNTER — APPOINTMENT (OUTPATIENT)
Dept: LAB | Facility: CLINIC | Age: 81
End: 2021-11-23
Payer: MEDICARE

## 2021-11-23 DIAGNOSIS — E78.2 MIXED HYPERLIPIDEMIA: ICD-10-CM

## 2021-11-23 DIAGNOSIS — I10 ESSENTIAL HYPERTENSION: ICD-10-CM

## 2021-11-23 DIAGNOSIS — I48.0 PAROXYSMAL ATRIAL FIBRILLATION (HCC): Primary | ICD-10-CM

## 2021-11-23 LAB
ALBUMIN SERPL BCP-MCNC: 3.7 G/DL (ref 3.5–5)
ALP SERPL-CCNC: 78 U/L (ref 46–116)
ALT SERPL W P-5'-P-CCNC: 34 U/L (ref 12–78)
ANION GAP SERPL CALCULATED.3IONS-SCNC: 6 MMOL/L (ref 4–13)
AST SERPL W P-5'-P-CCNC: 21 U/L (ref 5–45)
BILIRUB SERPL-MCNC: 0.79 MG/DL (ref 0.2–1)
BUN SERPL-MCNC: 17 MG/DL (ref 5–25)
CALCIUM SERPL-MCNC: 9.2 MG/DL (ref 8.3–10.1)
CHLORIDE SERPL-SCNC: 109 MMOL/L (ref 100–108)
CO2 SERPL-SCNC: 25 MMOL/L (ref 21–32)
CREAT SERPL-MCNC: 0.98 MG/DL (ref 0.6–1.3)
GFR SERPL CREATININE-BSD FRML MDRD: 73 ML/MIN/1.73SQ M
GLUCOSE SERPL-MCNC: 142 MG/DL (ref 65–140)
POTASSIUM SERPL-SCNC: 4.1 MMOL/L (ref 3.5–5.3)
PROT SERPL-MCNC: 7.2 G/DL (ref 6.4–8.2)
SODIUM SERPL-SCNC: 140 MMOL/L (ref 136–145)

## 2021-11-23 PROCEDURE — 80053 COMPREHEN METABOLIC PANEL: CPT

## 2021-11-29 DIAGNOSIS — E78.2 MIXED HYPERLIPIDEMIA: ICD-10-CM

## 2021-11-29 DIAGNOSIS — I10 ESSENTIAL (PRIMARY) HYPERTENSION: ICD-10-CM

## 2021-11-29 RX ORDER — ATORVASTATIN CALCIUM 40 MG/1
40 TABLET, FILM COATED ORAL
Qty: 90 TABLET | Refills: 0 | Status: SHIPPED | OUTPATIENT
Start: 2021-11-29 | End: 2022-02-28

## 2021-11-29 RX ORDER — AMLODIPINE BESYLATE 10 MG/1
TABLET ORAL
Qty: 90 TABLET | Refills: 0 | Status: SHIPPED | OUTPATIENT
Start: 2021-11-29 | End: 2022-03-01

## 2021-12-08 ENCOUNTER — OFFICE VISIT (OUTPATIENT)
Dept: PAIN MEDICINE | Facility: CLINIC | Age: 81
End: 2021-12-08
Payer: MEDICARE

## 2021-12-08 VITALS
HEIGHT: 67 IN | BODY MASS INDEX: 36.41 KG/M2 | SYSTOLIC BLOOD PRESSURE: 150 MMHG | DIASTOLIC BLOOD PRESSURE: 17 MMHG | HEART RATE: 60 BPM | WEIGHT: 232 LBS

## 2021-12-08 DIAGNOSIS — M46.1 SACROILIITIS (HCC): ICD-10-CM

## 2021-12-08 DIAGNOSIS — G89.4 CHRONIC PAIN SYNDROME: ICD-10-CM

## 2021-12-08 DIAGNOSIS — M51.16 INTERVERTEBRAL DISC DISORDER WITH RADICULOPATHY OF LUMBAR REGION: ICD-10-CM

## 2021-12-08 DIAGNOSIS — M54.16 LUMBAR RADICULOPATHY: ICD-10-CM

## 2021-12-08 DIAGNOSIS — R29.898 WEAKNESS OF BOTH LOWER EXTREMITIES: Primary | ICD-10-CM

## 2021-12-08 DIAGNOSIS — M51.16 INTERVERTEBRAL DISC DISORDERS WITH RADICULOPATHY, LUMBAR REGION: ICD-10-CM

## 2021-12-08 DIAGNOSIS — M47.816 SPONDYLOSIS OF LUMBAR REGION WITHOUT MYELOPATHY OR RADICULOPATHY: ICD-10-CM

## 2021-12-08 DIAGNOSIS — Z79.891 LONG-TERM CURRENT USE OF OPIATE ANALGESIC: ICD-10-CM

## 2021-12-08 DIAGNOSIS — M54.50 CHRONIC BILATERAL LOW BACK PAIN, UNSPECIFIED WHETHER SCIATICA PRESENT: ICD-10-CM

## 2021-12-08 DIAGNOSIS — G89.29 CHRONIC BILATERAL LOW BACK PAIN, UNSPECIFIED WHETHER SCIATICA PRESENT: ICD-10-CM

## 2021-12-08 DIAGNOSIS — Z79.891 ENCOUNTER FOR LONG-TERM USE OF OPIATE ANALGESIC: ICD-10-CM

## 2021-12-08 DIAGNOSIS — F11.20 UNCOMPLICATED OPIOID DEPENDENCE (HCC): ICD-10-CM

## 2021-12-08 DIAGNOSIS — M48.062 SPINAL STENOSIS OF LUMBAR REGION WITH NEUROGENIC CLAUDICATION: ICD-10-CM

## 2021-12-08 PROCEDURE — 99214 OFFICE O/P EST MOD 30 MIN: CPT | Performed by: NURSE PRACTITIONER

## 2021-12-08 PROCEDURE — 80305 DRUG TEST PRSMV DIR OPT OBS: CPT | Performed by: NURSE PRACTITIONER

## 2021-12-08 RX ORDER — TRAMADOL HYDROCHLORIDE 50 MG/1
50 TABLET ORAL EVERY 8 HOURS PRN
Qty: 90 TABLET | Refills: 0 | Status: SHIPPED | OUTPATIENT
Start: 2021-12-08 | End: 2022-03-02 | Stop reason: SDUPTHER

## 2021-12-11 ENCOUNTER — IMMUNIZATIONS (OUTPATIENT)
Dept: FAMILY MEDICINE CLINIC | Facility: HOSPITAL | Age: 81
End: 2021-12-11

## 2021-12-11 DIAGNOSIS — Z23 ENCOUNTER FOR IMMUNIZATION: Primary | ICD-10-CM

## 2021-12-11 PROCEDURE — 91300 COVID-19 PFIZER VACC 0.3 ML: CPT

## 2021-12-11 PROCEDURE — 0001A COVID-19 PFIZER VACC 0.3 ML: CPT

## 2021-12-17 ENCOUNTER — OFFICE VISIT (OUTPATIENT)
Dept: INTERNAL MEDICINE CLINIC | Facility: CLINIC | Age: 81
End: 2021-12-17
Payer: MEDICARE

## 2021-12-17 ENCOUNTER — TELEPHONE (OUTPATIENT)
Dept: INTERNAL MEDICINE CLINIC | Facility: CLINIC | Age: 81
End: 2021-12-17

## 2021-12-17 VITALS
OXYGEN SATURATION: 98 % | DIASTOLIC BLOOD PRESSURE: 78 MMHG | SYSTOLIC BLOOD PRESSURE: 136 MMHG | TEMPERATURE: 97.8 F | HEART RATE: 63 BPM | HEIGHT: 67 IN | WEIGHT: 238 LBS | BODY MASS INDEX: 37.35 KG/M2

## 2021-12-17 DIAGNOSIS — M17.0 PRIMARY OSTEOARTHRITIS OF BOTH KNEES: ICD-10-CM

## 2021-12-17 DIAGNOSIS — I10 ESSENTIAL HYPERTENSION: ICD-10-CM

## 2021-12-17 DIAGNOSIS — M54.16 LUMBAR RADICULOPATHY: ICD-10-CM

## 2021-12-17 DIAGNOSIS — R73.01 IMPAIRED FASTING BLOOD SUGAR: ICD-10-CM

## 2021-12-17 DIAGNOSIS — E66.01 OBESITY, MORBID (HCC): ICD-10-CM

## 2021-12-17 DIAGNOSIS — I87.2 VENOUS INSUFFICIENCY OF BOTH LOWER EXTREMITIES: ICD-10-CM

## 2021-12-17 DIAGNOSIS — L03.115 CELLULITIS OF RIGHT FOOT: Primary | ICD-10-CM

## 2021-12-17 DIAGNOSIS — C61 PROSTATE CANCER (HCC): ICD-10-CM

## 2021-12-17 DIAGNOSIS — I48.0 PAROXYSMAL ATRIAL FIBRILLATION (HCC): ICD-10-CM

## 2021-12-17 DIAGNOSIS — E78.2 MIXED HYPERLIPIDEMIA: ICD-10-CM

## 2021-12-17 PROCEDURE — 99214 OFFICE O/P EST MOD 30 MIN: CPT | Performed by: INTERNAL MEDICINE

## 2021-12-17 RX ORDER — TORSEMIDE 20 MG/1
20 TABLET ORAL DAILY
COMMUNITY
End: 2022-02-07

## 2021-12-17 RX ORDER — CEPHALEXIN 500 MG/1
500 CAPSULE ORAL EVERY 8 HOURS SCHEDULED
Qty: 21 CAPSULE | Refills: 0 | Status: SHIPPED | OUTPATIENT
Start: 2021-12-17 | End: 2021-12-17

## 2021-12-17 RX ORDER — CEPHALEXIN 500 MG/1
500 CAPSULE ORAL EVERY 8 HOURS SCHEDULED
Qty: 21 CAPSULE | Refills: 0 | Status: SHIPPED | OUTPATIENT
Start: 2021-12-17 | End: 2021-12-24

## 2021-12-21 ENCOUNTER — APPOINTMENT (OUTPATIENT)
Dept: LAB | Facility: CLINIC | Age: 81
End: 2021-12-21
Payer: MEDICARE

## 2021-12-21 ENCOUNTER — ANTICOAG VISIT (OUTPATIENT)
Dept: CARDIOLOGY CLINIC | Facility: CLINIC | Age: 81
End: 2021-12-21

## 2021-12-21 DIAGNOSIS — I48.0 PAROXYSMAL ATRIAL FIBRILLATION (HCC): Primary | ICD-10-CM

## 2022-01-03 DIAGNOSIS — F34.1 DYSTHYMIC DISORDER: ICD-10-CM

## 2022-01-19 ENCOUNTER — ANTICOAG VISIT (OUTPATIENT)
Dept: CARDIOLOGY CLINIC | Facility: CLINIC | Age: 82
End: 2022-01-19

## 2022-01-19 ENCOUNTER — APPOINTMENT (OUTPATIENT)
Dept: LAB | Facility: CLINIC | Age: 82
End: 2022-01-19
Payer: MEDICARE

## 2022-01-19 DIAGNOSIS — I48.0 PAROXYSMAL ATRIAL FIBRILLATION (HCC): Primary | ICD-10-CM

## 2022-01-19 DIAGNOSIS — I48.0 PAF (PAROXYSMAL ATRIAL FIBRILLATION) (HCC): ICD-10-CM

## 2022-01-19 LAB
INR PPP: 2.72 (ref 0.84–1.19)
PROTHROMBIN TIME: 27.4 SECONDS (ref 11.6–14.5)

## 2022-01-19 PROCEDURE — 36415 COLL VENOUS BLD VENIPUNCTURE: CPT

## 2022-01-19 PROCEDURE — 85610 PROTHROMBIN TIME: CPT

## 2022-02-07 DIAGNOSIS — I48.91 ATRIAL FIBRILLATION, UNSPECIFIED TYPE (HCC): ICD-10-CM

## 2022-02-07 RX ORDER — TORSEMIDE 20 MG/1
TABLET ORAL
Qty: 180 TABLET | Refills: 3 | Status: SHIPPED | OUTPATIENT
Start: 2022-02-07

## 2022-02-07 RX ORDER — WARFARIN SODIUM 5 MG/1
TABLET ORAL
Qty: 90 TABLET | Refills: 3 | Status: SHIPPED | OUTPATIENT
Start: 2022-02-07

## 2022-02-16 ENCOUNTER — APPOINTMENT (OUTPATIENT)
Dept: LAB | Facility: CLINIC | Age: 82
End: 2022-02-16
Payer: MEDICARE

## 2022-02-16 ENCOUNTER — ANTICOAG VISIT (OUTPATIENT)
Dept: CARDIOLOGY CLINIC | Facility: CLINIC | Age: 82
End: 2022-02-16

## 2022-02-16 DIAGNOSIS — I48.0 PAROXYSMAL ATRIAL FIBRILLATION (HCC): Primary | ICD-10-CM

## 2022-02-23 ENCOUNTER — OFFICE VISIT (OUTPATIENT)
Dept: INTERNAL MEDICINE CLINIC | Facility: CLINIC | Age: 82
End: 2022-02-23
Payer: MEDICARE

## 2022-02-23 VITALS
TEMPERATURE: 98.8 F | WEIGHT: 240 LBS | OXYGEN SATURATION: 98 % | BODY MASS INDEX: 37.67 KG/M2 | HEIGHT: 67 IN | HEART RATE: 60 BPM | SYSTOLIC BLOOD PRESSURE: 128 MMHG | DIASTOLIC BLOOD PRESSURE: 82 MMHG

## 2022-02-23 DIAGNOSIS — M17.0 PRIMARY OSTEOARTHRITIS OF BOTH KNEES: ICD-10-CM

## 2022-02-23 DIAGNOSIS — C61 PROSTATE CANCER (HCC): ICD-10-CM

## 2022-02-23 DIAGNOSIS — I10 ESSENTIAL HYPERTENSION: Primary | ICD-10-CM

## 2022-02-23 DIAGNOSIS — G47.33 OBSTRUCTIVE SLEEP APNEA SYNDROME: ICD-10-CM

## 2022-02-23 DIAGNOSIS — R73.01 IMPAIRED FASTING BLOOD SUGAR: ICD-10-CM

## 2022-02-23 DIAGNOSIS — F34.1 DYSTHYMIC DISORDER: ICD-10-CM

## 2022-02-23 DIAGNOSIS — M54.16 LUMBAR RADICULOPATHY: ICD-10-CM

## 2022-02-23 DIAGNOSIS — I48.0 PAROXYSMAL ATRIAL FIBRILLATION (HCC): ICD-10-CM

## 2022-02-23 DIAGNOSIS — E78.2 MIXED HYPERLIPIDEMIA: ICD-10-CM

## 2022-02-23 DIAGNOSIS — I87.2 VENOUS INSUFFICIENCY OF BOTH LOWER EXTREMITIES: ICD-10-CM

## 2022-02-23 PROCEDURE — 99214 OFFICE O/P EST MOD 30 MIN: CPT | Performed by: INTERNAL MEDICINE

## 2022-02-23 RX ORDER — IPRATROPIUM BROMIDE 42 UG/1
SPRAY, METERED NASAL
COMMUNITY
Start: 2022-02-16

## 2022-02-23 NOTE — PROGRESS NOTES
Assessment/Plan:    BMI Counseling: Body mass index is 37 59 kg/m²  The BMI is above normal  Nutrition recommendations include decreasing portion sizes, encouraging healthy choices of fruits and vegetables and decreasing fast food intake  Exercise recommendations include moderate physical activity 150 minutes/week  Rationale for BMI follow-up plan is due to patient being overweight or obese  1  Essential hypertension  -     CBC and differential; Future  -     Lipid Panel with Direct LDL reflex; Future  -     TSH, 3rd generation; Future    2  Mixed hyperlipidemia  -     Comprehensive metabolic panel; Future  -     Lipid Panel with Direct LDL reflex; Future  -     TSH, 3rd generation; Future    3  Impaired fasting blood sugar  -     Hemoglobin A1C; Future    4  Lumbar radiculopathy    5  Primary osteoarthritis of both knees    6  Paroxysmal atrial fibrillation (HCC)    7  Dysthymic disorder    8  Venous insufficiency of both lower extremities    9  Prostate cancer ([facility] [address] )    10  Obstructive sleep apnea syndrome           Subjective:      Patient ID: Jayla Giles is a 80 y o  male  Follow-up on multiple medical problems to ensure the stable on current medications      The following portions of the patient's history were reviewed and updated as appropriate: He  has a past medical history of A-fib ([facility] [address] ), Anemia, unspecified, Anxiety, Arthritis, Basal cell carcinoma (BCC) of skin of nose, Cancer (HCC), Chondrocostal junction syndrome, CPAP (continuous positive airway pressure) dependence, Depression, Dysthymic disorder, Edema, unspecified, Hyperlipidemia, Hypertension, Impaired fasting blood sugar, Intervertebral disc disorders with radiculopathy, lumbar region, Irregular heart beat, Prostate cancer ([address] ), Sleep apnea, Spinal stenosis, Stroke ([facility] [address] ), TIA (transient ischemic attack), Unspecified osteoarthritis, unspecified site, and Venous insufficiency of both lower extremities    He   Patient Active Problem List    Diagnosis Date Noted    Cellulitis of right foot 12/17/2021    Abnormal gait 08/04/2021    Medicare annual wellness visit, subsequent 03/26/2021    Obesity, morbid ([facility] [address] ) 03/26/2021    Non-seasonal allergic rhinitis 03/26/2021    Primary osteoarthritis of both knees 11/13/2020    TIA (transient ischemic attack)     [facility]     Intervertebral disc disorders with radiculopathy, lumbar region     Impaired fasting blood sugar     Hypertension     Mixed hyperlipidemia     A-fib ([facility] [address] )     Obstructive sleep apnea syndrome     CPAP (continuous positive airway pressure) dependence     Dysthymic disorder     Venous insufficiency of both lower extremities     Essential hypertension 11/20/2019    Venous insufficiency 78/79/3262    Uncomplicated opioid dependence ([facility] ) 06/05/2019    Encounter for long-term use of opiate analgesic 06/05/2019    Chronic pain syndrome 09/17/2018    Lumbar radiculopathy 04/16/2018    Lumbar spondylosis 04/16/2018    Right bundle branch block (RBBB) 03/19/2018    Edema of both lower legs due to peripheral venous insufficiency 03/19/2018    Chronic pain of both lower extremities 03/19/2018    Paroxysmal atrial fibrillation ([facility] [address] ) 01/20/2018    Sacroiliitis ([facility] ) 04/17/2017    Spondylosis of lumbar region without myelopathy or radiculopathy 04/17/2017    Lumbar spinal stenosis 05/12/2015     He  has a past surgical history that includes Prostatectomy; Tonsillectomy and adenoidectomy; FACIAL/NECK BIOPSY (N/A, 4/3/2017); FULL THICKNESS SKIN GRAFT (N/A, 4/3/2017); Cataract extraction; CT epidural steroid injection (TIN lumbar); MOHS RECONSTRUCTION (N/A, 10/27/2020); FLAP LOCAL HEAD / NECK (N/A, 10/27/2020); Colonoscopy; and Excision basal cell carcinoma  His family history includes Heart attack in his father  He  reports that he quit smoking about 42 years ago  His smoking use included cigarettes   He has never used smokeless tobacco  He reports current alcohol use of about 7 0 standard drinks of alcohol per week  He reports that he does not use drugs    Current Outpatient Medications   Medication Sig Dispense Refill    Acetaminophen 325 MG CAPS Take by mouth as needed       amLODIPine (NORVASC) 10 mg tablet TAKE 1 TABLET DAILY 90 tablet 0    atorvastatin (LIPITOR) 40 mg tablet TAKE 1 TABLET (40 MG TOTAL) BY MOUTH DAILY AT BEDTIME 90 tablet 0    Cholecalciferol (VITAMIN D-3 PO) Take 2,000 unit marking on U-100 syringe by mouth daily after dinner      co-enzyme Q-10 30 MG capsule Take 30 mg by mouth daily after dinner      diclofenac sodium (VOLTAREN) 1 % Apply 2 g topically 4 (four) times a day      glucosamine-chondroitin 500-400 MG tablet Take 2 tablets by mouth daily in the early morning      ipratropium (ATROVENT) 0 06 % nasal spray USE 2 SPRAYS INTO EACH NOSTRIL UP TO 3 TIMES DAILY AS NEEDED      losartan (COZAAR) 100 MG tablet TAKE 1 TABLET (100 MG TOTAL) BY MOUTH DAILY 90 tablet 3    montelukast (SINGULAIR) 10 mg tablet Take 1 tablet (10 mg total) by mouth daily at bedtime 30 tablet 1    multivitamin (THERAGRAN) TABS Take 1 tablet by mouth daily in the early morning      Omega-3 Fatty Acids (FISH OIL) 1,000 mg Take 1,000 mg by mouth 2 (two) times a day      pregabalin (LYRICA) 25 mg capsule Take 1 capsule (25 mg total) by mouth 3 (three) times a day TAKE 1 CAPSULE AT BEDTIME X 5 DAYS, THEN 1 CAPSULE TWICE A DAY X 5 DAYS, THEN 1 CAPSULE 3 TIMES A DAY THEREAFTER 90 capsule 2    sertraline (ZOLOFT) 50 mg tablet TAKE 1 TABLET (50 MG TOTAL) BY MOUTH DAILY 90 tablet 1    sotalol (BETAPACE) 80 mg tablet TAKE 1 TABLET TWICE DAILY 180 tablet 3    torsemide (DEMADEX) 20 mg tablet TAKE 1 TABLET TWICE DAILY 180 tablet 3    traMADol (ULTRAM) 50 mg tablet Take 1 tablet (50 mg total) by mouth every 8 (eight) hours as needed for moderate pain 90 tablet 0    warfarin (COUMADIN) 5 mg tablet TAKE 1/2 TO 1 TABLET DAILY OR AS DIRECTED 90 tablet 3     No current facility-administered medications for this visit  Current Outpatient Medications on File Prior to Visit   Medication Sig    Acetaminophen 325 MG CAPS Take by mouth as needed     amLODIPine (NORVASC) 10 mg tablet TAKE 1 TABLET DAILY    atorvastatin (LIPITOR) 40 mg tablet TAKE 1 TABLET (40 MG TOTAL) BY MOUTH DAILY AT BEDTIME    Cholecalciferol (VITAMIN D-3 PO) Take 2,000 unit marking on U-100 syringe by mouth daily after dinner    co-enzyme Q-10 30 MG capsule Take 30 mg by mouth daily after dinner    diclofenac sodium (VOLTAREN) 1 % Apply 2 g topically 4 (four) times a day    glucosamine-chondroitin 500-400 MG tablet Take 2 tablets by mouth daily in the early morning    ipratropium (ATROVENT) 0 06 % nasal spray USE 2 SPRAYS INTO EACH NOSTRIL UP TO 3 TIMES DAILY AS NEEDED    losartan (COZAAR) 100 MG tablet TAKE 1 TABLET (100 MG TOTAL) BY MOUTH DAILY    montelukast (SINGULAIR) 10 mg tablet Take 1 tablet (10 mg total) by mouth daily at bedtime    multivitamin (THERAGRAN) TABS Take 1 tablet by mouth daily in the early morning    Omega-3 Fatty Acids (FISH OIL) 1,000 mg Take 1,000 mg by mouth 2 (two) times a day    pregabalin (LYRICA) 25 mg capsule Take 1 capsule (25 mg total) by mouth 3 (three) times a day TAKE 1 CAPSULE AT BEDTIME X 5 DAYS, THEN 1 CAPSULE TWICE A DAY X 5 DAYS, THEN 1 CAPSULE 3 TIMES A DAY THEREAFTER    sertraline (ZOLOFT) 50 mg tablet TAKE 1 TABLET (50 MG TOTAL) BY MOUTH DAILY    sotalol (BETAPACE) 80 mg tablet TAKE 1 TABLET TWICE DAILY    torsemide (DEMADEX) 20 mg tablet TAKE 1 TABLET TWICE DAILY    traMADol (ULTRAM) 50 mg tablet Take 1 tablet (50 mg total) by mouth every 8 (eight) hours as needed for moderate pain    warfarin (COUMADIN) 5 mg tablet TAKE 1/2 TO 1 TABLET DAILY OR AS DIRECTED     No current facility-administered medications on file prior to visit  He has No Known Allergies       Review of Systems   Constitutional: Negative for chills and fever     HENT: Negative for congestion, ear pain and sore throat  Eyes: Negative for pain  Respiratory: Negative for cough and shortness of breath  Cardiovascular: Negative for chest pain and leg swelling  Gastrointestinal: Negative for abdominal pain, nausea and vomiting  Endocrine: Negative for polyuria  Genitourinary: Negative for difficulty urinating, frequency and urgency  Musculoskeletal: Positive for arthralgias and back pain  Skin: Negative for rash  Neurological: Negative for weakness and headaches  Psychiatric/Behavioral: Negative for sleep disturbance  The patient is not nervous/anxious  Objective:      /82 (BP Location: Right arm, Patient Position: Sitting, Cuff Size: Standard)   Pulse 60   Temp 98 8 °F (37 1 °C) (Temporal)   Ht 5' 7" (1 702 m)   Wt 109 kg (240 lb)   SpO2 98%   BMI 37 59 kg/m²     Recent Results (from the past 1344 hour(s))   Protime-INR    Collection Time: 01/19/22 11:52 AM   Result Value Ref Range    Protime 27 4 (H) 11 6 - 14 5 seconds    INR 2 72 (H) 0 84 - 1 19   Protime-INR    Collection Time: 02/16/22 11:57 AM   Result Value Ref Range    Protime 25 5 (H) 11 6 - 14 5 seconds    INR 2 47 (H) 0 84 - 1 19        Physical Exam  Constitutional:       Appearance: Normal appearance  HENT:      Head: Normocephalic  Right Ear: Tympanic membrane, ear canal and external ear normal       Left Ear: Tympanic membrane, ear canal and external ear normal       Nose: Nose normal  No congestion  Mouth/Throat:      Mouth: Mucous membranes are moist       Pharynx: Oropharynx is clear  No oropharyngeal exudate or posterior oropharyngeal erythema  Eyes:      Extraocular Movements: Extraocular movements intact  Conjunctiva/sclera: Conjunctivae normal       Pupils: Pupils are equal, round, and reactive to light  Cardiovascular:      Rate and Rhythm: Normal rate and regular rhythm  Heart sounds: Normal heart sounds  No murmur heard        Pulmonary: Effort: Pulmonary effort is normal       Breath sounds: Normal breath sounds  No wheezing or rales  Abdominal:      General: Bowel sounds are normal  There is no distension  Palpations: Abdomen is soft  Tenderness: There is no abdominal tenderness  Musculoskeletal:         General: Normal range of motion  Cervical back: Normal range of motion and neck supple  Right lower leg: Edema present  Left lower leg: Edema present  Lymphadenopathy:      Cervical: No cervical adenopathy  Skin:     General: Skin is warm  Neurological:      General: No focal deficit present  Mental Status: He is alert and oriented to person, place, and time

## 2022-02-28 DIAGNOSIS — E78.2 MIXED HYPERLIPIDEMIA: ICD-10-CM

## 2022-02-28 RX ORDER — ATORVASTATIN CALCIUM 40 MG/1
40 TABLET, FILM COATED ORAL
Qty: 90 TABLET | Refills: 0 | Status: SHIPPED | OUTPATIENT
Start: 2022-02-28 | End: 2022-06-06 | Stop reason: SDUPTHER

## 2022-03-01 DIAGNOSIS — I10 ESSENTIAL (PRIMARY) HYPERTENSION: ICD-10-CM

## 2022-03-01 RX ORDER — AMLODIPINE BESYLATE 10 MG/1
TABLET ORAL
Qty: 90 TABLET | Refills: 0 | Status: SHIPPED | OUTPATIENT
Start: 2022-03-01 | End: 2022-06-02

## 2022-03-02 ENCOUNTER — OFFICE VISIT (OUTPATIENT)
Dept: PAIN MEDICINE | Facility: CLINIC | Age: 82
End: 2022-03-02
Payer: MEDICARE

## 2022-03-02 VITALS
HEART RATE: 57 BPM | BODY MASS INDEX: 37.67 KG/M2 | SYSTOLIC BLOOD PRESSURE: 158 MMHG | WEIGHT: 240 LBS | HEIGHT: 67 IN | DIASTOLIC BLOOD PRESSURE: 69 MMHG

## 2022-03-02 DIAGNOSIS — R26.9 GAIT DISTURBANCE: ICD-10-CM

## 2022-03-02 DIAGNOSIS — M51.16 INTERVERTEBRAL DISC DISORDERS WITH RADICULOPATHY, LUMBAR REGION: ICD-10-CM

## 2022-03-02 DIAGNOSIS — Z79.891 LONG-TERM CURRENT USE OF OPIATE ANALGESIC: ICD-10-CM

## 2022-03-02 DIAGNOSIS — Z79.891 ENCOUNTER FOR LONG-TERM USE OF OPIATE ANALGESIC: ICD-10-CM

## 2022-03-02 DIAGNOSIS — M54.50 CHRONIC BILATERAL LOW BACK PAIN, UNSPECIFIED WHETHER SCIATICA PRESENT: ICD-10-CM

## 2022-03-02 DIAGNOSIS — M54.16 LUMBAR RADICULOPATHY: ICD-10-CM

## 2022-03-02 DIAGNOSIS — F11.20 UNCOMPLICATED OPIOID DEPENDENCE (HCC): ICD-10-CM

## 2022-03-02 DIAGNOSIS — M46.1 SACROILIITIS (HCC): ICD-10-CM

## 2022-03-02 DIAGNOSIS — G89.4 CHRONIC PAIN SYNDROME: Primary | ICD-10-CM

## 2022-03-02 DIAGNOSIS — G89.29 CHRONIC BILATERAL LOW BACK PAIN, UNSPECIFIED WHETHER SCIATICA PRESENT: ICD-10-CM

## 2022-03-02 PROCEDURE — 99214 OFFICE O/P EST MOD 30 MIN: CPT | Performed by: NURSE PRACTITIONER

## 2022-03-02 PROCEDURE — 80305 DRUG TEST PRSMV DIR OPT OBS: CPT | Performed by: NURSE PRACTITIONER

## 2022-03-02 RX ORDER — TRAMADOL HYDROCHLORIDE 50 MG/1
50 TABLET ORAL EVERY 8 HOURS PRN
Qty: 90 TABLET | Refills: 1 | Status: SHIPPED | OUTPATIENT
Start: 2022-03-02

## 2022-03-02 NOTE — PATIENT INSTRUCTIONS
Opioid Safety   WHAT YOU NEED TO KNOW:   An opioid medicine is used to treat pain  Examples are oxycodone, morphine, fentanyl, or codeine  Pain control and management may help you rest, heal, and return to your daily activities  You and your family will receive information about how to manage your pain at home  The instructions will include what to do if you have side effects as your pain is managed  You will get information on how to handle opioid medicine safely  You will also get suggestions on how to control pain without opioids  It is important to follow all instructions so your pain is managed effectively  DISCHARGE INSTRUCTIONS:   Call your local emergency number (911 in the [place]), or have someone else call if:   · You have a seizure  · You cannot be woken  · You have trouble staying awake and your breathing is slow or shallow  · Your speech is slurred, or you are confused  · You are dizzy or stumble when you walk  Call your doctor, or have someone close to you call if:   · You are extremely drowsy, or you have trouble staying awake or speaking  · You have pale or clammy skin  · You have blue fingernails or lips  · Your heartbeat is slower than normal     · You cannot stop vomiting  · You have questions or concerns about your condition or care  Use opioids safely:   · Take prescribed opioids exactly as directed  Opioids come with directions based on the kind and how it is given  Talk to your healthcare provider or a pharmacist if you have any questions  Do not take more than the recommended amount  Too much can cause a life-threatening overdose  Do not continue to take it after your pain stops  You may develop tolerance  This means you keep needing higher doses to get the same effect  You may also develop opioid use disorder  This means you are not able to control your opioid use  · Do not give opioids to others or take opioids that belong to someone else    The kind or amount one person takes may not be right for another  The person you share them with may also be taking medicines that do not mix with opioids  He or she may drink alcohol or use other drugs that can cause life-threatening problems when mixed with opioids  · Do not mix opioids with other medicines or alcohol  The combination can cause an overdose, or cause you to stop breathing  Alcohol, sleeping pills, and medicines such as antihistamines can make you sleepy  A combination with opioids can lead to a coma  · Do not drive or operate heavy machinery after you use an opioid  You may feel drowsy or have trouble concentrating  You can injure yourself or others if you drive or use heavy machinery when you are not alert  Your provider or pharmacist can tell you how long to wait after a dose before you do these activities  · Talk to your healthcare provider if you have any side effects  Side effects include nausea, sleepiness, itching, and trouble thinking clearly  Your provider may need to make changes to the kind or amount of opioid you are taking  He or she can also help you find ways to prevent or relieve side effects  Manage constipation:  Constipation is the most common side effect of opioid medicine  Constipation is when you have hard, dry bowel movements, or you go longer than usual between bowel movements  Tell your healthcare provider about all changes in your bowel movements while you are taking opioids  He or she may recommend laxative medicine to help you have a bowel movement  He or she may also change the kind of opioid you are taking, or change when you take it  The following are more ways you can prevent or relieve constipation:  · Drink liquids as directed  You may need to drink extra liquids to help soften and move your bowels  Ask how much liquid to drink each day and which liquids are best for you  · Eat high-fiber foods    This may help decrease constipation by adding bulk to your bowel movements  High-fiber foods include fruits, vegetables, whole-grain breads and cereals, and beans  Your healthcare provider or dietitian can help you create a high-fiber meal plan  Your provider may also recommend a fiber supplement if you cannot get enough fiber from food  · Exercise regularly  Regular physical activity can help stimulate your intestines  Walking is a good exercise to prevent or relieve constipation  Ask which exercises are best for you  · Schedule a time each day to have a bowel movement  This may help train your body to have regular bowel movements  Bend forward while you are on the toilet to help move the bowel movement out  Sit on the toilet for at least 10 minutes, even if you do not have a bowel movement  Store opioids safely:   · Store opioids where others cannot easily get them  Keep them in a locked cabinet or secure area  Do not  keep them in a purse or other bag you carry with you  A person may be looking for something else and find the opioids  · Make sure opioids are stored out of the reach of children  A child can easily overdose on opioids  Opioids may look like candy to a small child  The best way to dispose of opioids: The laws vary by country and area  In the [place], the best way is to return the opioids through a take-back program  This program is offered by the [employer]  The following are options for using the program:  · Take the opioids to a UZMA collection site  The site is often a law enforcement center  Call your local law enforcement center for scheduled take-back days in your area  You will be given information on where to go if the collection site is in a different location  · Take the opioids to an approved pharmacy or hospital   A pharmacy or hospital may be set up as a collection site  You will need to ask if it is a UZMA collection site if you were not directed there   A pharmacy or doctor's office may not be able to take back opioids unless it is a UZMA site  · Use a mail-back system  This means you are given containers to put the opioids into  You will then mail them in the containers  · Use a take-back drop box  This is a place to leave the opioids at any time  People and animals will not be able to get into the box  Your local law enforcement agency can tell you where to find a drop box in your area  Other safe ways to dispose of opioids: The medicine may come with disposal instructions  The instructions may vary depending on the brand of medicine you are using  Instructions may come in a Medication Guide, but not every medicine has one  You may instead get instructions from your pharmacy or doctor  Follow instructions carefully  The following are general guidelines to follow:  · Find out if you can flush the opioid  Some opioids can be flushed down the toilet or poured into the sink  You will need to contact authorities in your area to see if this is an option for you  The FDA also offers a list of medicines that are safe to flush down the toilet  You can check the list if you cannot get the information for your local area  · Ask your waste management company about rules for putting opioids in the trash  The company will be able to give you specific directions  Scratch out personal information on the original medicine label so it cannot be read  Then put it in the trash  Do not label the trash or put any information on it about the opioids  It should look like regular household trash so no one is tempted to look for the opioids  Keep the trash out of the reach of children and animals  Always make sure trash is secure  · Talk to officials if you live in a facility  If you live in a nursing home or assisted living center, talk to an official  The person will know the rules for your area  Other ways to manage pain:   · Ask your healthcare provider about non-opioid medicines to control pain  Some medicines may even work better than opioids, depending on the cause of your pain  Nonprescription medicines include NSAIDs (such as ibuprofen) and acetaminophen  Prescription medicines include muscle relaxers, antidepressants, and steroids  · Pain may be managed without any medicines  Some ways to relieve pain include massage, aromatherapy, or meditation  Physical or occupational therapy may also help  For more information:   · Drug Enforcement Administration  [address] [facility] [address]  Phone: 1- 103 - [phone]  Web Address: [facility]/drug_disposal/    · [address] and Drug Administration  [place] , [address] [facility]  Phone: 5- 536 - 970-4165  Web Address: http://weipass/    Follow up with your doctor or pain specialist as directed: You may need to have your dose adjusted  Your doctor or pain specialist can also help you find ways to manage pain without opioids  Write down your questions so you remember to ask them during your visits  © Copyright [employer] 2022 Information is for End User's use only and may not be sold, redistributed or otherwise used for commercial purposes  All illustrations and images included in CareNotes® are the copyrighted property of [employer]  or Lupe Silver[employer]  The above information is an  only  It is not intended as medical advice for individual conditions or treatments  Talk to your doctor, nurse or pharmacist before following any medical regimen to see if it is safe and effective for you

## 2022-03-02 NOTE — PROGRESS NOTES
Assessment:  1  Chronic pain syndrome    2  Uncomplicated opioid dependence ([facility] [address] )    3  Long-term current use of opiate analgesic    4  Gait disturbance    5  Chronic bilateral low back pain, unspecified whether sciatica present    6  Lumbar radiculopathy    7  Encounter for long-term use of opiate analgesic    8  Intervertebral disc disorders with radiculopathy, lumbar region    9  Sacroiliitis ([facility] [address] )        Plan:  1  Patient may continue tramadol 50 mg 1 tablet q 8 hours p r n  pain as prescribed  The patient was given a 2 month supply of prescriptions, however he generally makes 1 prescription last about 3 months  While the patient was in the office today an opioid contract was thoroughly reviewed and signed by the patient  The patient was given adequate time to ask questions in regards to the contract and a signed copy was sent home for their records  The patient also completed a BECKS depression inventory and SOAPP-R today, as part of our yearly opioid management program     [place] Prescription Drug Monitoring Program report was reviewed and was appropriate     A urine drug screen was collected at today's office visit as part of our medication management protocol  The point of care testing results were appropriate for what was being prescribed  The specimen will be sent for confirmatory testing  The drug screen is medically necessary because the patient is either dependent on opioid medication or is being considered for opioid medication therapy and the results could impact ongoing or future treatment  The drug screen is to evaluate for the presences or absence of prescribed, non-prescribed, and/or illicit drugs/substances  There are risks associated with opioid medications, including dependence, addiction and tolerance  The patient understands and agrees to use these medications only as prescribed   Potential side effects of the medications include, but are not limited to, constipation, drowsiness, addiction, impaired judgment and risk of fatal overdose if not taken as prescribed  The patient was warned against driving while taking sedation medications  Sharing medications is a felony  At this point in time, the patient is showing no signs of addiction, abuse, diversion or suicidal ideation  2  I will discontinue Pregabalin as the patient is not consistently taking this medication and has not experienced any worsening pain  3  I will avoid NSAIDs secondary to anticoagulation   4  We can repeat L5-S1 LESI p r n   5  Will initiate him and physical for gait disturbance and limb strengthening  6  The patient will follow-up in 6 months or sooner if needed     M*[employer] software was used to dictate this note  It may contain errors with dictating incorrect words or incorrect spelling  Please contact the provider directly with any questions  History of Present Illness: The patient is a 80 y o  male last seen on 12/8/21 who presents for a follow up office visit in regards to chronic low back pain secondary to lumbar spondylosis, lumbar degenerative disc disease, lumbar spondylolisthesis, lumbar stenosis and chronic pain syndrome  The patient denies bowel or bladder incontinence or saddle anesthesia  The patient did have good relief of his low back pain with interlaminar epidural steroid injection at L5-S1  He takes tramadol 50 mg on a sparing basis  He takes pregabalin rarely  He reports 70% improvement of his pain with the tramadol  He rates his pain a 4/10  He occasionally has pain which is described as muscular  He was given a referral for physical therapy however has not started this at this point      Pain Contract Signed: 3/2/22  Last Urine Drug Screen: 3/2/22  Lisa/CASEY 3/2/22  Last Tramadol per Pt    I have personally reviewed and/or updated the patient's past medical history, past surgical history, family history, social history, current medications, allergies, and vital signs today        Review of Systems:    Review of Systems   Respiratory: Negative for shortness of breath  Cardiovascular: Negative for chest pain  Gastrointestinal: Negative for constipation, diarrhea, nausea and vomiting  Musculoskeletal: Negative for arthralgias, gait problem, joint swelling and myalgias  Skin: Negative for rash  Neurological: Negative for dizziness, seizures and weakness  All other systems reviewed and are negative          Past Medical History:   Diagnosis Date    A-fib ([facility] )     Anemia, unspecified     Anxiety     Arthritis     Basal cell carcinoma (BCC) of skin of nose     Cancer (HCC)     Chondrocostal junction syndrome     CPAP (continuous positive airway pressure) dependence     Depression     Dysthymic disorder     Edema, unspecified     Hyperlipidemia     Hypertension     Impaired fasting blood sugar     Intervertebral disc disorders with radiculopathy, lumbar region     Irregular heart beat     Prostate cancer ([facility] )     s/p surgery    Sleep apnea     on CPAP    Spinal stenosis     Stroke ([place] [address] )     TIA (transient ischemic attack)     no residual problems    Unspecified osteoarthritis, unspecified site     Venous insufficiency of both lower extremities        Past Surgical History:   Procedure Laterality Date    BASAL CELL CARCINOMA EXCISION      on the nose    CATARACT EXTRACTION      COLONOSCOPY      2016, 2011    CT EPIDURAL STEROID INJECTION (TIN LUMBAR)      FACIAL/NECK BIOPSY N/A 4/3/2017    Procedure: NOSE BCC EXCISION; FROZEN SECTION; RECONSTRUCTION ;  Surgeon: Elana Cox MD;  [facility];  Service:     FLAP LOCAL HEAD / NECK N/A 10/27/2020    Procedure: NOSE FLAP;  Surgeon: Elana Cox MD;  [facility];  Service: Plastics    FULL THICKNESS SKIN GRAFT N/A 4/3/2017    Procedure: FLAP VERSUS FULL THICKNESS SKIN GRAFT ;  Surgeon: Elana Cox MD;  [facility];  Service:    Marlyce Reaper RECONSTRUCTION N/A 10/27/2020    Procedure: NOSE MOHS DEFECT RECONSTRUCTION;  Surgeon: Isak Riddle MD;  [facility];  Service: Plastics    PROSTATECTOMY      TONSILLECTOMY AND ADENOIDECTOMY         Family History   Problem Relation Age of Onset    Heart attack Father        Social History     Occupational History    Not on file   Tobacco Use    Smoking status: Former Smoker     Types: Cigarettes     Quit date:      Years since quittin 1    Smokeless tobacco: Never Used    Tobacco comment: long time ago   Vaping Use    Vaping Use: Never used   Substance and Sexual Activity    Alcohol use:  Yes     Alcohol/week: 7 0 standard drinks     Types: 7 Glasses of wine per week     Comment: glass of wine with dinner, maybe    Drug use: No    Sexual activity: Not Currently         Current Outpatient Medications:     Acetaminophen 325 MG CAPS, Take by mouth as needed , Disp: , Rfl:     amLODIPine (NORVASC) 10 mg tablet, TAKE 1 TABLET DAILY, Disp: 90 tablet, Rfl: 0    atorvastatin (LIPITOR) 40 mg tablet, TAKE 1 TABLET (40 MG TOTAL) BY MOUTH DAILY AT BEDTIME, Disp: 90 tablet, Rfl: 0    Cholecalciferol (VITAMIN D-3 PO), Take 2,000 unit marking on U-100 syringe by mouth daily after dinner, Disp: , Rfl:     co-enzyme Q-10 30 MG capsule, Take 30 mg by mouth daily after dinner, Disp: , Rfl:     diclofenac sodium (VOLTAREN) 1 %, Apply 2 g topically 4 (four) times a day, Disp: , Rfl:     glucosamine-chondroitin 500-400 MG tablet, Take 2 tablets by mouth daily in the early morning, Disp: , Rfl:     ipratropium (ATROVENT) 0 06 % nasal spray, USE 2 SPRAYS INTO EACH NOSTRIL UP TO 3 TIMES DAILY AS NEEDED, Disp: , Rfl:     losartan (COZAAR) 100 MG tablet, TAKE 1 TABLET (100 MG TOTAL) BY MOUTH DAILY, Disp: 90 tablet, Rfl: 3    montelukast (SINGULAIR) 10 mg tablet, Take 1 tablet (10 mg total) by mouth daily at bedtime, Disp: 30 tablet, Rfl: 1    multivitamin (THERAGRAN) TABS, Take 1 tablet by mouth daily in the early morning, Disp: , Rfl:     Omega-3 Fatty Acids (FISH OIL) 1,000 mg, Take 1,000 mg by mouth 2 (two) times a day, Disp: , Rfl:     sertraline (ZOLOFT) 50 mg tablet, TAKE 1 TABLET (50 MG TOTAL) BY MOUTH DAILY, Disp: 90 tablet, Rfl: 1    sotalol (BETAPACE) 80 mg tablet, TAKE 1 TABLET TWICE DAILY, Disp: 180 tablet, Rfl: 3    torsemide (DEMADEX) 20 mg tablet, TAKE 1 TABLET TWICE DAILY, Disp: 180 tablet, Rfl: 3    traMADol (ULTRAM) 50 mg tablet, Take 1 tablet (50 mg total) by mouth every 8 (eight) hours as needed for moderate pain, Disp: 90 tablet, Rfl: 1    warfarin (COUMADIN) 5 mg tablet, TAKE 1/2 TO 1 TABLET DAILY OR AS DIRECTED, Disp: 90 tablet, Rfl: 3    No Known Allergies    Physical Exam:    /69   Pulse 57   Ht 5' 7" (1 702 m)   Wt 109 kg (240 lb)   BMI 37 59 kg/m²     Constitutional:normal, well developed, well nourished, alert, in no distress and non-toxic and no overt pain behavior    Eyes:anicteric  HEENT:grossly intact  Neck:supple, symmetric, trachea midline and no masses   Pulmonary:even and unlabored  Cardiovascular:No edema or pitting edema present  Skin:Normal without rashes or lesions and well hydrated  Psychiatric:Mood and affect appropriate  Neurologic:Cranial Nerves II-XII grossly intact  Musculoskeletal:antalgic gait but steady with a cane      Imaging  No orders to display         Orders Placed This Encounter   Procedures    MM ALL_Prescribed Meds and Special Instructions    MM DT_Alprazolam Definitive Test    MM DT_Amphetamine Definitive Test    MM DT_Aripiprazole Definitive Test    MM DT_Bath Salts Definitive Test    MM DT_Buprenorphine Definitive Test    MM DT_Butalbital Definitive Test    MM DT_Clonazepam Definitive Test    MM DT_Clozapine Definitive Test    MM DT_Cocaine Definitive Test    MM DT_Codeine Definitive Test    MM DT_Desipramine Definitive Test    MM DT_Dextromethorphan Definitive Test    MM Diazepam Definitive Test    MM DT_Ethyl Glucuronide/Ethyl Sulfate Definitive Test    MM DT_Fentanyl Definitive Test    MM DT_Haloperidol Definitive Test    MM DT_Heroin Definitive Test    MM DT_Hydrocodone Definitive Test    MM DT_Hydromorphone Definitive Test    MM DT_Imipramine Definitive Test    MM DT_Kratom Definitive Test    MM DT_Levorphanol Definitive Test    MM Lorazepam Definitive Test    MM DT_MDMA Definitive Test    MM DT_Meperidine Definitive Test    MM DT_Methadone Definitive Test    MM DT_Methamphetamine Definitive Test    MM DT_Morphine Definitive Test    MM DT_Olanzapine Definitive Test    MM DT_Oxazepam Definitive Test    MM DT_Oxycodone Definitive Test    MM DT_Oxymorphone Definitive Test    MM DT_Phencyclidine Definitive Test    MM DT_Phenobarbital Definitive Test    MM DT_Phentermine Definitive Test    MM DT_Quetiapine Definitive Test    MM DT_Risperidone Definitive Test    MM DT_Secobarbital Definitive Test    MM DT_Spice Definitive Test    MM DT_Tapentadol Definitive Test    MM DT_Temazapam Definitive Test    MM DT_THC Definitive Test    MM DT_Tramadol Definitive Test    MM DT_Methylphenidate Definitive Test    Ambulatory referral to Physical Therapy

## 2022-03-04 LAB
6MAM UR QL CFM: NEGATIVE NG/ML
7AMINOCLONAZEPAM UR QL CFM: NEGATIVE NG/ML
A-OH ALPRAZ UR QL CFM: NEGATIVE NG/ML
AMPHET UR QL CFM: NEGATIVE NG/ML
AMPHET UR QL CFM: NEGATIVE NG/ML
BUPRENORPHINE UR QL CFM: NEGATIVE NG/ML
BUTALBITAL UR QL CFM: NEGATIVE NG/ML
BZE UR QL CFM: NEGATIVE NG/ML
CODEINE UR QL CFM: NEGATIVE NG/ML
DESIPRAMINE UR QL CFM: NEGATIVE NG/ML
DESIPRAMINE UR QL CFM: NEGATIVE NG/ML
EDDP UR QL CFM: NEGATIVE NG/ML
ETHYL GLUCURONIDE UR QL CFM: ABNORMAL NG/ML
ETHYL SULFATE UR QL SCN: NEGATIVE NG/ML
EUTYLONE UR QL: NEGATIVE NG/ML
FENTANYL UR QL CFM: NEGATIVE NG/ML
GLIADIN IGG SER IA-ACNC: NEGATIVE NG/ML
GLUCOSE 30M P 50 G LAC PO SERPL-MCNC: NEGATIVE NG/ML
HYDROCODONE UR QL CFM: NEGATIVE NG/ML
HYDROCODONE UR QL CFM: NEGATIVE NG/ML
HYDROMORPHONE UR QL CFM: NEGATIVE NG/ML
IMIPRAMINE UR QL CFM: NEGATIVE NG/ML
LORAZEPAM UR QL CFM: NEGATIVE NG/ML
MDMA UR QL CFM: NEGATIVE NG/ML
ME-PHENIDATE UR QL CFM: NEGATIVE NG/ML
MEPERIDINE UR QL CFM: NEGATIVE NG/ML
METHADONE UR QL CFM: NEGATIVE NG/ML
METHAMPHET UR QL CFM: NEGATIVE NG/ML
MORPHINE UR QL CFM: NEGATIVE NG/ML
MORPHINE UR QL CFM: NEGATIVE NG/ML
NORBUPRENORPHINE UR QL CFM: NEGATIVE NG/ML
NORDIAZEPAM UR QL CFM: NEGATIVE NG/ML
NORFENTANYL UR QL CFM: NEGATIVE NG/ML
NORHYDROCODONE UR QL CFM: NEGATIVE NG/ML
NORHYDROCODONE UR QL CFM: NEGATIVE NG/ML
NORMEPERIDINE UR QL CFM: NEGATIVE NG/ML
NOROXYCODONE UR QL CFM: NEGATIVE NG/ML
OLANZAPINE QUANTIFICATION: NEGATIVE NG/ML
OPC-3373 QUANTIFICATION: NEGATIVE
OXAZEPAM UR QL CFM: NEGATIVE NG/ML
OXYCODONE UR QL CFM: NEGATIVE NG/ML
OXYMORPHONE UR QL CFM: NEGATIVE NG/ML
OXYMORPHONE UR QL CFM: NEGATIVE NG/ML
PCP UR QL CFM: NEGATIVE NG/ML
PHENOBARB UR QL CFM: NEGATIVE NG/ML
SECOBARBITAL UR QL CFM: NEGATIVE NG/ML
SL AMB 3-METHYL-FENTANYL QUANTIFICATION: NORMAL NG/ML
SL AMB 4-ANPP QUANTIFICATION: NORMAL NG/ML
SL AMB 4-FIBF QUANTIFICATION: NORMAL NG/ML
SL AMB 5F-ADB-M7 METABOLITE QUANTIFICATION: NEGATIVE NG/ML
SL AMB 7-OH-MITRAGYNINE (KRATOM ALKALOID) QUANTIFICATION: NEGATIVE NG/ML
SL AMB AB-FUBINACA-M3 METABOLITE QUANTIFICATION: NEGATIVE NG/ML
SL AMB ACETYL FENTANYL QUANTIFICATION: NORMAL NG/ML
SL AMB ACETYL NORFENTANYL QUANTIFICATION: NORMAL NG/ML
SL AMB ACRYL FENTANYL QUANTIFICATION: NORMAL NG/ML
SL AMB BUTRYL FENTANYL QUANTIFICATION: NORMAL NG/ML
SL AMB CARFENTANIL QUANTIFICATION: NORMAL NG/ML
SL AMB CLOZAPINE QUANTIFICATION: NEGATIVE NG/ML
SL AMB CTHC (MARIJUANA METABOLITE) QUANTIFICATION: NEGATIVE NG/ML
SL AMB CYCLOPROPYL FENTANYL QUANTIFICATION: NORMAL NG/ML
SL AMB DEXTROMETHORPHAN QUANTIFICATION: NEGATIVE NG/ML
SL AMB DEXTRORPHAN (DEXTROMETHORPHAN METABOLITE) QUANT: NEGATIVE NG/ML
SL AMB DEXTRORPHAN (DEXTROMETHORPHAN METABOLITE) QUANT: NEGATIVE NG/ML
SL AMB FURANYL FENTANYL QUANTIFICATION: NORMAL NG/ML
SL AMB HALOPERIDOL  QUANTIFICATION: NEGATIVE NG/ML
SL AMB HALOPERIDOL METABOLITE QUANTIFICATION: NEGATIVE NG/ML
SL AMB HYDROXYRISPERIDONE QUANTIFICATION: NEGATIVE NG/ML
SL AMB JWH018 METABOLITE QUANTIFICATION: NEGATIVE NG/ML
SL AMB JWH073 METABOLITE QUANTIFICATION: NEGATIVE NG/ML
SL AMB MDMB-FUBINACA-M1 METABOLITE QUANTIFICATION: NEGATIVE NG/ML
SL AMB METHOXYACETYL FENTANYL QUANTIFICATION: NORMAL NG/ML
SL AMB METHYLONE QUANTIFICATION: NEGATIVE NG/ML
SL AMB N-DESMETHYL U-47700 QUANTIFICATION: NORMAL NG/ML
SL AMB N-DESMETHYL-TRAMADOL QUANTIFICATION: NORMAL NG/ML
SL AMB N-DESMETHYLCLOZAPINE QUANTIFICATION: NEGATIVE NG/ML
SL AMB NORQUETIAPINE QUANTIFICATION: NEGATIVE NG/ML
SL AMB PHENTERMINE QUANTIFICATION: NEGATIVE NG/ML
SL AMB QUETIAPINE QUANTIFICATION: NEGATIVE NG/ML
SL AMB RCS4 METABOLITE QUANTIFICATION: NEGATIVE NG/ML
SL AMB RISPERIDONE QUANTIFICATION: NEGATIVE NG/ML
SL AMB RITALINIC ACID QUANTIFICATION: NEGATIVE NG/ML
SL AMB U-47700 QUANTIFICATION: NORMAL NG/ML
SPECIMEN DRAWN SERPL: NEGATIVE NG/ML
TAPENTADOL UR QL CFM: NEGATIVE NG/ML
TEMAZEPAM UR QL CFM: NEGATIVE NG/ML
TEMAZEPAM UR QL CFM: NEGATIVE NG/ML
TRAMADOL UR QL CFM: NORMAL NG/ML
URATE/CREAT 24H UR: NORMAL NG/ML

## 2022-03-15 ENCOUNTER — APPOINTMENT (OUTPATIENT)
Dept: LAB | Facility: CLINIC | Age: 82
End: 2022-03-15
Payer: MEDICARE

## 2022-03-15 ENCOUNTER — ANTICOAG VISIT (OUTPATIENT)
Dept: CARDIOLOGY CLINIC | Facility: CLINIC | Age: 82
End: 2022-03-15

## 2022-03-15 DIAGNOSIS — I48.0 PAROXYSMAL ATRIAL FIBRILLATION (HCC): Primary | ICD-10-CM

## 2022-04-06 ENCOUNTER — EVALUATION (OUTPATIENT)
Dept: PHYSICAL THERAPY | Facility: REHABILITATION | Age: 82
End: 2022-04-06
Payer: MEDICARE

## 2022-04-06 DIAGNOSIS — R26.9 GAIT DISTURBANCE: ICD-10-CM

## 2022-04-06 DIAGNOSIS — G89.29 CHRONIC BILATERAL LOW BACK PAIN, UNSPECIFIED WHETHER SCIATICA PRESENT: ICD-10-CM

## 2022-04-06 DIAGNOSIS — M54.50 CHRONIC BILATERAL LOW BACK PAIN, UNSPECIFIED WHETHER SCIATICA PRESENT: ICD-10-CM

## 2022-04-06 PROCEDURE — 97162 PT EVAL MOD COMPLEX 30 MIN: CPT | Performed by: PHYSICAL THERAPIST

## 2022-04-06 PROCEDURE — 97112 NEUROMUSCULAR REEDUCATION: CPT | Performed by: PHYSICAL THERAPIST

## 2022-04-06 NOTE — PROGRESS NOTES
PT Evaluation     Today's date: 2022  Patient name: Igor Dunn  : 1940  MRN: [id number]  Referring provider: MICH Lanier  Dx:   Encounter Diagnosis     ICD-10-CM    1  Gait disturbance  R26 9 Ambulatory referral to Physical Therapy   2  Chronic bilateral low back pain, unspecified whether sciatica present  M54 50 Ambulatory referral to Physical Therapy    G89 29                   Assessment  Assessment details: Igor Dunn is a pleasant 80 y o  male who presents for evaluation of his gait and balance  The patient's greatest concerns are preventing falls, improving his ability to negotiate transfers at home, and improving his walking tolerance for community activities  No further referral appears necessary at this time based upon examination results  Primary movement impairment diagnosis of B/L LE weakness, balance deficit, gait dysfunction  Pertinent positive findings from evaluation shuffling gait pattern, TUG time of 22 s, 5x STS score of 25 s, and inability to maintain either SLS or tandem stance for a measurable length of time  Based on patient's endurance, LE strength and balance he is a significant fall risk  Discussion about implementing [facility] community ambulation  Pt  will benefit from skilled PT services that includes manual therapy techniques to enhance tissue extensibility, neuromuscular re-education to facilitate motor control, therapeutic exercise to increase functional mobility, and modalities prn to reduce pain and inflammation          Primary Impairments:  1) B/L LE weakness  2) balance deficit  3) gait dysfunction      Impairments: abnormal gait, abnormal or restricted ROM, abnormal movement, activity intolerance, impaired balance, impaired physical strength, lacks appropriate home exercise program, pain with function, safety issue and poor posture     Symptom irritability: moderateUnderstanding of Dx/Px/POC: good   Prognosis: good  Prognosis details: Positive prognostic indicators include positive attitude toward recovery, good social support system (wfie)  Goals  STG  Patient will be independent with home exercise program in 1-2 visits  Patient will implement a daily 5 min walk in 4 weeks  Patient will demonstrate 10s of B/L tandem stance in 4 weeks  LTG  Patient will be independent in comprehensive HEP upon discharge  Patient will achieve goal FOTO score upon discharge  Patient will demonstrate 10 min of continuous walking tolerance upon discharge  Patient will demonstrate 18s TUG time upon discharge  Patient will demonstrate 18s 5x STS time upon discharge  Plan  Patient would benefit from: skilled physical therapy  Planned therapy interventions: activity modification, joint mobilization, manual therapy, motor coordination training, neuromuscular re-education, patient education, self care, therapeutic activities, therapeutic exercise, graded activity, home exercise program, behavior modification, graded exercise, functional ROM exercises, strengthening and balance  Frequency: 2x week  Duration in weeks: 8  Treatment plan discussed with: patient        Subjective Evaluation    History of Present Illness  Mechanism of injury: Patient presents with chronic hx of low back pain  He has been receiving injections for spinal stenosis  He reports that he has been having more fatigue and SOB than previously  He feels that he is getting less mobile regarding community ambulation  He specifically is concerned about falling because it is impossible for him to stand from the floor  Patient is limited in his ability to go to stores or to walk for exercise because he is afraid he will "fall over" or be unable to get back home because of the fatigue  He struggles with getting out of chairs     Pain  Current pain ratin  At best pain ratin  At worst pain rating: 10  Location: Low back; legs  Quality: sharp, dull ache, radiating and tight  Relieving factors: relaxation and rest  Aggravating factors: stair climbing, walking, standing, sitting and lifting  Progression: worsening    Patient Goals  Patient goals for therapy: increased strength, independence with ADLs/IADLs, return to sport/leisure activities, increased motion and improved balance          Objective     Functional Assessment        Comments  Gait: shuffling    STS: fair    5x STS: 24 s    TUG (no AD): 22 s    Clinic walking tolerance: 3 min    Balance:  Single limb stance:  L unable  R unable  Tandem stance:  L >5s   R >5s  NBOS EO: WFL  NBOS EC: increased sway    Red Flags: unremarkable    [place] Quarter Screen: unremarkable    Manual Muscle Testing:   Hip Flexion: R  3+/5   L  3+/5   Hip Extension: R  3+/5  L  3+/5   Hip Abduction: R  3+/5  L  3+/5       Range of Motion:   Lumbar Spine Flexion: WFL  Lumbar Spine Extension 75% limited                   Precautions: none      Manuals 4/6                                       Neuro Re-Ed        Standing hip abd 10x HEP       Standing marches 10x HEP       Balance                                Education HEP and POC       Ther Ex        NS        PBall flexion rollout                                                        Ther Activity        STS 10x HEP       Side steps        FSU        LSU                Gait Training        Walking tolerance        Retro walking                                Modalities

## 2022-04-11 ENCOUNTER — OFFICE VISIT (OUTPATIENT)
Dept: PHYSICAL THERAPY | Facility: REHABILITATION | Age: 82
End: 2022-04-11
Payer: MEDICARE

## 2022-04-11 DIAGNOSIS — G89.29 CHRONIC BILATERAL LOW BACK PAIN, UNSPECIFIED WHETHER SCIATICA PRESENT: ICD-10-CM

## 2022-04-11 DIAGNOSIS — M54.50 CHRONIC BILATERAL LOW BACK PAIN, UNSPECIFIED WHETHER SCIATICA PRESENT: ICD-10-CM

## 2022-04-11 DIAGNOSIS — R26.9 GAIT DISTURBANCE: Primary | ICD-10-CM

## 2022-04-11 PROCEDURE — 97110 THERAPEUTIC EXERCISES: CPT | Performed by: PHYSICAL THERAPIST

## 2022-04-11 PROCEDURE — 97116 GAIT TRAINING THERAPY: CPT | Performed by: PHYSICAL THERAPIST

## 2022-04-11 PROCEDURE — 97530 THERAPEUTIC ACTIVITIES: CPT | Performed by: PHYSICAL THERAPIST

## 2022-04-11 NOTE — PROGRESS NOTES
Daily Note     Today's date: 2022  Patient name: Arin Cheng  : 1940  MRN: [id number]  Referring provider: Cooper Cogan, CRNP  Dx:   Encounter Diagnosis     ICD-10-CM    1  Gait disturbance  R26 9    2  Chronic bilateral low back pain, unspecified whether sciatica present  M54 50     G89 29                   Subjective: Patient has been compliant with HEP  He reports continued stiffness in his back and legs  Objective: See treatment diary below      Assessment: Patient tolerates tx well  Patient fatigues throughout tx and requires frequent rest breaks  No episodes of LOB noted throughout tx  Close supervision guarding throughout tx  Patient would benefit from continued PT  Plan: Continue per plan of care        Precautions: none      Manuals                                       Neuro Re-Ed        Standing hip abd 10x HEP       Standing marches 10x HEP       Tandem balance  3x20s ea                              Education HEP and POC       Ther Ex        NS  5 min; lvl 4 LE only      PBall flexion rollout  15x 5s                                                      Ther Activity        STS 10x HEP       Side steps  4 laps      FSU  2x10 ea; 4in; min UE assist      LSU                Gait Training        Walking tolerance        Retro walking  3 laps; cueing to lead floor contact with toe; min UE assist      Tandem walking                        Modalities

## 2022-04-12 ENCOUNTER — APPOINTMENT (OUTPATIENT)
Dept: LAB | Facility: CLINIC | Age: 82
End: 2022-04-12
Payer: MEDICARE

## 2022-04-12 ENCOUNTER — ANTICOAG VISIT (OUTPATIENT)
Dept: CARDIOLOGY CLINIC | Facility: CLINIC | Age: 82
End: 2022-04-12

## 2022-04-12 DIAGNOSIS — I48.0 PAROXYSMAL ATRIAL FIBRILLATION (HCC): Primary | ICD-10-CM

## 2022-04-13 ENCOUNTER — OFFICE VISIT (OUTPATIENT)
Dept: PHYSICAL THERAPY | Facility: REHABILITATION | Age: 82
End: 2022-04-13
Payer: MEDICARE

## 2022-04-13 DIAGNOSIS — M54.50 CHRONIC BILATERAL LOW BACK PAIN, UNSPECIFIED WHETHER SCIATICA PRESENT: Primary | ICD-10-CM

## 2022-04-13 DIAGNOSIS — G89.29 CHRONIC BILATERAL LOW BACK PAIN, UNSPECIFIED WHETHER SCIATICA PRESENT: Primary | ICD-10-CM

## 2022-04-13 DIAGNOSIS — R26.9 GAIT DISTURBANCE: ICD-10-CM

## 2022-04-13 PROCEDURE — 97116 GAIT TRAINING THERAPY: CPT | Performed by: PHYSICAL THERAPIST

## 2022-04-13 PROCEDURE — 97112 NEUROMUSCULAR REEDUCATION: CPT | Performed by: PHYSICAL THERAPIST

## 2022-04-13 PROCEDURE — 97110 THERAPEUTIC EXERCISES: CPT | Performed by: PHYSICAL THERAPIST

## 2022-04-13 PROCEDURE — 97530 THERAPEUTIC ACTIVITIES: CPT | Performed by: PHYSICAL THERAPIST

## 2022-04-13 NOTE — PROGRESS NOTES
Daily Note     Today's date: 2022  Patient name: Kamini Ogden  : 1940  MRN: [id number]  Referring provider: MICH Martel  Dx:   Encounter Diagnosis     ICD-10-CM    1  Chronic bilateral low back pain, unspecified whether sciatica present  M54 50     G89 29    2  Gait disturbance  R26 9                   Subjective: Patient reports feeling very fatigued today, which is normal for him  Reports some increased leg stiffness after last tx  Objective: See treatment diary below      Assessment: Patient tolerates tx well  Patient is significantly challenged by progression to 6in step for FSU, requires increased UE dependence  Close supervision guarding throughout tx  Patient demonstrates no episodes of LOB that require assistance  Patient would benefit from continued PT  Plan: Continue per plan of care        Precautions: none      Manuals                                      Neuro Re-Ed        Standing hip abd 10x HEP       Standing marches 10x HEP       Tandem balance  3x20s ea 3x20s ea                             Education HEP and POC       Ther Ex        NS  5 min; lvl 4 LE only 6 min; lvl 4 LE only     PBall flexion rollout  15x 5s 15x5s                                                     Ther Activity        STS 10x HEP       Side steps  4 laps 4 laps     FSU  2x10 ea; 4in; min UE assist 2x10 ea; 4in; min/mod UE assist     LSU                Gait Training        Walking tolerance        Retro walking  3 laps; cueing to lead floor contact with toe; min UE assist 4 laps; cueing to lead floor contact with toe; min UE assist     Walking marches   4 laps; min UE assist     Tandem walking                        Modalities

## 2022-04-18 ENCOUNTER — OFFICE VISIT (OUTPATIENT)
Dept: PHYSICAL THERAPY | Facility: REHABILITATION | Age: 82
End: 2022-04-18
Payer: MEDICARE

## 2022-04-18 DIAGNOSIS — G89.29 CHRONIC BILATERAL LOW BACK PAIN, UNSPECIFIED WHETHER SCIATICA PRESENT: Primary | ICD-10-CM

## 2022-04-18 DIAGNOSIS — M54.50 CHRONIC BILATERAL LOW BACK PAIN, UNSPECIFIED WHETHER SCIATICA PRESENT: Primary | ICD-10-CM

## 2022-04-18 DIAGNOSIS — R26.9 GAIT DISTURBANCE: ICD-10-CM

## 2022-04-18 DIAGNOSIS — I10 ESSENTIAL HYPERTENSION: ICD-10-CM

## 2022-04-18 PROCEDURE — 97530 THERAPEUTIC ACTIVITIES: CPT

## 2022-04-18 PROCEDURE — 97116 GAIT TRAINING THERAPY: CPT

## 2022-04-18 PROCEDURE — 97110 THERAPEUTIC EXERCISES: CPT

## 2022-04-18 RX ORDER — LOSARTAN POTASSIUM 100 MG/1
100 TABLET ORAL DAILY
Qty: 90 TABLET | Refills: 0 | Status: SHIPPED | OUTPATIENT
Start: 2022-04-18 | End: 2023-04-18

## 2022-04-18 NOTE — PROGRESS NOTES
Daily Note     Today's date: 2022  Patient name: Katt Rankin  : 1940  MRN: [id number]  Referring provider: MICH Jovel  Dx:   Encounter Diagnosis     ICD-10-CM    1  Chronic bilateral low back pain, unspecified whether sciatica present  M54 50     G89 29    2  Gait disturbance  R26 9                   Subjective: Pt  reports no change in status upon arrival       Objective: See treatment diary below      Assessment: Tolerated treatment well  Patient would benefit from continued PT   Pt  able to complete all exercises with no increase in pain during or after session  Pt requires some cuing for counting throughout session, demonstrates good sequencing and control  Pt  1:1 with PTA for entirety  Plan: Continue per plan of care        Precautions: none      Manuals                                     Neuro Re-Ed        Standing hip abd 10x HEP       Standing marches 10x HEP       Tandem balance  3x20s ea 3x20s ea 3x20" b/l                            Education HEP and POC       Ther Ex        NS  5 min; lvl 4 LE only 6 min; lvl 4 LE only 8' L4 LE    PBall flexion rollout  15x 5s 15x5s 15x5"                                                    Ther Activity        STS 10x HEP       Side steps  4 laps 4 laps 4 laps    FSU  2x10 ea; 4in; min UE assist 2x10 ea; 4in; min/mod UE assist 2x10 ea 4"     LSU                Gait Training        Walking tolerance        Retro walking  3 laps; cueing to lead floor contact with toe; min UE assist 4 laps; cueing to lead floor contact with toe; min UE assist 4 laps toe first cuing, min UE asst    Walking marches   4 laps; min UE assist 4 laps, min UE asst    Tandem walking                        Modalities

## 2022-04-20 ENCOUNTER — OFFICE VISIT (OUTPATIENT)
Dept: PHYSICAL THERAPY | Facility: REHABILITATION | Age: 82
End: 2022-04-20
Payer: MEDICARE

## 2022-04-20 DIAGNOSIS — G89.29 CHRONIC BILATERAL LOW BACK PAIN, UNSPECIFIED WHETHER SCIATICA PRESENT: ICD-10-CM

## 2022-04-20 DIAGNOSIS — M54.50 CHRONIC BILATERAL LOW BACK PAIN, UNSPECIFIED WHETHER SCIATICA PRESENT: ICD-10-CM

## 2022-04-20 DIAGNOSIS — R26.9 GAIT DISTURBANCE: Primary | ICD-10-CM

## 2022-04-20 PROCEDURE — 97112 NEUROMUSCULAR REEDUCATION: CPT | Performed by: PHYSICAL THERAPIST

## 2022-04-20 PROCEDURE — 97530 THERAPEUTIC ACTIVITIES: CPT | Performed by: PHYSICAL THERAPIST

## 2022-04-20 NOTE — PROGRESS NOTES
Daily Note     Today's date: 2022  Patient name: Jennifer Khan  : 1940  MRN: [id number]  Referring provider: MICH Kong  Dx:   Encounter Diagnosis     ICD-10-CM    1  Gait disturbance  R26 9    2  Chronic bilateral low back pain, unspecified whether sciatica present  M54 50     G89 29                   Subjective: Patient reports that he wanted to cancel today  Objective: See treatment diary below      Assessment: Patient is able to progress FSU height to 6 in; challenged by LLE>RLE  Good tolerance to tx  Continue to progress functional mobility as tolerated  Plan: Continue per plan of care        Precautions: none      Manuals                                    Neuro Re-Ed        Standing hip abd 10x HEP       Standing marches 10x HEP       Tandem balance  3x20s ea 3x20s ea 3x20" b/l 4x20s ea                           Education HEP and POC       Ther Ex        NS  5 min; lvl 4 LE only 6 min; lvl 4 LE only 8' L4 LE 10 min; lvl 4   PBall flexion rollout  15x 5s 15x5s 15x5" 15x5"                                                   Ther Activity        STS 10x HEP       Side steps  4 laps 4 laps 4 laps 4 laps; 6 cones   FSU  2x10 ea; 4in; min UE assist 2x10 ea; 4in; min/mod UE assist 2x10 ea 4"  4x5 ea; 6in   LSU                Gait Training        Walking tolerance        Retro walking  3 laps; cueing to lead floor contact with toe; min UE assist 4 laps; cueing to lead floor contact with toe; min UE assist 4 laps toe first cuing, min UE asst 4 laps toe first cuing, min UE asst   Walking marches   4 laps; min UE assist 4 laps, min UE asst 4 laps, min UE asst; HEP   Tandem walking                        Modalities

## 2022-04-25 ENCOUNTER — OFFICE VISIT (OUTPATIENT)
Dept: PHYSICAL THERAPY | Facility: REHABILITATION | Age: 82
End: 2022-04-25
Payer: MEDICARE

## 2022-04-25 DIAGNOSIS — G89.29 CHRONIC BILATERAL LOW BACK PAIN, UNSPECIFIED WHETHER SCIATICA PRESENT: ICD-10-CM

## 2022-04-25 DIAGNOSIS — R26.9 GAIT DISTURBANCE: Primary | ICD-10-CM

## 2022-04-25 DIAGNOSIS — M54.50 CHRONIC BILATERAL LOW BACK PAIN, UNSPECIFIED WHETHER SCIATICA PRESENT: ICD-10-CM

## 2022-04-25 PROCEDURE — 97530 THERAPEUTIC ACTIVITIES: CPT

## 2022-04-25 PROCEDURE — 97116 GAIT TRAINING THERAPY: CPT

## 2022-04-25 PROCEDURE — 97110 THERAPEUTIC EXERCISES: CPT

## 2022-04-25 NOTE — PROGRESS NOTES
Daily Note     Today's date: 2022  Patient name: Eden Johnson  : 1940  MRN: [id number]  Referring provider: MICH Garcia  Dx:   Encounter Diagnosis     ICD-10-CM    1  Gait disturbance  R26 9    2  Chronic bilateral low back pain, unspecified whether sciatica present  M54 50     G89 29                   Subjective: Pt states he's had a lot of pain over the weekend, was discussing getting another epidural injection for pain relief in back  Objective: See treatment diary below      Assessment: Tolerated treatment well  Patient would benefit from continued PT   Pt  able to complete all exercises with no increase in pain during or after session  Pt demonstrated improvement with mobility by end of session, is weaker in L LE > R LE  Pt would benefit from continued PT to address current deficits and restore PLOF  Pt  1:1 with PTA for entirety  Plan: Continue per plan of care        Precautions: none      Manuals                                        Neuro Re-Ed         Standing hip abd 10x HEP        Standing marches 10x HEP        Tandem balance  3x20s ea 3x20s ea 3x20" b/l 4x20s ea 4x20" ea                              Education HEP and POC        Ther Ex         NS  5 min; lvl 4 LE only 6 min; lvl 4 LE only 8' L4 LE 10 min; lvl 4 10' L4   PBall flexion rollout  15x 5s 15x5s 15x5" 15x5" 15x5"                                                         Ther Activity         STS 10x HEP        Side steps  4 laps 4 laps 4 laps 4 laps; 6 cones 4 laps 6 cones   FSU  2x10 ea; 4in; min UE assist 2x10 ea; 4in; min/mod UE assist 2x10 ea 4"  4x5 ea; 6in 2x10 6"   LSU                  Gait Training         Walking tolerance         Retro walking  3 laps; cueing to lead floor contact with toe; min UE assist 4 laps; cueing to lead floor contact with toe; min UE assist 4 laps toe first cuing, min UE asst 4 laps toe first cuing, min UE asst 4 laps    Walking marches   4 laps; min UE assist 4 laps, min UE asst 4 laps, min UE asst; HEP 4 laps no UE   Tandem walking                           Modalities

## 2022-04-27 ENCOUNTER — OFFICE VISIT (OUTPATIENT)
Dept: PHYSICAL THERAPY | Facility: REHABILITATION | Age: 82
End: 2022-04-27
Payer: MEDICARE

## 2022-04-27 DIAGNOSIS — R26.9 GAIT DISTURBANCE: ICD-10-CM

## 2022-04-27 DIAGNOSIS — M54.50 CHRONIC BILATERAL LOW BACK PAIN, UNSPECIFIED WHETHER SCIATICA PRESENT: Primary | ICD-10-CM

## 2022-04-27 DIAGNOSIS — G89.29 CHRONIC BILATERAL LOW BACK PAIN, UNSPECIFIED WHETHER SCIATICA PRESENT: Primary | ICD-10-CM

## 2022-04-27 PROCEDURE — 97530 THERAPEUTIC ACTIVITIES: CPT | Performed by: PHYSICAL THERAPIST

## 2022-04-27 PROCEDURE — 97110 THERAPEUTIC EXERCISES: CPT | Performed by: PHYSICAL THERAPIST

## 2022-04-27 NOTE — PROGRESS NOTES
Daily Note     Today's date: 2022  Patient name: Earline Crain  : 1940  MRN: [id number]  Referring provider: MICH Chambers  Dx:   Encounter Diagnosis     ICD-10-CM    1  Chronic bilateral low back pain, unspecified whether sciatica present  M54 50     G89 29    2  Gait disturbance  R26 9                   Subjective: Patient reports no significant change in status since last visit  Objective: See treatment diary below      Assessment: Patient tolerates tx well  He is fatigued by interventions as charted  Focus on LE strengthening to improve functional capacity  Continue to progress as tolerated  Plan: Continue per plan of care        Precautions: none      Manuals                                    Neuro Re-Ed        Standing hip abd        Standing marches        Tandem balance 3x20s ea 3x20" b/l 4x20s ea 4x20" ea np                           Education        Ther Ex        NS 6 min; lvl 4 LE only 8' L4 LE 10 min; lvl 4 10' L4 10 min; lvl 4   PBall flexion rollout 15x5s 15x5" 15x5" 15x5" np                                                   Ther Activity        STS        Side steps 4 laps 4 laps 4 laps; 6 cones 4 laps 6 cones np   FSU 2x10 ea; 4in; min/mod UE assist 2x10 ea 4"  4x5 ea; 6in 2x10 6" 2x10 ea 6in   LSU                Gait Training        STS     2x8   Retro walking 4 laps; cueing to lead floor contact with toe; min UE assist 4 laps toe first cuing, min UE asst 4 laps toe first cuing, min UE asst 4 laps  4 laps   Walking marches 4 laps; min UE assist 4 laps, min UE asst 4 laps, min UE asst; HEP 4 laps no UE 4 laps no UE   Tandem walking                        Modalities

## 2022-05-02 ENCOUNTER — OFFICE VISIT (OUTPATIENT)
Dept: PHYSICAL THERAPY | Facility: REHABILITATION | Age: 82
End: 2022-05-02
Payer: MEDICARE

## 2022-05-02 DIAGNOSIS — R26.9 GAIT DISTURBANCE: ICD-10-CM

## 2022-05-02 DIAGNOSIS — M54.50 CHRONIC BILATERAL LOW BACK PAIN, UNSPECIFIED WHETHER SCIATICA PRESENT: Primary | ICD-10-CM

## 2022-05-02 DIAGNOSIS — G89.29 CHRONIC BILATERAL LOW BACK PAIN, UNSPECIFIED WHETHER SCIATICA PRESENT: Primary | ICD-10-CM

## 2022-05-02 PROCEDURE — 97116 GAIT TRAINING THERAPY: CPT

## 2022-05-02 PROCEDURE — 97110 THERAPEUTIC EXERCISES: CPT

## 2022-05-02 NOTE — PROGRESS NOTES
Daily Note     Today's date: 2022  Patient name: Guillermo Hubbard  : 1940  MRN: [id number]  Referring provider: MICH Diaz  Dx:   Encounter Diagnosis     ICD-10-CM    1  Chronic bilateral low back pain, unspecified whether sciatica present  M54 50     G89 29    2  Gait disturbance  R26 9                   Subjective: Pt reports that he continues to feel weakness with L leg, states he feels like he's making no progress  Objective: See treatment diary below      Assessment: Tolerated treatment fair  Patient would benefit from continued PT  Pt is challenged with step ups with L leg, required B UE for assistance with L step up this session  Pt requires some cuing for counts and sequencing throughout session  Pt continues to demonstrate decreased stride length and shuffling with gait  Pt  able to complete all exercises with no increase in pain during or after session  Pt would benefit from continued PT to address current deficits and restore PLOF  Pt  1:1 with PTA for entirety  Plan: Continue per plan of care        Precautions: none      Manuals  5                                       Neuro Re-Ed         Standing hip abd         Standing marches         Tandem balance 3x20s ea 3x20" b/l 4x20s ea 4x20" ea np np                              Education         Ther Ex         NS 6 min; lvl 4 LE only 8' L4 LE 10 min; lvl 4 10' L4 10 min; lvl 4 10' L4   PBall flexion rollout 15x5s 15x5" 15x5" 15x5" np 15x5"                                                         Ther Activity         STS         Side steps 4 laps 4 laps 4 laps; 6 cones 4 laps 6 cones np np   FSU 2x10 ea; 4in; min/mod UE assist 2x10 ea 4"  4x5 ea; 6in 2x10 6" 2x10 ea 6in 2x10 ea 6"   LSU                  Gait Training         STS     2x8 2x8   Retro walking 4 laps; cueing to lead floor contact with toe; min UE assist 4 laps toe first cuing, min UE asst 4 laps toe first cuing, min UE asst 4 laps  4 laps 4 laps   Walking marches 4 laps; min UE assist 4 laps, min UE asst 4 laps, min UE asst; HEP 4 laps no UE 4 laps no UE 2x10 b/l standing march   Tandem walking                           Modalities

## 2022-05-04 ENCOUNTER — TELEPHONE (OUTPATIENT)
Dept: PAIN MEDICINE | Facility: CLINIC | Age: 82
End: 2022-05-04

## 2022-05-04 ENCOUNTER — APPOINTMENT (OUTPATIENT)
Dept: PHYSICAL THERAPY | Facility: REHABILITATION | Age: 82
End: 2022-05-04
Payer: MEDICARE

## 2022-05-04 DIAGNOSIS — Z79.01 CHRONIC ANTICOAGULATION: Primary | ICD-10-CM

## 2022-05-04 NOTE — TELEPHONE ENCOUNTER
Pt aware that Coumadin hold sent to Dr Christelle Foster and that this office will call to schedule once hold approval received     Please keep in bin til hold received

## 2022-05-04 NOTE — TELEPHONE ENCOUNTER
Patient states he's currently experiencing chronic low back pain  He's requesting a repeat injection   Please reach out to him at # [phone]

## 2022-05-04 NOTE — TELEPHONE ENCOUNTER
S/w pt  Pt states that he finished his 5th PT session on Monday  and was unable to walk afterwards d/t legs "giving out"  Pt got into his house but fell to the ground and his wife called the fire dept to get him into the recliner  Pt denies loss of B&B  Pt states that his strength has returned but would like repeat injection to help with the pain    Advised pt will notify [facility] and CB

## 2022-05-09 ENCOUNTER — APPOINTMENT (OUTPATIENT)
Dept: PHYSICAL THERAPY | Facility: REHABILITATION | Age: 82
End: 2022-05-09
Payer: MEDICARE

## 2022-05-11 ENCOUNTER — APPOINTMENT (OUTPATIENT)
Dept: PHYSICAL THERAPY | Facility: REHABILITATION | Age: 82
End: 2022-05-11
Payer: MEDICARE

## 2022-05-11 DIAGNOSIS — M54.16 LUMBAR RADICULOPATHY: Primary | ICD-10-CM

## 2022-05-11 NOTE — TELEPHONE ENCOUNTER
Good morning Dr Suma Junior,    Our mutual patient is being considered for a neuraxial injection by Dr Suly Pan of [facility]  The patient is taking Coumadin as per your order, and your permission is needed to hold this medication for 5 days  The patient will be advised to resume Coumadin after the procedure  Please advise

## 2022-05-11 NOTE — TELEPHONE ENCOUNTER
Consent to hold Coumadin rec'd via task from Dr Junaid Wayne    Pt scheduled for procedure 6/6 at [phone]  Pt advised to hold Coumadin 6/1 to 6/6  LD Coumadin 5/31  Pre procedure instructions given:  Pt will need , wear loose comfortable clothing, nothing to eat 1 hour prior to procedure, if pt becomes sick or on abx, call to cancel procedure, no vaccines 2 weeks before or after procedure         JW- Please place STAT Coumadin order for 6/6

## 2022-05-12 DIAGNOSIS — I48.0 PAROXYSMAL ATRIAL FIBRILLATION (HCC): ICD-10-CM

## 2022-05-12 RX ORDER — SOTALOL HYDROCHLORIDE 80 MG/1
TABLET ORAL
Qty: 180 TABLET | Refills: 3 | Status: SHIPPED | OUTPATIENT
Start: 2022-05-12

## 2022-05-16 ENCOUNTER — APPOINTMENT (OUTPATIENT)
Dept: PHYSICAL THERAPY | Facility: REHABILITATION | Age: 82
End: 2022-05-16
Payer: MEDICARE

## 2022-05-18 ENCOUNTER — APPOINTMENT (OUTPATIENT)
Dept: PHYSICAL THERAPY | Facility: REHABILITATION | Age: 82
End: 2022-05-18
Payer: MEDICARE

## 2022-05-18 ENCOUNTER — RA CDI HCC (OUTPATIENT)
Dept: OTHER | Facility: HOSPITAL | Age: 82
End: 2022-05-18

## 2022-05-18 NOTE — PROGRESS NOTES
[address]  coding opportunities       Chart reviewed, no opportunity found: CHART REVIEWED, NO OPPORTUNITY FOUND        Patients Insurance     Medicare Insurance: Medicare

## 2022-05-23 ENCOUNTER — APPOINTMENT (OUTPATIENT)
Dept: PHYSICAL THERAPY | Facility: REHABILITATION | Age: 82
End: 2022-05-23
Payer: MEDICARE

## 2022-05-24 ENCOUNTER — APPOINTMENT (OUTPATIENT)
Dept: LAB | Facility: CLINIC | Age: 82
End: 2022-05-24
Payer: MEDICARE

## 2022-05-24 ENCOUNTER — TELEPHONE (OUTPATIENT)
Dept: PAIN MEDICINE | Facility: CLINIC | Age: 82
End: 2022-05-24

## 2022-05-24 ENCOUNTER — ANTICOAG VISIT (OUTPATIENT)
Dept: CARDIOLOGY CLINIC | Facility: CLINIC | Age: 82
End: 2022-05-24

## 2022-05-24 DIAGNOSIS — Z79.01 CHRONIC ANTICOAGULATION: Primary | ICD-10-CM

## 2022-05-24 DIAGNOSIS — I48.0 PAROXYSMAL ATRIAL FIBRILLATION (HCC): Primary | ICD-10-CM

## 2022-05-24 NOTE — TELEPHONE ENCOUNTER
Attempted to call pt to reschedule procedure from 6/6 d/t JW being out of the office  Pt is on coumadin  VMMLOM  Provided with CB# and [place]

## 2022-05-24 NOTE — TELEPHONE ENCOUNTER
S/w pt  Pt procedure rescheduled to 6/20 with 210 arrival    Pt advised to hold Coumadin 6/15- 6/20  LD Coumadin 6/14  Pt aware to have PT/INR done day of procedure  Pt aware of pre procedure instructions  Romana Can you please cancel PT/INR for 6/6 and place order for STAT PT/INR 6/20

## 2022-05-25 ENCOUNTER — OFFICE VISIT (OUTPATIENT)
Dept: INTERNAL MEDICINE CLINIC | Facility: CLINIC | Age: 82
End: 2022-05-25
Payer: MEDICARE

## 2022-05-25 VITALS
TEMPERATURE: 97.6 F | WEIGHT: 232 LBS | OXYGEN SATURATION: 99 % | BODY MASS INDEX: 36.41 KG/M2 | SYSTOLIC BLOOD PRESSURE: 134 MMHG | HEIGHT: 67 IN | DIASTOLIC BLOOD PRESSURE: 72 MMHG | HEART RATE: 73 BPM

## 2022-05-25 DIAGNOSIS — M54.16 LUMBAR RADICULOPATHY: ICD-10-CM

## 2022-05-25 DIAGNOSIS — E78.2 MIXED HYPERLIPIDEMIA: ICD-10-CM

## 2022-05-25 DIAGNOSIS — E11.9 TYPE 2 DIABETES, HBA1C GOAL < 7% (HCC): ICD-10-CM

## 2022-05-25 DIAGNOSIS — I87.2 VENOUS INSUFFICIENCY OF BOTH LOWER EXTREMITIES: ICD-10-CM

## 2022-05-25 DIAGNOSIS — J30.89 NON-SEASONAL ALLERGIC RHINITIS, UNSPECIFIED TRIGGER: ICD-10-CM

## 2022-05-25 DIAGNOSIS — C61 PROSTATE CANCER (HCC): ICD-10-CM

## 2022-05-25 DIAGNOSIS — G89.4 CHRONIC PAIN SYNDROME: ICD-10-CM

## 2022-05-25 DIAGNOSIS — E66.01 OBESITY, MORBID (HCC): ICD-10-CM

## 2022-05-25 DIAGNOSIS — M17.0 PRIMARY OSTEOARTHRITIS OF BOTH KNEES: ICD-10-CM

## 2022-05-25 DIAGNOSIS — I10 ESSENTIAL HYPERTENSION: Primary | ICD-10-CM

## 2022-05-25 DIAGNOSIS — G47.33 OBSTRUCTIVE SLEEP APNEA SYNDROME: ICD-10-CM

## 2022-05-25 DIAGNOSIS — I48.0 PAROXYSMAL ATRIAL FIBRILLATION (HCC): ICD-10-CM

## 2022-05-25 DIAGNOSIS — R73.01 IMPAIRED FASTING BLOOD SUGAR: ICD-10-CM

## 2022-05-25 PROCEDURE — 99214 OFFICE O/P EST MOD 30 MIN: CPT | Performed by: INTERNAL MEDICINE

## 2022-05-25 RX ORDER — MONTELUKAST SODIUM 10 MG/1
10 TABLET ORAL
Qty: 30 TABLET | Refills: 1 | Status: SHIPPED | OUTPATIENT
Start: 2022-05-25

## 2022-05-25 RX ORDER — DOXYCYCLINE HYCLATE 100 MG/1
CAPSULE ORAL
COMMUNITY
Start: 2022-05-01 | End: 2022-05-25

## 2022-05-25 RX ORDER — OFLOXACIN 3 MG/ML
SOLUTION/ DROPS OPHTHALMIC
COMMUNITY
Start: 2022-05-01 | End: 2022-05-25

## 2022-05-25 NOTE — PROGRESS NOTES
Assessment/Plan:      Falls Plan of Care: balance, strength, and gait training instructions were provided  1  Essential hypertension    2  Non-seasonal allergic rhinitis, unspecified trigger  -     montelukast (SINGULAIR) 10 mg tablet; Take 1 tablet (10 mg total) by mouth daily at bedtime    3  Type 2 diabetes, HbA1c goal < 7% ([facility])    4  Ob[facility], morbid ([facility] )    5  Lumbar radiculopathy    6  Paroxysmal atrial fibrillation (HCC)    7  Primary osteoarthritis of both knees    8  Impaired fasting blood sugar    9  Mixed hyperlipidemia    10  Venous insufficiency of both lower extremities    11  Obstructive sleep apnea syndrome    12  Prostate cancer ([facility] )    13  Chronic pain syndrome           Subjective:      Patient ID: Carlos Hollis is a [de-identified] y o  male  Follow-up on blood test done 05/11/2022 test discussed with him      The following portions of the patient's history were reviewed and updated as appropriate: He  has a past medical history of A-fib ([facility] [address] ), Anemia, unspecified, Anxiety, Arthritis, Basal cell carcinoma (BCC) of skin of nose, Cancer ([facility]), Chondrocostal junction syndrome, CPAP (continuous positive airway pressure) dependence, Depression, Dysthymic disorder, Edema, unspecified, Hyperlipidemia, Hypertension, Impaired fasting blood sugar, Intervertebral disc disorders with radiculopathy, lumbar region, Irregular heart beat, Prostate cancer ([facility] [address] ), Sleep apnea, Spinal stenosis, Stroke ([address] ), TIA (transient ischemic attack), Unspecified osteoarthritis, unspecified site, and Venous insufficiency of both lower extremities    He   Patient Active Problem List    Diagnosis Date Noted    Type 2 diabetes, HbA1c goal < 7% ([address] ) 05/25/2022    Cellulitis of right foot 12/17/2021    Abnormal gait 08/04/2021    Medicare annual wellness visit, subsequent 03/26/2021    Obesity, morbid ([address] ) 03/26/2021    Non-seasonal allergic rhinitis 03/26/2021    Primary osteoarthritis of both knees 11/13/2020    TIA (transient ischemic attack)     [facility]     Intervertebral disc disorders with radiculopathy, lumbar region     Impaired fasting blood sugar     Hypertension     Mixed hyperlipidemia     A-fib (HCC)     Obstructive sleep apnea syndrome     CPAP (continuous positive airway pressure) dependence     Dysthymic disorder     Venous insufficiency of both lower extremities     Essential hypertension 11/20/2019    Venous insufficiency 31/71/2538    Uncomplicated opioid dependence ([facility] ) 06/05/2019    Encounter for long-term use of opiate analgesic 06/05/2019    Chronic pain syndrome 09/17/2018    Lumbar radiculopathy 04/16/2018    Lumbar spondylosis 04/16/2018    Right bundle branch block (RBBB) 03/19/2018    Edema of both lower legs due to peripheral venous insufficiency 03/19/2018    Chronic pain of both lower extremities 03/19/2018    Paroxysmal atrial fibrillation ([facility] ) 01/20/2018    Sacroiliitis ([facility] ) 04/17/2017    Spondylosis of lumbar region without myelopathy or radiculopathy 04/17/2017    Lumbar spinal stenosis 05/12/2015     He  has a past surgical history that includes Prostatectomy; Tonsillectomy and adenoidectomy; FACIAL/NECK BIOPSY (N/A, 4/3/2017); FULL THICKNESS SKIN GRAFT (N/A, 4/3/2017); Cataract extraction; CT epidural steroid injection (TIN lumbar); MOHS RECONSTRUCTION (N/A, 10/27/2020); FLAP LOCAL HEAD / NECK (N/A, 10/27/2020); Colonoscopy; and Excision basal cell carcinoma  His family history includes Heart attack in his father  He  reports that he quit smoking about 42 years ago  His smoking use included cigarettes  He has never used smokeless tobacco  He reports current alcohol use of about 7 0 standard drinks of alcohol per week  He reports that he does not use drugs    Current Outpatient Medications   Medication Sig Dispense Refill    Acetaminophen 325 MG CAPS Take by mouth as needed       amLODIPine (NORVASC) 10 mg tablet TAKE 1 TABLET DAILY 90 tablet 0  atorvastatin (LIPITOR) 40 mg tablet TAKE 1 TABLET (40 MG TOTAL) BY MOUTH DAILY AT BEDTIME 90 tablet 0    Cholecalciferol (VITAMIN D-3 PO) Take 2,000 unit marking on U-100 syringe by mouth daily after dinner      co-enzyme Q-10 30 MG capsule Take 30 mg by mouth daily after dinner      diclofenac sodium (VOLTAREN) 1 % Apply 2 g topically 4 (four) times a day      glucosamine-chondroitin 500-400 MG tablet Take 2 tablets by mouth daily in the early morning      losartan (COZAAR) 100 MG tablet TAKE 1 TABLET (100 MG TOTAL) BY MOUTH DAILY 90 tablet 0    montelukast (SINGULAIR) 10 mg tablet Take 1 tablet (10 mg total) by mouth daily at bedtime 30 tablet 1    multivitamin (THERAGRAN) TABS Take 1 tablet by mouth daily in the early morning      sertraline (ZOLOFT) 50 mg tablet TAKE 1 TABLET (50 MG TOTAL) BY MOUTH DAILY 90 tablet 1    sotalol (BETAPACE) 80 mg tablet TAKE 1 TABLET TWICE DAILY 180 tablet 3    torsemide (DEMADEX) 20 mg tablet TAKE 1 TABLET TWICE DAILY 180 tablet 3    traMADol (ULTRAM) 50 mg tablet Take 1 tablet (50 mg total) by mouth every 8 (eight) hours as needed for moderate pain 90 tablet 1    warfarin (COUMADIN) 5 mg tablet TAKE 1/2 TO 1 TABLET DAILY OR AS DIRECTED 90 tablet 3    ipratropium (ATROVENT) 0 06 % nasal spray USE 2 SPRAYS INTO EACH NOSTRIL UP TO 3 TIMES DAILY AS NEEDED      Omega-3 Fatty Acids (FISH OIL) 1,000 mg Take 1,000 mg by mouth 2 (two) times a day       No current facility-administered medications for this visit       Current Outpatient Medications on File Prior to Visit   Medication Sig    Acetaminophen 325 MG CAPS Take by mouth as needed     amLODIPine (NORVASC) 10 mg tablet TAKE 1 TABLET DAILY    atorvastatin (LIPITOR) 40 mg tablet TAKE 1 TABLET (40 MG TOTAL) BY MOUTH DAILY AT BEDTIME    Cholecalciferol (VITAMIN D-3 PO) Take 2,000 unit marking on U-100 syringe by mouth daily after dinner    co-enzyme Q-10 30 MG capsule Take 30 mg by mouth daily after dinner    diclofenac sodium (VOLTAREN) 1 % Apply 2 g topically 4 (four) times a day    glucosamine-chondroitin 500-400 MG tablet Take 2 tablets by mouth daily in the early morning    losartan (COZAAR) 100 MG tablet TAKE 1 TABLET (100 MG TOTAL) BY MOUTH DAILY    multivitamin (THERAGRAN) TABS Take 1 tablet by mouth daily in the early morning    sertraline (ZOLOFT) 50 mg tablet TAKE 1 TABLET (50 MG TOTAL) BY MOUTH DAILY    sotalol (BETAPACE) 80 mg tablet TAKE 1 TABLET TWICE DAILY    torsemide (DEMADEX) 20 mg tablet TAKE 1 TABLET TWICE DAILY    traMADol (ULTRAM) 50 mg tablet Take 1 tablet (50 mg total) by mouth every 8 (eight) hours as needed for moderate pain    warfarin (COUMADIN) 5 mg tablet TAKE 1/2 TO 1 TABLET DAILY OR AS DIRECTED    [DISCONTINUED] montelukast (SINGULAIR) 10 mg tablet Take 1 tablet (10 mg total) by mouth daily at bedtime    ipratropium (ATROVENT) 0 06 % nasal spray USE 2 SPRAYS INTO EACH NOSTRIL UP TO 3 TIMES DAILY AS NEEDED    Omega-3 Fatty Acids (FISH OIL) 1,000 mg Take 1,000 mg by mouth 2 (two) times a day    [DISCONTINUED] doxycycline hyclate (VIBRAMYCIN) 100 mg capsule     [DISCONTINUED] ofloxacin (OCUFLOX) 0 3 % ophthalmic solution  (Patient not taking: Reported on 5/25/2022)     No current facility-administered medications on file prior to visit  He has No Known Allergies       Review of Systems   Constitutional: Negative for chills and fever  HENT: Negative for congestion, ear pain and sore throat  Eyes: Negative for pain  Respiratory: Negative for cough and shortness of breath  Cardiovascular: Negative for chest pain and leg swelling  Gastrointestinal: Negative for abdominal pain, nausea and vomiting  Endocrine: Negative for polyuria  Genitourinary: Negative for difficulty urinating, frequency and urgency  Musculoskeletal: Positive for arthralgias and back pain  Skin: Negative for rash  Neurological: Negative for weakness and headaches     Psychiatric/Behavioral: Negative for sleep disturbance  The patient is not nervous/anxious  Objective:      /72 (BP Location: Left arm, Patient Position: Sitting, Cuff Size: Adult)   Pulse 73   Temp 97 6 °F (36 4 °C) (Temporal)   Ht 5' 7" (1 702 m)   Wt 105 kg (232 lb)   SpO2 99%   BMI 36 34 kg/m²     Recent Results (from the past 1344 hour(s))   Protime-INR    Collection Time: 04/12/22 11:02 AM   Result Value Ref Range    Protime 25 3 (H) 11 6 - 14 5 seconds    INR 2 44 (H) 0 84 - 1 19   Hemoglobin A1C    Collection Time: 05/11/22 12:00 AM   Result Value Ref Range    Hemoglobin A1C 6 5    HEMOGLOBIN A1C    Collection Time: 05/11/22  8:47 AM   Result Value Ref Range    Hemoglobin A1C 6 5 (H) <5 7 %    eAG, EST AVG Glucose 140 <154 mg/dL   Protime-INR    Collection Time: 05/24/22 12:18 PM   Result Value Ref Range    Protime 23 6 (H) 11 6 - 14 5 seconds    INR 2 22 (H) 0 84 - 1 19        Physical Exam  Constitutional:       Appearance: Normal appearance  HENT:      Head: Normocephalic  Right Ear: Tympanic membrane, ear canal and external ear normal       Left Ear: Tympanic membrane, ear canal and external ear normal       Nose: Nose normal  No congestion  Mouth/Throat:      Mouth: Mucous membranes are moist       Pharynx: Oropharynx is clear  No oropharyngeal exudate or posterior oropharyngeal erythema  Eyes:      Extraocular Movements: Extraocular movements intact  Conjunctiva/sclera: Conjunctivae normal       Pupils: Pupils are equal, round, and reactive to light  Cardiovascular:      Rate and Rhythm: Normal rate and regular rhythm  Heart sounds: Normal heart sounds  No murmur heard  Pulmonary:      Effort: Pulmonary effort is normal       Breath sounds: Normal breath sounds  No wheezing or rales  Abdominal:      General: Bowel sounds are normal  There is no distension  Palpations: Abdomen is soft  Tenderness: There is no abdominal tenderness     Musculoskeletal:         General: Normal range of motion  Cervical back: Normal range of motion and neck supple  Right lower leg: No edema  Left lower leg: No edema  Lymphadenopathy:      Cervical: No cervical adenopathy  Skin:     General: Skin is warm  Neurological:      General: No focal deficit present  Mental Status: He is alert and oriented to person, place, and time

## 2022-05-25 NOTE — PROGRESS NOTES
Patient has requested to stop therapy because he does not feel that it is benefiting him at this time  Current POC will be D/C; patient may return for future treatment as indicated

## 2022-05-26 ENCOUNTER — APPOINTMENT (OUTPATIENT)
Dept: PHYSICAL THERAPY | Facility: REHABILITATION | Age: 82
End: 2022-05-26
Payer: MEDICARE

## 2022-06-02 DIAGNOSIS — I10 ESSENTIAL (PRIMARY) HYPERTENSION: ICD-10-CM

## 2022-06-02 RX ORDER — AMLODIPINE BESYLATE 10 MG/1
TABLET ORAL
Qty: 90 TABLET | Refills: 0 | Status: SHIPPED | OUTPATIENT
Start: 2022-06-02

## 2022-06-06 DIAGNOSIS — E78.2 MIXED HYPERLIPIDEMIA: ICD-10-CM

## 2022-06-06 RX ORDER — ATORVASTATIN CALCIUM 40 MG/1
40 TABLET, FILM COATED ORAL
Qty: 90 TABLET | Refills: 1 | Status: SHIPPED | OUTPATIENT
Start: 2022-06-06 | End: 2022-06-07 | Stop reason: SDUPTHER

## 2022-06-06 RX ORDER — ATORVASTATIN CALCIUM 40 MG/1
40 TABLET, FILM COATED ORAL
Qty: 90 TABLET | Refills: 0 | OUTPATIENT
Start: 2022-06-06 | End: 2022-09-04

## 2022-06-07 DIAGNOSIS — E78.2 MIXED HYPERLIPIDEMIA: ICD-10-CM

## 2022-06-07 NOTE — TELEPHONE ENCOUNTER
patient called and needs his   lipitor  40 mg called into [facility]   Last ovs 05/25/2022  Next ovs 08/19/2022

## 2022-06-08 RX ORDER — ATORVASTATIN CALCIUM 40 MG/1
40 TABLET, FILM COATED ORAL
Qty: 90 TABLET | Refills: 1 | Status: SHIPPED | OUTPATIENT
Start: 2022-06-08 | End: 2022-12-05

## 2022-06-20 ENCOUNTER — APPOINTMENT (OUTPATIENT)
Dept: LAB | Facility: HOSPITAL | Age: 82
End: 2022-06-20
Payer: MEDICARE

## 2022-06-20 ENCOUNTER — HOSPITAL ENCOUNTER (OUTPATIENT)
Dept: RADIOLOGY | Facility: CLINIC | Age: 82
Discharge: HOME | End: 2022-06-20
Attending: ANESTHESIOLOGY | Admitting: ANESTHESIOLOGY
Payer: MEDICARE

## 2022-06-20 ENCOUNTER — ANTICOAG VISIT (OUTPATIENT)
Dept: CARDIOLOGY CLINIC | Facility: CLINIC | Age: 82
End: 2022-06-20

## 2022-06-20 VITALS
OXYGEN SATURATION: 96 % | TEMPERATURE: 97.2 F | HEART RATE: 98 BPM | RESPIRATION RATE: 20 BRPM | SYSTOLIC BLOOD PRESSURE: 148 MMHG | DIASTOLIC BLOOD PRESSURE: 91 MMHG

## 2022-06-20 DIAGNOSIS — Z79.01 CHRONIC ANTICOAGULATION: ICD-10-CM

## 2022-06-20 DIAGNOSIS — I48.0 PAROXYSMAL ATRIAL FIBRILLATION (HCC): Primary | ICD-10-CM

## 2022-06-20 DIAGNOSIS — M54.16 LUMBAR RADICULOPATHY: ICD-10-CM

## 2022-06-20 LAB
INR PPP: 1.03 (ref 0.84–1.19)
PROTHROMBIN TIME: 13.1 SECONDS (ref 11.6–14.5)

## 2022-06-20 PROCEDURE — 85610 PROTHROMBIN TIME: CPT

## 2022-06-20 PROCEDURE — 62323 NJX INTERLAMINAR LMBR/SAC: CPT | Performed by: ANESTHESIOLOGY

## 2022-06-20 PROCEDURE — 36415 COLL VENOUS BLD VENIPUNCTURE: CPT

## 2022-06-20 RX ORDER — PAPAVERINE HCL 150 MG
10 CAPSULE, EXTENDED RELEASE ORAL ONCE
Status: COMPLETED | OUTPATIENT
Start: 2022-06-20 | End: 2022-06-20

## 2022-06-20 RX ADMIN — DEXAMETHASONE SODIUM PHOSPHATE 10 MG: 10 INJECTION, SOLUTION INTRAMUSCULAR; INTRAVENOUS at 14:38

## 2022-06-20 NOTE — DISCHARGE INSTRUCTIONS
Epidural Steroid Injection   WHAT YOU NEED TO KNOW:   An epidural steroid injection (TIN) is a procedure to inject steroid medicine into the epidural space  The epidural space is between your spinal cord and vertebrae  Steroids reduce inflammation and fluid buildup in your spine that may be causing pain  You may be given pain medicine along with the steroids  ACTIVITY  Do not drive or operate machinery today  No strenuous activity today - bending, lifting, etc   You may resume normal activites starting tomorrow - start slowly and as tolerated  You may shower today, but no tub baths or hot tubs  You may have numbness for several hours from the local anesthetic  Please use caution and common sense, especially with weight-bearing activities  CARE OF THE INJECTION SITE  If you have soreness or pain, apply ice to the area today (20 minutes on/20 minutes off)  Starting tomorrow, you may use warm, moist heat or ice if needed  You may have an increase or change in your discomfort for 36-48 hours after your treatment  Apply ice and continue with any pain medication you have been prescribed  Notify the [facility] if you have any of the following: redness, drainage, swelling, headache, stiff neck or fever above 100°F     SPECIAL INSTRUCTIONS  Our office will contact you in approximately 7 days for a progress report  MEDICATIONS  Continue to take all routine medications  Our office may have instructed you to hold some medications  As no general anesthesia was used in today's procedure, you should not experience any side effects related to anesthesia  If you have a problem specifically related to your procedure, please call our office at [phone]  Problems not related to your procedure should be directed to your primary care physician

## 2022-06-20 NOTE — H&P
History of Present Illness: The patient is a [de-identified] y o  male who presents with complaints of low back and buttock pain      Patient Active Problem List   Diagnosis    Paroxysmal atrial fibrillation (HCC)    Lumbar spinal stenosis    Sacroiliitis (HCC)    Spondylosis of lumbar region without myelopathy or radiculopathy    Right bundle branch block (RBBB)    Edema of both lower legs due to peripheral venous insufficiency    Chronic pain of both lower extremities    Lumbar radiculopathy    Lumbar spondylosis    Chronic pain syndrome    Uncomplicated opioid dependence ([place] [address] )    Encounter for long-term use of opiate analgesic    Essential hypertension    Venous insufficiency    TIA (transient ischemic attack)    Prostate cancer ([facility] )    Intervertebral disc disorders with radiculopathy, lumbar region    Impaired fasting blood sugar    Hypertension    Mixed hyperlipidemia    [facility]fib [facility]    Obstructive sleep apnea syndrome    CPAP (continuous positive airway pressure) dependence    Dysthymic disorder    Venous insufficiency of both lower extremities    Primary osteoarthritis of both knees    Medicare annual wellness visit, subsequent    Obesity, morbid ([place] [address] )    Non-seasonal allergic rhinitis    Abnormal gait    Cellulitis of right foot    Type 2 diabetes, HbA1c goal < 7% ([facility])       Past Medical History:   Diagnosis Date    A-fib ([facility])     Anemia, unspecified     Anxiety     Arthritis     Basal cell carcinoma (BCC) of skin of nose     Cancer ([facility])     Chondrocostal junction syndrome     CPAP (continuous positive airway pressure) dependence     Depression     Dysthymic disorder     Edema, unspecified     Hyperlipidemia     Hypertension     Impaired fasting blood sugar     Intervertebral disc disorders with radiculopathy, lumbar region     Irregular heart beat     Prostate cancer ([place] [address] )     s/p surgery    Sleep apnea     on CPAP    Spinal stenosis     Stroke ([place] [address] )     TIA (transient ischemic attack)     no residual problems    Unspecified osteoarthritis, unspecified site     Venous insufficiency of both lower extremities        Past Surgical History:   Procedure Laterality Date    BASAL CELL CARCINOMA EXCISION      on the nose    CATARACT EXTRACTION      COLONOSCOPY      2016, 2011    CT EPIDURAL STEROID INJECTION (TIN LUMBAR)      FACIAL/NECK BIOPSY N/A 4/3/2017    Procedure: NOSE BCC EXCISION; FROZEN SECTION; RECONSTRUCTION ;  Surgeon: Terry Broderick MD;  [facility];  Service:     FLAP LOCAL HEAD / NECK N/A 10/27/2020    Procedure: NOSE FLAP;  Surgeon: Terry Broderick MD;  [facility];  Service: Plastics    FULL THICKNESS SKIN GRAFT N/A 4/3/2017    Procedure: FLAP VERSUS FULL THICKNESS SKIN GRAFT ;  Surgeon: Terry Broderick MD;  [facility];  Service:     MOHS RECONSTRUCTION N/A 10/27/2020    Procedure: NOSE MOHS DEFECT RECONSTRUCTION;  Surgeon: Terry Broderick MD;  [facility];  Service: Plastics    PROSTATECTOMY      TONSILLECTOMY AND ADENOIDECTOMY           Current Outpatient Medications:     Acetaminophen 325 MG CAPS, Take by mouth as needed , Disp: , Rfl:     amLODIPine (NORVASC) 10 mg tablet, TAKE 1 TABLET DAILY, Disp: 90 tablet, Rfl: 0    atorvastatin (LIPITOR) 40 mg tablet, Take 1 tablet (40 mg total) by mouth daily at bedtime, Disp: 90 tablet, Rfl: 1    Cholecalciferol (VITAMIN D-3 PO), Take 2,000 unit marking on U-100 syringe by mouth daily after dinner, Disp: , Rfl:     co-enzyme Q-10 30 MG capsule, Take 30 mg by mouth daily after dinner, Disp: , Rfl:     diclofenac sodium (VOLTAREN) 1 %, Apply 2 g topically 4 (four) times a day, Disp: , Rfl:     glucosamine-chondroitin 500-400 MG tablet, Take 2 tablets by mouth daily in the early morning, Disp: , Rfl:     ipratropium (ATROVENT) 0 06 % nasal spray, USE 2 SPRAYS INTO EACH NOSTRIL UP TO 3 TIMES DAILY AS NEEDED, Disp: , Rfl:     losartan (COZAAR) 100 MG tablet, TAKE 1 TABLET (100 MG TOTAL) BY MOUTH DAILY, Disp: 90 tablet, Rfl: 0    montelukast (SINGULAIR) 10 mg tablet, Take 1 tablet (10 mg total) by mouth daily at bedtime, Disp: 30 tablet, Rfl: 1    multivitamin (THERAGRAN) TABS, Take 1 tablet by mouth daily in the early morning, Disp: , Rfl:     Omega-3 Fatty Acids (FISH OIL) 1,000 mg, Take 1,000 mg by mouth 2 (two) times a day, Disp: , Rfl:     sertraline (ZOLOFT) 50 mg tablet, TAKE 1 TABLET (50 MG TOTAL) BY MOUTH DAILY, Disp: 90 tablet, Rfl: 1    sotalol (BETAPACE) 80 mg tablet, TAKE 1 TABLET TWICE DAILY, Disp: 180 tablet, Rfl: 3    torsemide (DEMADEX) 20 mg tablet, TAKE 1 TABLET TWICE DAILY, Disp: 180 tablet, Rfl: 3    traMADol (ULTRAM) 50 mg tablet, Take 1 tablet (50 mg total) by mouth every 8 (eight) hours as needed for moderate pain, Disp: 90 tablet, Rfl: 1    warfarin (COUMADIN) 5 mg tablet, TAKE 1/2 TO 1 TABLET DAILY OR AS DIRECTED, Disp: 90 tablet, Rfl: 3    Current Facility-Administered Medications:     dexamethasone (PF) (DECADRON) injection 10 mg, 10 mg, Epidural, Once, Mary Garcia, DO    No Known Allergies    Physical Exam:   Vitals:    06/20/22 1418   BP: 135/67   Pulse: 72   Resp: 20   Temp: (!) 97 2 °F (36 2 °C)   SpO2: 93%     General: Awake, Alert, Oriented x 3, Mood and affect appropriate  Respiratory: Respirations even and unlabored  Cardiovascular: Peripheral pulses intact; no edema  Musculoskeletal Exam:  Bilateral lumbar paraspinal tender to palpation    ASA Score: 3         Assessment:   1   Lumbar radiculopathy        Plan: L5-S1 LESI

## 2022-06-27 ENCOUNTER — TELEPHONE (OUTPATIENT)
Dept: PAIN MEDICINE | Facility: CLINIC | Age: 82
End: 2022-06-27

## 2022-07-06 ENCOUNTER — ANTICOAG VISIT (OUTPATIENT)
Dept: CARDIOLOGY CLINIC | Facility: CLINIC | Age: 82
End: 2022-07-06

## 2022-07-06 ENCOUNTER — APPOINTMENT (OUTPATIENT)
Dept: LAB | Facility: CLINIC | Age: 82
End: 2022-07-06
Payer: MEDICARE

## 2022-07-06 DIAGNOSIS — I48.0 PAROXYSMAL ATRIAL FIBRILLATION (HCC): Primary | ICD-10-CM

## 2022-07-08 ENCOUNTER — TELEPHONE (OUTPATIENT)
Dept: OTHER | Facility: OTHER | Age: 82
End: 2022-07-08

## 2022-07-11 NOTE — TELEPHONE ENCOUNTER
I would like the patient to start increasing his pregabalin 25/25/50 x3 days  If he tolerates this he may increase to 50/25/50 x3 days    If he tolerates this he may increase to 50 mg t i d  and stay on this dose until re-evaluated

## 2022-07-11 NOTE — TELEPHONE ENCOUNTER
S/W pt  Pt got cannot reach you letter for following up after his injection  Pt was having phone problems  He had 20% of improvement after his injection  Pain level is 8/10  He stated he has no strength  Confirmed next appt  Please advise

## 2022-07-11 NOTE — TELEPHONE ENCOUNTER
S/W pt  Pain is a 10/10 now  He stated mobility is the problem and has no strength in his legs  He is taking tramadol which doesn't work, tylenol 500 mg in am which don't help and gabapentin 25 mg 3x/day  He sleeps good  Advised to seek treatment at the ER if the pain is too bad and/or excruciating and will get this message to [address] [place] [address] Avenue  Please advise

## 2022-07-19 ENCOUNTER — TELEPHONE (OUTPATIENT)
Dept: PAIN MEDICINE | Facility: CLINIC | Age: 82
End: 2022-07-19

## 2022-07-19 NOTE — TELEPHONE ENCOUNTER
Patient states he's currently on Gabapentin & has increased his dosage, but he feels its making him feel worse  His lumbar spine & left leg pain are worse & his mobility is now limited   He would like to discuss his status with a nurse asap, thx    Call back# [phone]

## 2022-07-19 NOTE — TELEPHONE ENCOUNTER
S/w the patient to review and he stated he started taking the Lyrica on 7/11 c/ titration orders and now he is taking the 50 mg TID and he doesn't see any relief with his pain  He is not taking the tramadol anymore and he is using tylenol in between  No real relief  Inquired that with the previous injection that he has had no  relief also on 6/20/22  He stated no relief  His next ovs is not until 8/17  Please advise   Thanks

## 2022-07-19 NOTE — TELEPHONE ENCOUNTER
It takes a little while for this medication to establish a steady state in his blood stream   Once the patient has been on 50 mg t i d  for 2 weeks, if he does not notice any improvement in his pain, I will titrate him up on the medication

## 2022-07-25 NOTE — TELEPHONE ENCOUNTER
Pt called in to let the doctor know the Lyrica is not working,        Please be advised thank you    Pt can be reached @ [phone]

## 2022-07-25 NOTE — TELEPHONE ENCOUNTER
S/w pt  Pt reports no relief at all from taking the Pregabalin  50 mg tid and states that he has leg swelling and is having difficulty walking  Pt has venous insufficiency and only takes his water pill when he does not have anywhere to go  Pt had injection 6/20 and reports no relief  Pt is otherwise taking tylenol for pain that only helps a little, and was advised that he can take 1000 mg every 8 hrs, no more that 3000 mg /24hrs  Pt is not taking prescribed tramadol as he does not want to take it with Pregabalin  Pt is requesting a "magic pill form [facility]"  Pt takes coumadin         Pt has f/u OVS 8/17/22  Please advise    Per pt, ok to leave detailed message

## 2022-07-25 NOTE — TELEPHONE ENCOUNTER
If the patient's leg swelling becomes too severe I would like him to decrease down to 50 mg b i d  of the pregabalin, otherwise if the leg swelling is stable he may continue on this current dose    I advised the patient take tramadol as prescribed for breakthrough pain

## 2022-07-28 ENCOUNTER — TELEPHONE (OUTPATIENT)
Dept: PAIN MEDICINE | Facility: CLINIC | Age: 82
End: 2022-07-28

## 2022-07-28 DIAGNOSIS — M54.16 LUMBAR RADICULOPATHY: Primary | ICD-10-CM

## 2022-07-28 RX ORDER — PREGABALIN 25 MG/1
CAPSULE ORAL
Qty: 90 CAPSULE | OUTPATIENT
Start: 2022-07-28

## 2022-07-28 RX ORDER — PREGABALIN 50 MG/1
50 CAPSULE ORAL 2 TIMES DAILY
Qty: 60 CAPSULE | Refills: 1 | Status: SHIPPED | OUTPATIENT
Start: 2022-07-28 | End: 2022-08-17 | Stop reason: SDUPTHER

## 2022-07-28 NOTE — TELEPHONE ENCOUNTER
Patient called for a refill of:    Lyrica 25 mg capsule     One capsule by mouth twicw a day    - 5 pills left    [employer] on file    cb [phone]

## 2022-07-28 NOTE — TELEPHONE ENCOUNTER
S/w pt  Pt is requesting refill of Lyrica  Pt is taking 50 mg bid, it seems to be helping the pain and pt denies se's  Pt states with decreased dose his leg swelling has not increased  Pt has OVS with [address] on 8/17    Please advise

## 2022-08-02 ENCOUNTER — APPOINTMENT (OUTPATIENT)
Dept: LAB | Facility: CLINIC | Age: 82
End: 2022-08-02
Payer: MEDICARE

## 2022-08-02 ENCOUNTER — ANTICOAG VISIT (OUTPATIENT)
Dept: CARDIOLOGY CLINIC | Facility: CLINIC | Age: 82
End: 2022-08-02

## 2022-08-02 DIAGNOSIS — I48.0 PAROXYSMAL ATRIAL FIBRILLATION (HCC): Primary | ICD-10-CM

## 2022-08-02 LAB
INR PPP: 2.37 (ref 0.84–1.19)
PROTHROMBIN TIME: 26.2 SECONDS (ref 11.6–14.5)

## 2022-08-02 PROCEDURE — 36415 COLL VENOUS BLD VENIPUNCTURE: CPT

## 2022-08-02 PROCEDURE — 85610 PROTHROMBIN TIME: CPT

## 2022-08-12 ENCOUNTER — RA CDI HCC (OUTPATIENT)
Dept: OTHER | Facility: HOSPITAL | Age: 82
End: 2022-08-12

## 2022-08-17 ENCOUNTER — OFFICE VISIT (OUTPATIENT)
Dept: PAIN MEDICINE | Facility: CLINIC | Age: 82
End: 2022-08-17
Payer: MEDICARE

## 2022-08-17 VITALS
HEART RATE: 59 BPM | SYSTOLIC BLOOD PRESSURE: 185 MMHG | BODY MASS INDEX: 36.41 KG/M2 | WEIGHT: 232 LBS | HEIGHT: 67 IN | DIASTOLIC BLOOD PRESSURE: 86 MMHG

## 2022-08-17 DIAGNOSIS — Z79.891 ENCOUNTER FOR LONG-TERM USE OF OPIATE ANALGESIC: ICD-10-CM

## 2022-08-17 DIAGNOSIS — M48.062 SPINAL STENOSIS OF LUMBAR REGION WITH NEUROGENIC CLAUDICATION: ICD-10-CM

## 2022-08-17 DIAGNOSIS — M79.605 CHRONIC PAIN OF BOTH LOWER EXTREMITIES: ICD-10-CM

## 2022-08-17 DIAGNOSIS — F11.20 UNCOMPLICATED OPIOID DEPENDENCE (HCC): ICD-10-CM

## 2022-08-17 DIAGNOSIS — M54.16 LUMBAR RADICULOPATHY: ICD-10-CM

## 2022-08-17 DIAGNOSIS — M47.816 LUMBAR SPONDYLOSIS: ICD-10-CM

## 2022-08-17 DIAGNOSIS — G89.4 CHRONIC PAIN SYNDROME: Primary | ICD-10-CM

## 2022-08-17 DIAGNOSIS — M51.16 INTERVERTEBRAL DISC DISORDERS WITH RADICULOPATHY, LUMBAR REGION: ICD-10-CM

## 2022-08-17 DIAGNOSIS — M47.816 SPONDYLOSIS OF LUMBAR REGION WITHOUT MYELOPATHY OR RADICULOPATHY: ICD-10-CM

## 2022-08-17 DIAGNOSIS — G89.29 CHRONIC PAIN OF BOTH LOWER EXTREMITIES: ICD-10-CM

## 2022-08-17 DIAGNOSIS — M79.604 CHRONIC PAIN OF BOTH LOWER EXTREMITIES: ICD-10-CM

## 2022-08-17 PROCEDURE — 99214 OFFICE O/P EST MOD 30 MIN: CPT | Performed by: NURSE PRACTITIONER

## 2022-08-17 PROCEDURE — 80305 DRUG TEST PRSMV DIR OPT OBS: CPT | Performed by: NURSE PRACTITIONER

## 2022-08-17 RX ORDER — PREGABALIN 50 MG/1
50 CAPSULE ORAL 2 TIMES DAILY
Qty: 60 CAPSULE | Refills: 5 | Status: SHIPPED | OUTPATIENT
Start: 2022-08-17

## 2022-08-17 NOTE — PROGRESS NOTES
Assessment:  1  Chronic pain syndrome    2  Lumbar radiculopathy    3  Intervertebral disc disorders with radiculopathy, lumbar region    4  Spondylosis of lumbar region without myelopathy or radiculopathy    5  Lumbar spondylosis    6  Spinal stenosis of lumbar region with neurogenic claudication    7  Chronic pain of both lower extremities    8  Uncomplicated opioid dependence ([facility] )    9  Encounter for long-term use of opiate analgesic        Plan:  1  Patient may continue tramadol 50 mg q 8 hours p r n  as prescribed  He does not require refills today    [place] Prescription Drug Monitoring Program report was reviewed and was appropriate     A urine drug screen was collected at today's office visit as part of our medication management protocol  The point of care testing results were appropriate for what was being prescribed  The specimen will be sent for confirmatory testing  The drug screen is medically necessary because the patient is either dependent on opioid medication or is being considered for opioid medication therapy and the results could impact ongoing or future treatment  The drug screen is to evaluate for the presences or absence of prescribed, non-prescribed, and/or illicit drugs/substances  There are risks associated with opioid medications, including dependence, addiction and tolerance  The patient understands and agrees to use these medications only as prescribed  Potential side effects of the medications include, but are not limited to, constipation, drowsiness, addiction, impaired judgment and risk of fatal overdose if not taken as prescribed  The patient was warned against driving while taking sedation medications  Sharing medications is a felony  At this point in time, the patient is showing no signs of addiction, abuse, diversion or suicidal ideation  2  Patient may continue pregabalin 50 mg twice a day  Refills were provided today   3   I will avoid NSAIDs secondary to anticoagulation   4  Patient will follow-up in months or sooner if needed    History of Present Illness: The patient is a [de-identified] y o  male last seen on 3/2/22 who presents for a follow up office visit in regards to chronic low back pain with weakness in the legs secondary to lumbar degenerative disc disease, lumbar spondylosis, lumbar spondylolisthesis, lumbar stenosis and chronic pain syndrome  The patient denies bowel or bladder incontinence or saddle anesthesia  Patient was participating in physical therapy which he states worsened his symptoms  He recently had a lumbar epidural steroid injection on June 20, 2022 without any significant improvement of his pain  He is taking pregabalin 50 mg twice a day with 10% improvement of his pain  He thought he was experiencing lower extremity swelling from the pregabalin, however at this point feels like it is from another condition  He takes Tramadol on a sparing basis  He still has his bottle that was last filled in March 2022  He rates his pain a 5/10 on the numeric pain rating scale  He intermittently has pain in the morning which is described as pressure-like    Pain Contract Signed: 3/2/22  Last Urine Drug Screen: 3/2/22  Lisa/[facility]Y 3/2/22  Last Tramadol per Pt     I have personally reviewed and/or updated the patient's past medical history, past surgical history, family history, social history, current medications, allergies, and vital signs today  Review of Systems:    Review of Systems   Respiratory: Negative for shortness of breath  Cardiovascular: Negative for chest pain  Gastrointestinal: Negative for constipation, diarrhea, nausea and vomiting  Musculoskeletal: Negative for arthralgias, gait problem, joint swelling and myalgias  Skin: Negative for rash  Neurological: Negative for dizziness, seizures and weakness  All other systems reviewed and are negative          Past Medical History:   Diagnosis Date    [facility] )     Anemia, unspecified     Anxiety     Arthritis     Basal cell carcinoma (BCC) of skin of nose     Cancer (HCC)     Chondrocostal junction syndrome     CPAP (continuous positive airway pressure) dependence     Depression     Dysthymic disorder     Edema, unspecified     Hyperlipidemia     Hypertension     Impaired fasting blood sugar     Intervertebral disc disorders with radiculopathy, lumbar region     Irregular heart beat     Pro[facility] cancer [facility]     s/p surgery    Sleep apnea     on CPAP    Spinal stenosis     Stroke ([facility]     TIA (transient ischemic attack)     no residual problems    Unspecified osteoarthritis, unspecified site     Venous insufficiency of both lower extremities        Past Surgical History:   Procedure Laterality Date    BASAL CELL CARCINOMA EXCISION      on the nose    CATARACT EXTRACTION      COLONOSCOPY      2011    CT EPIDURAL STEROID INJECTION (TIN LUMBAR)      FACIAL/NECK BIOPSY N/A 4/3/2017    Procedure: NOSE BCC EXCISION; FROZEN SECTION; RECONSTRUCTION ;  Surgeon: Katia Sevilla MD;  [facility];  Service:     FLAP LOCAL HEAD / NECK N/A 10/27/2020    Procedure: NOSE FLAP;  Surgeon: Katia Sevilla MD;  [facility];  Service: Plastics    FULL THICKNESS SKIN GRAFT N/A 4/3/2017    Procedure: FLAP VERSUS FULL THICKNESS SKIN GRAFT ;  Surgeon: Katia Sevilla MD;  [facility];  Service:     MOHS RECONSTRUCTION N/A 10/27/2020    Procedure: NOSE MOHS DEFECT RECONSTRUCTION;  Surgeon: Katia Sevilla MD;  [facility];  Service: Plastics    PROSTATECTOMY      TONSILLECTOMY AND ADENOIDECTOMY         Family History   Problem Relation Age of Onset    Heart attack Father        Social History     Occupational History    Not on file   Tobacco Use    Smoking status: Former Smoker     Types: Cigarettes     Quit date:      Years since quittin 6    Smokeless tobacco: Never Used    Tobacco comment: long time ago   Vaping Use    Vaping Use: Never used   Substance and Sexual Activity    Alcohol use:  Yes     Alcohol/week: 7 0 standard drinks     Types: 7 Glasses of wine per week     Comment: glass of wine with dinner, maybe    Drug use: No    Sexual activity: Not Currently         Current Outpatient Medications:     Acetaminophen 325 MG CAPS, Take by mouth as needed , Disp: , Rfl:     amLODIPine (NORVASC) 10 mg tablet, TAKE 1 TABLET DAILY, Disp: 90 tablet, Rfl: 0    atorvastatin (LIPITOR) 40 mg tablet, Take 1 tablet (40 mg total) by mouth daily at bedtime, Disp: 90 tablet, Rfl: 1    Cholecalciferol (VITAMIN D-3 PO), Take 2,000 unit marking on U-100 syringe by mouth daily after dinner, Disp: , Rfl:     co-enzyme Q-10 30 MG capsule, Take 30 mg by mouth daily after dinner, Disp: , Rfl:     diclofenac sodium (VOLTAREN) 1 %, Apply 2 g topically 4 (four) times a day, Disp: , Rfl:     glucosamine-chondroitin 500-400 MG tablet, Take 2 tablets by mouth daily in the early morning, Disp: , Rfl:     ipratropium (ATROVENT) 0 06 % nasal spray, USE 2 SPRAYS INTO EACH NOSTRIL UP TO 3 TIMES DAILY AS NEEDED, Disp: , Rfl:     losartan (COZAAR) 100 MG tablet, TAKE 1 TABLET (100 MG TOTAL) BY MOUTH DAILY, Disp: 90 tablet, Rfl: 0    montelukast (SINGULAIR) 10 mg tablet, Take 1 tablet (10 mg total) by mouth daily at bedtime, Disp: 30 tablet, Rfl: 1    multivitamin (THERAGRAN) TABS, Take 1 tablet by mouth daily in the early morning, Disp: , Rfl:     Omega-3 Fatty Acids (FISH OIL) 1,000 mg, Take 1,000 mg by mouth 2 (two) times a day, Disp: , Rfl:     pregabalin (LYRICA) 50 mg capsule, Take 1 capsule (50 mg total) by mouth 2 (two) times a day, Disp: 60 capsule, Rfl: 1    sertraline (ZOLOFT) 50 mg tablet, TAKE 1 TABLET (50 MG TOTAL) BY MOUTH DAILY, Disp: 90 tablet, Rfl: 1    sotalol (BETAPACE) 80 mg tablet, TAKE 1 TABLET TWICE DAILY, Disp: 180 tablet, Rfl: 3    torsemide (DEMADEX) 20 mg tablet, TAKE 1 TABLET TWICE DAILY, Disp: 180 tablet, Rfl: 3    traMADol (ULTRAM) 50 mg tablet, Take 1 tablet (50 mg total) by mouth every 8 (eight) hours as needed for moderate pain, Disp: 90 tablet, Rfl: 1    warfarin (COUMADIN) 5 mg tablet, TAKE 1/2 TO 1 TABLET DAILY OR AS DIRECTED, Disp: 90 tablet, Rfl: 3    No Known Allergies    Physical Exam:    BP (!) 185/86   Pulse 59   Ht 5' 7" (1 702 m)   Wt 105 kg (232 lb)   BMI 36 34 kg/m²     Constitutional:normal, well developed, well nourished, alert, in no distress and non-toxic and no overt pain behavior  Eyes:anicteric  HEENT:grossly intact  Neck:supple, symmetric, trachea midline and no masses   Pulmonary:even and unlabored  Cardiovascular:Bilateral lower extremity edema  Skin:Normal without rashes or lesions and well hydrated  Psychiatric:Mood and affect appropriate  Neurologic:Cranial Nerves II-XII grossly intact  Musculoskeletal:antalgic gait, ambulates with a cane      Imaging  No orders to display     MRI LUMBAR SPINE WITHOUT CONTRAST   INDICATION: [id number]: Radiculopathy, lumbar region  COMPARISON: 4/23/2018   TECHNIQUE: Sagittal T1, sagittal T2, sagittal inversion recovery, axial T1 and axial T2, coronal T2    IMAGE QUALITY: Diagnostic   FINDINGS:   VERTEBRAL BODIES: There are 5 lumbar type vertebral bodies  Mild focal dextroscoliosis at the thoracolumbar junction with compensatory levoscoliosis of the lumbar spine  Grade 1 anterior spondylolisthesis of L4 upon L5  No compression fracture  T11-12 endplate marrow degenerative change, a combination of Modic type one endplate type II  Small hemangiomas are scattered within the lumbar vertebral bodies  SACRUM: Normal signal within the sacrum  No evidence of insufficiency or stress fracture  DISTAL CORD AND CONUS: Normal size and signal within the distal cord and conus     PARASPINAL SOFT TISSUES: The right kidney is ptotic with a small T2 hyperintensity, likely representing a simple cyst  There is mild atrophy of the posterior paraspinal musculature  LOWER THORACIC DISC SPACES: Mild lower thoracic degenerative change at T10-11 on the right and T11-T12 bilaterally with annular bulging, endplate and posterior element hypertrophic change  There is no cord compression or canal stenosis  Mild to   moderate right foraminal narrowing at T10-11 and mild left foraminal narrowing at T11-T12  T12-L1 level demonstrates no canal stenosis or foraminal narrowing  LUMBAR DISC SPACES:   L1-L2: Mild annular bulging  Trace facet effusions  No canal stenosis  There is mild bilateral foraminal narrowing without discrete nerve impingement  L2-L3: Slight annular bulging without disc herniation, canal stenosis or foraminal narrowing  L3-L4: Mild annular bulging and facet degenerative change  No focal disc herniation  Minimal canal stenosis and foraminal narrowing without discrete nerve impingement  L4-L5: Grade 1 anterior spondylolisthesis of L4 upon L5 on the basis of extensive facet hypertrophic degenerative change  There is mild diffuse annular bulging without focal disc herniation  Small right facet effusion  Moderate tricompartmental canal   stenosis with distortion of the thecal sac  Mild right foraminal narrowing  L5-S1: Mild endplate and facet arthropathy without a discrete disc herniation  No canal stenosis or foraminal narrowing  IMPRESSION:   Lumbar degenerative change most prominent at L4-5 where there is moderate canal stenosis with tricompartmental narrowing of the canal  Mild right foraminal narrowing  Canal stenosis at this level is more prominent than the prior study  Lower thoracic degenerative change at T10-11 and T11-12 with moderate right foraminal narrowing noted at T10-11 and mild left foraminal narrowing at T11-12  These findings are unchanged  No orders of the defined types were placed in this encounter

## 2022-08-18 DIAGNOSIS — I10 ESSENTIAL (PRIMARY) HYPERTENSION: ICD-10-CM

## 2022-08-18 RX ORDER — AMLODIPINE BESYLATE 10 MG/1
TABLET ORAL
Qty: 90 TABLET | Refills: 0 | Status: SHIPPED | OUTPATIENT
Start: 2022-08-18

## 2022-08-19 ENCOUNTER — OFFICE VISIT (OUTPATIENT)
Dept: INTERNAL MEDICINE CLINIC | Facility: CLINIC | Age: 82
End: 2022-08-19
Payer: MEDICARE

## 2022-08-19 VITALS
SYSTOLIC BLOOD PRESSURE: 126 MMHG | HEART RATE: 64 BPM | WEIGHT: 240 LBS | HEIGHT: 67 IN | BODY MASS INDEX: 37.67 KG/M2 | TEMPERATURE: 99 F | OXYGEN SATURATION: 97 % | DIASTOLIC BLOOD PRESSURE: 76 MMHG

## 2022-08-19 DIAGNOSIS — I87.2 VENOUS INSUFFICIENCY OF BOTH LOWER EXTREMITIES: ICD-10-CM

## 2022-08-19 DIAGNOSIS — I10 ESSENTIAL HYPERTENSION: ICD-10-CM

## 2022-08-19 DIAGNOSIS — F33.9 DEPRESSION, RECURRENT (HCC): ICD-10-CM

## 2022-08-19 DIAGNOSIS — E11.9 TYPE 2 DIABETES, HBA1C GOAL < 7% (HCC): Primary | ICD-10-CM

## 2022-08-19 DIAGNOSIS — C61 PROSTATE CANCER (HCC): ICD-10-CM

## 2022-08-19 DIAGNOSIS — J30.89 NON-SEASONAL ALLERGIC RHINITIS, UNSPECIFIED TRIGGER: ICD-10-CM

## 2022-08-19 DIAGNOSIS — Z00.00 MEDICARE ANNUAL WELLNESS VISIT, SUBSEQUENT: ICD-10-CM

## 2022-08-19 DIAGNOSIS — M17.0 PRIMARY OSTEOARTHRITIS OF BOTH KNEES: ICD-10-CM

## 2022-08-19 DIAGNOSIS — F34.1 DYSTHYMIC DISORDER: ICD-10-CM

## 2022-08-19 DIAGNOSIS — M54.16 LUMBAR RADICULOPATHY: ICD-10-CM

## 2022-08-19 DIAGNOSIS — E78.2 MIXED HYPERLIPIDEMIA: ICD-10-CM

## 2022-08-19 LAB
6MAM UR QL CFM: NEGATIVE NG/ML
7AMINOCLONAZEPAM UR QL CFM: NEGATIVE NG/ML
A-OH ALPRAZ UR QL CFM: NEGATIVE NG/ML
AMPHET UR QL CFM: NEGATIVE NG/ML
AMPHET UR QL CFM: NEGATIVE NG/ML
BUPRENORPHINE UR QL CFM: NEGATIVE NG/ML
BUTALBITAL UR QL CFM: NEGATIVE NG/ML
BZE UR QL CFM: NEGATIVE NG/ML
CODEINE UR QL CFM: NEGATIVE NG/ML
DESIPRAMINE UR QL CFM: NEGATIVE NG/ML
DESIPRAMINE UR QL CFM: NEGATIVE NG/ML
EDDP UR QL CFM: NEGATIVE NG/ML
ETHYL GLUCURONIDE UR QL CFM: ABNORMAL NG/ML
ETHYL SULFATE UR QL SCN: ABNORMAL NG/ML
EUTYLONE UR QL: NEGATIVE NG/ML
FENTANYL UR QL CFM: NEGATIVE NG/ML
GLIADIN IGG SER IA-ACNC: NEGATIVE NG/ML
GLUCOSE 30M P 50 G LAC PO SERPL-MCNC: NEGATIVE NG/ML
HYDROCODONE UR QL CFM: NEGATIVE NG/ML
HYDROCODONE UR QL CFM: NEGATIVE NG/ML
HYDROMORPHONE UR QL CFM: NEGATIVE NG/ML
IMIPRAMINE UR QL CFM: NEGATIVE NG/ML
LORAZEPAM UR QL CFM: NEGATIVE NG/ML
MDMA UR QL CFM: NEGATIVE NG/ML
ME-PHENIDATE UR QL CFM: NEGATIVE NG/ML
MEPERIDINE UR QL CFM: NEGATIVE NG/ML
METHADONE UR QL CFM: NEGATIVE NG/ML
METHAMPHET UR QL CFM: NEGATIVE NG/ML
MORPHINE UR QL CFM: NEGATIVE NG/ML
MORPHINE UR QL CFM: NEGATIVE NG/ML
NORBUPRENORPHINE UR QL CFM: NEGATIVE NG/ML
NORDIAZEPAM UR QL CFM: NEGATIVE NG/ML
NORFENTANYL UR QL CFM: NEGATIVE NG/ML
NORHYDROCODONE UR QL CFM: NEGATIVE NG/ML
NORHYDROCODONE UR QL CFM: NEGATIVE NG/ML
NORMEPERIDINE UR QL CFM: NEGATIVE NG/ML
NOROXYCODONE UR QL CFM: NEGATIVE NG/ML
OLANZAPINE QUANTIFICATION: NEGATIVE NG/ML
OPC-3373 QUANTIFICATION: NEGATIVE
OXAZEPAM UR QL CFM: NEGATIVE NG/ML
OXYCODONE UR QL CFM: NEGATIVE NG/ML
OXYMORPHONE UR QL CFM: NEGATIVE NG/ML
OXYMORPHONE UR QL CFM: NEGATIVE NG/ML
PARA-FLUOROFENTANYL QUANTIFICATION: NORMAL NG/ML
PCP UR QL CFM: NEGATIVE NG/ML
PHENOBARB UR QL CFM: NEGATIVE NG/ML
SECOBARBITAL UR QL CFM: NEGATIVE NG/ML
SL AMB 4-ANPP QUANTIFICATION: NORMAL NG/ML
SL AMB 5F-ADB-M7 METABOLITE QUANTIFICATION: NEGATIVE NG/ML
SL AMB 7-OH-MITRAGYNINE (KRATOM ALKALOID) QUANTIFICATION: NEGATIVE NG/ML
SL AMB AB-FUBINACA-M3 METABOLITE QUANTIFICATION: NEGATIVE NG/ML
SL AMB ACETYL FENTANYL QUANTIFICATION: NORMAL NG/ML
SL AMB ACETYL NORFENTANYL QUANTIFICATION: NORMAL NG/ML
SL AMB ACRYL FENTANYL QUANTIFICATION: NORMAL NG/ML
SL AMB CARFENTANIL QUANTIFICATION: NORMAL NG/ML
SL AMB CLOZAPINE QUANTIFICATION: NEGATIVE NG/ML
SL AMB CTHC (MARIJUANA METABOLITE) QUANTIFICATION: NEGATIVE NG/ML
SL AMB DEXTROMETHORPHAN QUANTIFICATION: NEGATIVE NG/ML
SL AMB DEXTRORPHAN (DEXTROMETHORPHAN METABOLITE) QUANT: NEGATIVE NG/ML
SL AMB DEXTRORPHAN (DEXTROMETHORPHAN METABOLITE) QUANT: NEGATIVE NG/ML
SL AMB HALOPERIDOL  QUANTIFICATION: NEGATIVE NG/ML
SL AMB HALOPERIDOL METABOLITE QUANTIFICATION: NEGATIVE NG/ML
SL AMB HYDROXYRISPERIDONE QUANTIFICATION: NEGATIVE NG/ML
SL AMB JWH018 METABOLITE QUANTIFICATION: NEGATIVE NG/ML
SL AMB JWH073 METABOLITE QUANTIFICATION: NEGATIVE NG/ML
SL AMB MDMB-FUBINACA-M1 METABOLITE QUANTIFICATION: NEGATIVE NG/ML
SL AMB METHYLONE QUANTIFICATION: NEGATIVE NG/ML
SL AMB N-DESMETHYL-TRAMADOL QUANTIFICATION: NORMAL NG/ML
SL AMB N-DESMETHYLCLOZAPINE QUANTIFICATION: NEGATIVE NG/ML
SL AMB NORQUETIAPINE QUANTIFICATION: NEGATIVE NG/ML
SL AMB PHENTERMINE QUANTIFICATION: NEGATIVE NG/ML
SL AMB QUETIAPINE QUANTIFICATION: NEGATIVE NG/ML
SL AMB RCS4 METABOLITE QUANTIFICATION: NEGATIVE NG/ML
SL AMB RISPERIDONE QUANTIFICATION: NEGATIVE NG/ML
SL AMB RITALINIC ACID QUANTIFICATION: NEGATIVE NG/ML
SPECIMEN DRAWN SERPL: NEGATIVE NG/ML
TAPENTADOL UR QL CFM: NEGATIVE NG/ML
TEMAZEPAM UR QL CFM: NEGATIVE NG/ML
TEMAZEPAM UR QL CFM: NEGATIVE NG/ML
TRAMADOL UR QL CFM: NORMAL NG/ML
URATE/CREAT 24H UR: NORMAL NG/ML

## 2022-08-19 PROCEDURE — G0439 PPPS, SUBSEQ VISIT: HCPCS | Performed by: INTERNAL MEDICINE

## 2022-08-19 PROCEDURE — 99214 OFFICE O/P EST MOD 30 MIN: CPT | Performed by: INTERNAL MEDICINE

## 2022-08-19 RX ORDER — PREGABALIN 25 MG/1
25 CAPSULE ORAL 3 TIMES DAILY
COMMUNITY
Start: 2022-07-07 | End: 2022-08-19

## 2022-08-19 NOTE — PATIENT INSTRUCTIONS
Medicare Preventive Visit Patient Instructions  Thank you for completing your Welcome to Medicare Visit or Medicare Annual Wellness Visit today  Your next wellness visit will be due in one year (8/20/2023)  The screening/preventive services that you may require over the next 5-10 years are detailed below  Some tests may not apply to you based off risk factors and/or age  Screening tests ordered at today's visit but not completed yet may show as past due  Also, please note that scanned in results may not display below  Preventive Screenings:  Service Recommendations Previous Testing/Comments   Colorectal Cancer Screening  · Colonoscopy    · Fecal Occult Blood Test (FOBT)/Fecal Immunochemical Test (FIT)  · Fecal DNA/Cologuard Test  · Flexible Sigmoidoscopy Age: 39-70 years old   Colonoscopy: every 10 years (May be performed more frequently if at higher risk)  OR  FOBT/FIT: every 1 year  OR  Cologuard: every 3 years  OR  Sigmoidoscopy: every 5 years  Screening may be recommended earlier than age 39 if at higher risk for colorectal cancer  Also, an individualized decision between you and your healthcare provider will decide whether screening between the ages of 74-80 would be appropriate   Colonoscopy: 02/04/2016  FOBT/FIT: Not on file  Cologuard: Not on file  Sigmoidoscopy: Not on file          Prostate Cancer Screening Individualized decision between patient and health care provider in men between ages of 53-78   Medicare will cover every 12 months beginning on the day after your 50th birthday PSA: No results in last 5 years           Hepatitis C Screening Once for adults born between [birth year] and 1965  More frequently in patients at high risk for Hepatitis C Hep C Antibody: Not on file        Diabetes Screening 1-2 times per year if you're at risk for diabetes or have pre-diabetes Fasting glucose: 112 mg/dL (8/26/2021)  A1C: 6 5 % (5/11/2022)      Cholesterol Screening Once every 5 years if you don't have a lipid disorder  May order more often based on risk factors  Lipid panel: 08/26/2021         Other Preventive Screenings Covered by Medicare:  1  Abdominal Aortic Aneurysm (AAA) Screening: covered once if your at risk  You're considered to be at risk if you have a family history of AAA or a male between the age of 73-68 who smoking at least 100 cigarettes in your lifetime  2  Lung Cancer Screening: covers low dose CT scan once per year if you meet all of the following conditions: (1) Age 50-69; (2) No signs or symptoms of lung cancer; (3) Current smoker or have quit smoking within the last 15 years; (4) You have a tobacco smoking history of at least 20 pack years (packs per day x number of years you smoked); (5) You get a written order from a healthcare provider  3  Glaucoma Screening: covered annually if you're considered high risk: (1) You have diabetes OR (2) Family history of glaucoma OR (3)  aged 48 and older OR (3)  American aged 72 and older  3  Osteoporosis Screening: covered every 2 years if you meet one of the following conditions: (1) Have a vertebral abnormality; (2) On glucocorticoid therapy for more than 3 months; (3) Have primary hyperparathyroidism; (4) On osteoporosis medications and need to assess response to drug therapy  5  HIV Screening: covered annually if you're between the age of 12-76  Also covered annually if you are younger than 13 and older than 72 with risk factors for HIV infection  For pregnant patients, it is covered up to 3 times per pregnancy      Immunizations:  Immunization Recommendations   Influenza Vaccine Annual influenza vaccination during flu season is recommended for all persons aged >= 6 months who do not have contraindications   Pneumococcal Vaccine   * Pneumococcal conjugate vaccine = PCV13 (Prevnar 13), PCV15 (Vaxneuvance), PCV20 (Prevnar 20)  * Pneumococcal polysaccharide vaccine = PPSV23 (Pneumovax) Adults 25-60 years old: 1-3 doses may be recommended based on certain risk factors  Adults 72 years old: 1-2 doses may be recommended based off what pneumonia vaccine you previously received   Hepatitis B Vaccine 3 dose series if at intermediate or high risk (ex: diabetes, end stage renal disease, liver disease)   Tetanus (Td) Vaccine - COST NOT COVERED BY MEDICARE PART B Following completion of primary series, a booster dose should be given every 10 years to maintain immunity against tetanus  Td may also be given as tetanus wound prophylaxis  Tdap Vaccine - COST NOT COVERED BY MEDICARE PART B Recommended at least once for all adults  For pregnant patients, recommended with each pregnancy  Shingles Vaccine (Shingrix) - COST NOT COVERED BY MEDICARE PART B  2 shot series recommended in those aged 48 and above     Health Maintenance Due:      Topic Date Due    Colorectal Cancer Screening  02/04/2021     Immunizations Due:      Topic Date Due    Pneumococcal Vaccine: 65+ Years (2 - PPSV23 or PCV20) 01/10/2018    COVID-19 Vaccine (4 - Booster for Pfizer series) 04/11/2022    Influenza Vaccine (1) 09/01/2022     Advance Directives   What are advance directives? Advance directives are legal documents that state your wishes and plans for medical care  These plans are made ahead of time in case you lose your ability to make decisions for yourself  Advance directives can apply to any medical decision, such as the treatments you want, and if you want to donate organs  What are the types of advance directives? There are many types of advance directives, and each state has rules about how to use them  You may choose a combination of any of the following:  · Living will: This is a written record of the treatment you want  You can also choose which treatments you do not want, which to limit, and which to stop at a certain time  This includes surgery, medicine, IV fluid, and tube feedings  · Durable power of  for healthcare [facility]):   This is a written record that states who you want to make healthcare choices for you when you are unable to make them for yourself  This person, called a proxy, is usually a family member or a friend  You may choose more than 1 proxy  · Do not resuscitate (DNR) order:  A DNR order is used in case your heart stops beating or you stop breathing  It is a request not to have certain forms of treatment, such as CPR  A DNR order may be included in other types of advance directives  · Medical directive: This covers the care that you want if you are in a coma, near death, or unable to make decisions for yourself  You can list the treatments you want for each condition  Treatment may include pain medicine, surgery, blood transfusions, dialysis, IV or tube feedings, and a ventilator (breathing machine)  · Values history: This document has questions about your views, beliefs, and how you feel and think about life  This information can help others choose the care that you would choose  Why are advance directives important? An advance directive helps you control your care  Although spoken wishes may be used, it is better to have your wishes written down  Spoken wishes can be misunderstood, or not followed  Treatments may be given even if you do not want them  An advance directive may make it easier for your family to make difficult choices about your care  Weight Management   Why it is important to manage your weight:  Being overweight increases your risk of health conditions such as heart disease, high blood pressure, type 2 diabetes, and certain types of cancer  It can also increase your risk for osteoarthritis, sleep apnea, and other respiratory problems  Aim for a slow, steady weight loss  Even a small amount of weight loss can lower your risk of health problems  How to lose weight safely:  A safe and healthy way to lose weight is to eat fewer calories and get regular exercise   You can lose up about 1 pound a week by decreasing the number of calories you eat by 500 calories each day  Healthy meal plan for weight management:  A healthy meal plan includes a variety of foods, contains fewer calories, and helps you stay healthy  A healthy meal plan includes the following:  · Eat whole-grain foods more often  A healthy meal plan should contain fiber  Fiber is the part of grains, fruits, and vegetables that is not broken down by your body  Whole-grain foods are healthy and provide extra fiber in your diet  Some examples of whole-grain foods are whole-wheat breads and pastas, oatmeal, brown rice, and bulgur  · Eat a variety of vegetables every day  Include dark, leafy greens such as spinach, kale, monroe greens, and mustard greens  Eat yellow and orange vegetables such as carrots, sweet potatoes, and winter squash  · Eat a variety of fruits every day  Choose fresh or canned fruit (canned in its own juice or light syrup) instead of juice  Fruit juice has very little or no fiber  · Eat low-fat dairy foods  Drink fat-free (skim) milk or 1% milk  Eat fat-free yogurt and low-fat cottage cheese  Try low-fat cheeses such as mozzarella and other reduced-fat cheeses  · Choose meat and other protein foods that are low in fat  Choose beans or other legumes such as split peas or lentils  Choose fish, skinless poultry (chicken or turkey), or lean cuts of red meat (beef or pork)  Before you cook meat or poultry, cut off any visible fat  · Use less fat and oil  Try baking foods instead of frying them  Add less fat, such as margarine, sour cream, regular salad dressing and mayonnaise to foods  Eat fewer high-fat foods  Some examples of high-fat foods include french fries, doughnuts, ice cream, and cakes  · Eat fewer sweets  Limit foods and drinks that are high in sugar  This includes candy, cookies, regular soda, and sweetened drinks  Exercise:  Exercise at least 30 minutes per day on most days of the week   Some examples of exercise include walking, biking, dancing, and swimming  You can also fit in more physical activity by taking the stairs instead of the elevator or parking farther away from stores  Ask your healthcare provider about the best exercise plan for you  © Copyright [employer] 2018 Information is for End User's use only and may not be sold, redistributed or otherwise used for commercial purposes   All illustrations and images included in CareNotes® are the copyrighted property of [employer]  or [address] [place] [address]

## 2022-08-19 NOTE — PROGRESS NOTES
Diabetic Foot Exam    Patient's shoes and socks removed  Right Foot/Ankle   Right Foot Inspection  Skin Exam: skin normal and skin intact  No dry skin, no warmth, no callus, no erythema, no maceration, no abnormal color, no pre-ulcer, no ulcer and no callus  Toe Exam: swelling  Sensory   Monofilament testing: intact    Vascular  Capillary refills: < 3 seconds  The right DP pulse is 2+  The right PT pulse is 2+  Left Foot/Ankle  Left Foot Inspection  Skin Exam: skin normal and skin intact  No dry skin, no warmth, no erythema, no maceration, normal color, no pre-ulcer, no ulcer and no callus  Toe Exam: swelling  Sensory   Monofilament testing: intact    Vascular  Capillary refills: < 3 seconds  The left DP pulse is 2+  The left PT pulse is 2+       Assign Risk Category  No deformity present  No loss of protective sensation  No weak pulses  Risk: 0

## 2022-08-19 NOTE — PROGRESS NOTES
Assessment and Plan:     Problem List Items Addressed This Visit        Endocrine    Type 2 diabetes, HbA1c goal < 7% ([facility]) - Primary    Relevant Orders    CBC and differential    Comprehensive metabolic panel    Hemoglobin A1C    Lipid Panel with Direct LDL reflex    Microalbumin / creatinine urine ratio    TSH, 3rd generation       Respiratory    Non-seasonal allergic rhinitis       Cardiovascular and Mediastinum    Essential hypertension    Venous insufficiency of both lower extremities       Nervous and Auditory    Lumbar radiculopathy       Musculoskeletal and Integument    Primary osteoarthritis of both knees       Genitourinary    Prostate cancer ([facility] )       Other    Mixed hyperlipidemia    Dysthymic disorder    Medicare annual wellness visit, subsequent    Depression, recurrent ([facility] )           Preventive health issues were discussed with patient, and age appropriate screening tests were ordered as noted in patient's After Visit Summary  Personalized health advice and appropriate referrals for health education or preventive services given if needed, as noted in patient's After Visit Summary  History of Present Illness:     Patient presents for a Medicare Wellness Visit    Follow-up on multiple medical problems to ensure the stable on current medication, also medical wellness exam     Patient Care Team:  Lizeth Marsh MD as PCP - General (Internal Medicine)  MD Kelby Paez DO (Pain Medicine)  MD Ramin Kowaslki CRNP as Nurse Practitioner (Pain Medicine)     Review of Systems:     Review of Systems   Constitutional: Negative for chills and fever  HENT: Negative for congestion, ear pain and sore throat  Eyes: Negative for pain  Respiratory: Positive for shortness of breath  Negative for cough  Cardiovascular: Negative for chest pain and leg swelling  Gastrointestinal: Negative for abdominal pain, nausea and vomiting     Endocrine: Negative for polyuria  Genitourinary: Negative for difficulty urinating, frequency and urgency  Musculoskeletal: Positive for arthralgias and back pain  Skin: Negative for rash  Neurological: Negative for weakness and headaches  Psychiatric/Behavioral: Negative for sleep disturbance  The patient is not nervous/anxious           Problem List:     Patient Active Problem List   Diagnosis    Paroxysmal atrial fibrillation (HCC)    Lumbar spinal stenosis    [facility]roiliitis ([facility])    Spondylosis of lumbar region without myelopathy or radiculopathy    Right bundle branch block (RBBB)    Edema of both lower legs due to peripheral venous insufficiency    Chronic pain of both lower extremities    Lumbar radiculopathy    Lumbar spondylosis    Chronic pain syndrome    Uncomplicated opioid dependence ([place] [address] )    Encounter for long-term use of opiate analgesic    Essential hypertension    Venous insufficiency    TIA (transient ischemic attack)    Prostate cancer ([place] [address] )    Intervertebral disc disorders with radiculopathy, lumbar region    Impaired fasting blood sugar    Hypertension    Mixed hyperlipidemia    A-fib ([facility])    Obstructive sleep apnea syndrome    CPAP (continuous positive airway pressure) dependence    Dysthymic disorder    Venous insufficiency of both lower extremities    Primary osteoarthritis of both knees    Medicare annual wellness visit, subsequent    Obesity, morbid ([place] [address] )    Non-seasonal allergic rhinitis    Abnormal gait    Cellulitis of right foot    Type 2 diabetes, HbA1c goal < 7% ([facility])    Depression, recurrent ([place] [address] )      Past Medical and Surgical History:     Past Medical History:   Diagnosis Date    A-fib ([place] [address] )     Anemia, unspecified     Anxiety     Arthritis     Basal cell carcinoma (BCC) of skin of nose     Cancer ([facility])     Chondrocostal junction syndrome     CPAP (continuous positive airway pressure) dependence     Depression     Dysthymic disorder     Edema, unspecified     Hyperlipidemia     Hypertension     Impaired fasting blood sugar     Intervertebral disc disorders with radiculopathy, lumbar region     Irregular heart beat     Pro[facility] cancer ([facility] )     s/p surgery    Sleep apnea     on CPAP    Spinal stenosis     St[facility]     TIA (transient ischemic attack)     no residual problems    Unspecified osteoarthritis, unspecified site     Venous insufficiency of both lower extremities      Past Surgical History:   Procedure Laterality Date    BASAL CELL CARCINOMA EXCISION      on the nose    CATARACT EXTRACTION      COLONOSCOPY      2011    CT EPIDURAL STEROID INJECTION (TIN LUMBAR)      FACIAL/NECK BIOPSY N/A 4/3/2017    Procedure: NOSE BCC EXCISION; FROZEN SECTION; RECONSTRUCTION ;  Surgeon: Sushil Ruth MD;  [facility];  Service:     FLAP LOCAL HEAD / NECK N/A 10/27/2020    Procedure: NOSE FLAP;  Surgeon: Sushil Ruth MD;  [facility];  Service: Plastics    FULL THICKNESS SKIN GRAFT N/A 4/3/2017    Procedure: FLAP VERSUS FULL THICKNESS SKIN GRAFT ;  Surgeon: Sushil Ruth MD;  [facility];  Service:     MOHS RECONSTRUCTION N/A 10/27/2020    Procedure: NOSE MOHS DEFECT RECONSTRUCTION;  Surgeon: Sushil Ruth MD;  [facility];  Service: Plastics    PROSTATECTOMY      TONSILLECTOMY AND ADENOIDECTOMY        Family History:     Family History   Problem Relation Age of Onset    Heart attack Father       Social History:     Social History     Socioeconomic History    Marital status: /Civil Union     Spouse name: None    Number of children: None    Years of education: None    Highest education level: None   Occupational History    None   Tobacco Use    Smoking status: Former Smoker     Types: Cigarettes     Quit date:      Years since quittin 6    Smokeless tobacco: Never Used    Tobacco comment: long time ago   Vaping Use    Vaping Use: Never used Substance and Sexual Activity    Alcohol use: Yes     Alcohol/week: 7 0 standard drinks     Types: 7 Glasses of wine per week     Comment: glass of wine with dinner, maybe    Drug use: No    Sexual activity: Not Currently   Other Topics Concern    None   Social History Narrative    None     Social Determinants of Health     Financial Resource Strain: Low Risk     Difficulty of Paying Living Expenses: Not hard at all   Food Insecurity: Not on file   Transportation Needs: No Transportation Needs    Lack of Transportation (Medical): No    Lack of Transportation (Non-Medical):  No   Physical Activity: Not on file   Stress: Not on file   Social Connections: Not on file   Intimate Partner Violence: Not on file   Housing Stability: Not on file      Medications and Allergies:     Current Outpatient Medications   Medication Sig Dispense Refill    Acetaminophen 325 MG CAPS Take by mouth as needed       amLODIPine (NORVASC) 10 mg tablet TAKE 1 TABLET DAILY 90 tablet 0    atorvastatin (LIPITOR) 40 mg tablet Take 1 tablet (40 mg total) by mouth daily at bedtime 90 tablet 1    Cholecalciferol (VITAMIN D-3 PO) Take 2,000 unit marking on U-100 syringe by mouth daily after dinner      co-enzyme Q-10 30 MG capsule Take 30 mg by mouth daily after dinner      glucosamine-chondroitin 500-400 MG tablet Take 2 tablets by mouth daily in the early morning      losartan (COZAAR) 100 MG tablet TAKE 1 TABLET (100 MG TOTAL) BY MOUTH DAILY 90 tablet 0    multivitamin (THERAGRAN) TABS Take 1 tablet by mouth daily in the early morning      Omega-3 Fatty Acids (FISH OIL) 1,000 mg Take 1,000 mg by mouth 2 (two) times a day      pregabalin (LYRICA) 50 mg capsule Take 1 capsule (50 mg total) by mouth 2 (two) times a day 60 capsule 5    sertraline (ZOLOFT) 50 mg tablet TAKE 1 TABLET (50 MG TOTAL) BY MOUTH DAILY 90 tablet 1    sotalol (BETAPACE) 80 mg tablet TAKE 1 TABLET TWICE DAILY 180 tablet 3    traMADol (ULTRAM) 50 mg tablet Take 1 tablet (50 mg total) by mouth every 8 (eight) hours as needed for moderate pain 90 tablet 1    warfarin (COUMADIN) 5 mg tablet TAKE 1/2 TO 1 TABLET DAILY OR AS DIRECTED 90 tablet 3    diclofenac sodium (VOLTAREN) 1 % Apply 2 g topically 4 (four) times a day (Patient not taking: Reported on 8/19/2022)      ipratropium (ATROVENT) 0 06 % nasal spray USE 2 SPRAYS INTO EACH NOSTRIL UP TO 3 TIMES DAILY AS NEEDED      montelukast (SINGULAIR) 10 mg tablet Take 1 tablet (10 mg total) by mouth daily at bedtime (Patient not taking: No sig reported) 30 tablet 1    torsemide (DEMADEX) 20 mg tablet TAKE 1 TABLET TWICE DAILY (Patient not taking: No sig reported) 180 tablet 3     No current facility-administered medications for this visit  No Known Allergies   Immunizations:     Immunization History   Administered Date(s) Administered    COVID-19 PFIZER VACCINE 0 3 ML IM 04/14/2021, 05/09/2021, 12/11/2021    INFLUENZA 11/11/2016, 10/22/2018    Influenza, high dose seasonal 0 7 mL 11/13/2020    Pneumococcal 11/02/2007    Pneumococcal Conjugate 13-Valent 01/10/2017    Pneumococcal Conjugate PCV 7 11/02/2007    Td (adult), Unspecified 03/23/1997    Td (adult), adsorbed 03/23/1997    Zoster 05/17/2013      Health Maintenance:         Topic Date Due    Colorectal Cancer Screening  02/04/2021         Topic Date Due    Pneumococcal Vaccine: 65+ Years (2 - PPSV23 or PCV20) 01/10/2018    COVID-19 Vaccine (4 - Booster for Pfizer series) 04/11/2022    Influenza Vaccine (1) 09/01/2022      Medicare Screening Tests and Risk Assessments:     Carla Mcdonald is here for his Subsequent Wellness visit  Last Medicare Wellness visit information reviewed, patient interviewed and updates made to the record today  Health Risk Assessment:   Patient rates overall health as fair  Patient feels that their physical health rating is slightly worse  Patient is satisfied with their life  Eyesight was rated as same   Hearing was rated as same  Patient feels that their emotional and mental health rating is much better  Patients states they are never, rarely angry  Patient states they are often unusually tired/fatigued  Pain experienced in the last 7 days has been a lot  Patient's pain rating has been 8/10  Patient states that he has experienced weight loss or gain in last 6 months  Fall Risk Screening: In the past year, patient has experienced: history of falling in past year    Number of falls: 2 or more  Injured during fall?: No    Feels unsteady when standing or walking?: Yes    Worried about falling?: Yes      Home Safety:  Patient has trouble with stairs inside or outside of their home  Patient has working smoke alarms and has working carbon monoxide detector  Home safety hazards include: none  Nutrition:   Current diet is Regular  Medications:   Patient is currently taking over-the-counter supplements  OTC medications include: see medication list  Patient is able to manage medications  Activities of Daily Living (ADLs)/Instrumental Activities of Daily Living (IADLs):   Walk and transfer into and out of bed and chair?: Yes  Dress and groom yourself?: Yes    Bathe or shower yourself?: Yes    Feed yourself?  Yes  Do your laundry/housekeeping?: No  Manage your money, pay your bills and track your expenses?: Yes  Make your own meals?: No    Do your own shopping?: No    Previous Hospitalizations:   Any hospitalizations or ED visits within the last 12 months?: Yes    How many hospitalizations have you had in the last year?: 1-2    Advance Care Planning:   Living will: Yes    Advanced directive: Yes      PREVENTIVE SCREENINGS      Cardiovascular Screening:    General: Screening Not Indicated and History Lipid Disorder      Diabetes Screening:     General: Screening Not Indicated and History Diabetes      Prostate Cancer Screening:    General: History Prostate Cancer and Screening Not Indicated      Abdominal Aortic Aneurysm (AAA) Screening:    Risk factors include: tobacco use        Lung Cancer Screening:     General: Screening Not Indicated    Screening, Brief Intervention, and Referral to Treatment (SBIRT)    Screening  Typical number of drinks in a day: 2  Typical number of drinks in a week: 8  Interpretation: Low risk drinking behavior  Review of Current Opioid Use    Opioid Risk Tool (ORT) Interpretation: Complete Opioid Risk Tool (ORT)    No exam data present     Physical Exam:     /76 (BP Location: Left arm, Patient Position: Sitting, Cuff Size: Large)   Pulse 64   Temp 99 °F (37 2 °C) (Temporal)   Ht 5' 7" (1 702 m)   Wt 109 kg (240 lb)   SpO2 97% Comment: RA  BMI 37 59 kg/m²     Physical Exam  Vitals and nursing note reviewed  Constitutional:       Appearance: He is well-developed  HENT:      Head: Normocephalic and atraumatic  Right Ear: Tympanic membrane, ear canal and external ear normal       Left Ear: Tympanic membrane, ear canal and external ear normal       Mouth/Throat:      Pharynx: Oropharynx is clear  Eyes:      Extraocular Movements: Extraocular movements intact  Conjunctiva/sclera: Conjunctivae normal    Cardiovascular:      Rate and Rhythm: Normal rate and regular rhythm  Heart sounds: Normal heart sounds  No murmur heard  Pulmonary:      Effort: Pulmonary effort is normal  No respiratory distress  Breath sounds: Normal breath sounds  Abdominal:      General: Abdomen is flat  Palpations: Abdomen is soft  Tenderness: There is no abdominal tenderness  Musculoskeletal:         General: Normal range of motion  Cervical back: Normal range of motion and neck supple  Right lower leg: Edema present  Left lower leg: Edema present  Skin:     General: Skin is warm and dry  Neurological:      General: No focal deficit present  Mental Status: He is alert and oriented to person, place, and time            Carrol Apgar, MD

## 2022-08-30 ENCOUNTER — APPOINTMENT (OUTPATIENT)
Dept: LAB | Facility: CLINIC | Age: 82
End: 2022-08-30
Payer: MEDICARE

## 2022-08-30 ENCOUNTER — ANTICOAG VISIT (OUTPATIENT)
Dept: CARDIOLOGY CLINIC | Facility: CLINIC | Age: 82
End: 2022-08-30

## 2022-08-30 DIAGNOSIS — I48.0 PAROXYSMAL ATRIAL FIBRILLATION (HCC): Primary | ICD-10-CM

## 2022-09-06 ENCOUNTER — OFFICE VISIT (OUTPATIENT)
Dept: CARDIAC SURGERY | Facility: CLINIC | Age: 82
End: 2022-09-06
Payer: MEDICARE

## 2022-09-06 VITALS
HEIGHT: 67 IN | DIASTOLIC BLOOD PRESSURE: 80 MMHG | WEIGHT: 237 LBS | HEART RATE: 64 BPM | BODY MASS INDEX: 37.2 KG/M2 | SYSTOLIC BLOOD PRESSURE: 162 MMHG

## 2022-09-06 DIAGNOSIS — I48.0 PAROXYSMAL ATRIAL FIBRILLATION (HCC): Primary | ICD-10-CM

## 2022-09-06 DIAGNOSIS — I87.2 VENOUS INSUFFICIENCY: ICD-10-CM

## 2022-09-06 DIAGNOSIS — I45.10 RIGHT BUNDLE BRANCH BLOCK (RBBB): ICD-10-CM

## 2022-09-06 DIAGNOSIS — I10 PRIMARY HYPERTENSION: ICD-10-CM

## 2022-09-06 DIAGNOSIS — I10 ESSENTIAL HYPERTENSION: ICD-10-CM

## 2022-09-06 PROCEDURE — 93000 ELECTROCARDIOGRAM COMPLETE: CPT | Performed by: INTERNAL MEDICINE

## 2022-09-06 PROCEDURE — 99214 OFFICE O/P EST MOD 30 MIN: CPT | Performed by: INTERNAL MEDICINE

## 2022-09-06 NOTE — PROGRESS NOTES
Cardiology Follow Up Visit    Enrique Inman  1940  [id number]  Pending sale to [facility] [id number]  [phone]  [phone]    1  Paroxysmal atrial fibrillation (HCC)     2  Venous insufficiency     3  Essential hypertension     4  Primary hypertension     5  Right bundle branch block (RBBB)           Discussion/Summary:    1  History of paroxysmal atrial fibrillation, on sotalol and warfarin, normal LVEF     2  Chronic venous insufficiency edema non compliant with diuretic or compression stockings     3  Essential hypertension, stable     4  Right bundle-branch block, ECG unchanged     5  Hyperlipidemia, on statin      plan  Patient on a take his torsemide or compression stockings  He lives torsemide due to need for frequent urination he has ambulatory dysfunction  Will not use compression stockings as he can not get them on  He also feels somewhat depressed on occasion  No suicidal ideations  Refer to Geriatrics  Continue otherwise listed regimen  Low-salt sensible diet    Interval History:      22-year-old male here for routine follow-up     Patient with history of paroxysmal atrial fibrillation and has been on sotalol and warfarin for many years  Right bundle-branch block with no further conduction disease    Hyperlipidemia on statin with controlled LDL     Essential hypertension intermittently occasional elevation BP  Overall controlled     Lower extremity edema, venous insufficiency will not use diuretics or compression stockings  Will not use diuretics due to need to have frequent urination  He has ambulatory dysfunction as well  Recent echocardiogram with preserved LV function, age related relaxation,  Type 1 diastolic dysfunction, no significant valvular disease    ECG with right bundle-branch block today  QT interval stable          Patient Active Problem List   Diagnosis    Paroxysmal atrial fibrillation [facility])    Lumbar spinal stenosis    Sacroiliitis (HCC)    Spondylosis of lumbar region without myelopathy or radiculopathy    Right bundle branch block (RBBB)    Edema of both lower legs due to peripheral venous insufficiency    Chronic pain of both lower extremities    Lumbar radiculopathy    Lumbar spondylosis    Chronic pain syndrome    Uncomplicated opioid dependence ([facility] )    Encounter for long-term use of opiate analgesic    Essential hypertension    Venous insufficiency    TIA (transient ischemic attack)    [facility]    Intervertebral disc disorders with radiculopathy, lumbar region    Impaired fasting blood sugar    Hypertension    Mixed hyperlipidemia    Obstructive sleep apnea syndrome    CPAP (continuous positive airway pressure) dependence    Dysthymic disorder    Venous insufficiency of both lower extremities    Primary osteoarthritis of both knees    Medicare annual wellness visit, subsequent    Obesity, morbid ([facility] )    Non-seasonal allergic rhinitis    Abnormal gait    Cellulitis of right foot    Type 2 diabetes, HbA1c goal < 7% (HCC)    Depression, recurrent (HCC)     Past Medical History:   Diagnosis Date    A-fib (HCC)     Anemia, unspecified     Anxiety     Arthritis     Basal cell carcinoma (BCC) of skin of nose     Cancer (HCC)     Chondrocostal junction syndrome     CPAP (continuous positive airway pressure) dependence     Depression     Dysthymic disorder     Edema, unspecified     Hyperlipidemia     Hypertension     Impaired fasting blood sugar     Intervertebral disc disorders with radiculopathy, lumbar region     Irregular heart beat     Prostate cancer ([facility] )     s/p surgery    Sleep apnea     on CPAP    Spinal stenosis     Stroke ([facility] )     TIA (transient ischemic attack)     no residual problems    Unspecified osteoarthritis, unspecified site     Venous insufficiency of both lower extremities      Social History Socioeconomic History    Marital status: /Civil Union     Spouse name: Not on file    Number of children: Not on file    Years of education: Not on file    Highest education level: Not on file   Occupational History    Not on file   Tobacco Use    Smoking status: Former Smoker     Types: Cigarettes     Quit date:      Years since quittin 7    Smokeless tobacco: Never Used    Tobacco comment: long time ago   Vaping Use    Vaping Use: Never used   Substance and Sexual Activity    Alcohol use: Yes     Alcohol/week: 7 0 standard drinks     Types: 7 Glasses of wine per week     Comment: glass of wine with dinner, maybe    Drug use: No    Sexual activity: Not Currently   Other Topics Concern    Not on file   Social History Narrative    Not on file     Social Determinants of Health     Financial Resource Strain: Low Risk     Difficulty of Paying Living Expenses: Not hard at all   Food Insecurity: Not on file   Transportation Needs: No Transportation Needs    Lack of Transportation (Medical): No    Lack of Transportation (Non-Medical):  No   Physical Activity: Not on file   Stress: Not on file   Social Connections: Not on file   Intimate Partner Violence: Not on file   Housing Stability: Not on file      Family History   Problem Relation Age of Onset    Heart attack Father      Past Surgical History:   Procedure Laterality Date    BASAL CELL CARCINOMA EXCISION      on the nose    CATARACT EXTRACTION      COLONOSCOPY      2011    CT EPIDURAL STEROID INJECTION (TIN LUMBAR)      FACIAL/NECK BIOPSY N/A 4/3/2017    Procedure: NOSE BCC EXCISION; FROZEN SECTION; RECONSTRUCTION ;  Surgeon: Tita Blum MD;  [facility];  Service:     FLAP LOCAL HEAD / NECK N/A 10/27/2020    Procedure: NOSE FLAP;  Surgeon: Tita Blum MD;  [facility];  Service: Plastics    FULL THICKNESS SKIN GRAFT N/A 4/3/2017    Procedure: FLAP VERSUS FULL THICKNESS SKIN GRAFT ; Surgeon: Aline Kulkarni MD;  [facility];  Service:     MOHS RECONSTRUCTION N/A 10/27/2020    Procedure: NOSE MOHS DEFECT RECONSTRUCTION;  Surgeon: Aline Kulkarni MD;  [facility];  Service: Plastics    PROSTATECTOMY      TONSILLECTOMY AND ADENOIDECTOMY         Current Outpatient Medications:     Acetaminophen 325 MG CAPS, Take by mouth as needed , Disp: , Rfl:     amLODIPine (NORVASC) 10 mg tablet, TAKE 1 TABLET DAILY, Disp: 90 tablet, Rfl: 0    atorvastatin (LIPITOR) 40 mg tablet, Take 1 tablet (40 mg total) by mouth daily at bedtime, Disp: 90 tablet, Rfl: 1    Cholecalciferol (VITAMIN D-3 PO), Take 2,000 unit marking on U-100 syringe by mouth daily after dinner, Disp: , Rfl:     co-enzyme Q-10 30 MG capsule, Take 30 mg by mouth daily after dinner, Disp: , Rfl:     diclofenac sodium (VOLTAREN) 1 %, Apply 2 g topically 4 (four) times a day (Patient not taking: Reported on 8/19/2022), Disp: , Rfl:     glucosamine-chondroitin 500-400 MG tablet, Take 2 tablets by mouth daily in the early morning, Disp: , Rfl:     ipratropium (ATROVENT) 0 06 % nasal spray, USE 2 SPRAYS INTO EACH NOSTRIL UP TO 3 TIMES DAILY AS NEEDED, Disp: , Rfl:     losartan (COZAAR) 100 MG tablet, TAKE 1 TABLET (100 MG TOTAL) BY MOUTH DAILY, Disp: 90 tablet, Rfl: 0    montelukast (SINGULAIR) 10 mg tablet, Take 1 tablet (10 mg total) by mouth daily at bedtime (Patient not taking: No sig reported), Disp: 30 tablet, Rfl: 1    multivitamin (THERAGRAN) TABS, Take 1 tablet by mouth daily in the early morning, Disp: , Rfl:     Omega-3 Fatty Acids (FISH OIL) 1,000 mg, Take 1,000 mg by mouth 2 (two) times a day, Disp: , Rfl:     pregabalin (LYRICA) 50 mg capsule, Take 1 capsule (50 mg total) by mouth 2 (two) times a day, Disp: 60 capsule, Rfl: 5    sertraline (ZOLOFT) 50 mg tablet, TAKE 1 TABLET (50 MG TOTAL) BY MOUTH DAILY, Disp: 90 tablet, Rfl: 1    sotalol (BETAPACE) 80 mg tablet, TAKE 1 TABLET TWICE DAILY, Disp: 180 tablet, Rfl: 3    torsemide (DEMADEX) 20 mg tablet, TAKE 1 TABLET TWICE DAILY (Patient not taking: No sig reported), Disp: 180 tablet, Rfl: 3    traMADol (ULTRAM) 50 mg tablet, Take 1 tablet (50 mg total) by mouth every 8 (eight) hours as needed for moderate pain, Disp: 90 tablet, Rfl: 1    warfarin (COUMADIN) 5 mg tablet, TAKE 1/2 TO 1 TABLET DAILY OR AS DIRECTED, Disp: 90 tablet, Rfl: 3  No Known Allergies      Social, Family, Medication history reviewed and updated as necessary      Labs:     Lab Results   Component Value Date    K 4 1 11/23/2021     (H) 11/23/2021    CO2 25 11/23/2021    BUN 17 11/23/2021    CREATININE 0 98 11/23/2021    CREATININE 0 78 08/26/2021    CALCIUM 9 2 11/23/2021       Lab Results   Component Value Date    WBC 8 67 08/26/2021    HGB 15 4 08/26/2021    HGB 15 5 09/15/2020    HCT 46 8 08/26/2021    HCT 46 2 09/15/2020     08/26/2021     09/15/2020       No results found for: CHOL  Lab Results   Component Value Date    HDL 43 08/26/2021     Lab Results   Component Value Date    LDLCALC 74 08/26/2021     No results found for: LDLDIRECT          Lab Results   Component Value Date    TRIG 153 (H) 08/26/2021       Lab Results   Component Value Date    ALT 34 11/23/2021    ALT 34 08/26/2021    AST 21 11/23/2021    AST 21 08/26/2021       Lab Results   Component Value Date    INR 2 68 (H) 08/30/2022    INR 2 37 (H) 08/02/2022       No results found for: NTBNP    Lab Results   Component Value Date    HGBA1C 6 5 (H) 05/11/2022    HGBA1C 6 5 05/11/2022    HGBA1C 6 1 (H) 08/26/2021           Imaging: Reviewed in epic        Review of Systems:  14 systems reviewed and negative with exception of the above        PHYSICAL EXAM:      There were no vitals filed for this visit  There is no height or weight on file to calculate BMI     Weight (last 2 days)     None            Gen: No acute distress  HEENT: anicteric, mucous membranes moist  Neck: supple, no jugular venous distention, or carotid bruit  Heart: regular, normal s1 and s2, no murmur/rub or gallop  Lungs :clear to auscultation bilaterally, no rales/rhonchi or wheeze  Abdomen: soft nontender, normoactive bowel sounds, no organomegaly  Ext: warm and perfused, normal femoral pulses, no edema, or clubbing  Skin: warm, no rashes  Neuro: AAO x 3, no focal findings  Psychiatric: normal affect  Musculoskeletal: no obvious joint deformities  This note was completed in part utilizing [employer] direct voice recognition software  Grammatical errors, random word insertion, spelling mistakes, and incomplete sentences may be an occasional consequence of the system secondary to software limitations, ambient noise and hardware issues  At the time of dictation, efforts were made to edit, clarify and /or correct errors  Please read the chart carefully and recognize, using context, where substitutions have occurred  If you have any questions or concerns about the context, text or information contained within the body of this dictation, please contact myself, the provider, for further clarification

## 2022-09-28 ENCOUNTER — APPOINTMENT (OUTPATIENT)
Dept: LAB | Facility: CLINIC | Age: 82
End: 2022-09-28
Payer: MEDICARE

## 2022-09-28 ENCOUNTER — ANTICOAG VISIT (OUTPATIENT)
Dept: CARDIOLOGY CLINIC | Facility: CLINIC | Age: 82
End: 2022-09-28

## 2022-09-28 DIAGNOSIS — I48.0 PAROXYSMAL ATRIAL FIBRILLATION (HCC): Primary | ICD-10-CM

## 2022-10-06 DIAGNOSIS — F34.1 DYSTHYMIC DISORDER: ICD-10-CM

## 2022-10-12 PROBLEM — Z00.00 MEDICARE ANNUAL WELLNESS VISIT, SUBSEQUENT: Status: RESOLVED | Noted: 2021-03-26 | Resolved: 2022-10-12

## 2022-10-17 DIAGNOSIS — I10 ESSENTIAL HYPERTENSION: ICD-10-CM

## 2022-10-17 RX ORDER — LOSARTAN POTASSIUM 100 MG/1
100 TABLET ORAL DAILY
Qty: 90 TABLET | Refills: 2 | Status: SHIPPED | OUTPATIENT
Start: 2022-10-17 | End: 2023-10-17

## 2022-10-26 ENCOUNTER — APPOINTMENT (OUTPATIENT)
Dept: LAB | Facility: CLINIC | Age: 82
End: 2022-10-26

## 2022-10-26 ENCOUNTER — ANTICOAG VISIT (OUTPATIENT)
Dept: CARDIOLOGY CLINIC | Facility: CLINIC | Age: 82
End: 2022-10-26

## 2022-10-26 DIAGNOSIS — I48.0 PAROXYSMAL ATRIAL FIBRILLATION (HCC): Primary | ICD-10-CM

## 2022-11-04 DIAGNOSIS — I48.91 ATRIAL FIBRILLATION, UNSPECIFIED TYPE (HCC): ICD-10-CM

## 2022-11-04 RX ORDER — WARFARIN SODIUM 5 MG/1
TABLET ORAL
Qty: 90 TABLET | Refills: 3 | Status: SHIPPED | OUTPATIENT
Start: 2022-11-04

## 2022-11-09 LAB
CREAT ?TM UR-SCNC: 140 UMOL/L
EXT MICROALBUMIN URINE RANDOM: 3.4
HBA1C MFR BLD HPLC: 6.6 %
MICROALBUMIN/CREAT UR: 24.3 MG/G{CREAT}

## 2022-11-11 ENCOUNTER — RA CDI HCC (OUTPATIENT)
Dept: OTHER | Facility: HOSPITAL | Age: 82
End: 2022-11-11

## 2022-11-16 ENCOUNTER — TELEPHONE (OUTPATIENT)
Dept: SLEEP CENTER | Facility: CLINIC | Age: 82
End: 2022-11-16

## 2022-11-16 NOTE — TELEPHONE ENCOUNTER
Patient left message stating [facility] told him he needs a new CPAP machine  Asking if the doctor can write a script  Last seen in office 9/15/21  Called patient and advised since it has been over a year since he was seen in the office, he will need a follow up visit in order for a script to be written  Scheduled follow up 11/23/22

## 2022-11-17 DIAGNOSIS — I10 ESSENTIAL (PRIMARY) HYPERTENSION: ICD-10-CM

## 2022-11-17 RX ORDER — AMLODIPINE BESYLATE 10 MG/1
10 TABLET ORAL DAILY
Qty: 90 TABLET | Refills: 3 | Status: SHIPPED | OUTPATIENT
Start: 2022-11-17

## 2022-11-18 ENCOUNTER — TELEPHONE (OUTPATIENT)
Dept: INTERNAL MEDICINE CLINIC | Facility: CLINIC | Age: 82
End: 2022-11-18

## 2022-11-18 ENCOUNTER — OFFICE VISIT (OUTPATIENT)
Dept: INTERNAL MEDICINE CLINIC | Facility: CLINIC | Age: 82
End: 2022-11-18

## 2022-11-18 VITALS
BODY MASS INDEX: 37.2 KG/M2 | HEIGHT: 67 IN | TEMPERATURE: 98.6 F | DIASTOLIC BLOOD PRESSURE: 70 MMHG | HEART RATE: 64 BPM | SYSTOLIC BLOOD PRESSURE: 136 MMHG | OXYGEN SATURATION: 98 % | WEIGHT: 237 LBS

## 2022-11-18 DIAGNOSIS — E11.9 TYPE 2 DIABETES, HBA1C GOAL < 7% (HCC): Primary | ICD-10-CM

## 2022-11-18 DIAGNOSIS — G47.33 OBSTRUCTIVE SLEEP APNEA SYNDROME: ICD-10-CM

## 2022-11-18 DIAGNOSIS — M54.16 LUMBAR RADICULOPATHY: ICD-10-CM

## 2022-11-18 DIAGNOSIS — C61 PROSTATE CANCER (HCC): ICD-10-CM

## 2022-11-18 DIAGNOSIS — I87.2 VENOUS INSUFFICIENCY OF BOTH LOWER EXTREMITIES: ICD-10-CM

## 2022-11-18 DIAGNOSIS — I10 ESSENTIAL HYPERTENSION: ICD-10-CM

## 2022-11-18 DIAGNOSIS — Z99.89 CPAP (CONTINUOUS POSITIVE AIRWAY PRESSURE) DEPENDENCE: ICD-10-CM

## 2022-11-18 DIAGNOSIS — Z23 NEEDS FLU SHOT: ICD-10-CM

## 2022-11-18 DIAGNOSIS — I48.0 PAROXYSMAL ATRIAL FIBRILLATION (HCC): ICD-10-CM

## 2022-11-18 DIAGNOSIS — E78.2 MIXED HYPERLIPIDEMIA: ICD-10-CM

## 2022-11-18 DIAGNOSIS — M17.0 PRIMARY OSTEOARTHRITIS OF BOTH KNEES: ICD-10-CM

## 2022-11-18 DIAGNOSIS — Z23 NEED FOR PROPHYLACTIC VACCINATION AND INOCULATION AGAINST VARICELLA: ICD-10-CM

## 2022-11-18 DIAGNOSIS — F34.1 DYSTHYMIC DISORDER: ICD-10-CM

## 2022-11-18 DIAGNOSIS — R26.9 ABNORMAL GAIT: ICD-10-CM

## 2022-11-18 RX ORDER — ZOSTER VACCINE RECOMBINANT, ADJUVANTED 50 MCG/0.5
0.5 KIT INTRAMUSCULAR ONCE
Qty: 1 EACH | Refills: 1 | Status: SHIPPED | OUTPATIENT
Start: 2022-11-18 | End: 2022-11-18

## 2022-11-18 RX ORDER — ATORVASTATIN CALCIUM 40 MG/1
40 TABLET, FILM COATED ORAL DAILY
COMMUNITY

## 2022-11-18 NOTE — PROGRESS NOTES
Assessment/Plan:             1  Type 2 diabetes, HbA1c goal < 7% ([facility])  Comments:  diet controlled    2  Mixed hyperlipidemia    3  Lumbar radiculopathy    4  Needs flu shot  -     influenza vaccine, high-dose, PF 0 7 mL (FLUZONE HIGH-DOSE)    5  Venous insufficiency of both lower extremities    6  Primary osteoarthritis of both knees    7  Essential hypertension    8  Dysthymic disorder    9  Prostate cancer ([facility] )    10  Paroxysmal atrial fibrillation (HCC)    11  Obstructive sleep apnea syndrome    12  CPAP (continuous positive airway pressure) dependence    13  Abnormal gait    14  Need for prophylactic vaccination and inoculation against varicella  -     Zoster Vac Recomb Adjuvanted (Shingrix) 50 MCG/0 5ML SUSR; Inject 0 5 mL into a muscle once for 1 dose Repeat dose in 2 to 6 months           Subjective:      Patient ID: Syd Kenyon is a [de-identified] y o  male  Follow-up on blood test done on 11/09/2022 test discussed with him      The following portions of the patient's history were reviewed and updated as appropriate: He  has a past medical history of A-fib ([facility] ), Anemia, unspecified, Anxiety, Arthritis, Basal cell carcinoma (BCC) of skin of nose, Cancer ([facility]), Chondrocostal junction syndrome, CPAP (continuous positive airway pressure) dependence, Depression, Dysthymic disorder, Edema, unspecified, Hyperlipidemia, Hypertension, Impaired fasting blood sugar, Intervertebral disc disorders with radiculopathy, lumbar region, Irregular heart beat, Prostate cancer ([facility] ), Sleep apnea, Spinal stenosis, Stroke ([facility] ), TIA (transient ischemic attack), Unspecified osteoarthritis, unspecified site, and Venous insufficiency of both lower extremities    He   Patient Active Problem List    Diagnosis Date Noted   • Depression, recurrent ([facility] ) 08/19/2022   • Type 2 diabetes, HbA1c goal < 7% ([facility] ) 05/25/2022   • Cellulitis of right foot 12/17/2021   • Abnormal gait 08/04/2021   • Obesity, morbid ([facility] ) 03/26/2021   • Non-seasonal allergic rhinitis 03/26/2021   • Primary osteoarthritis of both knees 11/13/2020   • TIA (transient ischemic attack)    • Prostate cancer ([facility] )    • Intervertebral disc disorders with radiculopathy, lumbar region    • Impaired fasting blood sugar    • Hypertension    • Mixed hyperlipidemia    • Obstructive sleep apnea syndrome    • CPAP (continuous positive airway pressure) dependence    • Dysthymic disorder    • Venous insufficiency of both lower extremities    • Essential hypertension 11/20/2019   • Venous insufficiency 08/56/5296   • Uncomplicated opioid dependence ([place] [address] ) 06/05/2019   • Encounter for long-term use of opiate analgesic 06/05/2019   • Chronic pain syndrome 09/17/2018   • Lumbar radiculopathy 04/16/2018   • Lumbar spondylosis 04/16/2018   • Right bundle branch block (RBBB) 03/19/2018   • Edema of both lower legs due to peripheral venous insufficiency 03/19/2018   • Chronic pain of both lower extremities 03/19/2018   • Paroxysmal atrial fibrillation ([place] [address] ) 01/20/2018   • Sacroiliitis ([place] [address] ) 04/17/2017   • Spondylosis of lumbar region without myelopathy or radiculopathy 04/17/2017   • Lumbar spinal stenosis 05/12/2015     He  has a past surgical history that includes Prostatectomy; Tonsillectomy and adenoidectomy; FACIAL/NECK BIOPSY (N/A, 4/3/2017); FULL THICKNESS SKIN GRAFT (N/A, 4/3/2017); Cataract extraction; CT epidural steroid injection (TIN lumbar); MOHS RECONSTRUCTION (N/A, 10/27/2020); FLAP LOCAL HEAD / NECK (N/A, 10/27/2020); Colonoscopy; and Excision basal cell carcinoma  His family history includes Heart attack in his father  He  reports that he quit smoking about 42 years ago  His smoking use included cigarettes  He has never used smokeless tobacco  He reports current alcohol use of about 7 0 standard drinks per week  He reports that he does not use drugs    Current Outpatient Medications   Medication Sig Dispense Refill   • Acetaminophen 325 MG CAPS Take by mouth as needed      • amLODIPine (NORVASC) 10 mg tablet Take 1 tablet (10 mg total) by mouth daily 90 tablet 3   • Cholecalciferol (VITAMIN D-3 PO) Take 2,000 unit marking on U-100 syringe by mouth daily after dinner     • co-enzyme Q-10 30 MG capsule Take 30 mg by mouth daily after dinner     • glucosamine-chondroitin 500-400 MG tablet Take 2 tablets by mouth daily in the early morning     • losartan (COZAAR) 100 MG tablet Take 1 tablet (100 mg total) by mouth daily 90 tablet 2   • multivitamin (THERAGRAN) TABS Take 1 tablet by mouth daily in the early morning     • Omega-3 Fatty Acids (FISH OIL) 1,000 mg Take 1,000 mg by mouth 2 (two) times a day     • pregabalin (LYRICA) 50 mg capsule Take 1 capsule (50 mg total) by mouth 2 (two) times a day 60 capsule 5   • sertraline (ZOLOFT) 50 mg tablet Take 1 tablet (50 mg total) by mouth daily 90 tablet 1   • sotalol (BETAPACE) 80 mg tablet TAKE 1 TABLET TWICE DAILY 180 tablet 3   • traMADol (ULTRAM) 50 mg tablet Take 1 tablet (50 mg total) by mouth every 8 (eight) hours as needed for moderate pain 90 tablet 1   • warfarin (COUMADIN) 5 mg tablet TAKE 1/2 TO 1 TABLET DAILY OR AS DIRECTED 90 tablet 3   • Zoster Vac Recomb Adjuvanted (Shingrix) 50 MCG/0 5ML SUSR Inject 0 5 mL into a muscle once for 1 dose Repeat dose in 2 to 6 months 1 each 1   • diclofenac sodium (VOLTAREN) 1 % Apply 2 g topically 4 (four) times a day (Patient not taking: Reported on 8/19/2022)     • montelukast (SINGULAIR) 10 mg tablet Take 1 tablet (10 mg total) by mouth daily at bedtime (Patient not taking: Reported on 8/19/2022) 30 tablet 1   • torsemide (DEMADEX) 20 mg tablet TAKE 1 TABLET TWICE DAILY (Patient not taking: Reported on 8/19/2022) 180 tablet 3     No current facility-administered medications for this visit       Current Outpatient Medications on File Prior to Visit   Medication Sig   • Acetaminophen 325 MG CAPS Take by mouth as needed    • amLODIPine (NORVASC) 10 mg tablet Take 1 tablet (10 mg total) by mouth daily   • Cholecalciferol (VITAMIN D-3 PO) Take 2,000 unit marking on U-100 syringe by mouth daily after dinner   • co-enzyme Q-10 30 MG capsule Take 30 mg by mouth daily after dinner   • glucosamine-chondroitin 500-400 MG tablet Take 2 tablets by mouth daily in the early morning   • losartan (COZAAR) 100 MG tablet Take 1 tablet (100 mg total) by mouth daily   • multivitamin (THERAGRAN) TABS Take 1 tablet by mouth daily in the early morning   • Omega-3 Fatty Acids (FISH OIL) 1,000 mg Take 1,000 mg by mouth 2 (two) times a day   • pregabalin (LYRICA) 50 mg capsule Take 1 capsule (50 mg total) by mouth 2 (two) times a day   • sertraline (ZOLOFT) 50 mg tablet Take 1 tablet (50 mg total) by mouth daily   • sotalol (BETAPACE) 80 mg tablet TAKE 1 TABLET TWICE DAILY   • traMADol (ULTRAM) 50 mg tablet Take 1 tablet (50 mg total) by mouth every 8 (eight) hours as needed for moderate pain   • warfarin (COUMADIN) 5 mg tablet TAKE 1/2 TO 1 TABLET DAILY OR AS DIRECTED   • diclofenac sodium (VOLTAREN) 1 % Apply 2 g topically 4 (four) times a day (Patient not taking: Reported on 8/19/2022)   • montelukast (SINGULAIR) 10 mg tablet Take 1 tablet (10 mg total) by mouth daily at bedtime (Patient not taking: Reported on 8/19/2022)   • torsemide (DEMADEX) 20 mg tablet TAKE 1 TABLET TWICE DAILY (Patient not taking: Reported on 8/19/2022)   • [DISCONTINUED] atorvastatin (LIPITOR) 40 mg tablet Take 1 tablet (40 mg total) by mouth daily at bedtime   • [DISCONTINUED] ipratropium (ATROVENT) 0 06 % nasal spray USE 2 SPRAYS INTO EACH NOSTRIL UP TO 3 TIMES DAILY AS NEEDED     No current facility-administered medications on file prior to visit  He has No Known Allergies       Review of Systems   Constitutional: Negative for chills and fever  HENT: Negative for congestion, ear pain and sore throat  Eyes: Negative for pain  Respiratory: Negative for cough and shortness of breath  Cardiovascular: Negative for chest pain and leg swelling  Gastrointestinal: Negative for abdominal pain, nausea and vomiting  Endocrine: Negative for polyuria  Genitourinary: Negative for difficulty urinating, frequency and urgency  Musculoskeletal: Positive for arthralgias and back pain  Skin: Negative for rash  Neurological: Negative for weakness and headaches  Psychiatric/Behavioral: Negative for sleep disturbance  The patient is not nervous/anxious  Objective:      /70 (BP Location: Left arm, Patient Position: Sitting, Cuff Size: Standard)   Pulse 64   Temp 98 6 °F (37 °C)   Ht 5' 7" (1 702 m)   Wt 108 kg (237 lb)   SpO2 98%   BMI 37 12 kg/m²     Recent Results (from the past 1344 hour(s))   Protime-INR    Collection Time: 09/28/22 12:03 PM   Result Value Ref Range    Protime 27 9 (H) 11 6 - 14 5 seconds    INR 2 58 (H) 0 84 - 1 19   Protime-INR    Collection Time: 10/26/22 12:02 PM   Result Value Ref Range    Protime 28 3 (H) 11 6 - 14 5 seconds    INR 2 63 (H) 0 84 - 1 19   Hemoglobin A1C    Collection Time: 11/09/22 12:00 AM   Result Value Ref Range    Hemoglobin A1C 6 6    Microalbumin / creatinine urine ratio    Collection Time: 11/09/22 12:00 AM   Result Value Ref Range    EXT Creatinine Urine 140 0     Microalbum  ,U,Random 3 4     EXTERNAL Microalb/Creat Ratio 24 3    HEMOGLOBIN A1C    Collection Time: 11/09/22 10:51 AM   Result Value Ref Range    Hemoglobin A1C 6 6 (H) <5 7 %    eAG, EST AVG Glucose 143 <154 mg/dL        Physical Exam  Constitutional:       Appearance: Normal appearance  HENT:      Head: Normocephalic  Right Ear: Tympanic membrane, ear canal and external ear normal       Left Ear: Tympanic membrane, ear canal and external ear normal       Nose: Nose normal  No congestion  Mouth/Throat:      Mouth: Mucous membranes are moist       Pharynx: Oropharynx is clear  No oropharyngeal exudate or posterior oropharyngeal erythema  Eyes:      Extraocular Movements: Extraocular movements intact  Conjunctiva/sclera: Conjunctivae normal       Pupils: Pupils are equal, round, and reactive to light  Cardiovascular:      Rate and Rhythm: Normal rate and regular rhythm  Heart sounds: Normal heart sounds  No murmur heard  Pulmonary:      Effort: Pulmonary effort is normal       Breath sounds: Normal breath sounds  No wheezing or rales  Abdominal:      General: Bowel sounds are normal  There is no distension  Palpations: Abdomen is soft  Tenderness: There is no abdominal tenderness  Musculoskeletal:         General: Normal range of motion  Cervical back: Normal range of motion and neck supple  Right lower leg: Edema present  Left lower leg: Edema present  Lymphadenopathy:      Cervical: No cervical adenopathy  Skin:     General: Skin is warm  Neurological:      General: No focal deficit present  Mental Status: He is alert and oriented to person, place, and time

## 2022-11-18 NOTE — TELEPHONE ENCOUNTER
He says on his after visit summary its says to stop atorvastatin  He wants to confirm because he does not recall you stopping it

## 2022-11-22 ENCOUNTER — OFFICE VISIT (OUTPATIENT)
Dept: PAIN MEDICINE | Facility: CLINIC | Age: 82
End: 2022-11-22

## 2022-11-22 ENCOUNTER — TELEPHONE (OUTPATIENT)
Dept: RADIOLOGY | Facility: CLINIC | Age: 82
End: 2022-11-22

## 2022-11-22 VITALS
HEART RATE: 67 BPM | WEIGHT: 238 LBS | SYSTOLIC BLOOD PRESSURE: 161 MMHG | DIASTOLIC BLOOD PRESSURE: 69 MMHG | BODY MASS INDEX: 37.35 KG/M2 | HEIGHT: 67 IN

## 2022-11-22 DIAGNOSIS — Z79.01 CHRONIC ANTICOAGULATION: Primary | ICD-10-CM

## 2022-11-22 DIAGNOSIS — M51.16 INTERVERTEBRAL DISC DISORDERS WITH RADICULOPATHY, LUMBAR REGION: ICD-10-CM

## 2022-11-22 DIAGNOSIS — M46.1 SACROILIITIS (HCC): ICD-10-CM

## 2022-11-22 DIAGNOSIS — M54.16 LUMBAR RADICULOPATHY: ICD-10-CM

## 2022-11-22 DIAGNOSIS — Z79.891 LONG-TERM CURRENT USE OF OPIATE ANALGESIC: ICD-10-CM

## 2022-11-22 DIAGNOSIS — G89.4 CHRONIC PAIN SYNDROME: Primary | ICD-10-CM

## 2022-11-22 DIAGNOSIS — Z79.891 ENCOUNTER FOR LONG-TERM USE OF OPIATE ANALGESIC: ICD-10-CM

## 2022-11-22 DIAGNOSIS — F11.20 UNCOMPLICATED OPIOID DEPENDENCE (HCC): ICD-10-CM

## 2022-11-22 DIAGNOSIS — Z79.01 CHRONIC ANTICOAGULATION: ICD-10-CM

## 2022-11-22 RX ORDER — PREGABALIN 50 MG/1
50 CAPSULE ORAL 3 TIMES DAILY
Qty: 90 CAPSULE | Refills: 2 | Status: SHIPPED | OUTPATIENT
Start: 2022-11-22

## 2022-11-22 RX ORDER — TRAMADOL HYDROCHLORIDE 50 MG/1
50 TABLET ORAL EVERY 8 HOURS PRN
Qty: 90 TABLET | Refills: 2 | Status: SHIPPED | OUTPATIENT
Start: 2022-11-22

## 2022-11-22 NOTE — TELEPHONE ENCOUNTER
Fax request to hold Coumadin for procedure to   Dr Clara Sheehan @ [phone]  Office phone number [phone]

## 2022-11-22 NOTE — PROGRESS NOTES
Assessment:  1  Chronic pain syndrome    2  Uncomplicated opioid dependence ([facility] [address] )    3  Long-term current use of opiate analgesic    4  Lumbar radiculopathy    5  Chronic anticoagulation    6  Encounter for long-term use of opiate analgesic    7  Intervertebral disc disorders with radiculopathy, lumbar region    8  Sacroiliitis ([facility] [address] )        Plan:  1  Patient will be scheduled for an L4-5 LESI to address the inflammatory component of the patient's pain  We will 1st request permission for patient to hold Coumadin for 5 days prior to procedure  PT INR order was placed for the patient to have drawn the morning of procedure  Complete risks and benefits including bleeding, infection, tissue reaction, nerve injury and allergic reaction were discussed  The patient was agreeable and verbalized an understanding    2  Patient may continue Tramadol 50 mg q 8 hours p r n  pain as prescribed  The patient was given a 3 month supply of prescriptions with a Do Not Fill date(s) of today with 2 refills    [facility] Prescription Drug Monitoring Program report was reviewed and was appropriate     A urine drug screen was collected at today's office visit as part of our medication management protocol  The point of care testing results were appropriate for what was being prescribed  The specimen will be sent for confirmatory testing  The drug screen is medically necessary because the patient is either dependent on opioid medication or is being considered for opioid medication therapy and the results could impact ongoing or future treatment  The drug screen is to evaluate for the presences or absence of prescribed, non-prescribed, and/or illicit drugs/substances  There are risks associated with opioid medications, including dependence, addiction and tolerance  The patient understands and agrees to use these medications only as prescribed   Potential side effects of the medications include, but are not limited to, constipation, drowsiness, addiction, impaired judgment and risk of fatal overdose if not taken as prescribed  The patient was warned against driving while taking sedation medications  Sharing medications is a felony  At this point in time, the patient is showing no signs of addiction, abuse, diversion or suicidal ideation  3  I will increase pregabalin to 50 mg 3 times a day for neuropathic complaints  I advised the patient that if they experience any side effects or issues with the changes in their medication regiment, they should give our office a call to discuss  I also advised the patient not to drive or operate machinery until they see how the changes in the medication regimen affects them  The patient was agreeable and verbalized an understanding  4  Will avoid NSAIDs secondary to anticoagulation  5  Patient will continue with home exercise program   He is not interested in physical therapy   6  Patient may continue Tylenol p r n  and should not exceed more than 3000 mg in 24 hours   7  Patient will follow up after procedure or sooner if needed    History of Present Illness: The patient is a [de-identified] y o  male last seen on 8/17/22 who presents for a follow up office visit in regards to chronic low back pain that radiates into the anterior aspect of the bilateral lower extremities with associated subjective weakness secondary to lumbar degenerative disc disease, spondylosis, spondylolisthesis, stenosis and chronic pain syndrome  The patient denies bowel or bladder incontinence or saddle anesthesia  Patient has had worsening symptoms with physical therapy in the past   He is gotten variable relief with epidural steroid injections  He continues on pregabalin 50 mg twice a day and tramadol 50 mg p r n  which he uses sparingly without much relief  The patient rates his pain a 10/10 on the numeric pain rating scale    He intermittently has pain in the morning which is described as pressure-like    Pain Contract Signed: 3/2/22  Last Urine Drug Screen: 11/17/22  Becks/SOAPP 3/2/22  Last Tramadol per Pt 11/17/22    I have personally reviewed and/or updated the patient's past medical history, past surgical history, family history, social history, current medications, allergies, and vital signs today  Review of Systems:    Review of Systems   Respiratory: Negative for shortness of breath  Cardiovascular: Negative for chest pain  Gastrointestinal: Negative for constipation, diarrhea, nausea and vomiting  Musculoskeletal: Positive for gait problem  Negative for arthralgias, joint swelling and myalgias  Skin: Negative for rash  Neurological: Negative for dizziness, seizures and weakness  All other systems reviewed and are negative          Past Medical History:   Diagnosis Date   • A-fib ([facility] [address] )    • Anemia, unspecified    • Anxiety    • Arthritis    • Basal cell carcinoma (BCC) of skin of nose    • Cancer (HCC)    • Chondrocostal junction syndrome    • CPAP (continuous positive airway pressure) dependence    • Depression    • Dysthymic disorder    • Edema, unspecified    • Hyperlipidemia    • Hypertension    • Impaired fasting blood sugar    • Intervertebral disc disorders with radiculopathy, lumbar region    • Irregular heart beat    • Prostate cancer [facility])     s/p surgery   • Sleep apnea     on CPAP   • Spinal stenosis    • Stroke [facility])    • TIA (transient ischemic attack)     no residual problems   • Unspecified osteoarthritis, unspecified site    • Venous insufficiency of both lower extremities        Past Surgical History:   Procedure Laterality Date   • BASAL CELL CARCINOMA EXCISION      on the nose   • CATARACT EXTRACTION     • COLONOSCOPY      2016, 2011   • CT EPIDURAL STEROID INJECTION (TIN LUMBAR)     • FACIAL/NECK BIOPSY N/A 4/3/2017    Procedure: NOSE BCC EXCISION; FROZEN SECTION; RECONSTRUCTION ;  Surgeon: Katie Echevarria MD;  [facility];  Service:    • FLAP LOCAL HEAD / NECK N/A 10/27/2020    Procedure: NOSE FLAP;  Surgeon: Ketty Kuo MD;  [facility];  Service: Plastics   • FULL THICKNESS SKIN GRAFT N/A 4/3/2017    Procedure: FLAP VERSUS FULL THICKNESS SKIN GRAFT ;  Surgeon: Ketty Kuo MD;  [facility];  Service:    • MOHS RECONSTRUCTION N/A 10/27/2020    Procedure: NOSE MOHS DEFECT RECONSTRUCTION;  Surgeon: Ketty Kuo MD;  [facility];  Service: Plastics   • PROSTATECTOMY     • TONSILLECTOMY AND ADENOIDECTOMY         Family History   Problem Relation Age of Onset   • Heart attack Father        Social History     Occupational History   • Not on file   Tobacco Use   • Smoking status: Former     Types: Cigarettes     Quit date:      Years since quittin 9   • Smokeless tobacco: Never   • Tobacco comments:     long time ago   Vaping Use   • Vaping Use: Never used   Substance and Sexual Activity   • Alcohol use:  Yes     Alcohol/week: 7 0 standard drinks     Types: 7 Glasses of wine per week     Comment: glass of wine with dinner, maybe   • Drug use: No   • Sexual activity: Not Currently         Current Outpatient Medications:   •  pregabalin (LYRICA) 50 mg capsule, Take 1 capsule (50 mg total) by mouth 3 (three) times a day, Disp: 90 capsule, Rfl: 2  •  traMADol (ULTRAM) 50 mg tablet, Take 1 tablet (50 mg total) by mouth every 8 (eight) hours as needed for moderate pain, Disp: 90 tablet, Rfl: 2  •  Acetaminophen 325 MG CAPS, Take by mouth as needed , Disp: , Rfl:   •  amLODIPine (NORVASC) 10 mg tablet, Take 1 tablet (10 mg total) by mouth daily, Disp: 90 tablet, Rfl: 3  •  atorvastatin (LIPITOR) 40 mg tablet, Take 40 mg by mouth daily, Disp: , Rfl:   •  Cholecalciferol (VITAMIN D-3 PO), Take 2,000 unit marking on U-100 syringe by mouth daily after dinner, Disp: , Rfl:   •  co-enzyme Q-10 30 MG capsule, Take 30 mg by mouth daily after dinner, Disp: , Rfl:   •  diclofenac sodium (VOLTAREN) 1 %, Apply 2 g topically 4 (four) times a day (Patient not taking: Reported on 8/19/2022), Disp: , Rfl:   •  glucosamine-chondroitin 500-400 MG tablet, Take 2 tablets by mouth daily in the early morning, Disp: , Rfl:   •  losartan (COZAAR) 100 MG tablet, Take 1 tablet (100 mg total) by mouth daily, Disp: 90 tablet, Rfl: 2  •  montelukast (SINGULAIR) 10 mg tablet, Take 1 tablet (10 mg total) by mouth daily at bedtime (Patient not taking: Reported on 8/19/2022), Disp: 30 tablet, Rfl: 1  •  multivitamin (THERAGRAN) TABS, Take 1 tablet by mouth daily in the early morning, Disp: , Rfl:   •  Omega-3 Fatty Acids (FISH OIL) 1,000 mg, Take 1,000 mg by mouth 2 (two) times a day, Disp: , Rfl:   •  sertraline (ZOLOFT) 50 mg tablet, Take 1 tablet (50 mg total) by mouth daily, Disp: 90 tablet, Rfl: 1  •  sotalol (BETAPACE) 80 mg tablet, TAKE 1 TABLET TWICE DAILY, Disp: 180 tablet, Rfl: 3  •  torsemide (DEMADEX) 20 mg tablet, TAKE 1 TABLET TWICE DAILY (Patient not taking: Reported on 8/19/2022), Disp: 180 tablet, Rfl: 3  •  warfarin (COUMADIN) 5 mg tablet, TAKE 1/2 TO 1 TABLET DAILY OR AS DIRECTED, Disp: 90 tablet, Rfl: 3    No Known Allergies    Physical Exam:    /69   Pulse 67   Ht 5' 7" (1 702 m)   Wt 108 kg (238 lb)   BMI 37 28 kg/m²     Constitutional:normal, well developed, well nourished, alert, in no distress and non-toxic and no overt pain behavior    Eyes:anicteric  HEENT:grossly intact  Neck:supple, symmetric, trachea midline and no masses   Pulmonary:even and unlabored  Cardiovascular:No edema or pitting edema present  Skin:Normal without rashes or lesions and well hydrated  Psychiatric:Mood and affect appropriate  Neurologic:Cranial Nerves II-XII grossly intact  Musculoskeletal:Antalgic gait but steady with the use of a cane      Imaging  FL spine and pain procedure    (Results Pending)         Orders Placed This Encounter   Procedures   • FL spine and pain procedure   • Protime-INR   • MM ALL_Prescribed Meds and Special Instructions   • MM DT_Alprazolam Definitive Test   • MM DT_Amphetamine Definitive Test   • MM DT_Aripiprazole Definitive Test   • MM DT_Bath Salts Definitive Test   • MM DT_Buprenorphine Definitive Test   • MM DT_Butalbital Definitive Test   • MM DT_Clonazepam Definitive Test   • MM DT_Clozapine Definitive Test   • MM DT_Cocaine Definitive Test   • MM DT_Codeine Definitive Test   • MM DT_Desipramine Definitive Test   • MM DT_Dextromethorphan Definitive Test   • MM Diazepam Definitive Test   • MM DT_Ethyl Glucuronide/Ethyl Sulfate Definitive Test   • MM DT_Fentanyl Definitive Test   • MM DT_Haloperidol Definitive Test   • MM DT_Heroin Definitive Test   • MM DT_Hydrocodone Definitive Test   • MM DT_Hydromorphone Definitive Test   • MM DT_Imipramine Definitive Test   • MM DT_Kratom Definitive Test   • MM DT_Levorphanol Definitive Test   • MM Lorazepam Definitive Test   • MM DT_MDMA Definitive Test   • MM DT_Meperidine Definitive Test   • MM DT_Methadone Definitive Test   • MM DT_Methamphetamine Definitive Test   • MM DT_Morphine Definitive Test   • MM DT_Olanzapine Definitive Test   • MM DT_Oxazepam Definitive Test   • MM DT_Oxycodone Definitive Test   • MM DT_Oxymorphone Definitive Test   • MM DT_Phencyclidine Definitive Test   • MM DT_Phenobarbital Definitive Test   • MM DT_Phentermine Definitive Test   • MM DT_Quetiapine Definitive Test   • MM DT_Risperidone Definitive Test   • MM DT_Secobarbital Definitive Test   • MM DT_Spice Definitive Test   • MM DT_Tapentadol Definitive Test   • MM DT_Temazapam Definitive Test   • MM DT_THC Definitive Test   • MM DT_Tramadol Definitive Test   • MM DT_Methylphenidate Definitive Test

## 2022-11-23 ENCOUNTER — OFFICE VISIT (OUTPATIENT)
Dept: SLEEP CENTER | Facility: CLINIC | Age: 82
End: 2022-11-23

## 2022-11-23 VITALS
SYSTOLIC BLOOD PRESSURE: 148 MMHG | HEIGHT: 67 IN | WEIGHT: 241.8 LBS | BODY MASS INDEX: 37.95 KG/M2 | DIASTOLIC BLOOD PRESSURE: 70 MMHG | HEART RATE: 71 BPM | OXYGEN SATURATION: 98 %

## 2022-11-23 DIAGNOSIS — G47.33 OSA (OBSTRUCTIVE SLEEP APNEA): Primary | ICD-10-CM

## 2022-11-23 NOTE — PROGRESS NOTES
Progress Note - Sleep Center   Boo Rodarte :1940 MRN: [id number]      Reason for Visit:  [de-identified] y o male here for annual follow-up    Assessment:  Doing well on current therapy of CPAP 11 cm for moderate CHASIDY (AHI = 16 8)  Plan:  Continue same    Follow up: One year    History of Present Illness:  History of CHASIDY on PAP therapy  Fully compliant and deriving benefit        Review of Systems      Genitourinary need to urinate more than twice a night  Cardiology ankle/leg swelling  Gastrointestinal none  Neurology need to move extremities, muscle weakness and forgetfulness  Constitutional weight change  Integumentary none  Psychiatry anxiety and depression  Musculoskeletal joint pain, muscle aches and sciatica  Pulmonary none  ENT none  Endocrine frequent urination  Hematological none    I have reviewed and updated the review of systems as necessary      Historical Information    Past Medical History:   Past Medical History:   Diagnosis Date   • A-fib ([facility] )    • Anemia, unspecified    • Anxiety    • Arthritis    • Basal cell carcinoma (BCC) of skin of nose    • Cancer (HCC)    • Chondrocostal junction syndrome    • CPAP (continuous positive airway pressure) dependence    • Depression    • Dysthymic disorder    • Edema, unspecified    • Hyperlipidemia    • Hypertension    • Impaired fasting blood sugar    • Intervertebral disc disorders with radiculopathy, lumbar region    • Irregular heart beat    • Prostate cancer ([facility] )     s/p surgery   • Sleep apnea     on CPAP   • Spinal stenosis    • Stroke (HCC)    • TIA (transient ischemic attack)     no residual problems   • Unspecified osteoarthritis, unspecified site    • Venous insufficiency of both lower extremities          Past Surgical History:   Past Surgical History:   Procedure Laterality Date   • BASAL CELL CARCINOMA EXCISION      on the nose   • CATARACT EXTRACTION     • COLONOSCOPY      2011   • CT EPIDURAL STEROID INJECTION (TIN LUMBAR)     • FACIAL/NECK BIOPSY N/A 4/3/2017    Procedure: NOSE BCC EXCISION; FROZEN SECTION; RECONSTRUCTION ;  Surgeon: Gurdeep Flores MD;  [facility];  Service:    • FLAP LOCAL HEAD / NECK N/A 10/27/2020    Procedure: NOSE FLAP;  Surgeon: Gurdeep Flores MD;  [facility];  Service: Plastics   • FULL THICKNESS SKIN GRAFT N/A 4/3/2017    Procedure: FLAP VERSUS FULL THICKNESS SKIN GRAFT ;  Surgeon: Gurdeep Flores MD;  [facility];  Service:    • MOHS RECONSTRUCTION N/A 10/27/2020    Procedure: NOSE MOHS DEFECT RECONSTRUCTION;  Surgeon: Gurdeep Flores MD;  [facility];  Service: Plastics   • PROSTATECTOMY     • TONSILLECTOMY AND ADENOIDECTOMY         Social History:   Social History     Socioeconomic History   • Marital status: /Civil Union     Spouse name: None   • Number of children: None   • Years of education: None   • Highest education level: None   Occupational History   • None   Tobacco Use   • Smoking status: Former     Types: Cigarettes     Quit date:      Years since quittin 9   • Smokeless tobacco: Never   • Tobacco comments:     long time ago   Vaping Use   • Vaping Use: Never used   Substance and Sexual Activity   • Alcohol use: Yes     Alcohol/week: 7 0 standard drinks     Types: 7 Glasses of wine per week     Comment: glass of wine with dinner, maybe   • Drug use: No   • Sexual activity: Not Currently   Other Topics Concern   • None   Social History Narrative   • None     Social Determinants of Health     Financial Resource Strain: Low Risk    • Difficulty of Paying Living Expenses: Not hard at all   Food Insecurity: Not on file   Transportation Needs: No Transportation Needs   • Lack of Transportation (Medical): No   • Lack of Transportation (Non-Medical):  No   Physical Activity: Not on file   Stress: Not on file   Social Connections: Not on file   Intimate Partner Violence: Not on file   Housing Stability: Not on file       Family History: Family History   Problem Relation Age of Onset   • Heart attack Father        Medications/Allergies:      Current Outpatient Medications:   •  Acetaminophen 325 MG CAPS, Take by mouth as needed , Disp: , Rfl:   •  amLODIPine (NORVASC) 10 mg tablet, Take 1 tablet (10 mg total) by mouth daily, Disp: 90 tablet, Rfl: 3  •  atorvastatin (LIPITOR) 40 mg tablet, Take 40 mg by mouth daily, Disp: , Rfl:   •  Cholecalciferol (VITAMIN D-3 PO), Take 2,000 unit marking on U-100 syringe by mouth daily after dinner, Disp: , Rfl:   •  co-enzyme Q-10 30 MG capsule, Take 30 mg by mouth daily after dinner, Disp: , Rfl:   •  glucosamine-chondroitin 500-400 MG tablet, Take 2 tablets by mouth daily in the early morning, Disp: , Rfl:   •  losartan (COZAAR) 100 MG tablet, Take 1 tablet (100 mg total) by mouth daily, Disp: 90 tablet, Rfl: 2  •  multivitamin (THERAGRAN) TABS, Take 1 tablet by mouth daily in the early morning, Disp: , Rfl:   •  Omega-3 Fatty Acids (FISH OIL) 1,000 mg, Take 1,000 mg by mouth 2 (two) times a day, Disp: , Rfl:   •  pregabalin (LYRICA) 50 mg capsule, Take 1 capsule (50 mg total) by mouth 3 (three) times a day, Disp: 90 capsule, Rfl: 2  •  sertraline (ZOLOFT) 50 mg tablet, Take 1 tablet (50 mg total) by mouth daily, Disp: 90 tablet, Rfl: 1  •  sotalol (BETAPACE) 80 mg tablet, TAKE 1 TABLET TWICE DAILY, Disp: 180 tablet, Rfl: 3  •  traMADol (ULTRAM) 50 mg tablet, Take 1 tablet (50 mg total) by mouth every 8 (eight) hours as needed for moderate pain, Disp: 90 tablet, Rfl: 2  •  warfarin (COUMADIN) 5 mg tablet, TAKE 1/2 TO 1 TABLET DAILY OR AS DIRECTED, Disp: 90 tablet, Rfl: 3  •  diclofenac sodium (VOLTAREN) 1 %, Apply 2 g topically 4 (four) times a day (Patient not taking: Reported on 8/19/2022), Disp: , Rfl:   •  montelukast (SINGULAIR) 10 mg tablet, Take 1 tablet (10 mg total) by mouth daily at bedtime (Patient not taking: Reported on 8/19/2022), Disp: 30 tablet, Rfl: 1  •  torsemide (DEMADEX) 20 mg tablet, TAKE 1 TABLET TWICE DAILY (Patient not taking: Reported on 8/19/2022), Disp: 180 tablet, Rfl: 3          Objective      Vital Signs:   Vitals:    11/23/22 1320   BP: 148/70   Pulse: 71   SpO2: 98%     Erhard Sleepiness Scale:          Physical Exam:    General: Alert, appropriate, cooperative, overweight    Head: NC/AT    Skin: Warm, dry    Neuro: No motor abnormalities, cranial nerves appear intact    Extremity: No clubbing, cyanosis      DME Provider: [employer]        Counseling / Coordination of Care   I have spent 15 minutes with the patient today in which greater than 50% of this time was spent in counseling/coordination of care regarding: equipment and compliance  Board Certified Sleep Specialist    Portions of the record may have been created with voice recognition software  Occasional wrong word or "sound a like" substitutions may have occurred due to the inherent limitations of voice recognition software  Read the chart carefully and recognize, using context, where substitutions have occurred

## 2022-11-25 ENCOUNTER — TELEPHONE (OUTPATIENT)
Dept: SLEEP CENTER | Facility: CLINIC | Age: 82
End: 2022-11-25

## 2022-11-25 DIAGNOSIS — E78.2 MIXED HYPERLIPIDEMIA: Primary | ICD-10-CM

## 2022-11-25 LAB
DME PARACHUTE DELIVERY DATE REQUESTED: NORMAL
DME PARACHUTE ITEM DESCRIPTION: NORMAL
DME PARACHUTE ORDER STATUS: NORMAL
DME PARACHUTE SUPPLIER NAME: NORMAL
DME PARACHUTE SUPPLIER PHONE: NORMAL

## 2022-11-27 LAB
6MAM UR QL CFM: NEGATIVE NG/ML
7AMINOCLONAZEPAM UR QL CFM: NEGATIVE NG/ML
A-OH ALPRAZ UR QL CFM: NEGATIVE NG/ML
AMPHET UR QL CFM: NEGATIVE NG/ML
AMPHET UR QL CFM: NEGATIVE NG/ML
BUPRENORPHINE UR QL CFM: NEGATIVE NG/ML
BUTALBITAL UR QL CFM: NEGATIVE NG/ML
BZE UR QL CFM: NEGATIVE NG/ML
CODEINE UR QL CFM: NEGATIVE NG/ML
DESIPRAMINE UR QL CFM: NEGATIVE NG/ML
DESIPRAMINE UR QL CFM: NEGATIVE NG/ML
EDDP UR QL CFM: NEGATIVE NG/ML
ETHYL GLUCURONIDE UR QL CFM: ABNORMAL NG/ML
ETHYL SULFATE UR QL SCN: ABNORMAL NG/ML
EUTYLONE UR QL: NEGATIVE NG/ML
FENTANYL UR QL CFM: NEGATIVE NG/ML
GLIADIN IGG SER IA-ACNC: NEGATIVE NG/ML
GLUCOSE 30M P 50 G LAC PO SERPL-MCNC: NEGATIVE NG/ML
HYDROCODONE UR QL CFM: NEGATIVE NG/ML
HYDROCODONE UR QL CFM: NEGATIVE NG/ML
HYDROMORPHONE UR QL CFM: NEGATIVE NG/ML
IMIPRAMINE UR QL CFM: NEGATIVE NG/ML
LORAZEPAM UR QL CFM: NEGATIVE NG/ML
MDMA UR QL CFM: NEGATIVE NG/ML
ME-PHENIDATE UR QL CFM: NEGATIVE NG/ML
MEPERIDINE UR QL CFM: NEGATIVE NG/ML
METHADONE UR QL CFM: NEGATIVE NG/ML
METHAMPHET UR QL CFM: NEGATIVE NG/ML
MORPHINE UR QL CFM: NEGATIVE NG/ML
MORPHINE UR QL CFM: NEGATIVE NG/ML
NORBUPRENORPHINE UR QL CFM: NEGATIVE NG/ML
NORDIAZEPAM UR QL CFM: NEGATIVE NG/ML
NORFENTANYL UR QL CFM: NEGATIVE NG/ML
NORHYDROCODONE UR QL CFM: NEGATIVE NG/ML
NORHYDROCODONE UR QL CFM: NEGATIVE NG/ML
NORMEPERIDINE UR QL CFM: NEGATIVE NG/ML
NOROXYCODONE UR QL CFM: NEGATIVE NG/ML
OLANZAPINE QUANTIFICATION: NEGATIVE NG/ML
OPC-3373 QUANTIFICATION: NEGATIVE
OXAZEPAM UR QL CFM: NEGATIVE NG/ML
OXYCODONE UR QL CFM: NEGATIVE NG/ML
OXYMORPHONE UR QL CFM: NEGATIVE NG/ML
OXYMORPHONE UR QL CFM: NEGATIVE NG/ML
PARA-FLUOROFENTANYL QUANTIFICATION: NORMAL NG/ML
PCP UR QL CFM: NEGATIVE NG/ML
PHENOBARB UR QL CFM: NEGATIVE NG/ML
RESULT ALL_PRESCRIBED MEDS AND SPECIAL INSTRUCTIONS: NORMAL
SECOBARBITAL UR QL CFM: NEGATIVE NG/ML
SL AMB 4-ANPP QUANTIFICATION: NORMAL NG/ML
SL AMB 5F-ADB-M7 METABOLITE QUANTIFICATION: NEGATIVE NG/ML
SL AMB 7-OH-MITRAGYNINE (KRATOM ALKALOID) QUANTIFICATION: NEGATIVE NG/ML
SL AMB AB-FUBINACA-M3 METABOLITE QUANTIFICATION: NEGATIVE NG/ML
SL AMB ACETYL FENTANYL QUANTIFICATION: NORMAL NG/ML
SL AMB ACETYL NORFENTANYL QUANTIFICATION: NORMAL NG/ML
SL AMB ACRYL FENTANYL QUANTIFICATION: NORMAL NG/ML
SL AMB CARFENTANIL QUANTIFICATION: NORMAL NG/ML
SL AMB CLOZAPINE QUANTIFICATION: NEGATIVE NG/ML
SL AMB CTHC (MARIJUANA METABOLITE) QUANTIFICATION: NEGATIVE NG/ML
SL AMB DEXTROMETHORPHAN QUANTIFICATION: NEGATIVE NG/ML
SL AMB DEXTRORPHAN (DEXTROMETHORPHAN METABOLITE) QUANT: NEGATIVE NG/ML
SL AMB DEXTRORPHAN (DEXTROMETHORPHAN METABOLITE) QUANT: NEGATIVE NG/ML
SL AMB HALOPERIDOL  QUANTIFICATION: NEGATIVE NG/ML
SL AMB HALOPERIDOL METABOLITE QUANTIFICATION: NEGATIVE NG/ML
SL AMB HYDROXYRISPERIDONE QUANTIFICATION: NEGATIVE NG/ML
SL AMB JWH018 METABOLITE QUANTIFICATION: NEGATIVE NG/ML
SL AMB JWH073 METABOLITE QUANTIFICATION: NEGATIVE NG/ML
SL AMB MDMB-FUBINACA-M1 METABOLITE QUANTIFICATION: NEGATIVE NG/ML
SL AMB METHYLONE QUANTIFICATION: NEGATIVE NG/ML
SL AMB N-DESMETHYL-TRAMADOL QUANTIFICATION: NORMAL NG/ML
SL AMB N-DESMETHYLCLOZAPINE QUANTIFICATION: NEGATIVE NG/ML
SL AMB NORQUETIAPINE QUANTIFICATION: NEGATIVE NG/ML
SL AMB PHENTERMINE QUANTIFICATION: NEGATIVE NG/ML
SL AMB QUETIAPINE QUANTIFICATION: NEGATIVE NG/ML
SL AMB RCS4 METABOLITE QUANTIFICATION: NEGATIVE NG/ML
SL AMB RISPERIDONE QUANTIFICATION: NEGATIVE NG/ML
SL AMB RITALINIC ACID QUANTIFICATION: NEGATIVE NG/ML
SPECIMEN DRAWN SERPL: NEGATIVE NG/ML
TAPENTADOL UR QL CFM: NEGATIVE NG/ML
TEMAZEPAM UR QL CFM: NEGATIVE NG/ML
TEMAZEPAM UR QL CFM: NEGATIVE NG/ML
TRAMADOL UR QL CFM: NORMAL NG/ML
URATE/CREAT 24H UR: NORMAL NG/ML

## 2022-11-28 ENCOUNTER — TELEPHONE (OUTPATIENT)
Dept: PAIN MEDICINE | Facility: CLINIC | Age: 82
End: 2022-11-28

## 2022-11-28 RX ORDER — ATORVASTATIN CALCIUM 40 MG/1
40 TABLET, FILM COATED ORAL DAILY
Qty: 90 TABLET | Refills: 1 | Status: SHIPPED | OUTPATIENT
Start: 2022-11-28

## 2022-11-28 NOTE — TELEPHONE ENCOUNTER
Nic Reddy    Doctor: Dr Dorothy Nation    Reason for call: Returning call from RN     Call back#: [phone]

## 2022-11-28 NOTE — TELEPHONE ENCOUNTER
----- Message from [employer] sent at 11/28/2022  8:38 AM EST -----  Patient's UDS is positive for moderate amount of ETOH  Please remind him our policy with ETOH use outline in the opioid contract he has with our office  Thank you

## 2022-11-29 LAB

## 2022-12-01 NOTE — TELEPHONE ENCOUNTER
S/w pt  Scheduled 12/7 to arrive at 4pm  Pt aware last dose of coumadin today and to resume post procedure 12/7  Aware to have PT/INR morning of procedure  Aware to have  and no vaccinations 2 weeks prior  Aware to reschedule if ill or on abx

## 2022-12-01 NOTE — TELEPHONE ENCOUNTER
Good Morning Dr Bolivar An,    A mutual pt of ours is being schd for a Epidural Steroid inj  As per the [employer] Society [employer] Guideline for neuraxial procedures and anticoagulation SPA would need to hold Mr Pk Joseph Coumadin for 5 days prior to procedure  Would you be able to annalee [facility] permission to advise pt to hold med for stated time in order to have procedure? Pls advise and respond in task  Thank you

## 2022-12-07 ENCOUNTER — HOSPITAL ENCOUNTER (OUTPATIENT)
Dept: RADIOLOGY | Facility: CLINIC | Age: 82
Discharge: HOME | End: 2022-12-07

## 2022-12-07 ENCOUNTER — APPOINTMENT (OUTPATIENT)
Dept: LAB | Facility: CLINIC | Age: 82
End: 2022-12-07

## 2022-12-07 ENCOUNTER — ANTICOAG VISIT (OUTPATIENT)
Dept: CARDIOLOGY CLINIC | Facility: CLINIC | Age: 82
End: 2022-12-07

## 2022-12-07 VITALS
DIASTOLIC BLOOD PRESSURE: 72 MMHG | RESPIRATION RATE: 18 BRPM | OXYGEN SATURATION: 93 % | TEMPERATURE: 97.5 F | HEART RATE: 66 BPM | SYSTOLIC BLOOD PRESSURE: 155 MMHG

## 2022-12-07 DIAGNOSIS — I48.0 PAROXYSMAL ATRIAL FIBRILLATION (HCC): Primary | ICD-10-CM

## 2022-12-07 DIAGNOSIS — I10 ESSENTIAL HYPERTENSION: ICD-10-CM

## 2022-12-07 DIAGNOSIS — E78.2 MIXED HYPERLIPIDEMIA: ICD-10-CM

## 2022-12-07 DIAGNOSIS — Z79.01 CHRONIC ANTICOAGULATION: ICD-10-CM

## 2022-12-07 DIAGNOSIS — R73.01 IMPAIRED FASTING BLOOD SUGAR: ICD-10-CM

## 2022-12-07 DIAGNOSIS — M54.16 LUMBAR RADICULOPATHY: ICD-10-CM

## 2022-12-07 LAB
INR PPP: 1 (ref 0.84–1.19)
PROTHROMBIN TIME: 13.4 SECONDS (ref 11.6–14.5)

## 2022-12-07 RX ORDER — METHYLPREDNISOLONE ACETATE 80 MG/ML
80 INJECTION, SUSPENSION INTRA-ARTICULAR; INTRALESIONAL; INTRAMUSCULAR; PARENTERAL; SOFT TISSUE ONCE
Status: COMPLETED | OUTPATIENT
Start: 2022-12-07 | End: 2022-12-07

## 2022-12-07 RX ADMIN — IOHEXOL 1 ML: 300 INJECTION, SOLUTION INTRAVENOUS at 16:07

## 2022-12-07 RX ADMIN — METHYLPREDNISOLONE ACETATE 80 MG: 80 INJECTION, SUSPENSION INTRA-ARTICULAR; INTRALESIONAL; INTRAMUSCULAR; SOFT TISSUE at 16:07

## 2022-12-07 NOTE — DISCHARGE INSTRUCTIONS
Epidural Steroid Injection   WHAT YOU NEED TO KNOW:   An epidural steroid injection (TIN) is a procedure to inject steroid medicine into the epidural space  The epidural space is between your spinal cord and vertebrae  Steroids reduce inflammation and fluid buildup in your spine that may be causing pain  You may be given pain medicine along with the steroids  ACTIVITY  Do not drive or operate machinery today  No strenuous activity today - bending, lifting, etc   You may resume normal activites starting tomorrow - start slowly and as tolerated  You may shower today, but no tub baths or hot tubs  You may have numbness for several hours from the local anesthetic  Please use caution and common sense, especially with weight-bearing activities  CARE OF THE INJECTION SITE  If you have soreness or pain, apply ice to the area today (20 minutes on/20 minutes off)  Starting tomorrow, you may use warm, moist heat or ice if needed  You may have an increase or change in your discomfort for 36-48 hours after your treatment  Apply ice and continue with any pain medication you have been prescribed  Notify the [facility] if you have any of the following: redness, drainage, swelling, headache, stiff neck or fever above 100°F     SPECIAL INSTRUCTIONS  Our office will contact you in approximately 7 days for a progress report  MEDICATIONS  Continue to take all routine medications  Our office may have instructed you to hold some medications  As no general anesthesia was used in today's procedure, you should not experience any side effects related to anesthesia  If you are diabetic, the steroids used in today's injection may temporarily increase your blood sugar levels after the first few days after your injection  Please keep a close eye on your sugars and alert the doctor who manages your diabetes if your sugars are significantly high from your baseline or you are symptomatic       If you have a problem specifically related to your procedure, please call our office at [phone]  Problems not related to your procedure should be directed to your primary care physician

## 2022-12-07 NOTE — H&P
History of Present Illness: The patient is a [de-identified] y o  male who presents with complaints of low back and leg pain      Past Medical History:   Diagnosis Date   • A-fib ([facility] [address] )    • Anemia, unspecified    • Anxiety    • Arthritis    • Basal cell carcinoma (BCC) of skin of nose    • Cancer (HCC)    • Chondrocostal junction syndrome    • CPAP (continuous positive airway pressure) dependence    • Depression    • Dysthymic disorder    • Edema, unspecified    • Hyperlipidemia    • Hypertension    • Impaired fasting blood sugar    • Intervertebral disc disorders with radiculopathy, lumbar region    • Irregular heart beat    • Prostate cancer [facility])     s/p surgery   • Sleep apnea     on CPAP   • Spinal stenosis    • Stroke [facility])    • TIA (transient ischemic attack)     no residual problems   • Unspecified osteoarthritis, unspecified site    • Venous insufficiency of both lower extremities        Past Surgical History:   Procedure Laterality Date   • BASAL CELL CARCINOMA EXCISION      on the nose   • CATARACT EXTRACTION     • COLONOSCOPY      2016, 2011   • CT EPIDURAL STEROID INJECTION (TIN LUMBAR)     • FACIAL/NECK BIOPSY N/A 4/3/2017    Procedure: NOSE BCC EXCISION; FROZEN SECTION; RECONSTRUCTION ;  Surgeon: Karely Mills MD;  [facility];  Service:    • FLAP LOCAL HEAD / NECK N/A 10/27/2020    Procedure: NOSE FLAP;  Surgeon: Karely Mills MD;  [facility];  Service: Plastics   • FULL THICKNESS SKIN GRAFT N/A 4/3/2017    Procedure: FLAP VERSUS FULL THICKNESS SKIN GRAFT ;  Surgeon: Karely Mills MD;  [facility];  Service:    • MOHS RECONSTRUCTION N/A 10/27/2020    Procedure: NOSE MOHS DEFECT RECONSTRUCTION;  Surgeon: Karely Mills MD;  [facility];  Service: Plastics   • PROSTATECTOMY     • TONSILLECTOMY AND ADENOIDECTOMY           Current Outpatient Medications:   •  Acetaminophen 325 MG CAPS, Take by mouth as needed , Disp: , Rfl:   •  amLODIPine (NORVASC) 10 mg tablet, Take 1 tablet (10 mg total) by mouth daily, Disp: 90 tablet, Rfl: 3  •  atorvastatin (LIPITOR) 40 mg tablet, Take 1 tablet (40 mg total) by mouth daily, Disp: 90 tablet, Rfl: 1  •  Cholecalciferol (VITAMIN D-3 PO), Take 2,000 unit marking on U-100 syringe by mouth daily after dinner, Disp: , Rfl:   •  co-enzyme Q-10 30 MG capsule, Take 30 mg by mouth daily after dinner, Disp: , Rfl:   •  diclofenac sodium (VOLTAREN) 1 %, Apply 2 g topically 4 (four) times a day (Patient not taking: Reported on 8/19/2022), Disp: , Rfl:   •  glucosamine-chondroitin 500-400 MG tablet, Take 2 tablets by mouth daily in the early morning, Disp: , Rfl:   •  losartan (COZAAR) 100 MG tablet, Take 1 tablet (100 mg total) by mouth daily, Disp: 90 tablet, Rfl: 2  •  montelukast (SINGULAIR) 10 mg tablet, Take 1 tablet (10 mg total) by mouth daily at bedtime (Patient not taking: Reported on 8/19/2022), Disp: 30 tablet, Rfl: 1  •  multivitamin (THERAGRAN) TABS, Take 1 tablet by mouth daily in the early morning, Disp: , Rfl:   •  Omega-3 Fatty Acids (FISH OIL) 1,000 mg, Take 1,000 mg by mouth 2 (two) times a day, Disp: , Rfl:   •  pregabalin (LYRICA) 50 mg capsule, Take 1 capsule (50 mg total) by mouth 3 (three) times a day, Disp: 90 capsule, Rfl: 2  •  sertraline (ZOLOFT) 50 mg tablet, Take 1 tablet (50 mg total) by mouth daily, Disp: 90 tablet, Rfl: 1  •  sotalol (BETAPACE) 80 mg tablet, TAKE 1 TABLET TWICE DAILY, Disp: 180 tablet, Rfl: 3  •  torsemide (DEMADEX) 20 mg tablet, TAKE 1 TABLET TWICE DAILY (Patient not taking: Reported on 8/19/2022), Disp: 180 tablet, Rfl: 3  •  traMADol (ULTRAM) 50 mg tablet, Take 1 tablet (50 mg total) by mouth every 8 (eight) hours as needed for moderate pain, Disp: 90 tablet, Rfl: 2  •  warfarin (COUMADIN) 5 mg tablet, TAKE 1/2 TO 1 TABLET DAILY OR AS DIRECTED, Disp: 90 tablet, Rfl: 3    No Known Allergies    Physical Exam:   Vitals:    12/07/22 1548   BP: 170/79   Pulse: 70   Resp: 20   Temp: 97 5 °F (36 4 °C)   SpO2: 99%     General: Awake, Alert, Oriented x 3, Mood and affect appropriate  Respiratory: Respirations even and unlabored  Cardiovascular: Peripheral pulses intact; no edema  Musculoskeletal Exam: Bilateral lumbar paraspinals tender to palpation    ASA Score: 3         Assessment:   1   Lumbar radiculopathy        Plan: L4-5 LESI

## 2022-12-14 ENCOUNTER — TELEPHONE (OUTPATIENT)
Dept: PAIN MEDICINE | Facility: CLINIC | Age: 82
End: 2022-12-14

## 2023-01-16 ENCOUNTER — ANTICOAG VISIT (OUTPATIENT)
Dept: CARDIOLOGY CLINIC | Facility: CLINIC | Age: 83
End: 2023-01-16

## 2023-01-16 ENCOUNTER — APPOINTMENT (OUTPATIENT)
Dept: LAB | Facility: CLINIC | Age: 83
End: 2023-01-16

## 2023-01-16 DIAGNOSIS — I48.0 PAROXYSMAL ATRIAL FIBRILLATION (HCC): Primary | ICD-10-CM

## 2023-02-01 ENCOUNTER — OFFICE VISIT (OUTPATIENT)
Dept: PAIN MEDICINE | Facility: CLINIC | Age: 83
End: 2023-02-01

## 2023-02-01 VITALS
HEART RATE: 68 BPM | HEIGHT: 67 IN | WEIGHT: 242 LBS | SYSTOLIC BLOOD PRESSURE: 158 MMHG | DIASTOLIC BLOOD PRESSURE: 72 MMHG | BODY MASS INDEX: 37.98 KG/M2

## 2023-02-01 DIAGNOSIS — M17.0 PRIMARY OSTEOARTHRITIS OF BOTH KNEES: ICD-10-CM

## 2023-02-01 DIAGNOSIS — M54.16 LUMBAR RADICULOPATHY: ICD-10-CM

## 2023-02-01 DIAGNOSIS — G89.4 CHRONIC PAIN SYNDROME: Primary | ICD-10-CM

## 2023-02-01 DIAGNOSIS — Z79.891 LONG-TERM CURRENT USE OF OPIATE ANALGESIC: ICD-10-CM

## 2023-02-01 DIAGNOSIS — M46.1 SACROILIITIS (HCC): ICD-10-CM

## 2023-02-01 DIAGNOSIS — F11.20 UNCOMPLICATED OPIOID DEPENDENCE (HCC): ICD-10-CM

## 2023-02-01 DIAGNOSIS — M47.816 SPONDYLOSIS OF LUMBAR REGION WITHOUT MYELOPATHY OR RADICULOPATHY: ICD-10-CM

## 2023-02-01 DIAGNOSIS — M48.062 SPINAL STENOSIS OF LUMBAR REGION WITH NEUROGENIC CLAUDICATION: ICD-10-CM

## 2023-02-01 DIAGNOSIS — Z79.891 ENCOUNTER FOR LONG-TERM USE OF OPIATE ANALGESIC: ICD-10-CM

## 2023-02-01 DIAGNOSIS — M51.16 INTERVERTEBRAL DISC DISORDERS WITH RADICULOPATHY, LUMBAR REGION: ICD-10-CM

## 2023-02-01 DIAGNOSIS — M47.816 LUMBAR SPONDYLOSIS: ICD-10-CM

## 2023-02-01 RX ORDER — PREGABALIN 75 MG/1
75 CAPSULE ORAL 3 TIMES DAILY
Qty: 90 CAPSULE | Refills: 1 | Status: SHIPPED | OUTPATIENT
Start: 2023-02-01

## 2023-02-01 NOTE — PROGRESS NOTES
Assessment:  1  Chronic pain syndrome    2  Uncomplicated opioid dependence ([address] )    3  Long-term current use of opiate analgesic    4  Lumbar radiculopathy    5  Intervertebral disc disorders with radiculopathy, lumbar region    6  Sacroiliitis (HCC)    7  Spondylosis of lumbar region without myelopathy or radiculopathy    8  Lumbar spondylosis    9  Primary osteoarthritis of both knees    10  Spinal stenosis of lumbar region with neurogenic claudication    11  Encounter for long-term use of opiate analgesic        Plan:  1  Patient may continue tramadol 50 mg every 8 hours as needed pain as prescribed  Patient still has 2 refills on his current prescription therefore does not require refills today  Patient complains of ongoing pain  I did instruct him that he can take his medication a little more often as he has not filled the tramadol prescription since November 2022  Patient states he was unaware  While the patient was in the office today an opioid contract was thoroughly reviewed and signed by the patient  The patient was given adequate time to ask questions in regards to the contract and a signed copy was sent home for their records  The patient also completed a BECKS depression inventory and SOAPP-R today, as part of our yearly opioid management program     [place] Prescription Drug Monitoring Program report was reviewed and was appropriate     A urine drug screen was collected at today's office visit as part of our medication management protocol  The point of care testing results were appropriate for what was being prescribed  The specimen will be sent for confirmatory testing  The drug screen is medically necessary because the patient is either dependent on opioid medication or is being considered for opioid medication therapy and the results could impact ongoing or future treatment   The drug screen is to evaluate for the presences or absence of prescribed, non-prescribed, and/or illicit drugs/substances  There are risks associated with opioid medications, including dependence, addiction and tolerance  The patient understands and agrees to use these medications only as prescribed  Potential side effects of the medications include, but are not limited to, constipation, drowsiness, addiction, impaired judgment and risk of fatal overdose if not taken as prescribed  The patient was warned against driving while taking sedation medications  Sharing medications is a felony  At this point in time, the patient is showing no signs of addiction, abuse, diversion or suicidal ideation  2   I will increase pregabalin to 75 mg 3 times a day for pain complaints  Med changes  3  I will avoid NSAIDs secondary to anticoagulation  4  Patient may continue Tylenol as needed and should not exceed more than 3000 mg in 24 hours  5  Continue with home exercise program  6  Follow-up in 8 weeks or sooner if needed    History of Present Illness: The patient is a [de-identified] y o  male last seen on 11/22/2022 who presents for a follow up office visit in regards to chronic low back pain that radiates into the bilateral lower extremities secondary to lumbar degenerative disc disease, lumbar spondylosis, lumbar spondylolisthesis, lumbar stenosis and chronic pain syndrome  The patient denies bowel or bladder incontinence or saddle anesthesia  Patient has had worsening symptoms with physical therapy in the past   He is status post L4-5 LESI on December 7, 2022 without any significant long-term improvement of his pain  He is taking pregabalin 50 mg 3 times a day and tramadol 50 mg as needed without much relief  He rates his pain a 7 out of 10 on the numeric pain rating scale    He constantly has pain in the morning which is described as intermittent and throbbing    Pain Contract Signed: 2/1/23  Last Urine Drug Screen: 2/1/23  Lisa/[facility] 2/1/23  Last Tramadol per Pt 2/1/23  Pill count 2/1/23      I have personally reviewed and/or updated the patient's past medical history, past surgical history, family history, social history, current medications, allergies, and vital signs today         Review of Systems:    Review of Systems      Past Medical History:   Diagnosis Date   • A-fib ([facility] )    • Anemia, unspecified    • Anxiety    • Arthritis    • Basal cell carcinoma (BCC) of skin of nose    • Cancer (HCC)    • Chondrocostal junction syndrome    • CPAP (continuous positive airway pressure) dependence    • Depression    • Dysthymic disorder    • Edema, unspecified    • Hyperlipidemia    • Hypertension    • Impaired fasting blood sugar    • Intervertebral disc disorders with radiculopathy, lumbar region    • Irregular heart beat    • Prostate cancer [facility])     s/p surgery   • Sleep apnea     on CPAP   • Spinal stenosis    • Stroke ([facility] )    • TIA (transient ischemic attack)     no residual problems   • Unspecified osteoarthritis, unspecified site    • Venous insufficiency of both lower extremities        Past Surgical History:   Procedure Laterality Date   • BASAL CELL CARCINOMA EXCISION      on the nose   • CATARACT EXTRACTION     • COLONOSCOPY      2016, 2011   • CT EPIDURAL STEROID INJECTION (TIN LUMBAR)     • FACIAL/NECK BIOPSY N/A 4/3/2017    Procedure: NOSE BCC EXCISION; FROZEN SECTION; RECONSTRUCTION ;  Surgeon: Teola Alpers, MD;  [facility];  Service:    • FLAP LOCAL HEAD / NECK N/A 10/27/2020    Procedure: NOSE FLAP;  Surgeon: Teola Alpers, MD;  [facility];  Service: Plastics   • FULL THICKNESS SKIN GRAFT N/A 4/3/2017    Procedure: FLAP VERSUS FULL THICKNESS SKIN GRAFT ;  Surgeon: Teola Alpers, MD;  [facility];  Service:    • MOHS RECONSTRUCTION N/A 10/27/2020    Procedure: NOSE MOHS DEFECT RECONSTRUCTION;  Surgeon: Teola Alpers, MD;  [facility];  Service: Plastics   • PROSTATECTOMY     • TONSILLECTOMY AND ADENOIDECTOMY         Family History   Problem Relation Age of Onset   • Heart attack Father        Social History     Occupational History   • Not on file   Tobacco Use   • Smoking status: Former     Types: Cigarettes     Quit date:      Years since quittin 1   • Smokeless tobacco: Never   • Tobacco comments:     long time ago   Vaping Use   • Vaping Use: Never used   Substance and Sexual Activity   • Alcohol use:  Yes     Alcohol/week: 7 0 standard drinks     Types: 7 Glasses of wine per week     Comment: glass of wine with dinner, maybe   • Drug use: No   • Sexual activity: Not Currently         Current Outpatient Medications:   •  Acetaminophen 325 MG CAPS, Take by mouth as needed , Disp: , Rfl:   •  amLODIPine (NORVASC) 10 mg tablet, Take 1 tablet (10 mg total) by mouth daily, Disp: 90 tablet, Rfl: 3  •  atorvastatin (LIPITOR) 40 mg tablet, Take 1 tablet (40 mg total) by mouth daily, Disp: 90 tablet, Rfl: 1  •  Cholecalciferol (VITAMIN D-3 PO), Take 2,000 unit marking on U-100 syringe by mouth daily after dinner, Disp: , Rfl:   •  co-enzyme Q-10 30 MG capsule, Take 30 mg by mouth daily after dinner, Disp: , Rfl:   •  glucosamine-chondroitin 500-400 MG tablet, Take 2 tablets by mouth daily in the early morning, Disp: , Rfl:   •  losartan (COZAAR) 100 MG tablet, Take 1 tablet (100 mg total) by mouth daily, Disp: 90 tablet, Rfl: 2  •  Omega-3 Fatty Acids (FISH OIL) 1,000 mg, Take 1,000 mg by mouth 2 (two) times a day, Disp: , Rfl:   •  pregabalin (LYRICA) 75 mg capsule, Take 1 capsule (75 mg total) by mouth 3 (three) times a day, Disp: 90 capsule, Rfl: 1  •  sertraline (ZOLOFT) 50 mg tablet, Take 1 tablet (50 mg total) by mouth daily, Disp: 90 tablet, Rfl: 1  •  sotalol (BETAPACE) 80 mg tablet, TAKE 1 TABLET TWICE DAILY, Disp: 180 tablet, Rfl: 3  •  traMADol (ULTRAM) 50 mg tablet, Take 1 tablet (50 mg total) by mouth every 8 (eight) hours as needed for moderate pain, Disp: 90 tablet, Rfl: 2  •  warfarin (COUMADIN) 5 mg tablet, TAKE 1/2 TO 1 TABLET DAILY OR AS DIRECTED, Disp: 90 tablet, Rfl: 3  •  diclofenac sodium (VOLTAREN) 1 %, Apply 2 g topically 4 (four) times a day (Patient not taking: Reported on 8/19/2022), Disp: , Rfl:   •  montelukast (SINGULAIR) 10 mg tablet, Take 1 tablet (10 mg total) by mouth daily at bedtime (Patient not taking: Reported on 8/19/2022), Disp: 30 tablet, Rfl: 1  •  multivitamin (THERAGRAN) TABS, Take 1 tablet by mouth daily in the early morning, Disp: , Rfl:   •  torsemide (DEMADEX) 20 mg tablet, TAKE 1 TABLET TWICE DAILY (Patient not taking: Reported on 8/19/2022), Disp: 180 tablet, Rfl: 3    No Known Allergies    Physical Exam:    /72   Pulse 68   Ht 5' 7" (1 702 m)   Wt 110 kg (242 lb)   BMI 37 90 kg/m²     Constitutional:normal, well developed, well nourished, alert, in no distress and non-toxic and no overt pain behavior    Eyes:anicteric  HEENT:grossly intact  Neck:supple, symmetric, trachea midline and no masses   Pulmonary:even and unlabored  Cardiovascular:No edema or pitting edema present  Skin:Normal without rashes or lesions and well hydrated  Psychiatric:Mood and affect appropriate  Neurologic:Cranial Nerves II-XII grossly intact  Musculoskeletal:antalgic gait, ambulates with a cane      Imaging  No orders to display         Orders Placed This Encounter   Procedures   • MM ALL_Prescribed Meds and Special Instructions   • MM DT_Alprazolam Definitive Test   • MM DT_Amphetamine Definitive Test   • MM DT_Aripiprazole Definitive Test   • MM DT_Bath Salts Definitive Test   • MM DT_Buprenorphine Definitive Test   • MM DT_Butalbital Definitive Test   • MM DT_Clonazepam Definitive Test   • MM DT_Clozapine Definitive Test   • MM DT_Cocaine Definitive Test   • MM DT_Codeine Definitive Test   • MM DT_Desipramine Definitive Test   • MM DT_Dextromethorphan Definitive Test   • MM Diazepam Definitive Test   • MM DT_Ethyl Glucuronide/Ethyl Sulfate Definitive Test   • MM DT_Fentanyl Definitive Test   • MM DT_Haloperidol Definitive Test • MM DT_Heroin Definitive Test   • MM DT_Hydrocodone Definitive Test   • MM DT_Hydromorphone Definitive Test   • MM DT_Imipramine Definitive Test   • MM DT_Kratom Definitive Test   • MM DT_Levorphanol Definitive Test   • MM Lorazepam Definitive Test   • MM DT_MDMA Definitive Test   • MM DT_Meperidine Definitive Test   • MM DT_Methadone Definitive Test   • MM DT_Methamphetamine Definitive Test   • MM DT_Morphine Definitive Test   • MM DT_Olanzapine Definitive Test   • MM DT_Oxazepam Definitive Test   • MM DT_Oxycodone Definitive Test   • MM DT_Oxymorphone Definitive Test   • MM DT_Phencyclidine Definitive Test   • MM DT_Phenobarbital Definitive Test   • MM DT_Phentermine Definitive Test   • MM DT_Quetiapine Definitive Test   • MM DT_Risperidone Definitive Test   • MM DT_Secobarbital Definitive Test   • MM DT_Spice Definitive Test   • MM DT_Tapentadol Definitive Test   • MM DT_Temazapam Definitive Test   • MM DT_THC Definitive Test   • MM DT_Tramadol Definitive Test   • MM DT_Methylphenidate Definitive Test

## 2023-02-15 ENCOUNTER — APPOINTMENT (OUTPATIENT)
Dept: LAB | Facility: CLINIC | Age: 83
End: 2023-02-15

## 2023-02-15 ENCOUNTER — ANTICOAG VISIT (OUTPATIENT)
Dept: CARDIOLOGY CLINIC | Facility: CLINIC | Age: 83
End: 2023-02-15

## 2023-02-15 DIAGNOSIS — I48.0 PAROXYSMAL ATRIAL FIBRILLATION (HCC): Primary | ICD-10-CM

## 2023-03-10 ENCOUNTER — TELEPHONE (OUTPATIENT)
Dept: CARDIOLOGY CLINIC | Facility: CLINIC | Age: 83
End: 2023-03-10

## 2023-03-10 NOTE — TELEPHONE ENCOUNTER
P/C'd, he is scheduled to have spinal injection and needs to be off coumadin from 3/17/23-3/21/23  Would that be ok for this pt?       Please advise

## 2023-03-13 ENCOUNTER — APPOINTMENT (OUTPATIENT)
Dept: LAB | Facility: CLINIC | Age: 83
End: 2023-03-13

## 2023-03-13 ENCOUNTER — ANTICOAG VISIT (OUTPATIENT)
Dept: CARDIOLOGY CLINIC | Facility: CLINIC | Age: 83
End: 2023-03-13

## 2023-03-13 DIAGNOSIS — I48.0 PAROXYSMAL ATRIAL FIBRILLATION (HCC): Primary | ICD-10-CM

## 2023-03-17 ENCOUNTER — RA CDI HCC (OUTPATIENT)
Dept: OTHER | Facility: HOSPITAL | Age: 83
End: 2023-03-17

## 2023-03-24 ENCOUNTER — OFFICE VISIT (OUTPATIENT)
Dept: INTERNAL MEDICINE CLINIC | Facility: CLINIC | Age: 83
End: 2023-03-24

## 2023-03-24 VITALS
TEMPERATURE: 98.7 F | OXYGEN SATURATION: 97 % | SYSTOLIC BLOOD PRESSURE: 132 MMHG | WEIGHT: 232 LBS | DIASTOLIC BLOOD PRESSURE: 70 MMHG | HEIGHT: 67 IN | HEART RATE: 62 BPM | BODY MASS INDEX: 36.41 KG/M2

## 2023-03-24 DIAGNOSIS — M54.16 LUMBAR RADICULOPATHY: ICD-10-CM

## 2023-03-24 DIAGNOSIS — C61 PROSTATE CANCER (HCC): ICD-10-CM

## 2023-03-24 DIAGNOSIS — E78.2 MIXED HYPERLIPIDEMIA: ICD-10-CM

## 2023-03-24 DIAGNOSIS — I87.2 EDEMA OF BOTH LOWER LEGS DUE TO PERIPHERAL VENOUS INSUFFICIENCY: ICD-10-CM

## 2023-03-24 DIAGNOSIS — R73.01 IMPAIRED FASTING BLOOD SUGAR: ICD-10-CM

## 2023-03-24 DIAGNOSIS — I87.2 VENOUS INSUFFICIENCY: ICD-10-CM

## 2023-03-24 DIAGNOSIS — E66.01 OBESITY, MORBID (HCC): ICD-10-CM

## 2023-03-24 DIAGNOSIS — I48.0 PAROXYSMAL ATRIAL FIBRILLATION (HCC): ICD-10-CM

## 2023-03-24 DIAGNOSIS — I10 ESSENTIAL HYPERTENSION: Primary | ICD-10-CM

## 2023-03-24 DIAGNOSIS — R60.0 EDEMA OF BOTH LOWER LEGS DUE TO PERIPHERAL VENOUS INSUFFICIENCY: ICD-10-CM

## 2023-03-24 DIAGNOSIS — E11.9 TYPE 2 DIABETES, HBA1C GOAL < 7% (HCC): ICD-10-CM

## 2023-03-24 DIAGNOSIS — F33.9 DEPRESSION, RECURRENT (HCC): ICD-10-CM

## 2023-03-24 DIAGNOSIS — F34.1 DYSTHYMIC DISORDER: ICD-10-CM

## 2023-03-24 NOTE — PROGRESS NOTES
Assessment/Plan:    BMI Counseling: Body mass index is 36 34 kg/m²  The BMI is above normal  Nutrition recommendations include decreasing portion sizes, encouraging healthy choices of fruits and vegetables and decreasing fast food intake  Exercise recommendations include moderate physical activity 150 minutes/week  Rationale for BMI follow-up plan is due to patient being overweight or obese  1  Essential hypertension    2  Lumbar radiculopathy    3  Edema of both lower legs due to peripheral venous insufficiency    4  Prostate cancer ([place] [address] )    5  Mixed hyperlipidemia    6  Impaired fasting blood sugar    7  Venous insufficiency    8  Type 2 diabetes, HbA1c goal < 7% ([facility])  -     CBC and differential; Future  -     Comprehensive metabolic panel; Future  -     Hemoglobin A1C; Future  -     Lipid Panel with Direct LDL reflex; Future  -     Microalbumin / creatinine urine ratio  -     TSH, 3rd generation; Future    9  Dysthymic disorder    10  Depression, recurrent ([place] [address] )    11  Paroxysmal atrial fibrillation ([facility])    12  Obesity, morbid ([place] [address] )         Subjective:      Patient ID: Marion Prince is a [de-identified] y o  male      Follow-up on multiple medical problems to ensure they are stable on current medications      The following portions of the patient's history were reviewed and updated as appropriate: He  has a past medical history of A-fib ([place] [address] ), Anemia, unspecified, Anxiety, Arthritis, Basal cell carcinoma (BCC) of skin of nose, Cancer ([facility]), Chondrocostal junction syndrome, CPAP (continuous positive airway pressure) dependence, Depression, Dysthymic disorder, Edema, unspecified, Hyperlipidemia, Hypertension, Impaired fasting blood sugar, Intervertebral disc disorders with radiculopathy, lumbar region, Irregular heart beat, Prostate cancer ([place] [address] ), Sleep apnea, Spinal stenosis, Stroke ([place] [address] ), TIA (transient ischemic attack), Unspecified osteoarthritis, unspecified site, and Venous insufficiency of both lower extremities  He   Patient Active Problem List    Diagnosis Date Noted   • Depression, recurrent ([place] [address] ) 08/19/2022   • Type 2 diabetes, HbA1c goal < 7% ([place] [address] ) 05/25/2022   • Cellulitis of right foot 12/17/2021   • Abnormal gait 08/04/2021   • Obesity, morbid ([place] [address] ) 03/26/2021   • Non-seasonal allergic rhinitis 03/26/2021   • Primary osteoarthritis of both knees 11/13/2020   • TIA (transient ischemic attack)    • Prostate cancer ([place] [address] )    • Intervertebral disc disorders with radiculopathy, lumbar region    • Impaired fasting blood sugar    • Hypertension    • Mixed hyperlipidemia    • Obstructive sleep apnea syndrome    • CPAP (continuous positive airway pressure) dependence    • Dysthymic disorder    • Venous insufficiency of both lower extremities    • Essential hypertension 11/20/2019   • Venous insufficiency 57/75/4523   • Uncomplicated opioid dependence ([place] [address] ) 06/05/2019   • Encounter for long-term use of opiate analgesic 06/05/2019   • Chronic pain syndrome 09/17/2018   • Lumbar radiculopathy 04/16/2018   • Lumbar spondylosis 04/16/2018   • Right bundle branch block (RBBB) 03/19/2018   • Edema of both lower legs due to peripheral venous insufficiency 03/19/2018   • Chronic pain of both lower extremities 03/19/2018   • Paroxysmal atrial fibrillation ([place] [address] ) 01/20/2018   • Sacroiliitis ([place] [address] ) 04/17/2017   • Spondylosis of lumbar region without myelopathy or radiculopathy 04/17/2017   • Lumbar spinal stenosis 05/12/2015     He  has a past surgical history that includes Prostatectomy; Tonsillectomy and adenoidectomy; FACIAL/NECK BIOPSY (N/A, 4/3/2017); FULL THICKNESS SKIN GRAFT (N/A, 4/3/2017); Cataract extraction; CT epidural steroid injection (TIN lumbar); MOHS RECONSTRUCTION (N/A, 10/27/2020); FLAP LOCAL HEAD / NECK (N/A, 10/27/2020); Colonoscopy; and Excision basal cell carcinoma  His family history includes Heart attack in his father  He  reports that he quit smoking about 43 years ago   His smoking use included cigarettes  He has never used smokeless tobacco  He reports current alcohol use of about 7 0 standard drinks per week  He reports that he does not use drugs  Current Outpatient Medications   Medication Sig Dispense Refill   • Acetaminophen 325 MG CAPS Take by mouth as needed      • amLODIPine (NORVASC) 10 mg tablet Take 1 tablet (10 mg total) by mouth daily 90 tablet 3   • atorvastatin (LIPITOR) 40 mg tablet Take 1 tablet (40 mg total) by mouth daily 90 tablet 1   • Cholecalciferol (VITAMIN D-3 PO) Take 2,000 unit marking on U-100 syringe by mouth daily after dinner     • co-enzyme Q-10 30 MG capsule Take 30 mg by mouth daily after dinner     • glucosamine-chondroitin 500-400 MG tablet Take 2 tablets by mouth daily in the early morning     • losartan (COZAAR) 100 MG tablet Take 1 tablet (100 mg total) by mouth daily 90 tablet 2   • multivitamin (THERAGRAN) TABS Take 1 tablet by mouth daily in the early morning     • Omega-3 Fatty Acids (FISH OIL) 1,000 mg Take 1,000 mg by mouth 2 (two) times a day     • pregabalin (LYRICA) 75 mg capsule Take 1 capsule (75 mg total) by mouth 3 (three) times a day 90 capsule 1   • sertraline (ZOLOFT) 50 mg tablet Take 1 tablet (50 mg total) by mouth daily 90 tablet 1   • sotalol (BETAPACE) 80 mg tablet TAKE 1 TABLET TWICE DAILY 180 tablet 3   • torsemide (DEMADEX) 20 mg tablet TAKE 1 TABLET TWICE DAILY 180 tablet 3   • traMADol (ULTRAM) 50 mg tablet Take 1 tablet (50 mg total) by mouth every 8 (eight) hours as needed for moderate pain 90 tablet 2   • warfarin (COUMADIN) 5 mg tablet TAKE 1/2 TO 1 TABLET DAILY OR AS DIRECTED 90 tablet 3   • diclofenac sodium (VOLTAREN) 1 % Apply 2 g topically 4 (four) times a day (Patient not taking: Reported on 8/19/2022)     • montelukast (SINGULAIR) 10 mg tablet Take 1 tablet (10 mg total) by mouth daily at bedtime (Patient not taking: Reported on 8/19/2022) 30 tablet 1     No current facility-administered medications for this visit       Current Outpatient Medications on File Prior to Visit   Medication Sig   • Acetaminophen 325 MG CAPS Take by mouth as needed    • amLODIPine (NORVASC) 10 mg tablet Take 1 tablet (10 mg total) by mouth daily   • atorvastatin (LIPITOR) 40 mg tablet Take 1 tablet (40 mg total) by mouth daily   • Cholecalciferol (VITAMIN D-3 PO) Take 2,000 unit marking on U-100 syringe by mouth daily after dinner   • co-enzyme Q-10 30 MG capsule Take 30 mg by mouth daily after dinner   • glucosamine-chondroitin 500-400 MG tablet Take 2 tablets by mouth daily in the early morning   • losartan (COZAAR) 100 MG tablet Take 1 tablet (100 mg total) by mouth daily   • multivitamin (THERAGRAN) TABS Take 1 tablet by mouth daily in the early morning   • Omega-3 Fatty Acids (FISH OIL) 1,000 mg Take 1,000 mg by mouth 2 (two) times a day   • pregabalin (LYRICA) 75 mg capsule Take 1 capsule (75 mg total) by mouth 3 (three) times a day   • sertraline (ZOLOFT) 50 mg tablet Take 1 tablet (50 mg total) by mouth daily   • sotalol (BETAPACE) 80 mg tablet TAKE 1 TABLET TWICE DAILY   • torsemide (DEMADEX) 20 mg tablet TAKE 1 TABLET TWICE DAILY   • traMADol (ULTRAM) 50 mg tablet Take 1 tablet (50 mg total) by mouth every 8 (eight) hours as needed for moderate pain   • warfarin (COUMADIN) 5 mg tablet TAKE 1/2 TO 1 TABLET DAILY OR AS DIRECTED   • diclofenac sodium (VOLTAREN) 1 % Apply 2 g topically 4 (four) times a day (Patient not taking: Reported on 8/19/2022)   • montelukast (SINGULAIR) 10 mg tablet Take 1 tablet (10 mg total) by mouth daily at bedtime (Patient not taking: Reported on 8/19/2022)     No current facility-administered medications on file prior to visit  He has No Known Allergies       Review of Systems   Constitutional: Negative for chills and fever  HENT: Negative for congestion, ear pain and sore throat  Eyes: Negative for pain  Respiratory: Positive for shortness of breath  Negative for cough      Cardiovascular: Negative for chest pain and leg swelling  Gastrointestinal: Negative for abdominal pain, nausea and vomiting  Endocrine: Negative for polyuria  Genitourinary: Negative for difficulty urinating, frequency and urgency  Musculoskeletal: Positive for arthralgias and back pain  Skin: Negative for rash  Neurological: Negative for weakness and headaches  Psychiatric/Behavioral: Negative for sleep disturbance  The patient is not nervous/anxious  Objective:      /70 (BP Location: Right arm, Patient Position: Sitting, Cuff Size: Standard)   Pulse 62   Temp 98 7 °F (37 1 °C)   Ht 5' 7" (1 702 m)   Wt 105 kg (232 lb)   SpO2 97%   BMI 36 34 kg/m²     Recent Results (from the past 1344 hour(s))   Protime-INR    Collection Time: 02/15/23 12:15 PM   Result Value Ref Range    Protime 25 7 (H) 11 6 - 14 5 seconds    INR 2 32 (H) 0 84 - 1 19   Protime-INR    Collection Time: 03/13/23 11:45 AM   Result Value Ref Range    Protime 25 3 (H) 11 6 - 14 5 seconds    INR 2 27 (H) 0 84 - 1 19        Physical Exam  Constitutional:       Appearance: Normal appearance  HENT:      Head: Normocephalic  Right Ear: Tympanic membrane, ear canal and external ear normal       Left Ear: Tympanic membrane, ear canal and external ear normal       Nose: Nose normal  No congestion  Mouth/Throat:      Mouth: Mucous membranes are moist       Pharynx: Oropharynx is clear  No oropharyngeal exudate or posterior oropharyngeal erythema  Eyes:      Extraocular Movements: Extraocular movements intact  Conjunctiva/sclera: Conjunctivae normal       Pupils: Pupils are equal, round, and reactive to light  Cardiovascular:      Rate and Rhythm: Normal rate and regular rhythm  Heart sounds: Normal heart sounds  No murmur heard  Pulmonary:      Effort: Pulmonary effort is normal       Breath sounds: Normal breath sounds  No wheezing or rales  Abdominal:      General: Bowel sounds are normal  There is no distension        Palpations: Abdomen is soft  Tenderness: There is no abdominal tenderness  Musculoskeletal:      Cervical back: Normal range of motion and neck supple  Right lower leg: Edema present  Left lower leg: Edema present  Lymphadenopathy:      Cervical: No cervical adenopathy  Skin:     General: Skin is warm  Neurological:      General: No focal deficit present  Mental Status: He is alert and oriented to person, place, and time

## 2023-04-01 DIAGNOSIS — F34.1 DYSTHYMIC DISORDER: ICD-10-CM

## 2023-05-09 LAB
CREAT ?TM UR-SCNC: 156.5 UMOL/L
EXT ALBUMIN URINE RANDOM: 2.8
HBA1C MFR BLD HPLC: 6.7 %
INR PPP: 3.8 (ref 0.84–1.19)
MICROALBUMIN/CREAT UR: 17.9 MG/G{CREAT}

## 2023-05-11 ENCOUNTER — ANTICOAG VISIT (OUTPATIENT)
Dept: CARDIOLOGY CLINIC | Facility: CLINIC | Age: 83
End: 2023-05-11

## 2023-05-11 DIAGNOSIS — I48.91 ATRIAL FIBRILLATION, UNSPECIFIED TYPE (HCC): ICD-10-CM

## 2023-05-11 DIAGNOSIS — I48.0 PAROXYSMAL ATRIAL FIBRILLATION (HCC): Primary | ICD-10-CM

## 2023-05-12 RX ORDER — TORSEMIDE 20 MG/1
TABLET ORAL
Qty: 180 TABLET | Refills: 3 | Status: SHIPPED | OUTPATIENT
Start: 2023-05-12

## 2023-05-17 ENCOUNTER — OFFICE VISIT (OUTPATIENT)
Dept: INTERNAL MEDICINE CLINIC | Facility: CLINIC | Age: 83
End: 2023-05-17

## 2023-05-17 VITALS — SYSTOLIC BLOOD PRESSURE: 126 MMHG | DIASTOLIC BLOOD PRESSURE: 74 MMHG

## 2023-05-17 DIAGNOSIS — E78.2 MIXED HYPERLIPIDEMIA: ICD-10-CM

## 2023-05-17 DIAGNOSIS — G89.4 CHRONIC PAIN SYNDROME: ICD-10-CM

## 2023-05-17 DIAGNOSIS — F34.1 DYSTHYMIC DISORDER: ICD-10-CM

## 2023-05-17 DIAGNOSIS — I87.2 VENOUS INSUFFICIENCY: ICD-10-CM

## 2023-05-17 DIAGNOSIS — I45.10 RIGHT BUNDLE BRANCH BLOCK (RBBB): ICD-10-CM

## 2023-05-17 DIAGNOSIS — M47.816 LUMBAR SPONDYLOSIS: ICD-10-CM

## 2023-05-17 DIAGNOSIS — I48.0 PAROXYSMAL ATRIAL FIBRILLATION (HCC): Primary | ICD-10-CM

## 2023-05-17 DIAGNOSIS — G45.9 TIA (TRANSIENT ISCHEMIC ATTACK): ICD-10-CM

## 2023-05-17 DIAGNOSIS — I10 ESSENTIAL HYPERTENSION: ICD-10-CM

## 2023-05-17 DIAGNOSIS — I48.0 PAROXYSMAL ATRIAL FIBRILLATION (HCC): ICD-10-CM

## 2023-05-17 DIAGNOSIS — E11.9 TYPE 2 DIABETES, HBA1C GOAL < 7% (HCC): ICD-10-CM

## 2023-05-17 DIAGNOSIS — C61 PROSTATE CANCER (HCC): ICD-10-CM

## 2023-05-17 RX ORDER — PREDNISONE 2.5 MG/1
2.5 TABLET ORAL DAILY
COMMUNITY

## 2023-05-17 RX ORDER — SOTALOL HYDROCHLORIDE 80 MG/1
TABLET ORAL
Qty: 180 TABLET | Refills: 3 | Status: SHIPPED | OUTPATIENT
Start: 2023-05-17

## 2023-05-17 NOTE — PROGRESS NOTES
Assessment/Plan:      Falls Plan of Care: balance, strength, and gait training instructions were provided  1  Paroxysmal atrial fibrillation ([facility] )    2  Lumbar spondylosis    3  Right bundle branch block (RBBB)    4  Mixed hyperlipidemia    5  Venous insufficiency    6  Chronic pain syndrome    7  Essential hypertension    8  Prostate cancer ([facility] [address] )    9  TIA (transient ischemic attack)    10  Type 2 diabetes, HbA1c goal < 7% ([facility])    11  Dysthymic disorder           Subjective:      Patient ID: Paula Hurd is a 82 y o  male  Follow-up on blood test done on 5/9/2023 test discussed with him      The following portions of the patient's history were reviewed and updated as appropriate: He  has a past medical history of [facility]fib ([facility] ), Anemia, unspecified, Anxiety, Arthritis, Basal cell carcinoma (BCC) of skin of nose, Cancer ([facility]), Chondrocostal junction syndrome, CPAP (continuous positive airway pressure) dependence, Depression, Dysthymic disorder, Edema, unspecified, Hyperlipidemia, Hypertension, Impaired fasting blood sugar, Intervertebral disc disorders with radiculopathy, lumbar region, Irregular heart beat, Prostate cancer ([facility] ), Sleep apnea, Spinal stenosis, Stroke ([facility] ), TIA (transient ischemic attack), Unspecified osteoarthritis, unspecified site, and Venous insufficiency of both lower extremities    He   Patient Active Problem List    Diagnosis Date Noted   • Depression, recurrent ([facility] ) 08/19/2022   • Type 2 diabetes, HbA1c goal < 7% ([facility] ) 05/25/2022   • Cellulitis of right foot 12/17/2021   • Abnormal gait 08/04/2021   • Obesity, morbid ([facility] ) 03/26/2021   • Non-seasonal allergic rhinitis 03/26/2021   • Primary osteoarthritis of both knees 11/13/2020   • TIA (transient ischemic attack)    • Prostate cancer ([facility] [address] )    • Intervertebral disc disorders with radiculopathy, lumbar region    • Impaired fasting blood sugar    • Hypertension    • Mixed hyperlipidemia    • Obstructive sleep apnea syndrome • CPAP (continuous positive airway pressure) dependence    • Dysthymic disorder    • Venous insufficiency of both lower extremities    • Essential hypertension 11/20/2019   • Venous insufficiency 99/03/5948   • Uncomplicated opioid dependence ([facility] ) 06/05/2019   • Encounter for long-term use of opiate analgesic 06/05/2019   • Chronic pain syndrome 09/17/2018   • Lumbar radiculopathy 04/16/2018   • Lumbar spondylosis 04/16/2018   • Right bundle branch block (RBBB) 03/19/2018   • Edema of both lower legs due to peripheral venous insufficiency 03/19/2018   • Chronic pain of both lower extremities 03/19/2018   • Paroxysmal atrial fibrillation ([facility] ) 01/20/2018   • Sacroiliitis ([facility] ) 04/17/2017   • Spondylosis of lumbar region without myelopathy or radiculopathy 04/17/2017   • Lumbar spinal stenosis 05/12/2015     He  has a past surgical history that includes Prostatectomy; Tonsillectomy and adenoidectomy; FACIAL/NECK BIOPSY (N/A, 4/3/2017); FULL THICKNESS SKIN GRAFT (N/A, 4/3/2017); Cataract extraction; CT epidural steroid injection (TIN lumbar); MOHS RECONSTRUCTION (N/A, 10/27/2020); FLAP LOCAL HEAD / NECK (N/A, 10/27/2020); Colonoscopy; and Excision basal cell carcinoma  His family history includes Heart attack in his father  He  reports that he quit smoking about 43 years ago  His smoking use included cigarettes  He has never used smokeless tobacco  He reports current alcohol use of about 7 0 standard drinks per week  He reports that he does not use drugs    Current Outpatient Medications   Medication Sig Dispense Refill   • Acetaminophen 325 MG CAPS Take by mouth as needed      • amLODIPine (NORVASC) 10 mg tablet Take 1 tablet (10 mg total) by mouth daily 90 tablet 3   • atorvastatin (LIPITOR) 40 mg tablet Take 1 tablet (40 mg total) by mouth daily 90 tablet 1   • Cholecalciferol (VITAMIN D-3 PO) Take 2,000 unit marking on U-100 syringe by mouth daily after dinner     • co-enzyme Q-10 30 MG capsule Take 30 mg by mouth daily after dinner     • glucosamine-chondroitin 500-400 MG tablet Take 2 tablets by mouth daily in the early morning     • losartan (COZAAR) 100 MG tablet TAKE 1 TABLET (100 MG TOTAL) BY MOUTH DAILY 90 tablet 2   • multivitamin (THERAGRAN) TABS Take 1 tablet by mouth daily in the early morning     • Omega-3 Fatty Acids (FISH OIL) 1,000 mg Take 1,000 mg by mouth 2 (two) times a day     • predniSONE 2 5 mg tablet Take 2 5 mg by mouth daily     • pregabalin (LYRICA) 75 mg capsule TAKE 1 CAPSULE (75 MG TOTAL) BY MOUTH 3 (THREE) TIMES A DAY 90 capsule 2   • sertraline (ZOLOFT) 50 mg tablet Take 1 tablet (50 mg total) by mouth daily 90 tablet 0   • sotalol (BETAPACE) 80 mg tablet TAKE 1 TABLET TWICE DAILY 180 tablet 3   • torsemide (DEMADEX) 20 mg tablet TAKE 1 TABLET TWICE DAILY (Patient taking differently: Take 20 mg by mouth daily) 180 tablet 3   • traMADol (ULTRAM) 50 mg tablet Take 1 tablet (50 mg total) by mouth every 8 (eight) hours as needed for moderate pain 90 tablet 2   • warfarin (COUMADIN) 5 mg tablet TAKE 1/2 TO 1 TABLET DAILY OR AS DIRECTED 90 tablet 3   • diclofenac sodium (VOLTAREN) 1 % Apply 2 g topically 4 (four) times a day (Patient not taking: Reported on 5/17/2023)     • montelukast (SINGULAIR) 10 mg tablet Take 1 tablet (10 mg total) by mouth daily at bedtime (Patient not taking: Reported on 5/17/2023) 30 tablet 1     No current facility-administered medications for this visit       Current Outpatient Medications on File Prior to Visit   Medication Sig   • Acetaminophen 325 MG CAPS Take by mouth as needed    • amLODIPine (NORVASC) 10 mg tablet Take 1 tablet (10 mg total) by mouth daily   • atorvastatin (LIPITOR) 40 mg tablet Take 1 tablet (40 mg total) by mouth daily   • Cholecalciferol (VITAMIN D-3 PO) Take 2,000 unit marking on U-100 syringe by mouth daily after dinner   • co-enzyme Q-10 30 MG capsule Take 30 mg by mouth daily after dinner   • glucosamine-chondroitin 500-400 MG tablet Take 2 tablets by mouth daily in the early morning   • losartan (COZAAR) 100 MG tablet TAKE 1 TABLET (100 MG TOTAL) BY MOUTH DAILY   • multivitamin (THERAGRAN) TABS Take 1 tablet by mouth daily in the early morning   • Omega-3 Fatty Acids (FISH OIL) 1,000 mg Take 1,000 mg by mouth 2 (two) times a day   • predniSONE 2 5 mg tablet Take 2 5 mg by mouth daily   • pregabalin (LYRICA) 75 mg capsule TAKE 1 CAPSULE (75 MG TOTAL) BY MOUTH 3 (THREE) TIMES A DAY   • sertraline (ZOLOFT) 50 mg tablet Take 1 tablet (50 mg total) by mouth daily   • sotalol (BETAPACE) 80 mg tablet TAKE 1 TABLET TWICE DAILY   • torsemide (DEMADEX) 20 mg tablet TAKE 1 TABLET TWICE DAILY (Patient taking differently: Take 20 mg by mouth daily)   • traMADol (ULTRAM) 50 mg tablet Take 1 tablet (50 mg total) by mouth every 8 (eight) hours as needed for moderate pain   • warfarin (COUMADIN) 5 mg tablet TAKE 1/2 TO 1 TABLET DAILY OR AS DIRECTED   • diclofenac sodium (VOLTAREN) 1 % Apply 2 g topically 4 (four) times a day (Patient not taking: Reported on 5/17/2023)   • montelukast (SINGULAIR) 10 mg tablet Take 1 tablet (10 mg total) by mouth daily at bedtime (Patient not taking: Reported on 5/17/2023)     No current facility-administered medications on file prior to visit  He has No Known Allergies       Review of Systems   Constitutional: Negative for chills and fever  HENT: Negative for congestion, ear pain and sore throat  Eyes: Negative for pain  Respiratory: Positive for shortness of breath  Negative for cough  Cardiovascular: Negative for chest pain and leg swelling  Gastrointestinal: Negative for abdominal pain, nausea and vomiting  Endocrine: Negative for polyuria  Genitourinary: Negative for difficulty urinating, frequency and urgency  Musculoskeletal: Positive for arthralgias and back pain  Skin: Negative for rash  Neurological: Negative for weakness and headaches  Psychiatric/Behavioral: Negative for sleep disturbance   The "patient is not nervous/anxious  Objective:      /74 (BP Location: Right arm, Patient Position: Sitting, Cuff Size: Standard)   Pulse (P) 57   Temp (P) 98 1 °F (36 7 °C) (Temporal)   Ht (P) 5' 7\" (1 702 m)   Wt (P) 112 kg (246 lb)   SpO2 (P) 98%   BMI (P) 38 53 kg/m²     Recent Results (from the past 1344 hour(s))   Protime-INR    Collection Time: 04/13/23 11:18 AM   Result Value Ref Range    Protime 30 3 (H) 11 6 - 14 5 seconds    INR 2 86 (H) 0 84 - 1 19   Hemoglobin A1C    Collection Time: 05/09/23 12:00 AM   Result Value Ref Range    Hemoglobin A1C 6 7    Albumin / creatinine urine ratio    Collection Time: 05/09/23 12:00 AM   Result Value Ref Range    EXT Creatinine Urine 156 5     Albumin,U,Random 2 8     Alb/Creat Ratio 17 9    Protime-INR    Collection Time: 05/09/23 12:00 AM   Result Value Ref Range    INR 3 80 (A) 0 84 - 1 19   HEMOGLOBIN A1C    Collection Time: 05/09/23 10:54 AM   Result Value Ref Range    Hemoglobin A1C 6 7 (H) <5 7 %    eAG, EST AVG Glucose 146 mg/dL mg/dL        Physical Exam  Constitutional:       Appearance: Normal appearance  HENT:      Head: Normocephalic  Right Ear: External ear normal       Left Ear: External ear normal       Nose: Nose normal  No congestion  Mouth/Throat:      Mouth: Mucous membranes are moist       Pharynx: Oropharynx is clear  No oropharyngeal exudate or posterior oropharyngeal erythema  Eyes:      Extraocular Movements: Extraocular movements intact  Conjunctiva/sclera: Conjunctivae normal    Cardiovascular:      Rate and Rhythm: Normal rate and regular rhythm  Heart sounds: Normal heart sounds  No murmur heard  Pulmonary:      Effort: Pulmonary effort is normal       Breath sounds: Normal breath sounds  No wheezing or rales  Abdominal:      General: Bowel sounds are normal  There is no distension  Palpations: Abdomen is soft  Tenderness: There is no abdominal tenderness     Musculoskeletal:         " General: Normal range of motion  Cervical back: Normal range of motion and neck supple  Right lower leg: Edema present  Left lower leg: Edema present  Lymphadenopathy:      Cervical: No cervical adenopathy  Skin:     General: Skin is warm  Neurological:      General: No focal deficit present  Mental Status: He is alert and oriented to person, place, and time

## 2023-05-31 DIAGNOSIS — E78.2 MIXED HYPERLIPIDEMIA: ICD-10-CM

## 2023-05-31 RX ORDER — ATORVASTATIN CALCIUM 40 MG/1
40 TABLET, FILM COATED ORAL DAILY
Qty: 90 TABLET | Refills: 1 | Status: SHIPPED | OUTPATIENT
Start: 2023-05-31

## 2023-06-01 ENCOUNTER — APPOINTMENT (OUTPATIENT)
Dept: LAB | Facility: CLINIC | Age: 83
End: 2023-06-01
Payer: MEDICARE

## 2023-06-02 ENCOUNTER — ANTICOAG VISIT (OUTPATIENT)
Dept: CARDIOLOGY CLINIC | Facility: CLINIC | Age: 83
End: 2023-06-02

## 2023-06-02 DIAGNOSIS — I48.0 PAROXYSMAL ATRIAL FIBRILLATION (HCC): Primary | ICD-10-CM

## 2023-06-13 ENCOUNTER — OFFICE VISIT (OUTPATIENT)
Dept: CARDIOLOGY CLINIC | Facility: CLINIC | Age: 83
End: 2023-06-13
Payer: MEDICARE

## 2023-06-13 ENCOUNTER — TELEPHONE (OUTPATIENT)
Dept: CARDIOLOGY CLINIC | Facility: CLINIC | Age: 83
End: 2023-06-13

## 2023-06-13 VITALS
WEIGHT: 245 LBS | SYSTOLIC BLOOD PRESSURE: 140 MMHG | HEIGHT: 67 IN | DIASTOLIC BLOOD PRESSURE: 72 MMHG | BODY MASS INDEX: 38.45 KG/M2 | OXYGEN SATURATION: 98 % | HEART RATE: 62 BPM

## 2023-06-13 DIAGNOSIS — Z01.810 PRE-OPERATIVE CARDIOVASCULAR EXAMINATION: Primary | ICD-10-CM

## 2023-06-13 DIAGNOSIS — I48.0 PAROXYSMAL ATRIAL FIBRILLATION (HCC): ICD-10-CM

## 2023-06-13 DIAGNOSIS — I10 ESSENTIAL HYPERTENSION: ICD-10-CM

## 2023-06-13 DIAGNOSIS — G47.33 OBSTRUCTIVE SLEEP APNEA SYNDROME: ICD-10-CM

## 2023-06-13 DIAGNOSIS — I45.10 RIGHT BUNDLE BRANCH BLOCK (RBBB): ICD-10-CM

## 2023-06-13 DIAGNOSIS — G45.9 TIA (TRANSIENT ISCHEMIC ATTACK): ICD-10-CM

## 2023-06-13 DIAGNOSIS — I87.2 VENOUS INSUFFICIENCY: ICD-10-CM

## 2023-06-13 PROCEDURE — 93000 ELECTROCARDIOGRAM COMPLETE: CPT | Performed by: INTERNAL MEDICINE

## 2023-06-13 PROCEDURE — 99214 OFFICE O/P EST MOD 30 MIN: CPT | Performed by: INTERNAL MEDICINE

## 2023-06-13 NOTE — Clinical Note
PLEASE FAX A COPY OF TODAY'S OFFICE VISIT TO:  Edu Sexton MD   [address] [facility]  [phone] (Fax)

## 2023-06-13 NOTE — PATIENT INSTRUCTIONS
You were seen today in the Cardiology office for pre operative risk assessment  There is no additional cardiac testing required prior to your back surgery  Please continue all your medications as prescribed  We will plan on holding your coumadin for 5 days prior to your procedure - please contact our [place] office when you have the surgery date scheduled for coumadin instructions  Thank you for choosing [employer]

## 2023-06-13 NOTE — PROGRESS NOTES
[facility]    CHIEF COMPLAINT:   Chief Complaint   Patient presents with   • Consult     CC Clearance       HPI:  Chandu Srinivasan is a 82 y o  male with a past medical history of obesity, CHASIDY on CPAP, remote history TIA, hypertension, hyperlipidemia, paroxysmal atrial fibrillation, chronic anticoagulation with warfarin who presents for pre operative risk assessment  He is following with Neurosurgery at [facility] for L4-L5 lumbar stenosis which has been refractory to epidural injections and pain medications  He has progressive lower extremity weakness  + recent falls  He is being considered for lumbar surgery and presents today for pre operative risk assessment  He has a remote history TIA  No history myocardial infarction, congestive heart failure, diabetes on insulin, advanced CKD  He has a history of chronic venous insufficiency and swelling in his bilateral legs  He takes torsemide daily and raises his legs with improvement in swelling  He has CHASIDY and uses CPAP nightly  Sotalol and warfarin for paroxysmal atrial fibrillation  No new complaints today  Feels well and denies visual changes, lightheadedness, syncope, chest pain, palpitations, shortness of breath at rest or with exertion, orthopnea, PND, nausea, vomiting, diarrhea, dark or bright red blood in stools   + leg edema    The following portions of the patient's history were reviewed and updated as appropriate: allergies, current medications, past family history, past medical history, past social history, past surgical history, and problem list     SINCE LAST OV I REVIEWED WITH THE PATIENT THE INTERIM LABS, TEST RESULTS, CONSULTANT(S) NOTES AND PERFORMED AN INTERIM REVIEW OF HISTORY    Past Medical History:   Diagnosis Date   • A-fib ([facility] [address] )    • Anemia, unspecified    • Anxiety    • Arthritis    • Basal cell carcinoma (BCC) of skin of nose    • Cancer (HCC)    • Chondrocostal junction syndrome    • CPAP (continuous positive airway pressure) dependence    • Depression    • Dysthymic disorder    • Edema, unspecified    • Hyperlipidemia    • Hypertension    • Impaired fasting blood sugar    • Intervertebral disc disorders with radiculopathy, lumbar region    • Irregular heart beat    • Prostate cancer [facility])     s/p surgery   • Sleep apnea     on CPAP   • Spinal stenosis    • Stroke [facility])    • TIA (transient ischemic attack)     no residual problems   • Unspecified osteoarthritis, unspecified site    • Venous insufficiency of both lower extremities        Past Surgical History:   Procedure Laterality Date   • BASAL CELL CARCINOMA EXCISION      on the nose   • CATARACT EXTRACTION     • COLONOSCOPY      2011   • CT EPIDURAL STEROID INJECTION (TIN LUMBAR)     • FACIAL/NECK BIOPSY N/A 4/3/2017    Procedure: NOSE BCC EXCISION; FROZEN SECTION; RECONSTRUCTION ;  Surgeon: Saul Alvarez MD;  [facility];  Service:    • FLAP LOCAL HEAD / NECK N/A 10/27/2020    Procedure: NOSE FLAP;  Surgeon: Saul Alvarez MD;  [facility];  Service: Plastics   • FULL THICKNESS SKIN GRAFT N/A 4/3/2017    Procedure: FLAP VERSUS FULL THICKNESS SKIN GRAFT ;  Surgeon: Saul Alvarez MD;  [facility];  Service:    • MOHS RECONSTRUCTION N/A 10/27/2020    Procedure: NOSE MOHS DEFECT RECONSTRUCTION;  Surgeon: Saul Alvarez MD;  [facility];  Service: Plastics   • PROSTATECTOMY     • TONSILLECTOMY AND ADENOIDECTOMY         Social History     Socioeconomic History   • Marital status: /Civil Union     Spouse name: Not on file   • Number of children: Not on file   • Years of education: Not on file   • Highest education level: Not on file   Occupational History   • Not on file   Tobacco Use   • Smoking status: Former     Types: Cigarettes     Quit date:      Years since quittin 4   • Smokeless tobacco: Never   • Tobacco comments:     long time ago   Vaping Use   • Vaping Use: Never used   Substance and Sexual Activity   • Alcohol use: Yes     Alcohol/week: 7 0 standard drinks of alcohol     Types: 7 Glasses of wine per week     Comment: glass of wine with dinner, maybe   • Drug use: No   • Sexual activity: Not Currently   Other Topics Concern   • Not on file   Social History Narrative   • Not on file     Social Determinants of Health     Financial Resource Strain: Low Risk  (8/19/2022)    Overall Financial Resource Strain (CARDIA)    • Difficulty of Paying Living Expenses: Not hard at all   Food Insecurity: Not on file   Transportation Needs: No Transportation Needs (8/19/2022)    PRAPARE - Transportation    • Lack of Transportation (Medical): No    • Lack of Transportation (Non-Medical):  No   Physical Activity: Not on file   Stress: Not on file   Social Connections: Not on file   Intimate Partner Violence: Not on file   Housing Stability: Not on file       Family History   Problem Relation Age of Onset   • Heart attack Father        No Known Allergies    Current Outpatient Medications   Medication Sig Dispense Refill   • Acetaminophen 325 MG CAPS Take by mouth as needed      • amLODIPine (NORVASC) 10 mg tablet Take 1 tablet (10 mg total) by mouth daily 90 tablet 3   • atorvastatin (LIPITOR) 40 mg tablet Take 1 tablet (40 mg total) by mouth daily 90 tablet 1   • Cholecalciferol (VITAMIN D-3 PO) Take 2,000 unit marking on U-100 syringe by mouth daily after dinner     • co-enzyme Q-10 30 MG capsule Take 30 mg by mouth daily after dinner     • glucosamine-chondroitin 500-400 MG tablet Take 2 tablets by mouth daily in the early morning     • losartan (COZAAR) 100 MG tablet TAKE 1 TABLET (100 MG TOTAL) BY MOUTH DAILY 90 tablet 2   • multivitamin (THERAGRAN) TABS Take 1 tablet by mouth daily in the early morning     • Omega-3 Fatty Acids (FISH OIL) 1,000 mg Take 1,000 mg by mouth 2 (two) times a day     • predniSONE 2 5 mg tablet Take 2 5 mg by mouth daily     • pregabalin (LYRICA) 75 mg capsule TAKE 1 CAPSULE (75 "MG TOTAL) BY MOUTH 3 (THREE) TIMES A DAY 90 capsule 2   • sertraline (ZOLOFT) 50 mg tablet Take 1 tablet (50 mg total) by mouth daily 90 tablet 0   • sotalol (BETAPACE) 80 mg tablet TAKE 1 TABLET TWICE DAILY 180 tablet 3   • torsemide (DEMADEX) 20 mg tablet TAKE 1 TABLET TWICE DAILY (Patient taking differently: Take 20 mg by mouth daily) 180 tablet 3   • traMADol (ULTRAM) 50 mg tablet Take 1 tablet (50 mg total) by mouth every 8 (eight) hours as needed for moderate pain 90 tablet 2   • warfarin (COUMADIN) 5 mg tablet TAKE 1/2 TO 1 TABLET DAILY OR AS DIRECTED 90 tablet 3   • diclofenac sodium (VOLTAREN) 1 % Apply 2 g topically 4 (four) times a day (Patient not taking: Reported on 5/17/2023)     • montelukast (SINGULAIR) 10 mg tablet Take 1 tablet (10 mg total) by mouth daily at bedtime (Patient not taking: Reported on 5/17/2023) 30 tablet 1     No current facility-administered medications for this visit  /72 (BP Location: Left arm, Patient Position: Sitting, Cuff Size: Large)   Pulse 62   Ht 5' 7\" (1 702 m)   Wt 111 kg (245 lb)   SpO2 98%   BMI 38 37 kg/m²     Review of Systems   All other systems reviewed and are negative  Physical Exam  Vitals reviewed  Constitutional:       General: He is not in acute distress  Appearance: Normal appearance  He is well-developed  He is obese  He is not toxic-appearing  HENT:      Head: Normocephalic and atraumatic  Eyes:      General: No scleral icterus  Conjunctiva/sclera: Conjunctivae normal    Neck:      Vascular: No carotid bruit  Cardiovascular:      Rate and Rhythm: Normal rate and regular rhythm  Pulses: Normal pulses  Heart sounds: Normal heart sounds  No murmur heard  No gallop  Pulmonary:      Effort: Pulmonary effort is normal  No respiratory distress  Breath sounds: Normal breath sounds  No wheezing or rales  Abdominal:      General: Bowel sounds are normal  There is no distension        Palpations: Abdomen is " soft       Tenderness: There is no abdominal tenderness  There is no guarding  Musculoskeletal:         General: No swelling  Right lower leg: Edema (1+) present  Left lower leg: Edema (1+) present  Skin:     General: Skin is warm and dry  Capillary Refill: Capillary refill takes less than 2 seconds  Coloration: Skin is not jaundiced or pale  Neurological:      Mental Status: He is alert  Psychiatric:         Mood and Affect: Mood normal          Behavior: Behavior normal           Lab Results   Component Value Date    ALT 34 11/23/2021    AST 21 11/23/2021    BUN 17 11/23/2021    CALCIUM 9 2 11/23/2021     (H) 11/23/2021    CO2 25 11/23/2021    CREATININE 0 98 11/23/2021    INR 2 60 (H) 06/01/2023    K 4 1 11/23/2021       Lab Results   Component Value Date    HDL 43 08/26/2021    LDLCALC 74 08/26/2021    TRIG 153 (H) 08/26/2021       Lab Results   Component Value Date    HCT 46 8 08/26/2021    HGB 15 4 08/26/2021     08/26/2021    WBC 8 67 08/26/2021       Lab Results   Component Value Date     05/09/2023    HGBA1C 6 7 (H) 05/09/2023     Cardiac studies:   Results for orders placed or performed in visit on 06/13/23   POCT ECG    Impression    Normal sinus rhythm  Right bundle branch block     TTE - 7/12/21: Normal left ventricular cavity size and wall thickness  Normal wall motion and systolic function  LVEF 60%, grade 1 DD  Normal right ventricular cavity size and systolic function  Mild tricuspid and aortic regurgitation  ASSESSMENT AND PLAN:  Padmini Rivera was seen today for consult  Diagnoses and all orders for this visit:    #  Pre-operative cardiovascular examination: Mr Dayanara Bloom is a 82year old gentleman with the above mentioned past medical history who presents for pre-operative risk assessment for tentative lumbar surgery   Surgical date is yet to be determined and he states will not be arranged until after follow up with the neurosurgeon in September '23  Currently, he is asymptomatic from a cardiovascular perspective  He has no exam evidence of congestive heart failure  Chronic lower extremity edema due to venous insufficiency  He has a remote history TIA  No history myocardial infarction, congestive heart failure, diabetes on insulin, advanced CKD  His functional capacity is limited due to back pain and leg weakness  ECG today demonstrates normal sinus rhythm with RBBB (chronic)  Recommendations:  -RCRI risk factors: history of TIA (remote)  RCI RISK CLASS II (1 risk factor, risk of major cardiac compl  appr  1 3%)  -No Cardiac Contraindication for Planned Surgical Procedures  -No additional cardiac work up is required at this time prior to lumbar surgery  -Given that surgery is >3 months away, he should return to the office for another assessment if he has any new cardiac or pulmonary symptoms  Otherwise, no contraindications to proceeding at that time  -Bring CPAP to the hospital if remaining overnight   -Sabianist - no blood transfusions  -Medication management as follows:  · Anticoagulation: Hold warfarin for 5 days prior to the procedure  Check INR the morning of surgery  Resume warfarin when safe from a surgical perspective  · Antiarrhythmic: Continue Sotalol throughout the surgical period and maintain potassium >4 0 and Mg >2 0    He will contact our coumadin clinic for anticoagulation management once he has a surgical date scheduled  #  Paroxysmal atrial fibrillation ([facility] [address] ): ECG with normal sinus rhythm  Recent lab work at [facility] in May 2023 with eGFR 87, potassium 3 8  Continue sotalol 80 mg BID  Continue warfarin management through coumadin clinic  #  Right bundle branch block (RBBB): Chronic  Stable  #  Essential hypertension: Blood pressure is adequately controlled at this time  Given bilateral leg weakness and increased propensity for falls, I do not recommend more aggressive lowering at this time   Continue Losartan 100 mg daily and amlodipine 10 mg daily  #  Venous insufficiency: Chronic bilateral leg edema  Taking torsemide 20 mg daily for this  Continue leg elevation when able  eGFR and potassium acceptable  #  TIA (transient ischemic attack): Details are unknown  He is on warfarin for anticoagulation and no indication for aspirin at this time  Continue atorvastatin  #  Obstructive sleep apnea syndrome: Continue CPAP    Follow up with Dr Rome Friedman as previously scheduled       Tawny Juarez MD

## 2023-06-13 NOTE — TELEPHONE ENCOUNTER
While checking pt out he stated he would like to stay with Dr Gill Lei and see him over at the [facility] office  Did Dr Gill Lei want to see him back in a certain amount of time?

## 2023-06-14 DIAGNOSIS — I48.0 PAROXYSMAL ATRIAL FIBRILLATION (HCC): Primary | ICD-10-CM

## 2023-07-02 DIAGNOSIS — F34.1 DYSTHYMIC DISORDER: ICD-10-CM

## 2023-07-02 NOTE — TELEPHONE ENCOUNTER
Medication Refill Request     Name  sertraline (ZOLOFT) 50 mg tablet  Dose/Frequency Take 1 tablet (50 mg total) by mouth daily  Quantity 90 tablet  Verified pharmacy   [x]  Verified ordering Provider   [x]  Does patient have enough for the next 3 days?  Yes [x] No []

## 2023-07-05 ENCOUNTER — TELEPHONE (OUTPATIENT)
Dept: SLEEP CENTER | Facility: CLINIC | Age: 83
End: 2023-07-05

## 2023-07-05 DIAGNOSIS — G47.33 OSA (OBSTRUCTIVE SLEEP APNEA): Primary | ICD-10-CM

## 2023-07-05 NOTE — TELEPHONE ENCOUNTER
Patient left message stating his Dreamstation 2 was giving him problems. He reached out to [address] [place] [address] and they told him he needs a new machine. They require a script from the doctor. Patient previously saw Dr. Tanner Dimas and is scheduled for follow up with Dr. Kodak Estrada 11/10/23. Called and spoke to patient. States his machine just stopped working. Advised patient that Dr. Kodak Estrada will not be starting in the sleep center until September but I will send request to one of our other sleep physicians Dr. Alfred Parsk. Once Rx is written it will be sent to [address] [place] [address]. Dr. Alfred Parks, please write Rx for replacement CPAP 11cm. Current machine is broken beyond repair per patient. Patient confirmed he uses nasal pillows.

## 2023-07-06 LAB

## 2023-07-07 ENCOUNTER — APPOINTMENT (OUTPATIENT)
Dept: LAB | Facility: CLINIC | Age: 83
End: 2023-07-07
Payer: MEDICARE

## 2023-07-10 LAB

## 2023-07-13 DIAGNOSIS — M54.16 LUMBAR RADICULOPATHY: ICD-10-CM

## 2023-07-13 DIAGNOSIS — M48.062 SPINAL STENOSIS OF LUMBAR REGION WITH NEUROGENIC CLAUDICATION: ICD-10-CM

## 2023-07-14 RX ORDER — PREGABALIN 75 MG/1
75 CAPSULE ORAL 3 TIMES DAILY
Qty: 90 CAPSULE | OUTPATIENT
Start: 2023-07-14

## 2023-07-17 ENCOUNTER — APPOINTMENT (OUTPATIENT)
Dept: LAB | Facility: CLINIC | Age: 83
End: 2023-07-17
Payer: MEDICARE

## 2023-07-18 ENCOUNTER — ANTICOAG VISIT (OUTPATIENT)
Dept: CARDIOLOGY CLINIC | Facility: CLINIC | Age: 83
End: 2023-07-18

## 2023-07-18 DIAGNOSIS — I48.0 PAROXYSMAL ATRIAL FIBRILLATION (HCC): Primary | ICD-10-CM

## 2023-07-26 ENCOUNTER — TELEPHONE (OUTPATIENT)
Dept: SLEEP CENTER | Facility: CLINIC | Age: 83
End: 2023-07-26

## 2023-07-26 LAB
DME PARACHUTE DELIVERY DATE ACTUAL: NORMAL
DME PARACHUTE DELIVERY DATE REQUESTED: NORMAL
DME PARACHUTE DELIVERY NOTE: NORMAL
DME PARACHUTE ITEM DESCRIPTION: NORMAL
DME PARACHUTE ORDER STATUS: NORMAL
DME PARACHUTE SUPPLIER NAME: NORMAL
DME PARACHUTE SUPPLIER PHONE: NORMAL

## 2023-07-26 NOTE — TELEPHONE ENCOUNTER
E mail from [address]     We have the order and it looks like we tried to contact this pt twice on getting him setup. I will have the scheduling team contact this pt. What number do you have on file? We have [phone] and [phone].

## 2023-07-26 NOTE — TELEPHONE ENCOUNTER
Returned the patient's call     Patient called and reports that he is still waiting for the CPAP machine and [facility] first said they did not have the order, then they told him maybe it shipped   Advised the patient I will contact [address] supervisor to find out what's going on

## 2023-08-08 ENCOUNTER — APPOINTMENT (OUTPATIENT)
Dept: LAB | Facility: CLINIC | Age: 83
End: 2023-08-08
Payer: MEDICARE

## 2023-08-09 ENCOUNTER — ANTICOAG VISIT (OUTPATIENT)
Dept: CARDIOLOGY CLINIC | Facility: CLINIC | Age: 83
End: 2023-08-09

## 2023-08-09 DIAGNOSIS — I48.0 PAROXYSMAL ATRIAL FIBRILLATION (HCC): Primary | ICD-10-CM

## 2023-08-21 DIAGNOSIS — I10 ESSENTIAL (PRIMARY) HYPERTENSION: ICD-10-CM

## 2023-08-22 ENCOUNTER — APPOINTMENT (OUTPATIENT)
Dept: LAB | Facility: CLINIC | Age: 83
End: 2023-08-22
Payer: MEDICARE

## 2023-08-22 RX ORDER — AMLODIPINE BESYLATE 10 MG/1
10 TABLET ORAL DAILY
Qty: 90 TABLET | Refills: 3 | Status: SHIPPED | OUTPATIENT
Start: 2023-08-22

## 2023-08-23 ENCOUNTER — ANTICOAG VISIT (OUTPATIENT)
Dept: CARDIOLOGY CLINIC | Facility: CLINIC | Age: 83
End: 2023-08-23

## 2023-08-23 ENCOUNTER — OFFICE VISIT (OUTPATIENT)
Dept: INTERNAL MEDICINE CLINIC | Facility: CLINIC | Age: 83
End: 2023-08-23
Payer: MEDICARE

## 2023-08-23 VITALS
TEMPERATURE: 98.1 F | OXYGEN SATURATION: 97 % | HEART RATE: 67 BPM | BODY MASS INDEX: 37.51 KG/M2 | DIASTOLIC BLOOD PRESSURE: 82 MMHG | HEIGHT: 67 IN | WEIGHT: 239 LBS | SYSTOLIC BLOOD PRESSURE: 134 MMHG

## 2023-08-23 DIAGNOSIS — E11.9 TYPE 2 DIABETES, HBA1C GOAL < 7% (HCC): ICD-10-CM

## 2023-08-23 DIAGNOSIS — E78.2 MIXED HYPERLIPIDEMIA: ICD-10-CM

## 2023-08-23 DIAGNOSIS — M51.16 INTERVERTEBRAL DISC DISORDERS WITH RADICULOPATHY, LUMBAR REGION: ICD-10-CM

## 2023-08-23 DIAGNOSIS — M17.0 PRIMARY OSTEOARTHRITIS OF BOTH KNEES: ICD-10-CM

## 2023-08-23 DIAGNOSIS — I48.0 PAROXYSMAL ATRIAL FIBRILLATION (HCC): Primary | ICD-10-CM

## 2023-08-23 DIAGNOSIS — I48.0 PAROXYSMAL ATRIAL FIBRILLATION (HCC): ICD-10-CM

## 2023-08-23 DIAGNOSIS — Z00.00 MEDICARE ANNUAL WELLNESS VISIT, SUBSEQUENT: ICD-10-CM

## 2023-08-23 DIAGNOSIS — I10 ESSENTIAL HYPERTENSION: Primary | ICD-10-CM

## 2023-08-23 PROCEDURE — G0439 PPPS, SUBSEQ VISIT: HCPCS | Performed by: INTERNAL MEDICINE

## 2023-08-23 PROCEDURE — 99214 OFFICE O/P EST MOD 30 MIN: CPT | Performed by: INTERNAL MEDICINE

## 2023-08-23 RX ORDER — PREDNISONE 5 MG/1
TABLET ORAL
COMMUNITY
Start: 2023-07-21

## 2023-08-23 NOTE — PROGRESS NOTES
Assessment and Plan:     Problem List Items Addressed This Visit        Endocrine    Type 2 diabetes, HbA1c goal < 7% (HCC)    Relevant Orders    CBC and differential    Comprehensive metabolic panel    Hemoglobin A1C    Lipid Panel with Direct LDL reflex    TSH, 3rd generation    Albumin / creatinine urine ratio       Cardiovascular and Mediastinum    Paroxysmal atrial fibrillation (HCC)    Essential hypertension - Primary       Nervous and Auditory    Intervertebral disc disorders with radiculopathy, lumbar region       Musculoskeletal and Integument    Primary osteoarthritis of both knees       Other    Mixed hyperlipidemia    Medicare annual wellness visit, subsequent        Preventive health issues were discussed with patient, and age appropriate screening tests were ordered as noted in patient's After Visit Summary. Personalized health advice and appropriate referrals for health education or preventive services given if needed, as noted in patient's After Visit Summary. History of Present Illness:     Patient presents for a Medicare Wellness Visit    Follow-up on multiple medical problems to ensure they are stable on current medication, also Medicare wellness exam     Patient Care Team:  Angela Gross MD as PCP - General (Internal Medicine)  MD Mateo Shaikh DO (Pain Medicine)  MD Melodie Garibay CRNP as Nurse Practitioner (Pain Medicine)     Review of Systems:     Review of Systems   Constitutional: Negative for chills and fever. HENT: Negative for congestion, ear pain and sore throat. Eyes: Negative for pain. Respiratory: Negative for cough and shortness of breath. Cardiovascular: Negative for chest pain and leg swelling. Gastrointestinal: Negative for abdominal pain, nausea and vomiting. Endocrine: Negative for polyuria. Genitourinary: Negative for difficulty urinating, frequency and urgency.    Musculoskeletal: Positive for arthralgias and back pain. Skin: Negative for rash. Neurological: Negative for weakness and headaches. Psychiatric/Behavioral: Negative for sleep disturbance. The patient is not nervous/anxious.          Problem List:     Patient Active Problem List   Diagnosis   • Paroxysmal atrial fibrillation (HCC)   • Lumbar spinal stenosis   • Sacroiliitis (HCC)   • Spondylosis of lumbar region without myelopathy or radiculopathy   • Right bundle branch block (RBBB)   • Edema of both lower legs due to peripheral venous insufficiency   • Chronic pain of both lower extremities   • Lumbar radiculopathy   • Lumbar spondylosis   • Chronic pain syndrome   • Uncomplicated opioid dependence ([address])   • Encounter for long-term use of opiate analgesic   • Essential hypertension   • Venous insufficiency   • TIA (transient ischemic attack)   • Prostate cancer ([address])   • Intervertebral disc disorders with radiculopathy, lumbar region   • Impaired fasting blood sugar   • Hypertension   • Mixed hyperlipidemia   • Obstructive sleep apnea syndrome   • CPAP (continuous positive airway pressure) dependence   • Dysthymic disorder   • Venous insufficiency of both lower extremities   • Primary osteoarthritis of both knees   • Medicare annual wellness visit, subsequent   • Obesity, morbid ([address])   • Non-seasonal allergic rhinitis   • Abnormal gait   • Cellulitis of right foot   • Type 2 diabetes, HbA1c goal < 7% ([facility])   • Depression, recurrent ([facility])   • CHASIDY (obstructive sleep apnea)      Past Medical and Surgical History:     Past Medical History:   Diagnosis Date   • A-fib ([address])    • Anemia, unspecified    • Anxiety    • Arthritis    • Basal cell carcinoma (BCC) of skin of nose    • Cancer (HCC)    • Chondrocostal junction syndrome    • CPAP (continuous positive airway pressure) dependence    • Depression    • Dysthymic disorder    • Edema, unspecified    • Hyperlipidemia    • Hypertension    • Impaired fasting blood sugar    • Intervertebral disc disorders with radiculopathy, lumbar region    • Irregular heart beat    • Prostate cancer [facility])     s/p surgery   • Sleep apnea     on CPAP   • Spinal stenosis    • Stroke [facility])    • TIA (transient ischemic attack)     no residual problems   • Unspecified osteoarthritis, unspecified site    • Venous insufficiency of both lower extremities      Past Surgical History:   Procedure Laterality Date   • BASAL CELL CARCINOMA EXCISION      on the nose   • CATARACT EXTRACTION     • COLONOSCOPY      ,    • CT EPIDURAL STEROID INJECTION (TIN LUMBAR)     • FACIAL/NECK BIOPSY N/A 4/3/2017    Procedure: NOSE BCC EXCISION; FROZEN SECTION; RECONSTRUCTION ;  Surgeon: Charbel Tinoco MD;  [facility];  Service:    • FLAP LOCAL HEAD / NECK N/A 10/27/2020    Procedure: NOSE FLAP;  Surgeon: Charbel Tinoco MD;  [facility];  Service: Plastics   • FULL THICKNESS SKIN GRAFT N/A 4/3/2017    Procedure: FLAP VERSUS FULL THICKNESS SKIN GRAFT ;  Surgeon: Charbel Tinoco MD;  [facility];  Service:    • MOHS RECONSTRUCTION N/A 10/27/2020    Procedure: NOSE MOHS DEFECT RECONSTRUCTION;  Surgeon: Charbel Tinoco MD;  [facility];  Service: Plastics   • PROSTATECTOMY     • TONSILLECTOMY AND ADENOIDECTOMY        Family History:     Family History   Problem Relation Age of Onset   • Heart attack Father       Social History:     Social History     Socioeconomic History   • Marital status: /Civil Union     Spouse name: None   • Number of children: None   • Years of education: None   • Highest education level: None   Occupational History   • None   Tobacco Use   • Smoking status: Former     Types: Cigarettes     Quit date:      Years since quittin.6   • Smokeless tobacco: Never   • Tobacco comments:     long time ago   Vaping Use   • Vaping Use: Never used   Substance and Sexual Activity   • Alcohol use:  Yes     Alcohol/week: 7.0 standard drinks of alcohol     Types: 7 Glasses of wine per week     Comment: glass of wine with dinner, maybe   • Drug use: No   • Sexual activity: Not Currently   Other Topics Concern   • None   Social History Narrative   • None     Social Determinants of Health     Financial Resource Strain: Low Risk  (8/23/2023)    Overall Financial Resource Strain (CARDIA)    • Difficulty of Paying Living Expenses: Not hard at all   Food Insecurity: Not on file   Transportation Needs: No Transportation Needs (8/23/2023)    PRAPARE - Transportation    • Lack of Transportation (Medical): No    • Lack of Transportation (Non-Medical):  No   Physical Activity: Not on file   Stress: Not on file   Social Connections: Not on file   Intimate Partner Violence: Not on file   Housing Stability: Not on file      Medications and Allergies:     Current Outpatient Medications   Medication Sig Dispense Refill   • Acetaminophen 325 MG CAPS Take by mouth as needed      • amLODIPine (NORVASC) 10 mg tablet TAKE 1 TABLET (10 MG TOTAL) BY MOUTH DAILY 90 tablet 3   • atorvastatin (LIPITOR) 40 mg tablet Take 1 tablet (40 mg total) by mouth daily 90 tablet 1   • Cholecalciferol (VITAMIN D-3 PO) Take 2,000 unit marking on U-100 syringe by mouth daily after dinner     • co-enzyme Q-10 30 MG capsule Take 30 mg by mouth daily after dinner     • glucosamine-chondroitin 500-400 MG tablet Take 2 tablets by mouth daily in the early morning     • losartan (COZAAR) 100 MG tablet TAKE 1 TABLET (100 MG TOTAL) BY MOUTH DAILY 90 tablet 2   • multivitamin (THERAGRAN) TABS Take 1 tablet by mouth daily in the early morning     • Omega-3 Fatty Acids (FISH OIL) 1,000 mg Take 1,000 mg by mouth 2 (two) times a day     • predniSONE 2.5 mg tablet Take 2.5 mg by mouth daily     • predniSONE 5 mg tablet TAKE ONE TABLET 1 TO 2 TIMES A DAY AS NEEDED FOR NERVE INFLAMMATION     • pregabalin (LYRICA) 75 mg capsule TAKE 1 CAPSULE (75 MG TOTAL) BY MOUTH 3 (THREE) TIMES A DAY 90 capsule 2   • sertraline (ZOLOFT) 50 mg tablet Take 1 tablet (50 mg total) by mouth daily 90 tablet 0   • sotalol (BETAPACE) 80 mg tablet TAKE 1 TABLET TWICE DAILY 180 tablet 3   • torsemide (DEMADEX) 20 mg tablet TAKE 1 TABLET TWICE DAILY (Patient taking differently: Take 20 mg by mouth daily) 180 tablet 3   • traMADol (ULTRAM) 50 mg tablet Take 1 tablet (50 mg total) by mouth every 8 (eight) hours as needed for moderate pain 90 tablet 2   • warfarin (COUMADIN) 5 mg tablet TAKE 1/2 TO 1 TABLET DAILY OR AS DIRECTED 90 tablet 3   • diclofenac sodium (VOLTAREN) 1 % Apply 2 g topically 4 (four) times a day (Patient not taking: Reported on 5/17/2023)     • montelukast (SINGULAIR) 10 mg tablet Take 1 tablet (10 mg total) by mouth daily at bedtime (Patient not taking: Reported on 5/17/2023) 30 tablet 1     No current facility-administered medications for this visit. No Known Allergies   Immunizations:     Immunization History   Administered Date(s) Administered   • COVID-19 PFIZER VACCINE 0.3 ML IM 04/14/2021, 05/09/2021, 12/11/2021   • INFLUENZA 11/11/2016, 10/22/2018   • Influenza, high dose seasonal 0.7 mL 11/13/2020, 11/18/2022   • Pneumococcal 11/02/2007   • Pneumococcal Conjugate 13-Valent 01/10/2017   • Pneumococcal Conjugate PCV 7 11/02/2007   • Td (adult), Unspecified 03/23/1997   • Td (adult), adsorbed 03/23/1997   • Zoster 05/17/2013      Health Maintenance:         Topic Date Due   • Colorectal Cancer Screening  05/17/2024 (Originally 4/25/1985)         Topic Date Due   • Pneumococcal Vaccine: 65+ Years (2 - PPSV23 if available, else PCV20) 03/07/2017   • COVID-19 Vaccine (4 - Pfizer series) 02/05/2022   • Influenza Vaccine (1) 09/01/2023      Medicare Screening Tests and Risk Assessments:     Liv Ramos is here for his Subsequent Wellness visit. Last Medicare Wellness visit information reviewed, patient interviewed and updates made to the record today. Health Risk Assessment:   Patient rates overall health as poor.  Patient feels that their physical health rating is much worse. Patient is satisfied with their life. Eyesight was rated as same. Hearing was rated as slightly worse. Patient feels that their emotional and mental health rating is same. Patients states they are never, rarely angry. Patient states they are often unusually tired/fatigued. Pain experienced in the last 7 days has been some. Patient's pain rating has been 6/10. Patient states that he has experienced weight loss or gain in last 6 months. Depression Screening:   PHQ-9 Score: 5      Fall Risk Screening: In the past year, patient has experienced: history of falling in past year    Number of falls: 1  Injured during fall?: No    Feels unsteady when standing or walking?: Yes    Worried about falling?: Yes      Home Safety:  Patient has trouble with stairs inside or outside of their home. Patient has working smoke alarms and has working carbon monoxide detector. Home safety hazards include: none. Nutrition:   Current diet is Regular. Medications:   Patient is currently taking over-the-counter supplements. OTC medications include: see medication list. Patient is able to manage medications. Activities of Daily Living (ADLs)/Instrumental Activities of Daily Living (IADLs):   Walk and transfer into and out of bed and chair?: Yes  Dress and groom yourself?: Yes    Bathe or shower yourself?: Yes    Feed yourself? Yes  Do your laundry/housekeeping?: No  Manage your money, pay your bills and track your expenses?: Yes  Make your own meals?: No    Do your own shopping?: No    Previous Hospitalizations:   Any hospitalizations or ED visits within the last 12 months?: No      Advance Care Planning:   Living will: Yes    Durable POA for healthcare:  Yes    Advanced directive: Yes      Cognitive Screening:   Provider or family/friend/caregiver concerned regarding cognition?: No    PREVENTIVE SCREENINGS      Cardiovascular Screening:    General: Screening Not Indicated and History Lipid Disorder      Diabetes Screening:     General: Screening Not Indicated and History Diabetes      Prostate Cancer Screening:    General: History Prostate Cancer and Screening Not Indicated      Abdominal Aortic Aneurysm (AAA) Screening:    Risk factors include: tobacco use        Lung Cancer Screening:     General: Screening Not Indicated    Screening, Brief Intervention, and Referral to Treatment (SBIRT)    Screening  Typical number of drinks in a day: 0  Typical number of drinks in a week: 0  Interpretation: Low risk drinking behavior. Single Item Drug Screening:  How often have you used an illegal drug (including marijuana) or a prescription medication for non-medical reasons in the past year? never    Single Item Drug Screen Score: 0  Interpretation: Negative screen for possible drug use disorder    No results found. Physical Exam:     /82 (BP Location: Left arm, Patient Position: Sitting, Cuff Size: Large)   Pulse 67   Temp 98.1 °F (36.7 °C) (Temporal)   Ht 5' 7" (1.702 m)   Wt 108 kg (239 lb)   SpO2 97%   BMI 37.43 kg/m²     Physical Exam  Vitals and nursing note reviewed. Constitutional:       General: He is not in acute distress. Appearance: He is well-developed. HENT:      Head: Normocephalic and atraumatic. Right Ear: External ear normal.      Left Ear: External ear normal.      Mouth/Throat:      Pharynx: Oropharynx is clear. Eyes:      Conjunctiva/sclera: Conjunctivae normal.   Cardiovascular:      Rate and Rhythm: Normal rate and regular rhythm. Heart sounds: Normal heart sounds. No murmur heard. Pulmonary:      Effort: Pulmonary effort is normal. No respiratory distress. Breath sounds: Normal breath sounds. Abdominal:      General: Abdomen is flat. Palpations: Abdomen is soft. Tenderness: There is no abdominal tenderness. Musculoskeletal:         General: No swelling. Cervical back: Neck supple. Right lower leg: Edema present.       Left lower leg: Edema present. Skin:     General: Skin is warm and dry. Capillary Refill: Capillary refill takes less than 2 seconds. Neurological:      General: No focal deficit present. Mental Status: He is alert and oriented to person, place, and time.    Psychiatric:         Mood and Affect: Mood normal.          Zaina Tripp MD

## 2023-08-30 DIAGNOSIS — E78.2 MIXED HYPERLIPIDEMIA: ICD-10-CM

## 2023-08-30 RX ORDER — ATORVASTATIN CALCIUM 40 MG/1
40 TABLET, FILM COATED ORAL DAILY
Qty: 90 TABLET | Refills: 1 | OUTPATIENT
Start: 2023-08-30

## 2023-09-06 DIAGNOSIS — I48.91 ATRIAL FIBRILLATION, UNSPECIFIED TYPE (HCC): Primary | ICD-10-CM

## 2023-09-06 RX ORDER — WARFARIN SODIUM 5 MG/1
TABLET ORAL
Qty: 90 TABLET | Refills: 3 | Status: SHIPPED | OUTPATIENT
Start: 2023-09-06

## 2023-09-22 ENCOUNTER — APPOINTMENT (OUTPATIENT)
Dept: LAB | Facility: CLINIC | Age: 83
End: 2023-09-22
Payer: MEDICARE

## 2023-09-22 ENCOUNTER — ANTICOAG VISIT (OUTPATIENT)
Dept: CARDIOLOGY CLINIC | Facility: CLINIC | Age: 83
End: 2023-09-22

## 2023-09-22 DIAGNOSIS — I48.0 PAROXYSMAL ATRIAL FIBRILLATION (HCC): Primary | ICD-10-CM

## 2023-10-05 DIAGNOSIS — F34.1 DYSTHYMIC DISORDER: ICD-10-CM

## 2023-10-13 ENCOUNTER — OFFICE VISIT (OUTPATIENT)
Dept: CARDIOLOGY CLINIC | Facility: CLINIC | Age: 83
End: 2023-10-13
Payer: MEDICARE

## 2023-10-13 VITALS
OXYGEN SATURATION: 96 % | WEIGHT: 238 LBS | SYSTOLIC BLOOD PRESSURE: 110 MMHG | DIASTOLIC BLOOD PRESSURE: 62 MMHG | BODY MASS INDEX: 37.35 KG/M2 | HEIGHT: 67 IN | HEART RATE: 76 BPM

## 2023-10-13 DIAGNOSIS — I87.2 VENOUS INSUFFICIENCY OF BOTH LOWER EXTREMITIES: ICD-10-CM

## 2023-10-13 DIAGNOSIS — I45.10 RIGHT BUNDLE BRANCH BLOCK (RBBB): ICD-10-CM

## 2023-10-13 DIAGNOSIS — I10 ESSENTIAL HYPERTENSION: ICD-10-CM

## 2023-10-13 DIAGNOSIS — I48.0 PAROXYSMAL ATRIAL FIBRILLATION (HCC): Primary | ICD-10-CM

## 2023-10-13 DIAGNOSIS — G47.33 OSA (OBSTRUCTIVE SLEEP APNEA): ICD-10-CM

## 2023-10-13 DIAGNOSIS — I10 PRIMARY HYPERTENSION: ICD-10-CM

## 2023-10-13 DIAGNOSIS — G45.9 TIA (TRANSIENT ISCHEMIC ATTACK): ICD-10-CM

## 2023-10-13 DIAGNOSIS — I44.5 LEFT POSTERIOR FASCICULAR BLOCK (LPFB): ICD-10-CM

## 2023-10-13 PROCEDURE — 99214 OFFICE O/P EST MOD 30 MIN: CPT | Performed by: INTERNAL MEDICINE

## 2023-10-13 PROCEDURE — 93000 ELECTROCARDIOGRAM COMPLETE: CPT | Performed by: INTERNAL MEDICINE

## 2023-10-13 NOTE — PROGRESS NOTES
[facility]    CHIEF COMPLAINT:   Chief Complaint   Patient presents with    Follow-up     Pt is here to discuss blood thinner options. HPI:  Tom Joseph is a 82 y.o. male with a past medical history of obesity, CHASIDY on CPAP, remote history TIA, hypertension, hyperlipidemia, paroxysmal atrial fibrillation, chronic anticoagulation with warfarin. OV - 6/13/23: Follows with Neurosurgery at [facility] for L4-L5 lumbar stenosis which has been refractory to epidural injections and pain medications. He has progressive lower extremity weakness. + recent falls. He is being considered for lumbar surgery and presents today for pre operative risk assessment. He has a remote history TIA. No history myocardial infarction, congestive heart failure, diabetes on insulin, advanced CKD. He has a history of chronic venous insufficiency and swelling in his bilateral legs. He takes torsemide daily and raises his legs with improvement in swelling. He has CHASIDY and uses CPAP nightly. Sotalol and warfarin for paroxysmal atrial fibrillation. Interval history: Presents today for follow up and to discuss blood thinner options. He feels well and has no new complaints. His ECG today shows atrial fibrillation. He was unaware and has not interval change in symptoms since our last office visit. No visual changes, lightheadedness, syncope, chest pain, palpitations, shortness of breath, orthopnea.  + leg edema     The following portions of the patient's history were reviewed and updated as appropriate: allergies, current medications, past family history, past medical history, past social history, past surgical history, and problem list.    SINCE LAST OV I REVIEWED WITH THE PATIENT THE INTERIM LABS, TEST RESULTS, CONSULTANT(S) NOTES AND PERFORMED AN INTERIM REVIEW OF HISTORY    Past Medical History:   Diagnosis Date    A-fib ([address])     Anemia, unspecified     Anxiety     Arthritis     Basal cell carcinoma (BCC) of skin of nose     Cancer (HCC)     Chondrocostal junction syndrome     CPAP (continuous positive airway pressure) dependence     Depression     Dysthymic disorder     Edema, unspecified     Hyperlipidemia     Hypertension     Impaired fasting blood sugar     Intervertebral disc disorders with radiculopathy, lumbar region     Irregular heart beat     Prostate cancer [facility])     s/p surgery    Sleep apnea     on CPAP    Spinal stenosis     Stroke ([address])     TIA (transient ischemic attack)     no residual problems    Unspecified osteoarthritis, unspecified site     Venous insufficiency of both lower extremities        Past Surgical History:   Procedure Laterality Date    BASAL CELL CARCINOMA EXCISION      on the nose    CATARACT EXTRACTION      COLONOSCOPY      2011    CT EPIDURAL STEROID INJECTION (TIN LUMBAR)      FACIAL/NECK BIOPSY N/A 4/3/2017    Procedure: NOSE BCC EXCISION; FROZEN SECTION; RECONSTRUCTION ;  Surgeon: Charbel Tinoco MD;  [facility];  Service:     FLAP LOCAL HEAD / NECK N/A 10/27/2020    Procedure: NOSE FLAP;  Surgeon: Charbel Tinoco MD;  [facility];  Service: Plastics    FULL THICKNESS SKIN GRAFT N/A 4/3/2017    Procedure: FLAP VERSUS FULL THICKNESS SKIN GRAFT ;  Surgeon: Charbel Tinoco MD;  [facility];  Service:     MOHS RECONSTRUCTION N/A 10/27/2020    Procedure: NOSE MOHS DEFECT RECONSTRUCTION;  Surgeon: Charbel Tinoco MD;  [facility];  Service: Plastics    PROSTATECTOMY      TONSILLECTOMY AND ADENOIDECTOMY         Social History     Socioeconomic History    Marital status: /Civil Union     Spouse name: Not on file    Number of children: Not on file    Years of education: Not on file    Highest education level: Not on file   Occupational History    Not on file   Tobacco Use    Smoking status: Former     Types: Cigarettes     Quit date:      Years since quittin.8    Smokeless tobacco: Never    Tobacco comments:     long time ago   Vaping Use    Vaping Use: Never used   Substance and Sexual Activity    Alcohol use: Yes     Alcohol/week: 7.0 standard drinks of alcohol     Types: 7 Glasses of wine per week     Comment: glass of wine with dinner, maybe    Drug use: No    Sexual activity: Not Currently   Other Topics Concern    Not on file   Social History Narrative    Not on file     Social Determinants of Health     Financial Resource Strain: Low Risk  (8/23/2023)    Overall Financial Resource Strain (CARDIA)     Difficulty of Paying Living Expenses: Not hard at all   Food Insecurity: Not on file   Transportation Needs: No Transportation Needs (8/23/2023)    PRAPARE - Transportation     Lack of Transportation (Medical): No     Lack of Transportation (Non-Medical):  No   Physical Activity: Not on file   Stress: Not on file   Social Connections: Not on file   Intimate Partner Violence: Not on file   Housing Stability: Not on file       Family History   Problem Relation Age of Onset    Heart attack Father        No Known Allergies    Current Outpatient Medications   Medication Sig Dispense Refill    Acetaminophen 325 MG CAPS Take by mouth as needed       amLODIPine (NORVASC) 10 mg tablet TAKE 1 TABLET (10 MG TOTAL) BY MOUTH DAILY 90 tablet 3    atorvastatin (LIPITOR) 40 mg tablet Take 1 tablet (40 mg total) by mouth daily 90 tablet 1    Cholecalciferol (VITAMIN D-3 PO) Take 2,000 unit marking on U-100 syringe by mouth daily after dinner      co-enzyme Q-10 30 MG capsule Take 30 mg by mouth daily after dinner      glucosamine-chondroitin 500-400 MG tablet Take 2 tablets by mouth daily in the early morning      losartan (COZAAR) 100 MG tablet TAKE 1 TABLET (100 MG TOTAL) BY MOUTH DAILY 90 tablet 2    multivitamin (THERAGRAN) TABS Take 1 tablet by mouth daily in the early morning      Omega-3 Fatty Acids (FISH OIL) 1,000 mg Take 1,000 mg by mouth 2 (two) times a day      predniSONE 2.5 mg tablet Take 2.5 mg by mouth daily      pregabalin (LYRICA) 75 mg capsule TAKE 1 CAPSULE (75 MG TOTAL) BY MOUTH 3 (THREE) TIMES A DAY 90 capsule 2    sertraline (ZOLOFT) 50 mg tablet Take 1 tablet (50 mg total) by mouth daily 90 tablet 0    sotalol (BETAPACE) 80 mg tablet TAKE 1 TABLET TWICE DAILY 180 tablet 3    torsemide (DEMADEX) 20 mg tablet TAKE 1 TABLET TWICE DAILY (Patient taking differently: Take 20 mg by mouth daily) 180 tablet 3    warfarin (COUMADIN) 5 mg tablet TAKE 1/2 TO 1 TABLET DAILY BY MOUTH OR AS DIRECTED BY PHYSICIAN 90 tablet 3    diclofenac sodium (VOLTAREN) 1 % Apply 2 g topically 4 (four) times a day (Patient not taking: Reported on 5/17/2023)      montelukast (SINGULAIR) 10 mg tablet Take 1 tablet (10 mg total) by mouth daily at bedtime (Patient not taking: Reported on 5/17/2023) 30 tablet 1    predniSONE 5 mg tablet TAKE ONE TABLET 1 TO 2 TIMES A DAY AS NEEDED FOR NERVE INFLAMMATION      traMADol (ULTRAM) 50 mg tablet Take 1 tablet (50 mg total) by mouth every 8 (eight) hours as needed for moderate pain 90 tablet 2     No current facility-administered medications for this visit. /62 (BP Location: Right arm, Patient Position: Sitting, Cuff Size: Large)   Pulse 76   Ht 5' 7" (1.702 m)   Wt 108 kg (238 lb)   SpO2 96%   BMI 37.28 kg/m²     Review of Systems   All other systems reviewed and are negative. Physical Exam  Vitals reviewed. Constitutional:       General: He is not in acute distress. Appearance: Normal appearance. He is well-developed. He is obese. He is not toxic-appearing. HENT:      Head: Normocephalic and atraumatic. Eyes:      General: No scleral icterus. Conjunctiva/sclera: Conjunctivae normal.   Neck:      Vascular: No carotid bruit. Cardiovascular:      Rate and Rhythm: Normal rate. Rhythm irregular. Pulses: Normal pulses. Heart sounds: Normal heart sounds. No murmur heard. No gallop. Pulmonary:      Effort: Pulmonary effort is normal. No respiratory distress.       Breath sounds: Normal breath sounds. No wheezing or rales. Abdominal:      General: Bowel sounds are normal. There is no distension. Palpations: Abdomen is soft. Tenderness: There is no abdominal tenderness. There is no guarding. Musculoskeletal:         General: No swelling. Right lower leg: Edema (1+) present. Left lower leg: Edema (1+) present. Skin:     General: Skin is warm and dry. Capillary Refill: Capillary refill takes less than 2 seconds. Coloration: Skin is not jaundiced or pale. Neurological:      Mental Status: He is alert. Psychiatric:         Mood and Affect: Mood normal.         Behavior: Behavior normal.          Lab Results   Component Value Date    K 4.1 11/23/2021     (H) 11/23/2021    CO2 25 11/23/2021    BUN 17 11/23/2021    CREATININE 0.98 11/23/2021    CALCIUM 9.2 11/23/2021    ALT 34 11/23/2021    AST 21 11/23/2021    INR 2.77 (H) 09/22/2023       Lab Results   Component Value Date    HDL 43 08/26/2021    LDLCALC 74 08/26/2021    TRIG 153 (H) 08/26/2021       Lab Results   Component Value Date    WBC 8.67 08/26/2021    HGB 15.4 08/26/2021    HCT 46.8 08/26/2021     08/26/2021       Lab Results   Component Value Date     05/09/2023    HGBA1C 6.7 (H) 05/09/2023     Cardiac studies:   Results for orders placed or performed in visit on 10/13/23   POCT ECG    Impression    Atrial fibrillation  RBBB/LPFB       TTE - 7/12/21: Normal left ventricular cavity size and wall thickness. Normal wall motion and systolic function. LVEF 60%, grade 1 DD. Normal right ventricular cavity size and systolic function. Mild tricuspid and aortic regurgitation. ASSESSMENT AND PLAN:  Vida Olivares was seen today for follow-up. Diagnoses and all orders for this visit:    Paroxysmal atrial fibrillation [facility]):  Right bundle branch block (RBBB): Chronic, known  Left posterior fascicular block (LPFB): New  History of persistent Afib s/p cardioversion many years ago. He was placed on Sotalol and has been doing well with no symptomatic recurrence. Today he is asymptomatic and his ECG shows Afib with controlled rates, RBBB/LPFB. Last echocardiogram was 2 years ago and given new LPFB on ECG we will repeat this today. He has seen EP, Dr. Betty Honeycutt in the past and may have him return for another consultation. May pursue rate control strategy at this stage given no symptoms. Continue Sotalol for now. F/u echo and will discuss with EP. Lab work at [facility] in May 2023 with eGFR 87, potassium 3.8. He has no new complaints today and would like to discuss anticoagulation options. His primary concern was bleeding risk and that for Judaism purposes he would not accept blood products. We reviewed bleeding risk for warfarin and the direct oral anticoagulants. We also reviewed reversal agents. Cost was also of concern to him and his wife as well. After our discussion, he would like to continue warfarin at this time. -     POCT ECG  -     Echo complete w/ contrast if indicated; Future    Primary hypertension: Continue losartan and amlodipine     Venous insufficiency of both lower extremities: Chronic bilateral leg edema. Taking torsemide 20 mg daily for this. Continue leg elevation when able. eGFR and potassium acceptable. TIA (transient ischemic attack): He is on warfarin for anticoagulation and no indication for aspirin at this time. Continue atorvastatin.      CHASIDY (obstructive sleep apnea): Continue CPAP      Suhail Florez MD

## 2023-10-13 NOTE — PATIENT INSTRUCTIONS
You were seen today in the Cardiology office for follow up. Please have an echocardiogram performed and we will call you with the results. Thank you for choosing [facility]. Please call our office or use [employer] with any questions.

## 2023-10-20 ENCOUNTER — ANTICOAG VISIT (OUTPATIENT)
Dept: CARDIOLOGY CLINIC | Facility: CLINIC | Age: 83
End: 2023-10-20

## 2023-10-20 ENCOUNTER — APPOINTMENT (OUTPATIENT)
Dept: LAB | Facility: CLINIC | Age: 83
End: 2023-10-20
Payer: MEDICARE

## 2023-10-20 DIAGNOSIS — I48.0 PAROXYSMAL ATRIAL FIBRILLATION (HCC): Primary | ICD-10-CM

## 2023-10-20 DIAGNOSIS — E11.9 TYPE 2 DIABETES, HBA1C GOAL < 7% (HCC): ICD-10-CM

## 2023-10-20 LAB
ALBUMIN SERPL BCP-MCNC: 4.3 G/DL (ref 3.5–5)
ALP SERPL-CCNC: 61 U/L (ref 34–104)
ALT SERPL W P-5'-P-CCNC: 16 U/L (ref 7–52)
ANION GAP SERPL CALCULATED.3IONS-SCNC: 9 MMOL/L
AST SERPL W P-5'-P-CCNC: 17 U/L (ref 13–39)
BASOPHILS # BLD AUTO: 0.04 THOUSANDS/ÂΜL (ref 0–0.1)
BASOPHILS NFR BLD AUTO: 1 % (ref 0–1)
BILIRUB SERPL-MCNC: 0.77 MG/DL (ref 0.2–1)
BUN SERPL-MCNC: 21 MG/DL (ref 5–25)
CALCIUM SERPL-MCNC: 9 MG/DL (ref 8.4–10.2)
CHLORIDE SERPL-SCNC: 106 MMOL/L (ref 96–108)
CHOLEST SERPL-MCNC: 153 MG/DL
CO2 SERPL-SCNC: 27 MMOL/L (ref 21–32)
CREAT SERPL-MCNC: 0.98 MG/DL (ref 0.6–1.3)
CREAT UR-MCNC: 100 MG/DL
EOSINOPHIL # BLD AUTO: 0.27 THOUSAND/ÂΜL (ref 0–0.61)
EOSINOPHIL NFR BLD AUTO: 3 % (ref 0–6)
ERYTHROCYTE [DISTWIDTH] IN BLOOD BY AUTOMATED COUNT: 13.2 % (ref 11.6–15.1)
EST. AVERAGE GLUCOSE BLD GHB EST-MCNC: 143 MG/DL
GFR SERPL CREATININE-BSD FRML MDRD: 72 ML/MIN/1.73SQ M
GLUCOSE P FAST SERPL-MCNC: 107 MG/DL (ref 65–99)
HBA1C MFR BLD: 6.6 %
HCT VFR BLD AUTO: 45.7 % (ref 36.5–49.3)
HDLC SERPL-MCNC: 49 MG/DL
HGB BLD-MCNC: 15.4 G/DL (ref 12–17)
IMM GRANULOCYTES # BLD AUTO: 0.04 THOUSAND/UL (ref 0–0.2)
IMM GRANULOCYTES NFR BLD AUTO: 1 % (ref 0–2)
LDLC SERPL CALC-MCNC: 68 MG/DL (ref 0–100)
LYMPHOCYTES # BLD AUTO: 1.64 THOUSANDS/ÂΜL (ref 0.6–4.47)
LYMPHOCYTES NFR BLD AUTO: 20 % (ref 14–44)
MCH RBC QN AUTO: 31.6 PG (ref 26.8–34.3)
MCHC RBC AUTO-ENTMCNC: 33.7 G/DL (ref 31.4–37.4)
MCV RBC AUTO: 94 FL (ref 82–98)
MICROALBUMIN UR-MCNC: 9.1 MG/L
MICROALBUMIN/CREAT 24H UR: 9 MG/G CREATININE (ref 0–30)
MONOCYTES # BLD AUTO: 0.87 THOUSAND/ÂΜL (ref 0.17–1.22)
MONOCYTES NFR BLD AUTO: 11 % (ref 4–12)
NEUTROPHILS # BLD AUTO: 5.42 THOUSANDS/ÂΜL (ref 1.85–7.62)
NEUTS SEG NFR BLD AUTO: 64 % (ref 43–75)
NRBC BLD AUTO-RTO: 0 /100 WBCS
PLATELET # BLD AUTO: 175 THOUSANDS/UL (ref 149–390)
PMV BLD AUTO: 12.4 FL (ref 8.9–12.7)
POTASSIUM SERPL-SCNC: 4.1 MMOL/L (ref 3.5–5.3)
PROT SERPL-MCNC: 6.9 G/DL (ref 6.4–8.4)
RBC # BLD AUTO: 4.87 MILLION/UL (ref 3.88–5.62)
SODIUM SERPL-SCNC: 142 MMOL/L (ref 135–147)
TRIGL SERPL-MCNC: 180 MG/DL
TSH SERPL DL<=0.05 MIU/L-ACNC: 2.22 UIU/ML (ref 0.45–4.5)
WBC # BLD AUTO: 8.28 THOUSAND/UL (ref 4.31–10.16)

## 2023-10-22 PROBLEM — Z00.00 MEDICARE ANNUAL WELLNESS VISIT, SUBSEQUENT: Status: RESOLVED | Noted: 2021-03-26 | Resolved: 2023-10-22

## 2023-10-24 ENCOUNTER — TELEPHONE (OUTPATIENT)
Dept: CARDIOLOGY CLINIC | Facility: CLINIC | Age: 83
End: 2023-10-24

## 2023-10-24 ENCOUNTER — RA CDI HCC (OUTPATIENT)
Dept: OTHER | Facility: HOSPITAL | Age: 83
End: 2023-10-24

## 2023-10-24 DIAGNOSIS — M51.16 INTERVERTEBRAL DISC DISORDERS WITH RADICULOPATHY, LUMBAR REGION: Primary | ICD-10-CM

## 2023-10-24 RX ORDER — PREDNISONE 5 MG/1
5 TABLET ORAL DAILY
Qty: 90 TABLET | Refills: 0 | Status: SHIPPED | OUTPATIENT
Start: 2023-10-24

## 2023-10-24 NOTE — PROGRESS NOTES
[address] [facility] coding opportunities       Chart reviewed, no opportunity found: CHART REVIEWED, NO OPPORTUNITY FOUND        Patients Insurance     Medicare Insurance: Medicare

## 2023-10-27 DIAGNOSIS — M54.16 LUMBAR RADICULOPATHY: ICD-10-CM

## 2023-10-27 DIAGNOSIS — M48.062 SPINAL STENOSIS OF LUMBAR REGION WITH NEUROGENIC CLAUDICATION: ICD-10-CM

## 2023-10-27 RX ORDER — PREGABALIN 75 MG/1
75 CAPSULE ORAL 3 TIMES DAILY
Qty: 90 CAPSULE | Refills: 1 | Status: SHIPPED | OUTPATIENT
Start: 2023-10-27

## 2023-10-31 ENCOUNTER — OFFICE VISIT (OUTPATIENT)
Dept: INTERNAL MEDICINE CLINIC | Facility: CLINIC | Age: 83
End: 2023-10-31
Payer: MEDICARE

## 2023-10-31 VITALS
OXYGEN SATURATION: 97 % | HEART RATE: 56 BPM | TEMPERATURE: 97.6 F | HEIGHT: 67 IN | BODY MASS INDEX: 36.88 KG/M2 | WEIGHT: 235 LBS | DIASTOLIC BLOOD PRESSURE: 76 MMHG | SYSTOLIC BLOOD PRESSURE: 134 MMHG

## 2023-10-31 DIAGNOSIS — G89.4 CHRONIC PAIN SYNDROME: ICD-10-CM

## 2023-10-31 DIAGNOSIS — G47.33 OSA (OBSTRUCTIVE SLEEP APNEA): ICD-10-CM

## 2023-10-31 DIAGNOSIS — I87.2 VENOUS INSUFFICIENCY OF BOTH LOWER EXTREMITIES: ICD-10-CM

## 2023-10-31 DIAGNOSIS — M51.16 INTERVERTEBRAL DISC DISORDERS WITH RADICULOPATHY, LUMBAR REGION: ICD-10-CM

## 2023-10-31 DIAGNOSIS — I10 ESSENTIAL HYPERTENSION: ICD-10-CM

## 2023-10-31 DIAGNOSIS — E11.9 TYPE 2 DIABETES, HBA1C GOAL < 7% (HCC): Primary | ICD-10-CM

## 2023-10-31 DIAGNOSIS — F34.1 DYSTHYMIC DISORDER: ICD-10-CM

## 2023-10-31 DIAGNOSIS — I48.0 PAROXYSMAL ATRIAL FIBRILLATION (HCC): ICD-10-CM

## 2023-10-31 DIAGNOSIS — Z23 NEED FOR INFLUENZA VACCINATION: ICD-10-CM

## 2023-10-31 DIAGNOSIS — Z23 ENCOUNTER FOR IMMUNIZATION: ICD-10-CM

## 2023-10-31 DIAGNOSIS — M17.0 PRIMARY OSTEOARTHRITIS OF BOTH KNEES: ICD-10-CM

## 2023-10-31 DIAGNOSIS — E78.2 MIXED HYPERLIPIDEMIA: ICD-10-CM

## 2023-10-31 DIAGNOSIS — C61 PROSTATE CANCER (HCC): ICD-10-CM

## 2023-10-31 PROCEDURE — 90662 IIV NO PRSV INCREASED AG IM: CPT | Performed by: INTERNAL MEDICINE

## 2023-10-31 PROCEDURE — 99214 OFFICE O/P EST MOD 30 MIN: CPT | Performed by: INTERNAL MEDICINE

## 2023-10-31 PROCEDURE — G0008 ADMIN INFLUENZA VIRUS VAC: HCPCS | Performed by: INTERNAL MEDICINE

## 2023-10-31 RX ORDER — IPRATROPIUM BROMIDE 42 UG/1
SPRAY, METERED NASAL
COMMUNITY
Start: 2023-10-16

## 2023-10-31 NOTE — PROGRESS NOTES
Diabetic Foot Exam    Patient's shoes and socks removed. Right Foot/Ankle   Right Foot Inspection  Skin Exam: skin normal, skin intact and dry skin. No warmth, no callus, no erythema, no maceration, no abnormal color, no pre-ulcer, no ulcer and no callus. Toe Exam: ROM and strength within normal limits and swelling. No tenderness, erythema and  no right toe deformity    Sensory   Monofilament testing: intact    Vascular  Capillary refills: < 3 seconds  The right DP pulse is 2+. The right PT pulse is 2+. Left Foot/Ankle  Left Foot Inspection  Skin Exam: skin normal, skin intact and dry skin. No warmth, no erythema, no maceration, normal color, no pre-ulcer, no ulcer and no callus. Toe Exam: ROM and strength within normal limits and swelling. No tenderness, no erythema and no left toe deformity. Sensory   Monofilament testing: intact    Vascular  Capillary refills: < 3 seconds  The left DP pulse is 2+. The left PT pulse is 2+.      Assign Risk Category  No deformity present  No loss of protective sensation  No weak pulses  Risk: 0

## 2023-10-31 NOTE — PROGRESS NOTES
Assessment/Plan:    BMI Counseling: Body mass index is 36.81 kg/m². The BMI is above normal. Nutrition recommendations include decreasing portion sizes, encouraging healthy choices of fruits and vegetables and decreasing fast food intake. Exercise recommendations include moderate physical activity 150 minutes/week. Rationale for BMI follow-up plan is due to patient being overweight or obese. 1. Type 2 diabetes, HbA1c goal < 7% (HCC)  -     semaglutide, 0.25 or 0.5 mg/dose, (Ozempic, 0.25 or 0.5 MG/DOSE,) 2 mg/3 mL injection pen; 0.25 mg under the skin every 7 days for 4 doses (28 days), THEN 0.5 mg under the skin every 7 days    2. Encounter for immunization  -     influenza vaccine, high-dose, PF 0.7 mL (FLUZONE HIGH-DOSE)    3. Need for influenza vaccination  -     influenza vaccine, high-dose, PF 0.7 mL (FLUZONE HIGH-DOSE)    4. Venous insufficiency of both lower extremities    5. CHASIDY (obstructive sleep apnea)  -     semaglutide, 0.25 or 0.5 mg/dose, (Ozempic, 0.25 or 0.5 MG/DOSE,) 2 mg/3 mL injection pen; 0.25 mg under the skin every 7 days for 4 doses (28 days), THEN 0.5 mg under the skin every 7 days    6. Paroxysmal atrial fibrillation (HCC)    7. Essential hypertension  -     semaglutide, 0.25 or 0.5 mg/dose, (Ozempic, 0.25 or 0.5 MG/DOSE,) 2 mg/3 mL injection pen; 0.25 mg under the skin every 7 days for 4 doses (28 days), THEN 0.5 mg under the skin every 7 days    8. Intervertebral disc disorders with radiculopathy, lumbar region  -     semaglutide, 0.25 or 0.5 mg/dose, (Ozempic, 0.25 or 0.5 MG/DOSE,) 2 mg/3 mL injection pen; 0.25 mg under the skin every 7 days for 4 doses (28 days), THEN 0.5 mg under the skin every 7 days    9. Prostate cancer ([address])    10. Mixed hyperlipidemia    11. Dysthymic disorder    12. Chronic pain syndrome    13.  Primary osteoarthritis of both knees  -     semaglutide, 0.25 or 0.5 mg/dose, (Ozempic, 0.25 or 0.5 MG/DOSE,) 2 mg/3 mL injection pen; 0.25 mg under the skin every 7 days for 4 doses (28 days), THEN 0.5 mg under the skin every 7 days    14. Body mass index 36.0-36.9, adult  -     semaglutide, 0.25 or 0.5 mg/dose, (Ozempic, 0.25 or 0.5 MG/DOSE,) 2 mg/3 mL injection pen; 0.25 mg under the skin every 7 days for 4 doses (28 days), THEN 0.5 mg under the skin every 7 days           Subjective:      Patient ID: Lex Santiago is a 82 y.o. male. Follow-up on blood test done on 10/20/2023 test discussed with him, also urine test was discussed        The following portions of the patient's history were reviewed and updated as appropriate: He  has a past medical history of A-fib ([address]), Anemia, unspecified, Anxiety, Arthritis, Basal cell carcinoma (BCC) of skin of nose, Cancer (HCC), Chondrocostal junction syndrome, Coronary artery disease, CPAP (continuous positive airway pressure) dependence, Depression, Dysthymic disorder, Edema, unspecified, Headache(784.0), Hyperlipidemia, Hypertension, Impaired fasting blood sugar, Intervertebral disc disorders with radiculopathy, lumbar region, Irregular heart beat, Obesity, Prostate cancer ([address]), Sleep apnea, Spinal stenosis, Stroke ([address]), TIA (transient ischemic attack), Unspecified osteoarthritis, unspecified site, and Venous insufficiency of both lower extremities.   He   Patient Active Problem List    Diagnosis Date Noted    CHASIDY (obstructive sleep apnea) 07/05/2023    Depression, recurrent ([address]) 08/19/2022    Type 2 diabetes, HbA1c goal < 7% ([address]) 05/25/2022    Cellulitis of right foot 12/17/2021    Abnormal gait 08/04/2021    Obesity, morbid ([address]) 03/26/2021    Non-seasonal allergic rhinitis 03/26/2021    Primary osteoarthritis of both knees 11/13/2020    TIA (transient ischemic attack)     Prostate cancer ([address])     Intervertebral disc disorders with radiculopathy, lumbar region     Impaired fasting blood sugar     Hypertension     Mixed hyperlipidemia     Obstructive sleep apnea syndrome     CPAP (continuous positive airway pressure) dependence     Dysthymic disorder     Venous insufficiency of both lower extremities     Essential hypertension 11/20/2019    Venous insufficiency 66/86/7260    Uncomplicated opioid dependence ([address]) 06/05/2019    Encounter for long-term use of opiate analgesic 06/05/2019    Chronic pain syndrome 09/17/2018    Lumbar radiculopathy 04/16/2018    Lumbar spondylosis 04/16/2018    Right bundle branch block (RBBB) 03/19/2018    Edema of both lower legs due to peripheral venous insufficiency 03/19/2018    Chronic pain of both lower extremities 03/19/2018    Paroxysmal atrial fibrillation ([address]) 01/20/2018    Sacroiliitis ([address]) 04/17/2017    Spondylosis of lumbar region without myelopathy or radiculopathy 04/17/2017    Lumbar spinal stenosis 05/12/2015     He  has a past surgical history that includes Prostatectomy; Tonsillectomy and adenoidectomy; FACIAL/NECK BIOPSY (N/A, 4/3/2017); FULL THICKNESS SKIN GRAFT (N/A, 4/3/2017); Cataract extraction; CT epidural steroid injection (TIN lumbar); MOHS RECONSTRUCTION (N/A, 10/27/2020); FLAP LOCAL HEAD / NECK (N/A, 10/27/2020); Colonoscopy; and Excision basal cell carcinoma. His family history includes Heart attack in his father. He  reports that he quit smoking about 43 years ago. His smoking use included cigarettes. He has a 1.25 pack-year smoking history. He has never used smokeless tobacco. He reports that he does not currently use alcohol after a past usage of about 17.0 standard drinks of alcohol per week. He reports that he does not use drugs.   Current Outpatient Medications   Medication Sig Dispense Refill    Acetaminophen 325 MG CAPS Take by mouth as needed       amLODIPine (NORVASC) 10 mg tablet TAKE 1 TABLET (10 MG TOTAL) BY MOUTH DAILY 90 tablet 3    atorvastatin (LIPITOR) 40 mg tablet Take 1 tablet (40 mg total) by mouth daily 90 tablet 1    Cholecalciferol (VITAMIN D-3 PO) Take 2,000 unit marking on U-100 syringe by mouth daily after dinner      co-enzyme Q-10 30 MG capsule Take 30 mg by mouth daily after dinner      glucosamine-chondroitin 500-400 MG tablet Take 2 tablets by mouth daily in the early morning      ipratropium (ATROVENT) 0.06 % nasal spray USE 2 SPRAYS INTO EACH NOSTRIL UP TO 3 TIMES DAILY AS NEEDED      losartan (COZAAR) 100 MG tablet TAKE 1 TABLET (100 MG TOTAL) BY MOUTH DAILY 90 tablet 2    multivitamin (THERAGRAN) TABS Take 1 tablet by mouth daily in the early morning      Omega-3 Fatty Acids (FISH OIL) 1,000 mg Take 1,000 mg by mouth 2 (two) times a day      predniSONE 5 mg tablet Take 1 tablet (5 mg total) by mouth daily 90 tablet 0    pregabalin (LYRICA) 75 mg capsule Take 1 capsule (75 mg total) by mouth 3 (three) times a day 90 capsule 1    semaglutide, 0.25 or 0.5 mg/dose, (Ozempic, 0.25 or 0.5 MG/DOSE,) 2 mg/3 mL injection pen 0.25 mg under the skin every 7 days for 4 doses (28 days), THEN 0.5 mg under the skin every 7 days 9 mL 0    sertraline (ZOLOFT) 50 mg tablet Take 1 tablet (50 mg total) by mouth daily 90 tablet 0    sotalol (BETAPACE) 80 mg tablet TAKE 1 TABLET TWICE DAILY 180 tablet 3    torsemide (DEMADEX) 20 mg tablet TAKE 1 TABLET TWICE DAILY (Patient taking differently: Take 20 mg by mouth daily) 180 tablet 3    warfarin (COUMADIN) 5 mg tablet TAKE 1/2 TO 1 TABLET DAILY BY MOUTH OR AS DIRECTED BY PHYSICIAN 90 tablet 3    diclofenac sodium (VOLTAREN) 1 % Apply 2 g topically 4 (four) times a day (Patient not taking: Reported on 10/31/2023)      montelukast (SINGULAIR) 10 mg tablet Take 1 tablet (10 mg total) by mouth daily at bedtime (Patient not taking: Reported on 10/31/2023) 30 tablet 1    traMADol (ULTRAM) 50 mg tablet Take 1 tablet (50 mg total) by mouth every 8 (eight) hours as needed for moderate pain (Patient not taking: Reported on 10/31/2023) 90 tablet 2     No current facility-administered medications for this visit.      Current Outpatient Medications on File Prior to Visit   Medication Sig    Acetaminophen 325 MG CAPS Take by mouth as needed     amLODIPine (NORVASC) 10 mg tablet TAKE 1 TABLET (10 MG TOTAL) BY MOUTH DAILY    atorvastatin (LIPITOR) 40 mg tablet Take 1 tablet (40 mg total) by mouth daily    Cholecalciferol (VITAMIN D-3 PO) Take 2,000 unit marking on U-100 syringe by mouth daily after dinner    co-enzyme Q-10 30 MG capsule Take 30 mg by mouth daily after dinner    glucosamine-chondroitin 500-400 MG tablet Take 2 tablets by mouth daily in the early morning    ipratropium (ATROVENT) 0.06 % nasal spray USE 2 SPRAYS INTO EACH NOSTRIL UP TO 3 TIMES DAILY AS NEEDED    losartan (COZAAR) 100 MG tablet TAKE 1 TABLET (100 MG TOTAL) BY MOUTH DAILY    multivitamin (THERAGRAN) TABS Take 1 tablet by mouth daily in the early morning    Omega-3 Fatty Acids (FISH OIL) 1,000 mg Take 1,000 mg by mouth 2 (two) times a day    predniSONE 5 mg tablet Take 1 tablet (5 mg total) by mouth daily    pregabalin (LYRICA) 75 mg capsule Take 1 capsule (75 mg total) by mouth 3 (three) times a day    sertraline (ZOLOFT) 50 mg tablet Take 1 tablet (50 mg total) by mouth daily    sotalol (BETAPACE) 80 mg tablet TAKE 1 TABLET TWICE DAILY    torsemide (DEMADEX) 20 mg tablet TAKE 1 TABLET TWICE DAILY (Patient taking differently: Take 20 mg by mouth daily)    warfarin (COUMADIN) 5 mg tablet TAKE 1/2 TO 1 TABLET DAILY BY MOUTH OR AS DIRECTED BY PHYSICIAN    diclofenac sodium (VOLTAREN) 1 % Apply 2 g topically 4 (four) times a day (Patient not taking: Reported on 10/31/2023)    montelukast (SINGULAIR) 10 mg tablet Take 1 tablet (10 mg total) by mouth daily at bedtime (Patient not taking: Reported on 10/31/2023)    traMADol (ULTRAM) 50 mg tablet Take 1 tablet (50 mg total) by mouth every 8 (eight) hours as needed for moderate pain (Patient not taking: Reported on 10/31/2023)    [DISCONTINUED] predniSONE 2.5 mg tablet Take 2.5 mg by mouth daily (Patient not taking: Reported on 10/31/2023)     No current facility-administered medications on file prior to visit. He has No Known Allergies. .    Review of Systems   Constitutional:  Negative for chills and fever. HENT:  Negative for congestion, ear pain and sore throat. Eyes:  Negative for pain. Respiratory:  Positive for shortness of breath. Negative for cough. Cardiovascular:  Negative for chest pain and leg swelling. Gastrointestinal:  Negative for abdominal pain, nausea and vomiting. Endocrine: Negative for polyuria. Genitourinary:  Negative for difficulty urinating, frequency and urgency. Musculoskeletal:  Positive for arthralgias and back pain. Skin:  Negative for rash. Neurological:  Negative for weakness and headaches. Psychiatric/Behavioral:  Negative for sleep disturbance. The patient is not nervous/anxious.           Objective:      /76 (BP Location: Left arm, Patient Position: Sitting, Cuff Size: Large)   Pulse 56   Temp 97.6 °F (36.4 °C) (Temporal)   Ht 5' 7" (1.702 m)   Wt 107 kg (235 lb)   SpO2 97% Comment: room air  BMI 36.81 kg/m²     Recent Results (from the past 1344 hour(s))   Protime-INR    Collection Time: 09/22/23 11:38 AM   Result Value Ref Range    Protime 29.5 (H) 11.6 - 14.5 seconds    INR 2.77 (H) 0.84 - 1.19   CBC and differential    Collection Time: 10/20/23 10:28 AM   Result Value Ref Range    WBC 8.28 4.31 - 10.16 Thousand/uL    RBC 4.87 3.88 - 5.62 Million/uL    Hemoglobin 15.4 12.0 - 17.0 g/dL    Hematocrit 45.7 36.5 - 49.3 %    MCV 94 82 - 98 fL    MCH 31.6 26.8 - 34.3 pg    MCHC 33.7 31.4 - 37.4 g/dL    RDW 13.2 11.6 - 15.1 %    MPV 12.4 8.9 - 12.7 fL    Platelets 174 337 - 213 Thousands/uL    nRBC 0 /100 WBCs    Neutrophils Relative 64 43 - 75 %    Immat GRANS % 1 0 - 2 %    Lymphocytes Relative 20 14 - 44 %    Monocytes Relative 11 4 - 12 %    Eosinophils Relative 3 0 - 6 %    Basophils Relative 1 0 - 1 %    Neutrophils Absolute 5.42 1.85 - 7.62 Thousands/µL    Immature Grans Absolute 0.04 0.00 - 0.20 Thousand/uL    Lymphocytes Absolute 1.64 0.60 - 4.47 Thousands/µL    Monocytes Absolute 0.87 0.17 - 1.22 Thousand/µL    Eosinophils Absolute 0.27 0.00 - 0.61 Thousand/µL    Basophils Absolute 0.04 0.00 - 0.10 Thousands/µL   Comprehensive metabolic panel    Collection Time: 10/20/23 10:28 AM   Result Value Ref Range    Sodium 142 135 - 147 mmol/L    Potassium 4.1 3.5 - 5.3 mmol/L    Chloride 106 96 - 108 mmol/L    CO2 27 21 - 32 mmol/L    ANION GAP 9 mmol/L    BUN 21 5 - 25 mg/dL    Creatinine 0.98 0.60 - 1.30 mg/dL    Glucose, Fasting 107 (H) 65 - 99 mg/dL    Calcium 9.0 8.4 - 10.2 mg/dL    AST 17 13 - 39 U/L    ALT 16 7 - 52 U/L    Alkaline Phosphatase 61 34 - 104 U/L    Total Protein 6.9 6.4 - 8.4 g/dL    Albumin 4.3 3.5 - 5.0 g/dL    Total Bilirubin 0.77 0.20 - 1.00 mg/dL    eGFR 72 ml/min/1.73sq m   Hemoglobin A1C    Collection Time: 10/20/23 10:28 AM   Result Value Ref Range    Hemoglobin A1C 6.6 (H) Normal 4.0-5.6%; PreDiabetic 5.7-6.4%; Diabetic >=6.5%; Glycemic control for adults with diabetes <7.0% %     mg/dl   Lipid Panel with Direct LDL reflex    Collection Time: 10/20/23 10:28 AM   Result Value Ref Range    Cholesterol 153 See Comment mg/dL    Triglycerides 180 (H) See Comment mg/dL    HDL, Direct 49 >=40 mg/dL    LDL Calculated 68 0 - 100 mg/dL   TSH, 3rd generation    Collection Time: 10/20/23 10:28 AM   Result Value Ref Range    TSH 3RD GENERATON 2.221 0.450 - 4.500 uIU/mL   Albumin / creatinine urine ratio    Collection Time: 10/20/23 10:28 AM   Result Value Ref Range    Creatinine, Ur 100.0 Reference range not established. mg/dL    Albumin,U,Random 9.1 <20.0 mg/L    Albumin Creat Ratio 9 0 - 30 mg/g creatinine   Protime-INR    Collection Time: 10/20/23 10:28 AM   Result Value Ref Range    Protime 35.4 (H) 11.6 - 14.5 seconds    INR 3.64 (H) 0.84 - 1.19        Physical Exam  Constitutional:       Appearance: Normal appearance. HENT:      Head: Normocephalic.       Right Ear: External ear normal.      Left Ear: External ear normal. Nose: Nose normal. No congestion. Mouth/Throat:      Mouth: Mucous membranes are moist.      Pharynx: Oropharynx is clear. No oropharyngeal exudate or posterior oropharyngeal erythema. Eyes:      Extraocular Movements: Extraocular movements intact. Conjunctiva/sclera: Conjunctivae normal.   Cardiovascular:      Rate and Rhythm: Normal rate and regular rhythm. Heart sounds: Normal heart sounds. No murmur heard. Pulmonary:      Effort: Pulmonary effort is normal.      Breath sounds: Normal breath sounds. No wheezing or rales. Abdominal:      General: Abdomen is flat. There is no distension. Palpations: Abdomen is soft. Tenderness: There is no abdominal tenderness. Musculoskeletal:      Cervical back: Normal range of motion and neck supple. Right lower leg: Edema present. Left lower leg: Edema present. Lymphadenopathy:      Cervical: No cervical adenopathy. Skin:     General: Skin is warm. Neurological:      General: No focal deficit present. Mental Status: He is alert and oriented to person, place, and time.

## 2023-11-02 ENCOUNTER — TELEPHONE (OUTPATIENT)
Dept: INTERNAL MEDICINE CLINIC | Facility: CLINIC | Age: 83
End: 2023-11-02

## 2023-11-02 ENCOUNTER — HOSPITAL ENCOUNTER (OUTPATIENT)
Dept: NON INVASIVE DIAGNOSTICS | Facility: CLINIC | Age: 83
Discharge: HOME | End: 2023-11-02
Payer: MEDICARE

## 2023-11-02 ENCOUNTER — PATIENT MESSAGE (OUTPATIENT)
Dept: INTERNAL MEDICINE CLINIC | Facility: CLINIC | Age: 83
End: 2023-11-02

## 2023-11-02 VITALS
HEART RATE: 56 BPM | HEIGHT: 67 IN | SYSTOLIC BLOOD PRESSURE: 134 MMHG | WEIGHT: 235 LBS | DIASTOLIC BLOOD PRESSURE: 76 MMHG | BODY MASS INDEX: 36.88 KG/M2

## 2023-11-02 DIAGNOSIS — M17.0 PRIMARY OSTEOARTHRITIS OF BOTH KNEES: ICD-10-CM

## 2023-11-02 DIAGNOSIS — I10 ESSENTIAL HYPERTENSION: ICD-10-CM

## 2023-11-02 DIAGNOSIS — E11.9 TYPE 2 DIABETES, HBA1C GOAL < 7% (HCC): ICD-10-CM

## 2023-11-02 DIAGNOSIS — M51.16 INTERVERTEBRAL DISC DISORDERS WITH RADICULOPATHY, LUMBAR REGION: ICD-10-CM

## 2023-11-02 DIAGNOSIS — I48.0 PAROXYSMAL ATRIAL FIBRILLATION (HCC): ICD-10-CM

## 2023-11-02 DIAGNOSIS — G47.33 OSA (OBSTRUCTIVE SLEEP APNEA): ICD-10-CM

## 2023-11-02 LAB
AORTIC ROOT: 3.9 CM
APICAL FOUR CHAMBER EJECTION FRACTION: 60 %
ASCENDING AORTA: 4 CM
AV REGURGITATION PRESSURE HALF TIME: 742 MS
E WAVE DECELERATION TIME: 225 MS
E/A RATIO: 0.75
FRACTIONAL SHORTENING: 29 (ref 28–44)
INTERVENTRICULAR SEPTUM IN DIASTOLE (PARASTERNAL SHORT AXIS VIEW): 1.2 CM
INTERVENTRICULAR SEPTUM: 1.2 CM (ref 0.6–1.1)
LAAS-AP2: 21.9 CM2
LAAS-AP4: 28.2 CM2
LEFT ATRIUM SIZE: 4.4 CM
LEFT ATRIUM VOLUME (MOD BIPLANE): 80 ML
LEFT ATRIUM VOLUME INDEX (MOD BIPLANE): 36.9 ML/M2
LEFT INTERNAL DIMENSION IN SYSTOLE: 3.4 CM (ref 2.1–4)
LEFT VENTRICULAR INTERNAL DIMENSION IN DIASTOLE: 4.8 CM (ref 3.5–6)
LEFT VENTRICULAR POSTERIOR WALL IN END DIASTOLE: 1.1 CM
LEFT VENTRICULAR STROKE VOLUME: 57 ML
LVSV (TEICH): 57 ML
MV E'TISSUE VEL-LAT: 12 CM/S
MV E'TISSUE VEL-SEP: 10 CM/S
MV PEAK A VEL: 0.96 M/S
MV PEAK E VEL: 72 CM/S
MV STENOSIS PRESSURE HALF TIME: 65 MS
MV VALVE AREA P 1/2 METHOD: 3.38
RA PRESSURE ESTIMATED: 3 MMHG
RIGHT ATRIUM AREA SYSTOLE A4C: 19.9 CM2
RIGHT VENTRICLE ID DIMENSION: 3.9 CM
RV PSP: 36 MMHG
SL CV AV DECELERATION TIME RETROGRADE: 2557 MS
SL CV AV PEAK GRADIENT RETROGRADE: 62 MMHG
SL CV LEFT ATRIUM LENGTH A2C: 6.1 CM
SL CV LV EF: 60
SL CV PED ECHO LEFT VENTRICLE DIASTOLIC VOLUME (MOD BIPLANE) 2D: 106 ML
SL CV PED ECHO LEFT VENTRICLE SYSTOLIC VOLUME (MOD BIPLANE) 2D: 48 ML
TR MAX PG: 33 MMHG
TR PEAK VELOCITY: 2.9 M/S
TRICUSPID ANNULAR PLANE SYSTOLIC EXCURSION: 2.2 CM
TRICUSPID VALVE PEAK REGURGITATION VELOCITY: 2.86 M/S

## 2023-11-02 PROCEDURE — 93306 TTE W/DOPPLER COMPLETE: CPT

## 2023-11-02 PROCEDURE — 93306 TTE W/DOPPLER COMPLETE: CPT | Performed by: INTERNAL MEDICINE

## 2023-11-05 ENCOUNTER — VBI (OUTPATIENT)
Dept: ADMINISTRATIVE | Facility: OTHER | Age: 83
End: 2023-11-05

## 2023-11-06 ENCOUNTER — ANTICOAG VISIT (OUTPATIENT)
Dept: CARDIOLOGY CLINIC | Facility: CLINIC | Age: 83
End: 2023-11-06

## 2023-11-06 ENCOUNTER — APPOINTMENT (OUTPATIENT)
Dept: LAB | Facility: CLINIC | Age: 83
End: 2023-11-06
Payer: MEDICARE

## 2023-11-06 DIAGNOSIS — I48.0 PAROXYSMAL ATRIAL FIBRILLATION (HCC): Primary | ICD-10-CM

## 2023-11-07 ENCOUNTER — TELEPHONE (OUTPATIENT)
Dept: INTERNAL MEDICINE CLINIC | Facility: CLINIC | Age: 83
End: 2023-11-07

## 2023-11-10 ENCOUNTER — OFFICE VISIT (OUTPATIENT)
Dept: SLEEP CENTER | Facility: CLINIC | Age: 83
End: 2023-11-10
Payer: MEDICARE

## 2023-11-10 VITALS
WEIGHT: 233 LBS | SYSTOLIC BLOOD PRESSURE: 122 MMHG | OXYGEN SATURATION: 97 % | DIASTOLIC BLOOD PRESSURE: 78 MMHG | HEIGHT: 67 IN | BODY MASS INDEX: 36.57 KG/M2 | HEART RATE: 93 BPM

## 2023-11-10 DIAGNOSIS — I48.0 PAROXYSMAL ATRIAL FIBRILLATION (HCC): ICD-10-CM

## 2023-11-10 DIAGNOSIS — G45.9 TIA (TRANSIENT ISCHEMIC ATTACK): ICD-10-CM

## 2023-11-10 DIAGNOSIS — G47.33 OSA (OBSTRUCTIVE SLEEP APNEA): Primary | ICD-10-CM

## 2023-11-10 DIAGNOSIS — I10 PRIMARY HYPERTENSION: ICD-10-CM

## 2023-11-10 DIAGNOSIS — E66.01 OBESITY, MORBID (HCC): ICD-10-CM

## 2023-11-10 PROCEDURE — 99213 OFFICE O/P EST LOW 20 MIN: CPT | Performed by: STUDENT IN AN ORGANIZED HEALTH CARE EDUCATION/TRAINING PROGRAM

## 2023-11-10 NOTE — PATIENT INSTRUCTIONS
Continue PAP Therapy  Continue CPAP at [id number]. Remember to clean your mask and equipment regularly, as directed. You should be eligible for new supplies approximately every 3-6 months, depending on your insurance coverage. Contact your Durable Medical Equipment (DME) company for new supplies as needed. Follow up in 12 months. Care and Maintenance  Headgear should be washed as needed. Daily inspection and weekly washings are recommended. Do not disassemble the straps. Machine wash in warm water, making sure to attach Velcro hooks and tabs before washing. Line dry or machine dry on a low setting. Masks should be washed every day. Daily inspection is recommended. Leave the mask and tubing attached. Gently wash the mask with a soft cloth using warm water and mild detergent, concentrating on the mask cushion flaps. DO NOT use alcohol or bleach. Rinse thoroughly and air dry. Tubing and headgear should be washed weekly. Daily inspection is recommended. Wash in warm water and mild detergent and rinse thoroughly. Hook the tubing to the machine and blow until dry. Humidifier should be washed daily and filled with DISTILLED water before use. Wash with warm water and mild detergent. Disinfect weekly by soaking with a solution of 1 part white vinegar and 3 parts water for 30 minutes. Rinse thoroughly and air dry. Disposable filters should be replaced once a month. Wash reusable foam filters with warm water and mild detergent at least once a month. Rinse thoroughly and dry with paper towels. Avoid  that contain fragrance or conditioners, as these will leave a residue. NEVER iron any soft goods. CMS Requirements    Your insurance requires a face-to-face follow up visit within a 31-90 day period after starting CPAP. Your insurance requires compliance with CPAP, which is at least 4 hours per night for 70% of the time.  This must be done over a 30 day period and must occur within the initial 31-90 day period after starting CPAP. Your insurance also requires at least yearly follow ups to continue to pay for CPAP supplies. PAP Supply Guidelines    Below are the guidelines for reordering your supplies. You will be responsible for your deductible, co payments, and out of pocket expenses.     Item Frequency   Nasal Mask (no headgear) 1 every 3 months   Nasal Mask Cushion 1 every 2 weeks   Full Face Mask (no headgear) 1 every 3 months   Full Face Mask Cushion 1 every month   Nasal Pillows 1 every 2 weeks   Headgear 1 every 6 months   hin Strap 1 every 6 months   magalys 1 every 3 months   Filters: Reusable 1 every 6 months   Filters: Disposable 1 every 2 weeks   Humidifier Chamber(disposable) 1 every 6 months

## 2023-11-10 NOTE — PROGRESS NOTES
[address] [facility]  Sleep Medicine Follow up/ Established Patient Visit      Assessment/Plan:  Donald Zuñiga was seen today for follow-up. Diagnoses and all orders for this visit:    CHASIDY (obstructive sleep apnea)  -     PAP DME Resupply/Reorder    Obesity, morbid (HCC)  -     PAP DME Resupply/Reorder    TIA (transient ischemic attack)  -     PAP DME Resupply/Reorder    Paroxysmal atrial fibrillation (HCC)  -     PAP DME Resupply/Reorder    Primary hypertension  -     PAP DME Resupply/Reorder      Donald Zuñiga is a pleasant 82year-old gentleman with a PMHx of T2DM, HTN, TIA, RBBB, chronic pain syndrome, HLD, obesity, lumbar degenerative disc disease who presents in follow up for obstructive sleep apnea. He overall is doing well with his new machine, and states that it is working much much better than his previous device, and he is quite pleased with his current treatment. He has no concerns today. - Continue CPAP at settings of 11 cm H2O  - Prescription for new supplies ordered today  - Reviewed CMS/insurance requirements and resupply guidelines  - Information provided on the above as well as general maintenance steps  - he is to see me back in ~12 months, however may reach out sooner if needed. ________________________________________________________________________________________________    Per Last Visit Note (Date: 11/23/2023):  Reason for Visit:  82 y.o.male here for annual follow-up     Assessment:  Doing well on current therapy of CPAP 11 cm for moderate CHASIDY (AHI = 16.8). Plan:  Continue same     Follow up: One year      Sleep Studies:  -PSG 10/10/2017: .5 minutes, sleep efficiency 66%. Reduced REM sleep, high percentage of N3 noted. AHI 16.8, supine AHI 16.8, REM AHI 57.4. O2 mal 83%. PLM index 170.4, PLM arousal index 20.4. -PAP titration study 10/19/2017: Pressure of 11 cm H2O a limited respiratory events and snoring.   PLM index 100.5, PLM arousal index 5.1.      ________________________________________________________________________________________________      Interval History: Glen Jacome is a 82 y.o. male with a PMHx of T2DM, HTN, TIA, RBBB, chronic pain syndrome, HLD, obesity, lumbar degenerative disc disease who presents in follow up for obstructive sleep apnea. SDB:  -Current experience with PAP Therapy: "Working great, much better than my other machine."  -Difficulties with mask: None; uses nasal pillows  -Difficulties with device: None  Compliance:          [place] Sleepiness Scale:  What are your chances of dozing? 0= no chance  1= slight chance  2= moderate chance  3= high chance    Sitting and readin   Watching TV: 2  Sitting, inactive in a public place (e.g. a theatre or a meeting):0  As a passenger in a car for an hour without a break: 0  Lying down to rest in the afternoon when circumstances permit: 2   Sitting and talking to someone: 0  Sitting quietly after a lunch without alcohol: 1  In a car, while stopped for a few minutes in the traffic: 0       TOTAL  7/24  Greater or equal to 10 is positive for excessive daytime sleepiness        SLEEP HYGIENE QUESTIONS:  Bedtime: 2330   Time it takes to fall sleep: <1 hour  Wake up Time: ~0630/0700   Number of times patient wakes up per night: 1-2  Reason (s) why patient wakes up during the night: restroom   Estimated total sleep time ( in a 24 hour period of time): ~7 hours   Naps: 1, while watching TV. ~1 hour. Doesn't use CPAP. PMH, PSH, SH: "In the process of getting L4-L5 done, however was told he needs to lose 20 pounds. Starts Ozempic Monday.      SLEEP RELATED ROS   No Known Allergies  CURRENT MEDICATIONS:  Current Outpatient Medications   Medication Instructions    Acetaminophen 325 MG CAPS Oral, As needed    amLODIPine (NORVASC) 10 mg, Oral, Daily    atorvastatin (LIPITOR) 40 mg, Oral, Daily    Cholecalciferol (VITAMIN D-3 PO) 2,000 unit marking on U-100 syringe, Oral, Daily after dinner    co-enzyme Q-10 30 mg, Oral, Daily after dinner    diclofenac sodium (VOLTAREN) 2 g, 4 times daily    fish oil 1,000 mg, Oral, 2 times daily    glucosamine-chondroitin 500-400 MG tablet 2 tablets, Oral, Daily (early morning)    ipratropium (ATROVENT) 0.06 % nasal spray USE 2 SPRAYS INTO EACH NOSTRIL UP TO 3 TIMES DAILY AS NEEDED    losartan (COZAAR) 100 mg, Oral, Daily    montelukast (SINGULAIR) 10 mg, Oral, Daily at bedtime    multivitamin (THERAGRAN) TABS 1 tablet, Oral, Daily (early morning)    predniSONE 5 mg, Oral, Daily    pregabalin (LYRICA) 75 mg, Oral, 3 times daily    sertraline (ZOLOFT) 50 mg, Oral, Daily    sotalol (BETAPACE) 80 mg tablet TAKE 1 TABLET TWICE DAILY    tirzepatide 5 mg, Subcutaneous, Every 7 days    torsemide (DEMADEX) 20 mg tablet TAKE 1 TABLET TWICE DAILY    traMADol (ULTRAM) 50 mg, Oral, Every 8 hours PRN    warfarin (COUMADIN) 5 mg tablet TAKE 1/2 TO 1 TABLET DAILY BY MOUTH OR AS DIRECTED BY PHYSICIAN         PHYSICAL EXAMINATION:  Vital Signs: /78 (BP Location: Left arm, Patient Position: Sitting, Cuff Size: Standard)   Pulse 93   Ht 5' 7" (1.702 m)   Wt 106 kg (233 lb)   SpO2 97%   BMI 36.49 kg/m²   PHYSICAL EXAM:  Constitutional: LOUIE, well appearing   Mental Status: A&Ox3  Skin: Warm, dry, no rashes noted   Eyes: PERRL, normal conjunctiva  ENT: Nasal congestion absent, Nasal valve incompetence absent. Posterior airspace: Luu tongue position 4, retrognathia absent. Overbite absent. High arched palate absent. Tongue scalloping/ridging absent. Uvula: Not visualized  Chest: No evidence of respiratory distress, no accessory muscle use; no evidence of peripheral cyanosis  Abdomen: Soft, NT/ND  Extremities: No digital clubbing or pedal edema  Neuro: Strength 5/5 throughout, sensation grossly intact. Utilizing walker for ambulation due to severe chronic low back pain.       I have spent a total time of 20-29 minutes on 11/10/23 in caring for this patient including Diagnostic results, Prognosis, Risks and benefits of tx options, Instructions for management, Patient and family education, Documenting in the medical record, Reviewing / ordering tests, medicine, procedures  , and Obtaining or reviewing history  . Electronically signed by:    Sascha Chairez [address] [place] [address] Neurology and Sleep Medicine  [address] [place] [address]  11/10/23

## 2023-11-16 ENCOUNTER — TELEPHONE (OUTPATIENT)
Dept: SLEEP CENTER | Facility: CLINIC | Age: 83
End: 2023-11-16

## 2023-11-16 NOTE — TELEPHONE ENCOUNTER
Rx for PAP resupply sent to [facility] via [address] [place] [address].

## 2023-11-17 LAB

## 2023-11-24 DIAGNOSIS — E78.2 MIXED HYPERLIPIDEMIA: ICD-10-CM

## 2023-11-27 RX ORDER — ATORVASTATIN CALCIUM 40 MG/1
40 TABLET, FILM COATED ORAL DAILY
Qty: 90 TABLET | Refills: 1 | Status: SHIPPED | OUTPATIENT
Start: 2023-11-27

## 2023-11-28 DIAGNOSIS — M17.0 PRIMARY OSTEOARTHRITIS OF BOTH KNEES: ICD-10-CM

## 2023-11-28 DIAGNOSIS — I10 ESSENTIAL HYPERTENSION: ICD-10-CM

## 2023-11-28 DIAGNOSIS — M51.16 INTERVERTEBRAL DISC DISORDERS WITH RADICULOPATHY, LUMBAR REGION: ICD-10-CM

## 2023-11-28 DIAGNOSIS — G47.33 OSA (OBSTRUCTIVE SLEEP APNEA): ICD-10-CM

## 2023-11-28 DIAGNOSIS — E11.9 TYPE 2 DIABETES, HBA1C GOAL < 7% (HCC): ICD-10-CM

## 2023-12-13 ENCOUNTER — TELEPHONE (OUTPATIENT)
Dept: INTERNAL MEDICINE CLINIC | Age: 83
End: 2023-12-13

## 2023-12-13 DIAGNOSIS — J30.89 NON-SEASONAL ALLERGIC RHINITIS, UNSPECIFIED TRIGGER: ICD-10-CM

## 2023-12-13 RX ORDER — MONTELUKAST SODIUM 10 MG/1
10 TABLET ORAL
Qty: 30 TABLET | Refills: 0 | Status: SHIPPED | OUTPATIENT
Start: 2023-12-13

## 2023-12-13 NOTE — TELEPHONE ENCOUNTER
Patient called stated his nose keeps running would like something to be called into pharmacy for him if possible

## 2023-12-14 ENCOUNTER — ANTICOAG VISIT (OUTPATIENT)
Dept: CARDIOLOGY CLINIC | Facility: CLINIC | Age: 83
End: 2023-12-14

## 2023-12-14 ENCOUNTER — APPOINTMENT (OUTPATIENT)
Dept: LAB | Facility: CLINIC | Age: 83
End: 2023-12-14
Payer: MEDICARE

## 2023-12-14 DIAGNOSIS — I48.0 PAROXYSMAL ATRIAL FIBRILLATION (HCC): Primary | ICD-10-CM

## 2023-12-28 ENCOUNTER — RA CDI HCC (OUTPATIENT)
Dept: OTHER | Facility: HOSPITAL | Age: 83
End: 2023-12-28

## 2024-01-02 DIAGNOSIS — M51.16 INTERVERTEBRAL DISC DISORDERS WITH RADICULOPATHY, LUMBAR REGION: ICD-10-CM

## 2024-01-02 DIAGNOSIS — F34.1 DYSTHYMIC DISORDER: ICD-10-CM

## 2024-01-02 NOTE — TELEPHONE ENCOUNTER
Pt left message about refills needed, called him back to let him know since he has appt tomorrow the refills will be taken care of at the appt

## 2024-01-03 ENCOUNTER — OFFICE VISIT (OUTPATIENT)
Dept: INTERNAL MEDICINE CLINIC | Facility: CLINIC | Age: 84
End: 2024-01-03
Payer: MEDICARE

## 2024-01-03 ENCOUNTER — APPOINTMENT (OUTPATIENT)
Dept: LAB | Facility: CLINIC | Age: 84
End: 2024-01-03
Payer: MEDICARE

## 2024-01-03 ENCOUNTER — ANTICOAG VISIT (OUTPATIENT)
Dept: CARDIOLOGY CLINIC | Facility: CLINIC | Age: 84
End: 2024-01-03

## 2024-01-03 VITALS
WEIGHT: 230 LBS | HEIGHT: 67 IN | SYSTOLIC BLOOD PRESSURE: 126 MMHG | BODY MASS INDEX: 36.1 KG/M2 | OXYGEN SATURATION: 99 % | HEART RATE: 66 BPM | DIASTOLIC BLOOD PRESSURE: 78 MMHG | TEMPERATURE: 98.2 F

## 2024-01-03 DIAGNOSIS — I48.0 PAROXYSMAL ATRIAL FIBRILLATION (HCC): Primary | ICD-10-CM

## 2024-01-03 DIAGNOSIS — I48.0 PAROXYSMAL ATRIAL FIBRILLATION (HCC): ICD-10-CM

## 2024-01-03 DIAGNOSIS — C61 PROSTATE CANCER (HCC): ICD-10-CM

## 2024-01-03 DIAGNOSIS — F11.20 UNCOMPLICATED OPIOID DEPENDENCE (HCC): ICD-10-CM

## 2024-01-03 DIAGNOSIS — F34.1 DYSTHYMIC DISORDER: ICD-10-CM

## 2024-01-03 DIAGNOSIS — E11.9 TYPE 2 DIABETES, HBA1C GOAL < 7% (HCC): ICD-10-CM

## 2024-01-03 DIAGNOSIS — M17.0 PRIMARY OSTEOARTHRITIS OF BOTH KNEES: ICD-10-CM

## 2024-01-03 DIAGNOSIS — M46.1 SACROILIITIS (HCC): ICD-10-CM

## 2024-01-03 DIAGNOSIS — G47.33 OSA (OBSTRUCTIVE SLEEP APNEA): ICD-10-CM

## 2024-01-03 DIAGNOSIS — M51.16 INTERVERTEBRAL DISC DISORDERS WITH RADICULOPATHY, LUMBAR REGION: ICD-10-CM

## 2024-01-03 DIAGNOSIS — I10 ESSENTIAL HYPERTENSION: ICD-10-CM

## 2024-01-03 DIAGNOSIS — E66.01 OBESITY, MORBID (HCC): ICD-10-CM

## 2024-01-03 DIAGNOSIS — F33.9 DEPRESSION, RECURRENT (HCC): ICD-10-CM

## 2024-01-03 PROCEDURE — 99214 OFFICE O/P EST MOD 30 MIN: CPT | Performed by: INTERNAL MEDICINE

## 2024-01-03 RX ORDER — PREDNISONE 5 MG/1
5 TABLET ORAL DAILY
Qty: 90 TABLET | Refills: 0 | Status: SHIPPED | OUTPATIENT
Start: 2024-01-03

## 2024-01-03 NOTE — PROGRESS NOTES
Assessment/Plan:      Depression Screening and Follow-up Plan: Patient was screened for depression during today's encounter. They screened negative with a PHQ-9 score of 3.            1. Body mass index 36.0-36.9, adult  -     tirzepatide (Mounjaro) 7.5 MG/0.5ML; Inject 0.5 mL (7.5 mg total) under the skin every 7 days    2. Dysthymic disorder  -     sertraline (ZOLOFT) 50 mg tablet; Take 1 tablet (50 mg total) by mouth daily    3. Intervertebral disc disorders with radiculopathy, lumbar region  -     predniSONE 5 mg tablet; Take 1 tablet (5 mg total) by mouth daily  -     tirzepatide (Mounjaro) 7.5 MG/0.5ML; Inject 0.5 mL (7.5 mg total) under the skin every 7 days    4. Essential hypertension  -     tirzepatide (Mounjaro) 7.5 MG/0.5ML; Inject 0.5 mL (7.5 mg total) under the skin every 7 days    5. CHASIDY (obstructive sleep apnea)  -     tirzepatide (Mounjaro) 7.5 MG/0.5ML; Inject 0.5 mL (7.5 mg total) under the skin every 7 days    6. Type 2 diabetes, HbA1c goal < 7% (HCC)  -     tirzepatide (Mounjaro) 7.5 MG/0.5ML; Inject 0.5 mL (7.5 mg total) under the skin every 7 days    7. Primary osteoarthritis of both knees  -     tirzepatide (Mounjaro) 7.5 MG/0.5ML; Inject 0.5 mL (7.5 mg total) under the skin every 7 days    8. Sacroiliitis (HCC)    9. Uncomplicated opioid dependence (HCC)    10. Depression, recurrent (HCC)    11. Paroxysmal atrial fibrillation (HCC)    12. Obesity, morbid (HCC)    13. Prostate cancer (HCC)           Subjective:      Patient ID: Sergio Lambert is a 83 y.o. male.    Follow-up on weight check, doing well, on the medication        The following portions of the patient's history were reviewed and updated as appropriate: He  has a past medical history of A-fib (HCC), Anemia, unspecified, Anxiety, Arthritis, Basal cell carcinoma (BCC) of skin of nose, Cancer (HCC), Chondrocostal junction syndrome, Coronary artery disease, CPAP (continuous positive airway pressure) dependence, Depression, Dysthymic  disorder, Edema, unspecified, Headache(784.0), Hyperlipidemia, Hypertension, Impaired fasting blood sugar, Intervertebral disc disorders with radiculopathy, lumbar region, Irregular heart beat, Obesity, Prostate cancer ([facility]), Sleep apnea, Spinal stenosis, Stroke ([facility]), TIA (transient ischemic attack), Unspecified osteoarthritis, unspecified site, and Venous insufficiency of both lower extremities.  He   Patient Active Problem List    Diagnosis Date Noted    Body mass index 36.0-36.9, adult 01/03/2024    Primary hypertension 11/10/2023    CHASIDY (obstructive sleep apnea) 07/05/2023    Depression, recurrent ([facility]) 08/19/2022    Type 2 diabetes, HbA1c goal < 7% ([facility]) 05/25/2022    Cellulitis of right foot 12/17/2021    Abnormal gait 08/04/2021    Obesity, morbid ([facility]) 03/26/2021    Non-seasonal allergic rhinitis 03/26/2021    Primary osteoarthritis of both knees 11/13/2020    TIA (transient ischemic attack)     Prostate cancer ([facility])     Intervertebral disc disorders with radiculopathy, lumbar region     Impaired fasting blood sugar     Hypertension     Mixed hyperlipidemia     Obstructive sleep apnea syndrome     CPAP (continuous positive airway pressure) dependence     Dysthymic disorder     Venous insufficiency of both lower extremities     Essential hypertension 11/20/2019    Venous insufficiency 11/20/2019    Uncomplicated opioid dependence ([facility]) 06/05/2019    Encounter for long-term use of opiate analgesic 06/05/2019    Chronic pain syndrome 09/17/2018    Lumbar radiculopathy 04/16/2018    Lumbar spondylosis 04/16/2018    Right bundle branch block (RBBB) 03/19/2018    Edema of both lower legs due to peripheral venous insufficiency 03/19/2018    Chronic pain of both lower extremities 03/19/2018    Paroxysmal atrial fibrillation (HCC) 01/20/2018    Sacroiliitis (HCC) 04/17/2017    Spondylosis of lumbar region without myelopathy or radiculopathy 04/17/2017    Lumbar spinal stenosis 05/12/2015     He  has a past surgical  history that includes Prostatectomy; Tonsillectomy and adenoidectomy; FACIAL/NECK BIOPSY (N/A, 4/3/2017); FULL THICKNESS SKIN GRAFT (N/A, 4/3/2017); Cataract extraction; CT epidural steroid injection (TIN lumbar); MOHS RECONSTRUCTION (N/A, 10/27/2020); FLAP LOCAL HEAD / NECK (N/A, 10/27/2020); Colonoscopy; and Excision basal cell carcinoma.  His family history includes Heart attack in his father.  He  reports that he quit smoking about 44 years ago. His smoking use included cigarettes. He started smoking about 49 years ago. He has a 1.3 pack-year smoking history. He has never used smokeless tobacco. He reports that he does not currently use alcohol after a past usage of about 17.0 standard drinks of alcohol per week. He reports that he does not use drugs.  Current Outpatient Medications   Medication Sig Dispense Refill    Acetaminophen 325 MG CAPS Take by mouth as needed       amLODIPine (NORVASC) 10 mg tablet TAKE 1 TABLET (10 MG TOTAL) BY MOUTH DAILY 90 tablet 3    atorvastatin (LIPITOR) 40 mg tablet Take 1 tablet (40 mg total) by mouth daily 90 tablet 1    Cholecalciferol (VITAMIN D-3 PO) Take 2,000 unit marking on U-100 syringe by mouth daily after dinner      co-enzyme Q-10 30 MG capsule Take 30 mg by mouth daily after dinner      glucosamine-chondroitin 500-400 MG tablet Take 2 tablets by mouth daily in the early morning      ipratropium (ATROVENT) 0.06 % nasal spray USE 2 SPRAYS INTO EACH NOSTRIL UP TO 3 TIMES DAILY AS NEEDED      losartan (COZAAR) 100 MG tablet TAKE 1 TABLET (100 MG TOTAL) BY MOUTH DAILY 90 tablet 2    montelukast (SINGULAIR) 10 mg tablet Take 1 tablet (10 mg total) by mouth daily at bedtime 30 tablet 0    multivitamin (THERAGRAN) TABS Take 1 tablet by mouth daily in the early morning      Omega-3 Fatty Acids (FISH OIL) 1,000 mg Take 1,000 mg by mouth 2 (two) times a day      predniSONE 5 mg tablet Take 1 tablet (5 mg total) by mouth daily 90 tablet 0    pregabalin (LYRICA) 75 mg capsule  Take 1 capsule (75 mg total) by mouth 3 (three) times a day 90 capsule 1    sertraline (ZOLOFT) 50 mg tablet Take 1 tablet (50 mg total) by mouth daily 90 tablet 0    sotalol (BETAPACE) 80 mg tablet TAKE 1 TABLET TWICE DAILY 180 tablet 3    tirzepatide (Mounjaro) 7.5 MG/0.5ML Inject 0.5 mL (7.5 mg total) under the skin every 7 days 6 mL 1    torsemide (DEMADEX) 20 mg tablet TAKE 1 TABLET TWICE DAILY (Patient taking differently: Take 20 mg by mouth daily) 180 tablet 3    warfarin (COUMADIN) 5 mg tablet TAKE 1/2 TO 1 TABLET DAILY BY MOUTH OR AS DIRECTED BY PHYSICIAN 90 tablet 3    diclofenac sodium (VOLTAREN) 1 % Apply 2 g topically 4 (four) times a day (Patient not taking: Reported on 10/31/2023)      traMADol (ULTRAM) 50 mg tablet Take 1 tablet (50 mg total) by mouth every 8 (eight) hours as needed for moderate pain (Patient not taking: Reported on 10/31/2023) 90 tablet 2     No current facility-administered medications for this visit.     Current Outpatient Medications on File Prior to Visit   Medication Sig    Acetaminophen 325 MG CAPS Take by mouth as needed     amLODIPine (NORVASC) 10 mg tablet TAKE 1 TABLET (10 MG TOTAL) BY MOUTH DAILY    atorvastatin (LIPITOR) 40 mg tablet Take 1 tablet (40 mg total) by mouth daily    Cholecalciferol (VITAMIN D-3 PO) Take 2,000 unit marking on U-100 syringe by mouth daily after dinner    co-enzyme Q-10 30 MG capsule Take 30 mg by mouth daily after dinner    glucosamine-chondroitin 500-400 MG tablet Take 2 tablets by mouth daily in the early morning    ipratropium (ATROVENT) 0.06 % nasal spray USE 2 SPRAYS INTO EACH NOSTRIL UP TO 3 TIMES DAILY AS NEEDED    losartan (COZAAR) 100 MG tablet TAKE 1 TABLET (100 MG TOTAL) BY MOUTH DAILY    montelukast (SINGULAIR) 10 mg tablet Take 1 tablet (10 mg total) by mouth daily at bedtime    multivitamin (THERAGRAN) TABS Take 1 tablet by mouth daily in the early morning    Omega-3 Fatty Acids (FISH OIL) 1,000 mg Take 1,000 mg by mouth 2 (two)  "times a day    pregabalin (LYRICA) 75 mg capsule Take 1 capsule (75 mg total) by mouth 3 (three) times a day    sotalol (BETAPACE) 80 mg tablet TAKE 1 TABLET TWICE DAILY    torsemide (DEMADEX) 20 mg tablet TAKE 1 TABLET TWICE DAILY (Patient taking differently: Take 20 mg by mouth daily)    warfarin (COUMADIN) 5 mg tablet TAKE 1/2 TO 1 TABLET DAILY BY MOUTH OR AS DIRECTED BY PHYSICIAN    [DISCONTINUED] predniSONE 5 mg tablet Take 1 tablet (5 mg total) by mouth daily    [DISCONTINUED] sertraline (ZOLOFT) 50 mg tablet Take 1 tablet (50 mg total) by mouth daily    [DISCONTINUED] tirzepatide 5 MG/0.5ML Inject 0.5 mL (5 mg total) under the skin every 7 days    diclofenac sodium (VOLTAREN) 1 % Apply 2 g topically 4 (four) times a day (Patient not taking: Reported on 10/31/2023)    traMADol (ULTRAM) 50 mg tablet Take 1 tablet (50 mg total) by mouth every 8 (eight) hours as needed for moderate pain (Patient not taking: Reported on 10/31/2023)     No current facility-administered medications on file prior to visit.     He has No Known Allergies..    Review of Systems   Constitutional:  Negative for chills and fever.   HENT:  Negative for congestion, ear pain and sore throat.    Eyes:  Negative for pain.   Respiratory:  Negative for cough and shortness of breath.    Cardiovascular:  Negative for chest pain and leg swelling.   Gastrointestinal:  Negative for abdominal pain, nausea and vomiting.   Endocrine: Negative for polyuria.   Genitourinary:  Negative for difficulty urinating, frequency and urgency.   Musculoskeletal:  Positive for arthralgias and back pain.   Skin:  Negative for rash.   Neurological:  Negative for weakness and headaches.   Psychiatric/Behavioral:  Negative for sleep disturbance. The patient is not nervous/anxious.          Objective:      /78 (BP Location: Left arm, Patient Position: Sitting, Cuff Size: Standard)   Pulse 66   Temp 98.2 °F (36.8 °C) (Temporal)   Ht 5' 7\" (1.702 m)   Wt 104 kg " (230 lb)   SpO2 99%   BMI 36.02 kg/m²     Recent Results (from the past 1344 hour(s))   PAP Accessories/Supplies Package    Collection Time: 11/16/23  2:44 PM   Result Value Ref Range    Supplier Name [employer]     Supplier Phone Number [phone]     Order Status Delivery Successful     Delivery Note      Delivery Request Date 11/16/2023     Date Delivered  11/17/2023     Item Description PAP Accessory     Item Description       PAP Mask, Nasal Pillow, Fit Upon Setup, N/A, 1 per 3 months    Item Description Humidifier Water Chamber, 1 per 6 months     Item Description PAP Headgear, 1 per 6 months     Item Description PAP Chinstrap, 1 per 6 months     Item Description PAP Humidifier, Heated     Item Description PAP Monitoring [place]     Item Description Heated PAP Tubing, 1 per 3 months     Item Description Disposable PAP Filter, 2 per 1 month     Item Description Non-Disposable PAP Filter, 1 per 6 months     Item Description       PAP Mask Interface Cushion, Nasal Pillow, 2 per 1 month   Protime-INR    Collection Time: 12/14/23 12:09 PM   Result Value Ref Range    Protime 33.9 (H) 11.6 - 14.5 seconds    INR 3.44 (H) 0.84 - 1.19        Physical Exam  Constitutional:       Appearance: Normal appearance.   HENT:      Head: Normocephalic.      Right Ear: External ear normal.      Left Ear: External ear normal.      Nose: Nose normal. No congestion.      Mouth/Throat:      Mouth: Mucous membranes are moist.      Pharynx: Oropharynx is clear. No oropharyngeal exudate or posterior oropharyngeal erythema.   Eyes:      Extraocular Movements: Extraocular movements intact.      Conjunctiva/sclera: Conjunctivae normal.   Cardiovascular:      Rate and Rhythm: Normal rate and regular rhythm.      Heart sounds: Normal heart sounds. No murmur heard.  Pulmonary:      Effort: Pulmonary effort is normal.      Breath sounds: Normal breath sounds. No wheezing or rales.   Abdominal:      General: There is  no distension.      Palpations: Abdomen is soft.      Tenderness: There is no abdominal tenderness.   Musculoskeletal:         General: Normal range of motion.      Cervical back: Normal range of motion and neck supple.      Right lower leg: Edema present.      Left lower leg: Edema present.   Lymphadenopathy:      Cervical: No cervical adenopathy.   Skin:     General: Skin is warm.   Neurological:      General: No focal deficit present.      Mental Status: He is alert and oriented to person, place, and time.

## 2024-01-12 DIAGNOSIS — J30.89 NON-SEASONAL ALLERGIC RHINITIS, UNSPECIFIED TRIGGER: ICD-10-CM

## 2024-01-12 RX ORDER — MONTELUKAST SODIUM 10 MG/1
10 TABLET ORAL
Qty: 30 TABLET | Refills: 0 | Status: SHIPPED | OUTPATIENT
Start: 2024-01-12

## 2024-01-24 ENCOUNTER — OFFICE VISIT (OUTPATIENT)
Dept: CARDIOLOGY CLINIC | Facility: CLINIC | Age: 84
End: 2024-01-24
Payer: MEDICARE

## 2024-01-24 VITALS
BODY MASS INDEX: 36.19 KG/M2 | WEIGHT: 230.6 LBS | OXYGEN SATURATION: 99 % | SYSTOLIC BLOOD PRESSURE: 128 MMHG | HEIGHT: 67 IN | HEART RATE: 88 BPM | DIASTOLIC BLOOD PRESSURE: 72 MMHG

## 2024-01-24 DIAGNOSIS — G47.33 OSA (OBSTRUCTIVE SLEEP APNEA): ICD-10-CM

## 2024-01-24 DIAGNOSIS — I48.0 PAROXYSMAL ATRIAL FIBRILLATION (HCC): Primary | ICD-10-CM

## 2024-01-24 DIAGNOSIS — I10 ESSENTIAL HYPERTENSION: ICD-10-CM

## 2024-01-24 PROCEDURE — 99212 OFFICE O/P EST SF 10 MIN: CPT | Performed by: INTERNAL MEDICINE

## 2024-01-24 NOTE — PATIENT INSTRUCTIONS
You were seen today in the Cardiology office for follow up. No medication changes were made today. Please continue your current cardiac medications as prescribed.    Thank you for choosing [facility].

## 2024-01-24 NOTE — PROGRESS NOTES
[facility]    CHIEF COMPLAINT:   Chief Complaint   Patient presents with    Follow-up       HPI:  Sergio Lambert is a 83 y.o. male with a past medical history of obesity, CHASIDY on CPAP, remote history TIA, hypertension, hyperlipidemia, paroxysmal atrial fibrillation, chronic anticoagulation with warfarin.    OV - 6/13/23: Follows with Neurosurgery at [facility] for L4-L5 lumbar stenosis which has been refractory to epidural injections and pain medications. He has progressive lower extremity weakness. + recent falls. He is being considered for lumbar surgery and presents today for pre operative risk assessment. He has a remote history TIA. No history myocardial infarction, congestive heart failure, diabetes on insulin, advanced CKD. He has a history of chronic venous insufficiency and swelling in his bilateral legs. He takes torsemide daily and raises his legs with improvement in swelling. He has CHASIDY and uses CPAP nightly. Sotalol and warfarin for paroxysmal atrial fibrillation.    OV - 10/13/23 Presents today for follow up and to discuss blood thinner options. He feels well and has no new complaints. His ECG today shows atrial fibrillation. He was unaware and has not interval change in symptoms since our last office visit. No visual changes, lightheadedness, syncope, chest pain, palpitations, shortness of breath, orthopnea. + leg edema     Interval history:  Started Mounjaro for weight loss  No lightheadedness, syncope, chest pain, palpitations, shortness of breath, orthopnea  Leg swelling is baseline and improves with elevation     The following portions of the patient's history were reviewed and updated as appropriate: allergies, current medications, past family history, past medical history, past social history, past surgical history, and problem list.    SINCE LAST OV I REVIEWED WITH THE PATIENT THE INTERIM LABS, TEST RESULTS, CONSULTANT(S) NOTES AND PERFORMED AN INTERIM REVIEW OF  HISTORY    Past Medical History:   Diagnosis Date    A-fib (HCC)     Anemia, unspecified     Anxiety     Arthritis     Basal cell carcinoma (BCC) of skin of nose     Cancer (HCC)     Chondrocostal junction syndrome     Coronary artery disease     CPAP (continuous positive airway pressure) dependence     Depression     Dysthymic disorder     Edema, unspecified     Headache(784.0)     Hyperlipidemia     Hypertension     Impaired fasting blood sugar     Intervertebral disc disorders with radiculopathy, lumbar region     Irregular heart beat     Obesity     Prostate cancer (HCC)     s/p surgery    Sleep apnea     on CPAP    Spinal stenosis     Stroke (HCC)     TIA (transient ischemic attack)     no residual problems    Unspecified osteoarthritis, unspecified site     Venous insufficiency of both lower extremities        Past Surgical History:   Procedure Laterality Date    BASAL CELL CARCINOMA EXCISION      on the nose    CATARACT EXTRACTION      COLONOSCOPY      2016, 2011    CT EPIDURAL STEROID INJECTION (TIN LUMBAR)      FACIAL/NECK BIOPSY N/A 4/3/2017    Procedure: NOSE BCC EXCISION; FROZEN SECTION; RECONSTRUCTION ;  Surgeon: Marco Antonio Duckworth MD;  [facility];  Service:     FLAP LOCAL HEAD / NECK N/A 10/27/2020    Procedure: NOSE FLAP;  Surgeon: Marco Antonio Duckworth MD;  [facility];  Service: Plastics    FULL THICKNESS SKIN GRAFT N/A 4/3/2017    Procedure: FLAP VERSUS FULL THICKNESS SKIN GRAFT ;  Surgeon: Marco Antonio Duckworth MD;  [facility];  Service:     MOHS RECONSTRUCTION N/A 10/27/2020    Procedure: NOSE MOHS DEFECT RECONSTRUCTION;  Surgeon: Marco Antonio Duckworth MD;  [facility];  Service: Plastics    PROSTATECTOMY      TONSILLECTOMY AND ADENOIDECTOMY         Social History     Socioeconomic History    Marital status: /Civil Union     Spouse name: Not on file    Number of children: Not on file    Years of education: Not on file    Highest education level: Not on  file   Occupational History    Not on file   Tobacco Use    Smoking status: Former     Current packs/day: 0.00     Average packs/day: 0.3 packs/day for 5.0 years (1.3 ttl pk-yrs)     Types: Cigarettes     Start date:      Quit date:      Years since quittin.0    Smokeless tobacco: Never    Tobacco comments:     I quite some time ago   Vaping Use    Vaping status: Never Used   Substance and Sexual Activity    Alcohol use: Not Currently     Alcohol/week: 17.0 standard drinks of alcohol     Types: 10 Glasses of wine, 3 Cans of beer, 4 Shots of liquor per week     Comment: glass of wine with dinner, maybe    Drug use: No    Sexual activity: Not Currently     Partners: Female     Birth control/protection: Abstinence, None   Other Topics Concern    Not on file   Social History Narrative    Not on file     Social Determinants of Health     Financial Resource Strain: Low Risk  (2023)    Overall Financial Resource Strain (CARDIA)     Difficulty of Paying Living Expenses: Not hard at all   Food Insecurity: Not on file   Transportation Needs: No Transportation Needs (2023)    PRAPARE - Transportation     Lack of Transportation (Medical): No     Lack of Transportation (Non-Medical): No   Physical Activity: Not on file   Stress: Not on file   Social Connections: Not on file   Intimate Partner Violence: Not on file   Housing Stability: Not on file       Family History   Problem Relation Age of Onset    Heart attack Father        No Known Allergies    Current Outpatient Medications   Medication Sig Dispense Refill    Acetaminophen 325 MG CAPS Take by mouth as needed       amLODIPine (NORVASC) 10 mg tablet TAKE 1 TABLET (10 MG TOTAL) BY MOUTH DAILY 90 tablet 3    atorvastatin (LIPITOR) 40 mg tablet Take 1 tablet (40 mg total) by mouth daily 90 tablet 1    Cholecalciferol (VITAMIN D-3 PO) Take 2,000 unit marking on U-100 syringe by mouth daily after dinner      co-enzyme Q-10 30 MG capsule Take 30 mg by mouth  "daily after dinner      glucosamine-chondroitin 500-400 MG tablet Take 2 tablets by mouth daily in the early morning      losartan (COZAAR) 100 MG tablet TAKE 1 TABLET (100 MG TOTAL) BY MOUTH DAILY 90 tablet 2    montelukast (SINGULAIR) 10 mg tablet Take 1 tablet (10 mg total) by mouth daily at bedtime 30 tablet 0    multivitamin (THERAGRAN) TABS Take 1 tablet by mouth daily in the early morning      Omega-3 Fatty Acids (FISH OIL) 1,000 mg Take 1,000 mg by mouth 2 (two) times a day      predniSONE 5 mg tablet Take 1 tablet (5 mg total) by mouth daily 90 tablet 0    pregabalin (LYRICA) 75 mg capsule Take 1 capsule (75 mg total) by mouth 3 (three) times a day 90 capsule 1    sertraline (ZOLOFT) 50 mg tablet Take 1 tablet (50 mg total) by mouth daily 90 tablet 0    sotalol (BETAPACE) 80 mg tablet TAKE 1 TABLET TWICE DAILY 180 tablet 3    tirzepatide (Mounjaro) 7.5 MG/0.5ML Inject 0.5 mL (7.5 mg total) under the skin every 7 days 6 mL 1    torsemide (DEMADEX) 20 mg tablet TAKE 1 TABLET TWICE DAILY (Patient taking differently: Take 20 mg by mouth daily) 180 tablet 3    warfarin (COUMADIN) 5 mg tablet TAKE 1/2 TO 1 TABLET DAILY BY MOUTH OR AS DIRECTED BY PHYSICIAN 90 tablet 3    diclofenac sodium (VOLTAREN) 1 % Apply 2 g topically 4 (four) times a day (Patient not taking: Reported on 10/31/2023)      ipratropium (ATROVENT) 0.06 % nasal spray USE 2 SPRAYS INTO EACH NOSTRIL UP TO 3 TIMES DAILY AS NEEDED      traMADol (ULTRAM) 50 mg tablet Take 1 tablet (50 mg total) by mouth every 8 (eight) hours as needed for moderate pain (Patient not taking: Reported on 10/31/2023) 90 tablet 2     No current facility-administered medications for this visit.       /72 (BP Location: Left arm, Patient Position: Sitting, Cuff Size: Large)   Pulse 88 Comment: L radial  Ht 5' 7\" (1.702 m)   Wt 105 kg (230 lb 9.6 oz)   SpO2 99%   BMI 36.12 kg/m²     Review of Systems   All other systems reviewed and are negative.      Physical " Exam  Vitals reviewed.   Constitutional:       General: He is not in acute distress.     Appearance: He is well-developed. He is obese. He is not toxic-appearing.   HENT:      Head: Normocephalic and atraumatic.   Eyes:      General: No scleral icterus.  Cardiovascular:      Rate and Rhythm: Normal rate and regular rhythm.      Pulses: Normal pulses.      Heart sounds: Normal heart sounds. No murmur heard.     No gallop.   Pulmonary:      Effort: Pulmonary effort is normal. No respiratory distress.      Breath sounds: Normal breath sounds. No wheezing or rales.   Abdominal:      General: Bowel sounds are normal. There is no distension.      Palpations: Abdomen is soft.      Tenderness: There is no abdominal tenderness. There is no guarding.   Musculoskeletal:         General: No swelling.      Right lower leg: Edema (1+) present.      Left lower leg: Edema (1+) present.   Skin:     General: Skin is warm and dry.      Capillary Refill: Capillary refill takes less than 2 seconds.      Coloration: Skin is not jaundiced or pale.   Neurological:      Mental Status: He is alert.   Psychiatric:         Mood and Affect: Mood normal.         Behavior: Behavior normal.          Lab Results   Component Value Date    K 4.1 10/20/2023     10/20/2023    CO2 27 10/20/2023    BUN 21 10/20/2023    CREATININE 0.98 10/20/2023    CALCIUM 9.0 10/20/2023    ALT 16 10/20/2023    AST 17 10/20/2023    INR 2.55 (H) 01/03/2024       Lab Results   Component Value Date    HDL 49 10/20/2023    LDLCALC 68 10/20/2023    TRIG 180 (H) 10/20/2023       Lab Results   Component Value Date    WBC 8.28 10/20/2023    HGB 15.4 10/20/2023    HCT 45.7 10/20/2023     10/20/2023       Lab Results   Component Value Date     10/20/2023    HGBA1C 6.6 (H) 10/20/2023     Cardiac studies:   TTE - 11/2/23:    Left Ventricle: Left ventricular cavity size is normal. Wall thickness is mildly increased. There is concentric remodeling. The left  ventricular ejection fraction is 60%. Systolic function is normal. Wall motion is normal. Diastolic function is mildly abnormal, consistent with grade I (abnormal) relaxation.    Left Atrium: The atrium is mildly dilated (35-41 mL/m2).    Aortic Valve: There is mild regurgitation.    Mitral Valve: There is mild annular calcification. There is mild regurgitation.    Tricuspid Valve: There is mild regurgitation. The estimated right ventricular systolic pressure is 36.00 mmHg.    Aorta: The aortic root is mildly dilated. The ascending aorta is mildly dilated. The aortic root is 3.90 cm. The ascending aorta is 4 cm.     ECG - 10/13/23: Afib, RBBB/LPFB    TTE - 7/12/21: Normal left ventricular cavity size and wall thickness.  Normal wall motion and systolic function.  LVEF 60%, grade 1 DD.  Normal right ventricular cavity size and systolic function.  Mild tricuspid and aortic regurgitation.    ASSESSMENT AND PLAN:  Mustapha was seen today for follow-up.    Diagnoses and all orders for this visit:    Paroxysmal atrial fibrillation (HCC): History of persistent Afib with remote history of cardioversion. He was placed on Sotalol and has been doing well with no symptomatic recurrence. ECG in Oct 2023 showed rate controlled Afib and new LPFB. Repeat transthoracic echo in Nov 2023 showed normal LV/RV systolic function, mildly dilated LA, mild AI, mild MR, mild TR. Aortic root and ascending top normal for BSA. Sinus rhythm during echo. Regular on exam today.  -eGFR 72, K 4.1  -Continue sotalol 80 mg BID  -Not interested in DOAC  -Continue warfarin goal INR 2.0-3.0    Essential hypertension: BP is at goal. Currently taking losartan 100 mg and amlodipine 10 mg daily.    CHASIDY (obstructive sleep apnea): Continue CPAP      Delfina Arellano MD

## 2024-02-02 ENCOUNTER — APPOINTMENT (OUTPATIENT)
Dept: LAB | Facility: CLINIC | Age: 84
End: 2024-02-02
Payer: MEDICARE

## 2024-02-02 ENCOUNTER — ANTICOAG VISIT (OUTPATIENT)
Dept: CARDIOLOGY CLINIC | Facility: CLINIC | Age: 84
End: 2024-02-02

## 2024-02-02 DIAGNOSIS — I48.0 PAROXYSMAL ATRIAL FIBRILLATION (HCC): Primary | ICD-10-CM

## 2024-02-08 ENCOUNTER — TELEPHONE (OUTPATIENT)
Dept: INTERNAL MEDICINE CLINIC | Facility: CLINIC | Age: 84
End: 2024-02-08

## 2024-02-08 DIAGNOSIS — J30.89 NON-SEASONAL ALLERGIC RHINITIS, UNSPECIFIED TRIGGER: ICD-10-CM

## 2024-02-08 RX ORDER — DESLORATADINE 5 MG/1
5 TABLET ORAL DAILY
Qty: 30 TABLET | Refills: 0 | Status: SHIPPED | OUTPATIENT
Start: 2024-02-08

## 2024-02-08 NOTE — TELEPHONE ENCOUNTER
Patient is on Montelukast and he says its not working. His nose just runs, he is asking if you can give him a different allergy medicine

## 2024-02-20 ENCOUNTER — RA CDI HCC (OUTPATIENT)
Dept: OTHER | Facility: HOSPITAL | Age: 84
End: 2024-02-20

## 2024-02-27 ENCOUNTER — OFFICE VISIT (OUTPATIENT)
Dept: INTERNAL MEDICINE CLINIC | Facility: CLINIC | Age: 84
End: 2024-02-27
Payer: MEDICARE

## 2024-02-27 VITALS
WEIGHT: 225 LBS | HEART RATE: 72 BPM | OXYGEN SATURATION: 99 % | SYSTOLIC BLOOD PRESSURE: 132 MMHG | BODY MASS INDEX: 35.31 KG/M2 | HEIGHT: 67 IN | DIASTOLIC BLOOD PRESSURE: 74 MMHG | TEMPERATURE: 98.8 F

## 2024-02-27 DIAGNOSIS — J34.89 RHINORRHEA: ICD-10-CM

## 2024-02-27 DIAGNOSIS — E11.9 DIABETIC EYE EXAM (HCC): ICD-10-CM

## 2024-02-27 DIAGNOSIS — M51.16 INTERVERTEBRAL DISC DISORDERS WITH RADICULOPATHY, LUMBAR REGION: ICD-10-CM

## 2024-02-27 DIAGNOSIS — I87.2 VENOUS INSUFFICIENCY OF BOTH LOWER EXTREMITIES: ICD-10-CM

## 2024-02-27 DIAGNOSIS — Z01.00 DIABETIC EYE EXAM (HCC): ICD-10-CM

## 2024-02-27 DIAGNOSIS — E78.2 MIXED HYPERLIPIDEMIA: ICD-10-CM

## 2024-02-27 DIAGNOSIS — F34.1 DYSTHYMIC DISORDER: ICD-10-CM

## 2024-02-27 DIAGNOSIS — I10 PRIMARY HYPERTENSION: ICD-10-CM

## 2024-02-27 DIAGNOSIS — I48.0 PAROXYSMAL ATRIAL FIBRILLATION (HCC): ICD-10-CM

## 2024-02-27 DIAGNOSIS — E11.9 TYPE 2 DIABETES, HBA1C GOAL < 7% (HCC): Primary | ICD-10-CM

## 2024-02-27 DIAGNOSIS — M17.0 PRIMARY OSTEOARTHRITIS OF BOTH KNEES: ICD-10-CM

## 2024-02-27 DIAGNOSIS — G47.33 OBSTRUCTIVE SLEEP APNEA SYNDROME: ICD-10-CM

## 2024-02-27 PROCEDURE — 99214 OFFICE O/P EST MOD 30 MIN: CPT | Performed by: INTERNAL MEDICINE

## 2024-02-27 RX ORDER — GLYCOPYRROLATE 1 MG/1
1 TABLET ORAL 2 TIMES DAILY
Qty: 60 TABLET | Refills: 0 | Status: SHIPPED | OUTPATIENT
Start: 2024-02-27

## 2024-02-27 NOTE — PROGRESS NOTES
Assessment/Plan:             1. Type 2 diabetes, HbA1c goal < 7% ([facility])  -     CBC and differential; Future  -     Comprehensive metabolic panel; Future  -     Lipid Panel with Direct LDL reflex; Future  -     TSH, 3rd generation; Future  -     Albumin / creatinine urine ratio; Future  -     Hemoglobin A1C; Future    2. Intervertebral disc disorders with radiculopathy, lumbar region    3. Diabetic eye exam ([facility])    4. Rhinorrhea  -     glycopyrrolate (ROBINUL) 1 mg tablet; Take 1 tablet (1 mg total) by mouth 2 (two) times a day    5. Obstructive sleep apnea syndrome    6. Venous insufficiency of both lower extremities    7. Primary hypertension    8. Paroxysmal atrial fibrillation (HCC)    9. Primary osteoarthritis of both knees    10. Mixed hyperlipidemia  -     Comprehensive metabolic panel; Future  -     Lipid Panel with Direct LDL reflex; Future  -     TSH, 3rd generation; Future    11. Dysthymic disorder           Subjective:      Patient ID: Sergio Lambert is a 83 y.o. male.    Follow-up on multiple medical problems to ensure they are stable on current medications        The following portions of the patient's history were reviewed and updated as appropriate: He  has a past medical history of A-fib ([facility]), Anemia, unspecified, Anxiety, Arthritis, Basal cell carcinoma (BCC) of skin of nose, Cancer ([facility]), Chondrocostal junction syndrome, Coronary artery disease, CPAP (continuous positive airway pressure) dependence, Depression, Dysthymic disorder, Edema, unspecified, Headache(784.0), Hyperlipidemia, Hypertension, Impaired fasting blood sugar, Intervertebral disc disorders with radiculopathy, lumbar region, Irregular heart beat, Obesity, Prostate cancer ([facility]), Sleep apnea, Spinal stenosis, Stroke ([facility]), TIA (transient ischemic attack), Unspecified osteoarthritis, unspecified site, and Venous insufficiency of both lower extremities.  He   Patient Active Problem List    Diagnosis Date Noted    Body mass index  36.0-36.9, adult 01/03/2024    Primary hypertension 11/10/2023    CHASIDY (obstructive sleep apnea) 07/05/2023    Depression, recurrent ([facility]) 08/19/2022    Type 2 diabetes, HbA1c goal < 7% ([facility]) 05/25/2022    Cellulitis of right foot 12/17/2021    Abnormal gait 08/04/2021    Obesity, morbid ([facility]) 03/26/2021    Non-seasonal allergic rhinitis 03/26/2021    Primary osteoarthritis of both knees 11/13/2020    TIA (transient ischemic attack)     Prostate cancer ([facility])     Intervertebral disc disorders with radiculopathy, lumbar region     Impaired fasting blood sugar     Hypertension     Mixed hyperlipidemia     Obstructive sleep apnea syndrome     CPAP (continuous positive airway pressure) dependence     Dysthymic disorder     Venous insufficiency of both lower extremities     Essential hypertension 11/20/2019    Venous insufficiency 11/20/2019    Uncomplicated opioid dependence ([facility]) 06/05/2019    Encounter for long-term use of opiate analgesic 06/05/2019    Chronic pain syndrome 09/17/2018    Lumbar radiculopathy 04/16/2018    Lumbar spondylosis 04/16/2018    Right bundle branch block (RBBB) 03/19/2018    Edema of both lower legs due to peripheral venous insufficiency 03/19/2018    Chronic pain of both lower extremities 03/19/2018    Paroxysmal atrial fibrillation (HCC) 01/20/2018    Sacroiliitis ([facility]) 04/17/2017    Spondylosis of lumbar region without myelopathy or radiculopathy 04/17/2017    Lumbar spinal stenosis 05/12/2015     He  has a past surgical history that includes Prostatectomy; Tonsillectomy and adenoidectomy; FACIAL/NECK BIOPSY (N/A, 4/3/2017); FULL THICKNESS SKIN GRAFT (N/A, 4/3/2017); Cataract extraction; CT epidural steroid injection (TNI lumbar); MOHS RECONSTRUCTION (N/A, 10/27/2020); FLAP LOCAL HEAD / NECK (N/A, 10/27/2020); Colonoscopy; and Excision basal cell carcinoma.  His family history includes Heart attack in his father.  He  reports that he quit smoking about 44 years ago. His smoking use included  cigarettes. He started smoking about 49 years ago. He has a 1.3 pack-year smoking history. He has never used smokeless tobacco. He reports that he does not currently use alcohol after a past usage of about 17.0 standard drinks of alcohol per week. He reports that he does not use drugs.  Current Outpatient Medications   Medication Sig Dispense Refill    Acetaminophen 325 MG CAPS Take by mouth as needed       amLODIPine (NORVASC) 10 mg tablet TAKE 1 TABLET (10 MG TOTAL) BY MOUTH DAILY 90 tablet 3    atorvastatin (LIPITOR) 40 mg tablet Take 1 tablet (40 mg total) by mouth daily 90 tablet 1    Cholecalciferol (VITAMIN D-3 PO) Take 2,000 unit marking on U-100 syringe by mouth daily after dinner      glucosamine-chondroitin 500-400 MG tablet Take 2 tablets by mouth daily in the early morning      glycopyrrolate (ROBINUL) 1 mg tablet Take 1 tablet (1 mg total) by mouth 2 (two) times a day 60 tablet 0    ipratropium (ATROVENT) 0.06 % nasal spray USE 2 SPRAYS INTO EACH NOSTRIL UP TO 3 TIMES DAILY AS NEEDED      losartan (COZAAR) 100 MG tablet TAKE 1 TABLET (100 MG TOTAL) BY MOUTH DAILY 90 tablet 2    multivitamin (THERAGRAN) TABS Take 1 tablet by mouth daily in the early morning      Omega-3 Fatty Acids (FISH OIL) 1,000 mg Take 1,000 mg by mouth 2 (two) times a day      predniSONE 5 mg tablet Take 1 tablet (5 mg total) by mouth daily 90 tablet 0    sertraline (ZOLOFT) 50 mg tablet Take 1 tablet (50 mg total) by mouth daily 90 tablet 0    sotalol (BETAPACE) 80 mg tablet TAKE 1 TABLET TWICE DAILY 180 tablet 3    tirzepatide (Mounjaro) 7.5 MG/0.5ML Inject 0.5 mL (7.5 mg total) under the skin every 7 days 6 mL 1    torsemide (DEMADEX) 20 mg tablet TAKE 1 TABLET TWICE DAILY (Patient taking differently: Take 20 mg by mouth daily) 180 tablet 3    warfarin (COUMADIN) 5 mg tablet TAKE 1/2 TO 1 TABLET DAILY BY MOUTH OR AS DIRECTED BY PHYSICIAN 90 tablet 3    co-enzyme Q-10 30 MG capsule Take 30 mg by mouth daily after dinner       desloratadine (CLARINEX) 5 MG tablet Take 1 tablet (5 mg total) by mouth daily (Patient not taking: Reported on 2/27/2024) 30 tablet 0    diclofenac sodium (VOLTAREN) 1 % Apply 2 g topically 4 (four) times a day (Patient not taking: Reported on 10/31/2023)      traMADol (ULTRAM) 50 mg tablet Take 1 tablet (50 mg total) by mouth every 8 (eight) hours as needed for moderate pain (Patient not taking: Reported on 10/31/2023) 90 tablet 2     No current facility-administered medications for this visit.     Current Outpatient Medications on File Prior to Visit   Medication Sig    Acetaminophen 325 MG CAPS Take by mouth as needed     amLODIPine (NORVASC) 10 mg tablet TAKE 1 TABLET (10 MG TOTAL) BY MOUTH DAILY    atorvastatin (LIPITOR) 40 mg tablet Take 1 tablet (40 mg total) by mouth daily    Cholecalciferol (VITAMIN D-3 PO) Take 2,000 unit marking on U-100 syringe by mouth daily after dinner    glucosamine-chondroitin 500-400 MG tablet Take 2 tablets by mouth daily in the early morning    ipratropium (ATROVENT) 0.06 % nasal spray USE 2 SPRAYS INTO EACH NOSTRIL UP TO 3 TIMES DAILY AS NEEDED    losartan (COZAAR) 100 MG tablet TAKE 1 TABLET (100 MG TOTAL) BY MOUTH DAILY    multivitamin (THERAGRAN) TABS Take 1 tablet by mouth daily in the early morning    Omega-3 Fatty Acids (FISH OIL) 1,000 mg Take 1,000 mg by mouth 2 (two) times a day    predniSONE 5 mg tablet Take 1 tablet (5 mg total) by mouth daily    sertraline (ZOLOFT) 50 mg tablet Take 1 tablet (50 mg total) by mouth daily    sotalol (BETAPACE) 80 mg tablet TAKE 1 TABLET TWICE DAILY    tirzepatide (Mounjaro) 7.5 MG/0.5ML Inject 0.5 mL (7.5 mg total) under the skin every 7 days    torsemide (DEMADEX) 20 mg tablet TAKE 1 TABLET TWICE DAILY (Patient taking differently: Take 20 mg by mouth daily)    warfarin (COUMADIN) 5 mg tablet TAKE 1/2 TO 1 TABLET DAILY BY MOUTH OR AS DIRECTED BY PHYSICIAN    co-enzyme Q-10 30 MG capsule Take 30 mg by mouth daily after dinner     "desloratadine (CLARINEX) 5 MG tablet Take 1 tablet (5 mg total) by mouth daily (Patient not taking: Reported on 2/27/2024)    diclofenac sodium (VOLTAREN) 1 % Apply 2 g topically 4 (four) times a day (Patient not taking: Reported on 10/31/2023)    traMADol (ULTRAM) 50 mg tablet Take 1 tablet (50 mg total) by mouth every 8 (eight) hours as needed for moderate pain (Patient not taking: Reported on 10/31/2023)    [DISCONTINUED] montelukast (SINGULAIR) 10 mg tablet Take 1 tablet (10 mg total) by mouth daily at bedtime (Patient not taking: Reported on 2/27/2024)    [DISCONTINUED] pregabalin (LYRICA) 75 mg capsule Take 1 capsule (75 mg total) by mouth 3 (three) times a day (Patient not taking: Reported on 2/27/2024)     No current facility-administered medications on file prior to visit.     He has No Known Allergies..    Review of Systems   Constitutional:  Negative for chills and fever.   HENT:  Negative for congestion, ear pain and sore throat.    Eyes:  Negative for pain.   Respiratory:  Negative for cough and shortness of breath.    Cardiovascular:  Negative for chest pain and leg swelling.   Gastrointestinal:  Negative for abdominal pain, nausea and vomiting.   Endocrine: Negative for polyuria.   Genitourinary:  Negative for difficulty urinating, frequency and urgency.   Musculoskeletal:  Positive for arthralgias and back pain.   Skin:  Negative for rash.   Neurological:  Negative for weakness and headaches.   Psychiatric/Behavioral:  Negative for sleep disturbance. The patient is not nervous/anxious.          Objective:      /74 (BP Location: Left arm, Patient Position: Sitting, Cuff Size: Standard)   Pulse 72   Temp 98.8 °F (37.1 °C) (Temporal)   Ht 5' 7\" (1.702 m)   Wt 102 kg (225 lb)   SpO2 99%   BMI 35.24 kg/m²     Recent Results (from the past 1344 hour(s))   Protime-INR    Collection Time: 01/03/24 11:43 AM   Result Value Ref Range    Protime 26.9 (H) 11.6 - 14.5 seconds    INR 2.55 (H) 0.84 - " 1.19   Protime-INR    Collection Time: 02/02/24 11:26 AM   Result Value Ref Range    Protime 28.4 (H) 11.6 - 14.5 seconds    INR 2.73 (H) 0.84 - 1.19        Physical Exam  Constitutional:       Appearance: Normal appearance.   HENT:      Head: Normocephalic.      Right Ear: External ear normal.      Left Ear: External ear normal.      Nose: Nose normal. No congestion.      Mouth/Throat:      Mouth: Mucous membranes are moist.      Pharynx: Oropharynx is clear. No oropharyngeal exudate or posterior oropharyngeal erythema.   Eyes:      Extraocular Movements: Extraocular movements intact.      Conjunctiva/sclera: Conjunctivae normal.   Cardiovascular:      Rate and Rhythm: Normal rate and regular rhythm.      Heart sounds: Normal heart sounds. No murmur heard.  Pulmonary:      Effort: Pulmonary effort is normal.      Breath sounds: Normal breath sounds. No wheezing or rales.   Abdominal:      General: There is no distension.      Palpations: Abdomen is soft.      Tenderness: There is no abdominal tenderness.   Musculoskeletal:      Cervical back: Normal range of motion and neck supple.      Right lower leg: Edema present.      Left lower leg: Edema present.   Lymphadenopathy:      Cervical: No cervical adenopathy.   Skin:     General: Skin is warm.   Neurological:      General: No focal deficit present.      Mental Status: He is alert and oriented to person, place, and time.

## 2024-03-01 ENCOUNTER — APPOINTMENT (OUTPATIENT)
Dept: LAB | Facility: CLINIC | Age: 84
End: 2024-03-01
Payer: MEDICARE

## 2024-03-04 ENCOUNTER — ANTICOAG VISIT (OUTPATIENT)
Dept: CARDIOLOGY CLINIC | Facility: CLINIC | Age: 84
End: 2024-03-04

## 2024-03-04 DIAGNOSIS — I48.0 PAROXYSMAL ATRIAL FIBRILLATION (HCC): Primary | ICD-10-CM

## 2024-04-01 ENCOUNTER — PATIENT MESSAGE (OUTPATIENT)
Dept: INTERNAL MEDICINE CLINIC | Facility: CLINIC | Age: 84
End: 2024-04-01

## 2024-04-03 ENCOUNTER — OFFICE VISIT (OUTPATIENT)
Dept: INTERNAL MEDICINE CLINIC | Facility: CLINIC | Age: 84
End: 2024-04-03
Payer: MEDICARE

## 2024-04-03 VITALS
DIASTOLIC BLOOD PRESSURE: 82 MMHG | SYSTOLIC BLOOD PRESSURE: 140 MMHG | TEMPERATURE: 98.2 F | HEART RATE: 64 BPM | HEIGHT: 67 IN | OXYGEN SATURATION: 99 % | BODY MASS INDEX: 34.84 KG/M2 | WEIGHT: 222 LBS

## 2024-04-03 DIAGNOSIS — E11.9 DIABETIC EYE EXAM (HCC): ICD-10-CM

## 2024-04-03 DIAGNOSIS — L23.89 OTHER ALLERGIC CONTACT DERMATITIS: Primary | ICD-10-CM

## 2024-04-03 DIAGNOSIS — Z12.11 SCREENING FOR COLON CANCER: ICD-10-CM

## 2024-04-03 DIAGNOSIS — Z01.00 DIABETIC EYE EXAM (HCC): ICD-10-CM

## 2024-04-03 DIAGNOSIS — F34.1 DYSTHYMIC DISORDER: ICD-10-CM

## 2024-04-03 PROCEDURE — G2211 COMPLEX E/M VISIT ADD ON: HCPCS | Performed by: INTERNAL MEDICINE

## 2024-04-03 PROCEDURE — 99213 OFFICE O/P EST LOW 20 MIN: CPT | Performed by: INTERNAL MEDICINE

## 2024-04-03 RX ORDER — TRIAMCINOLONE ACETONIDE 1 MG/G
CREAM TOPICAL 2 TIMES DAILY
Qty: 80 G | Refills: 1 | Status: SHIPPED | OUTPATIENT
Start: 2024-04-03

## 2024-04-03 NOTE — PROGRESS NOTES
Assessment/Plan:             1. Other allergic contact dermatitis  -     triamcinolone (KENALOG) 0.1 % cream; Apply topically 2 (two) times a day    2. Screening for colon cancer  -     Ambulatory Referral to Gastroenterology; Future    3. Diabetic eye exam (HCC)  -     Ambulatory Referral to Ophthalmology; Future    4. Dysthymic disorder  -     sertraline (ZOLOFT) 50 mg tablet; Take 1 tablet (50 mg total) by mouth daily           Subjective:      Patient ID: Sergio Lambert is a 83 y.o. male.    Rash, bilateral forearm, itchy, no fever no chills    Rash  Pertinent negatives include no congestion, cough, fever, shortness of breath, sore throat or vomiting.       The following portions of the patient's history were reviewed and updated as appropriate: He  has a past medical history of A-fib (Formerly [facility]), Anemia, unspecified, Anxiety, Arthritis, Basal cell carcinoma (BCC) of skin of nose, Cancer ([facility]), Chondrocostal junction syndrome, Coronary artery disease, CPAP (continuous positive airway pressure) dependence, Depression, Dysthymic disorder, Edema, unspecified, Headache(784.0), Hyperlipidemia, Hypertension, Impaired fasting blood sugar, Intervertebral disc disorders with radiculopathy, lumbar region, Irregular heart beat, Obesity, Prostate cancer (Formerly [facility]), Sleep apnea, Spinal stenosis, Stroke ([facility]), TIA (transient ischemic attack), Unspecified osteoarthritis, unspecified site, and Venous insufficiency of both lower extremities.  He   Patient Active Problem List    Diagnosis Date Noted    Body mass index 36.0-36.9, adult 01/03/2024    Primary hypertension 11/10/2023    CHASIDY (obstructive sleep apnea) 07/05/2023    Depression, recurrent ([facility]ly [facility]) 08/19/2022    Type 2 diabetes, HbA1c goal < 7% ([facility]ly [facility]) 05/25/2022    Cellulitis of right foot 12/17/2021    Abnormal gait 08/04/2021    Obesity, morbid ([facility]ly [facility]) 03/26/2021    Non-seasonal allergic rhinitis 03/26/2021    Primary osteoarthritis of both knees 11/13/2020    TIA (transient ischemic  attack)     Prostate cancer (HCC)     Intervertebral disc disorders with radiculopathy, lumbar region     Impaired fasting blood sugar     Hypertension     Mixed hyperlipidemia     Obstructive sleep apnea syndrome     CPAP (continuous positive airway pressure) dependence     Dysthymic disorder     Venous insufficiency of both lower extremities     Essential hypertension 11/20/2019    Venous insufficiency 11/20/2019    Uncomplicated opioid dependence (HCC) 06/05/2019    Encounter for long-term use of opiate analgesic 06/05/2019    Chronic pain syndrome 09/17/2018    Lumbar radiculopathy 04/16/2018    Lumbar spondylosis 04/16/2018    Right bundle branch block (RBBB) 03/19/2018    Edema of both lower legs due to peripheral venous insufficiency 03/19/2018    Chronic pain of both lower extremities 03/19/2018    Paroxysmal atrial fibrillation (HCC) 01/20/2018    Sacroiliitis (HCC) 04/17/2017    Spondylosis of lumbar region without myelopathy or radiculopathy 04/17/2017    Lumbar spinal stenosis 05/12/2015     He  has a past surgical history that includes Prostatectomy; Tonsillectomy and adenoidectomy; FACIAL/NECK BIOPSY (N/A, 4/3/2017); FULL THICKNESS SKIN GRAFT (N/A, 4/3/2017); Cataract extraction; CT epidural steroid injection (TIN lumbar); MOHS RECONSTRUCTION (N/A, 10/27/2020); FLAP LOCAL HEAD / NECK (N/A, 10/27/2020); Colonoscopy; and Excision basal cell carcinoma.  His family history includes Heart attack in his father.  He  reports that he quit smoking about 44 years ago. His smoking use included cigarettes. He started smoking about 49 years ago. He has a 1.3 pack-year smoking history. He has never used smokeless tobacco. He reports that he does not currently use alcohol after a past usage of about 17.0 standard drinks of alcohol per week. He reports that he does not use drugs.  Current Outpatient Medications   Medication Sig Dispense Refill    Acetaminophen 325 MG CAPS Take by mouth as needed       amLODIPine  (NORVASC) 10 mg tablet TAKE 1 TABLET (10 MG TOTAL) BY MOUTH DAILY 90 tablet 3    atorvastatin (LIPITOR) 40 mg tablet Take 1 tablet (40 mg total) by mouth daily 90 tablet 1    Cholecalciferol (VITAMIN D-3 PO) Take 2,000 unit marking on U-100 syringe by mouth daily after dinner      co-enzyme Q-10 30 MG capsule Take 30 mg by mouth daily after dinner      glucosamine-chondroitin 500-400 MG tablet Take 2 tablets by mouth daily in the early morning      glycopyrrolate (ROBINUL) 1 mg tablet Take 1 tablet (1 mg total) by mouth 2 (two) times a day 60 tablet 0    ipratropium (ATROVENT) 0.06 % nasal spray USE 2 SPRAYS INTO EACH NOSTRIL UP TO 3 TIMES DAILY AS NEEDED      losartan (COZAAR) 100 MG tablet TAKE 1 TABLET (100 MG TOTAL) BY MOUTH DAILY 90 tablet 2    multivitamin (THERAGRAN) TABS Take 1 tablet by mouth daily in the early morning      Omega-3 Fatty Acids (FISH OIL) 1,000 mg Take 1,000 mg by mouth 2 (two) times a day      predniSONE 5 mg tablet Take 1 tablet (5 mg total) by mouth daily 90 tablet 0    sertraline (ZOLOFT) 50 mg tablet Take 1 tablet (50 mg total) by mouth daily 90 tablet 0    sotalol (BETAPACE) 80 mg tablet TAKE 1 TABLET TWICE DAILY 180 tablet 3    tirzepatide (Mounjaro) 7.5 MG/0.5ML Inject 0.5 mL (7.5 mg total) under the skin every 7 days 6 mL 1    torsemide (DEMADEX) 20 mg tablet TAKE 1 TABLET TWICE DAILY (Patient taking differently: Take 20 mg by mouth daily) 180 tablet 3    triamcinolone (KENALOG) 0.1 % cream Apply topically 2 (two) times a day 80 g 1    warfarin (COUMADIN) 5 mg tablet TAKE 1/2 TO 1 TABLET DAILY BY MOUTH OR AS DIRECTED BY PHYSICIAN 90 tablet 3    desloratadine (CLARINEX) 5 MG tablet Take 1 tablet (5 mg total) by mouth daily (Patient not taking: Reported on 2/27/2024) 30 tablet 0    diclofenac sodium (VOLTAREN) 1 % Apply 2 g topically 4 (four) times a day (Patient not taking: Reported on 10/31/2023)      traMADol (ULTRAM) 50 mg tablet Take 1 tablet (50 mg total) by mouth every 8  (eight) hours as needed for moderate pain (Patient not taking: Reported on 10/31/2023) 90 tablet 2     No current facility-administered medications for this visit.     Current Outpatient Medications on File Prior to Visit   Medication Sig    Acetaminophen 325 MG CAPS Take by mouth as needed     amLODIPine (NORVASC) 10 mg tablet TAKE 1 TABLET (10 MG TOTAL) BY MOUTH DAILY    atorvastatin (LIPITOR) 40 mg tablet Take 1 tablet (40 mg total) by mouth daily    Cholecalciferol (VITAMIN D-3 PO) Take 2,000 unit marking on U-100 syringe by mouth daily after dinner    co-enzyme Q-10 30 MG capsule Take 30 mg by mouth daily after dinner    glucosamine-chondroitin 500-400 MG tablet Take 2 tablets by mouth daily in the early morning    glycopyrrolate (ROBINUL) 1 mg tablet Take 1 tablet (1 mg total) by mouth 2 (two) times a day    ipratropium (ATROVENT) 0.06 % nasal spray USE 2 SPRAYS INTO EACH NOSTRIL UP TO 3 TIMES DAILY AS NEEDED    losartan (COZAAR) 100 MG tablet TAKE 1 TABLET (100 MG TOTAL) BY MOUTH DAILY    multivitamin (THERAGRAN) TABS Take 1 tablet by mouth daily in the early morning    Omega-3 Fatty Acids (FISH OIL) 1,000 mg Take 1,000 mg by mouth 2 (two) times a day    predniSONE 5 mg tablet Take 1 tablet (5 mg total) by mouth daily    sotalol (BETAPACE) 80 mg tablet TAKE 1 TABLET TWICE DAILY    tirzepatide (Mounjaro) 7.5 MG/0.5ML Inject 0.5 mL (7.5 mg total) under the skin every 7 days    torsemide (DEMADEX) 20 mg tablet TAKE 1 TABLET TWICE DAILY (Patient taking differently: Take 20 mg by mouth daily)    warfarin (COUMADIN) 5 mg tablet TAKE 1/2 TO 1 TABLET DAILY BY MOUTH OR AS DIRECTED BY PHYSICIAN    [DISCONTINUED] sertraline (ZOLOFT) 50 mg tablet Take 1 tablet (50 mg total) by mouth daily    desloratadine (CLARINEX) 5 MG tablet Take 1 tablet (5 mg total) by mouth daily (Patient not taking: Reported on 2/27/2024)    diclofenac sodium (VOLTAREN) 1 % Apply 2 g topically 4 (four) times a day (Patient not taking: Reported on  "10/31/2023)    traMADol (ULTRAM) 50 mg tablet Take 1 tablet (50 mg total) by mouth every 8 (eight) hours as needed for moderate pain (Patient not taking: Reported on 10/31/2023)     No current facility-administered medications on file prior to visit.     He has No Known Allergies..    Review of Systems   Constitutional:  Negative for chills and fever.   HENT:  Negative for congestion, ear pain and sore throat.    Eyes:  Negative for pain.   Respiratory:  Negative for cough and shortness of breath.    Cardiovascular:  Negative for chest pain and leg swelling.   Gastrointestinal:  Negative for abdominal pain, nausea and vomiting.   Endocrine: Negative for polyuria.   Genitourinary:  Negative for difficulty urinating, frequency and urgency.   Musculoskeletal:  Positive for arthralgias and back pain.   Skin:  Positive for rash.   Neurological:  Negative for weakness and headaches.   Psychiatric/Behavioral:  Negative for sleep disturbance. The patient is not nervous/anxious.          Objective:      /82 (BP Location: Left arm, Patient Position: Sitting, Cuff Size: Standard)   Pulse 64   Temp 98.2 °F (36.8 °C) (Temporal)   Ht 5' 7\" (1.702 m)   Wt 101 kg (222 lb)   SpO2 99%   BMI 34.77 kg/m²     Recent Results (from the past 1344 hour(s))   Protime-INR    Collection Time: 03/01/24 11:22 AM   Result Value Ref Range    Protime 26.3 (H) 11.6 - 14.5 seconds    INR 2.48 (H) 0.84 - 1.19        Physical Exam  Constitutional:       Appearance: Normal appearance.   HENT:      Head: Normocephalic.      Right Ear: External ear normal.      Left Ear: External ear normal.      Nose: Nose normal. No congestion.      Mouth/Throat:      Mouth: Mucous membranes are moist.      Pharynx: Oropharynx is clear. No oropharyngeal exudate or posterior oropharyngeal erythema.   Eyes:      Extraocular Movements: Extraocular movements intact.      Conjunctiva/sclera: Conjunctivae normal.   Cardiovascular:      Rate and Rhythm: Normal rate " and regular rhythm.      Heart sounds: Normal heart sounds. No murmur heard.  Pulmonary:      Effort: Pulmonary effort is normal.      Breath sounds: Normal breath sounds. No wheezing or rales.   Abdominal:      General: There is no distension.      Palpations: Abdomen is soft.      Tenderness: There is no abdominal tenderness.   Musculoskeletal:      Cervical back: Normal range of motion and neck supple.      Right lower leg: No edema.      Left lower leg: No edema.   Lymphadenopathy:      Cervical: No cervical adenopathy.   Skin:     General: Skin is warm.      Comments: Bilateral forearm extension surface maculopapular erythematous rash present   Neurological:      General: No focal deficit present.      Mental Status: He is alert and oriented to person, place, and time.

## 2024-04-05 ENCOUNTER — ANTICOAG VISIT (OUTPATIENT)
Dept: CARDIOLOGY CLINIC | Facility: CLINIC | Age: 84
End: 2024-04-05

## 2024-04-05 ENCOUNTER — APPOINTMENT (OUTPATIENT)
Dept: LAB | Facility: CLINIC | Age: 84
End: 2024-04-05
Payer: MEDICARE

## 2024-04-05 DIAGNOSIS — I48.0 PAROXYSMAL ATRIAL FIBRILLATION (HCC): Primary | ICD-10-CM

## 2024-04-16 DIAGNOSIS — I48.0 PAROXYSMAL ATRIAL FIBRILLATION (HCC): ICD-10-CM

## 2024-04-17 NOTE — TELEPHONE ENCOUNTER
Call from Mahesh at [facility] pharmacy asking why the losartan script was approved and sent to [facility] but not the  Sotalol. Looks like Dr Jovel refused it stating he sees Dr Arellano. Please review and if needed send script to Dr Arellano.

## 2024-04-18 DIAGNOSIS — I48.0 PAROXYSMAL ATRIAL FIBRILLATION (HCC): Primary | ICD-10-CM

## 2024-04-18 RX ORDER — SOTALOL HYDROCHLORIDE 80 MG/1
TABLET ORAL
Qty: 180 TABLET | Refills: 3 | Status: SHIPPED | OUTPATIENT
Start: 2024-04-18

## 2024-04-18 NOTE — TELEPHONE ENCOUNTER
Pharmacy called for a follow up - requesting if script can be sent in. Patient was seen in the same practice different provider, losartan was approved but not the sotalol. Routing to last provider that had seen patient. Patient needs prescription sotalol 80 mg.

## 2024-04-19 NOTE — TELEPHONE ENCOUNTER
Refill provided. Please call patient and let him know that I ordered blood work (basic metabolic profile and magnesium level) to be completed. Thank you

## 2024-04-24 ENCOUNTER — RA CDI HCC (OUTPATIENT)
Dept: OTHER | Facility: HOSPITAL | Age: 84
End: 2024-04-24

## 2024-04-26 LAB
ALBUMIN SERPL-MCNC: 4.2 G/DL (ref 3.5–5.7)
ALBUMIN/CREAT UR: 8.1
ALP SERPL-CCNC: 73 U/L (ref 35–120)
ALT SERPL-CCNC: 14 U/L
ANION GAP SERPL CALCULATED.3IONS-SCNC: 12 MMOL/L (ref 3–11)
AST SERPL-CCNC: 16 U/L
BASOPHILS # BLD AUTO: 0 THOU/CMM (ref 0–0.1)
BASOPHILS NFR BLD AUTO: 0 %
BILIRUB SERPL-MCNC: 0.9 MG/DL (ref 0.2–1)
BUN SERPL-MCNC: 21 MG/DL (ref 7–28)
CALCIUM SERPL-MCNC: 9.1 MG/DL (ref 8.5–10.1)
CHLORIDE SERPL-SCNC: 104 MMOL/L (ref 100–109)
CHOLEST SERPL-MCNC: 153 MG/DL
CHOLEST/HDLC SERPL: 3.7 {RATIO}
CO2 SERPL-SCNC: 27 MMOL/L (ref 21–31)
CREAT SERPL-MCNC: 0.92 MG/DL (ref 0.53–1.3)
CREAT UR-MCNC: 223 MG/DL (ref 50–200)
CYTOLOGY CMNT CVX/VAG CYTO-IMP: ABNORMAL
DIFFERENTIAL METHOD BLD: NORMAL
EOSINOPHIL # BLD AUTO: 0.2 THOU/CMM (ref 0–0.5)
EOSINOPHIL NFR BLD AUTO: 2 %
ERYTHROCYTE [DISTWIDTH] IN BLOOD BY AUTOMATED COUNT: 13.5 % (ref 12–16)
EST. AVERAGE GLUCOSE BLD GHB EST-MCNC: 128 MG/DL
GFR/BSA.PRED SERPLBLD CYS-BASED-ARV: 83 ML/MIN/{1.73_M2}
GLUCOSE SERPL-MCNC: 108 MG/DL (ref 65–99)
HBA1C MFR BLD: 6.1 %
HCT VFR BLD AUTO: 42.9 % (ref 37–48)
HDLC SERPL-MCNC: 41 MG/DL (ref 23–92)
HGB BLD-MCNC: 14.6 G/DL (ref 12.5–17)
LDLC SERPL CALC-MCNC: 73 MG/DL
LYMPHOCYTES # BLD AUTO: 1.7 THOU/CMM (ref 1–3)
LYMPHOCYTES NFR BLD AUTO: 18 %
MCH RBC QN AUTO: 31.1 PG (ref 27–36)
MCHC RBC AUTO-ENTMCNC: 34.1 G/DL (ref 32–37)
MCV RBC AUTO: 91 FL (ref 80–100)
MICROALBUMIN UR-MCNC: 1.8 MG/DL
MONOCYTES # BLD AUTO: 0.9 THOU/CMM (ref 0.3–1)
MONOCYTES NFR BLD AUTO: 10 %
NEUTROPHILS # BLD AUTO: 6.7 THOU/CMM (ref 1.8–7.8)
NEUTROPHILS NFR BLD AUTO: 70 %
NONHDLC SERPL-MCNC: 112 MG/DL
PLATELET # BLD AUTO: 212 THOU/CMM (ref 140–350)
PMV BLD REES-ECKER: 9.8 FL (ref 7.5–11.3)
POTASSIUM SERPL-SCNC: 4.1 MMOL/L (ref 3.5–5.2)
PROT SERPL-MCNC: 6.4 G/DL (ref 6.3–8.3)
RBC # BLD AUTO: 4.7 MILL/CMM (ref 4–5.4)
SODIUM SERPL-SCNC: 143 MMOL/L (ref 135–145)
TRIGL SERPL-MCNC: 195 MG/DL
TSH SERPL-ACNC: 1.6 UIU/ML (ref 0.45–5.33)
WBC # BLD AUTO: 9.5 THOU/CMM (ref 4–10.5)

## 2024-05-01 ENCOUNTER — CONSULT (OUTPATIENT)
Dept: INTERNAL MEDICINE CLINIC | Facility: CLINIC | Age: 84
End: 2024-05-01
Payer: MEDICARE

## 2024-05-01 VITALS
DIASTOLIC BLOOD PRESSURE: 80 MMHG | TEMPERATURE: 98.6 F | HEART RATE: 68 BPM | SYSTOLIC BLOOD PRESSURE: 144 MMHG | WEIGHT: 223 LBS | BODY MASS INDEX: 35 KG/M2 | HEIGHT: 67 IN | OXYGEN SATURATION: 99 %

## 2024-05-01 DIAGNOSIS — I10 ESSENTIAL HYPERTENSION: ICD-10-CM

## 2024-05-01 DIAGNOSIS — G47.33 OSA (OBSTRUCTIVE SLEEP APNEA): ICD-10-CM

## 2024-05-01 DIAGNOSIS — E78.2 MIXED HYPERLIPIDEMIA: ICD-10-CM

## 2024-05-01 DIAGNOSIS — M51.16 INTERVERTEBRAL DISC DISORDERS WITH RADICULOPATHY, LUMBAR REGION: ICD-10-CM

## 2024-05-01 DIAGNOSIS — E11.9 DIABETIC EYE EXAM (HCC): ICD-10-CM

## 2024-05-01 DIAGNOSIS — M17.0 PRIMARY OSTEOARTHRITIS OF BOTH KNEES: ICD-10-CM

## 2024-05-01 DIAGNOSIS — Z12.11 SCREENING FOR COLON CANCER: ICD-10-CM

## 2024-05-01 DIAGNOSIS — Z01.00 DIABETIC EYE EXAM (HCC): ICD-10-CM

## 2024-05-01 DIAGNOSIS — Z01.818 PREOP EXAM FOR INTERNAL MEDICINE: Primary | ICD-10-CM

## 2024-05-01 DIAGNOSIS — F34.1 DYSTHYMIC DISORDER: ICD-10-CM

## 2024-05-01 DIAGNOSIS — I48.0 PAROXYSMAL ATRIAL FIBRILLATION (HCC): ICD-10-CM

## 2024-05-01 DIAGNOSIS — I87.2 VENOUS INSUFFICIENCY OF BOTH LOWER EXTREMITIES: ICD-10-CM

## 2024-05-01 DIAGNOSIS — R73.01 IMPAIRED FASTING BLOOD SUGAR: ICD-10-CM

## 2024-05-01 PROCEDURE — G2211 COMPLEX E/M VISIT ADD ON: HCPCS | Performed by: INTERNAL MEDICINE

## 2024-05-01 PROCEDURE — 99214 OFFICE O/P EST MOD 30 MIN: CPT | Performed by: INTERNAL MEDICINE

## 2024-05-01 NOTE — PROGRESS NOTES
Assessment/Plan:             1. Preop exam for internal medicine  Comments:  Cleared for surgery, at mild to moderate risk for perioperative cardiovascular events    2. Diabetic eye exam (Formerly [facility])    3. Screening for colon cancer    4. Paroxysmal atrial fibrillation (Formerly [facility])    5. Intervertebral disc disorders with radiculopathy, lumbar region    6. Mixed hyperlipidemia  Comments:  Stable, continue same medication    7. Primary osteoarthritis of both knees    8. Dysthymic disorder    9. CHASIDY (obstructive sleep apnea)    10. Essential hypertension  Comments:  Stable, continue same medication    11. Venous insufficiency of both lower extremities    12. Impaired fasting blood sugar           Subjective:      Patient ID: Sergio Lambert is a 84 y.o. male.    Preop for lower back surgery, on 5/21/2024, also blood test done on 4/26/2024 test discussed with him        The following portions of the patient's history were reviewed and updated as appropriate: He  has a past medical history of A-fib (Formerly [facility]), Anemia, unspecified, Anxiety, Arthritis, Basal cell carcinoma (BCC) of skin of nose, Cancer (Formerly [facility]), Chondrocostal junction syndrome, Coronary artery disease, CPAP (continuous positive airway pressure) dependence, Depression, Dysthymic disorder, Edema, unspecified, Headache(784.0), Hyperlipidemia, Hypertension, Impaired fasting blood sugar, Intervertebral disc disorders with radiculopathy, lumbar region, Irregular heart beat, Obesity, Prostate cancer (Formerly [facility]), Sleep apnea, Spinal stenosis, Stroke (Formerly [facility]), TIA (transient ischemic attack), Unspecified osteoarthritis, unspecified site, and Venous insufficiency of both lower extremities.  He   Patient Active Problem List    Diagnosis Date Noted    Body mass index 36.0-36.9, adult 01/03/2024    Primary hypertension 11/10/2023    CHASIDY (obstructive sleep apnea) 07/05/2023    Depression, recurrent (Formerly [facility]) 08/19/2022    Type 2 diabetes, HbA1c goal < 7% (Formerly [facility]) 05/25/2022    Cellulitis of right foot  12/17/2021    Abnormal gait 08/04/2021    Obesity, morbid (HCC) 03/26/2021    Non-seasonal allergic rhinitis 03/26/2021    Primary osteoarthritis of both knees 11/13/2020    TIA (transient ischemic attack)     Prostate cancer (HCC)     Intervertebral disc disorders with radiculopathy, lumbar region     Impaired fasting blood sugar     Hypertension     Mixed hyperlipidemia     Obstructive sleep apnea syndrome     CPAP (continuous positive airway pressure) dependence     Dysthymic disorder     Venous insufficiency of both lower extremities     Essential hypertension 11/20/2019    Venous insufficiency 11/20/2019    Uncomplicated opioid dependence (HCC) 06/05/2019    Encounter for long-term use of opiate analgesic 06/05/2019    Chronic pain syndrome 09/17/2018    Lumbar radiculopathy 04/16/2018    Lumbar spondylosis 04/16/2018    Right bundle branch block (RBBB) 03/19/2018    Edema of both lower legs due to peripheral venous insufficiency 03/19/2018    Chronic pain of both lower extremities 03/19/2018    Paroxysmal atrial fibrillation (HCC) 01/20/2018    Sacroiliitis (HCC) 04/17/2017    Spondylosis of lumbar region without myelopathy or radiculopathy 04/17/2017    Lumbar spinal stenosis 05/12/2015     He  has a past surgical history that includes Prostatectomy; Tonsillectomy and adenoidectomy; FACIAL/NECK BIOPSY (N/A, 4/3/2017); FULL THICKNESS SKIN GRAFT (N/A, 4/3/2017); Cataract extraction; CT epidural steroid injection (TIN lumbar); MOHS RECONSTRUCTION (N/A, 10/27/2020); FLAP LOCAL HEAD / NECK (N/A, 10/27/2020); Colonoscopy; and Excision basal cell carcinoma.  His family history includes Heart attack in his father.  He  reports that he quit smoking about 44 years ago. His smoking use included cigarettes. He started smoking about 49 years ago. He has a 1.3 pack-year smoking history. He has never used smokeless tobacco. He reports that he does not currently use alcohol after a past usage of about 17.0 standard drinks of  alcohol per week. He reports that he does not use drugs.  Current Outpatient Medications   Medication Sig Dispense Refill    Acetaminophen 325 MG CAPS Take by mouth as needed       amLODIPine (NORVASC) 10 mg tablet TAKE 1 TABLET (10 MG TOTAL) BY MOUTH DAILY 90 tablet 3    atorvastatin (LIPITOR) 40 mg tablet Take 1 tablet (40 mg total) by mouth daily 90 tablet 1    Cholecalciferol (VITAMIN D-3 PO) Take 2,000 unit marking on U-100 syringe by mouth daily after dinner      co-enzyme Q-10 30 MG capsule Take 30 mg by mouth daily after dinner      glucosamine-chondroitin 500-400 MG tablet Take 2 tablets by mouth daily in the early morning      ipratropium (ATROVENT) 0.06 % nasal spray USE 2 SPRAYS INTO EACH NOSTRIL UP TO 3 TIMES DAILY AS NEEDED      losartan (COZAAR) 100 MG tablet TAKE 1 TABLET (100 MG TOTAL) BY MOUTH DAILY 90 tablet 1    multivitamin (THERAGRAN) TABS Take 1 tablet by mouth daily in the early morning      Omega-3 Fatty Acids (FISH OIL) 1,000 mg Take 1,000 mg by mouth 2 (two) times a day      predniSONE 5 mg tablet Take 1 tablet (5 mg total) by mouth daily 90 tablet 0    sertraline (ZOLOFT) 50 mg tablet Take 1 tablet (50 mg total) by mouth daily 90 tablet 0    sotalol (BETAPACE) 80 mg tablet TAKE 1 TABLET TWICE DAILY 180 tablet 3    tirzepatide (Mounjaro) 7.5 MG/0.5ML Inject 0.5 mL (7.5 mg total) under the skin every 7 days 6 mL 1    torsemide (DEMADEX) 20 mg tablet TAKE 1 TABLET TWICE DAILY (Patient taking differently: Take 20 mg by mouth daily) 180 tablet 3    triamcinolone (KENALOG) 0.1 % cream Apply topically 2 (two) times a day 80 g 1    warfarin (COUMADIN) 5 mg tablet TAKE 1/2 TO 1 TABLET DAILY BY MOUTH OR AS DIRECTED BY PHYSICIAN 90 tablet 3    desloratadine (CLARINEX) 5 MG tablet Take 1 tablet (5 mg total) by mouth daily (Patient not taking: Reported on 2/27/2024) 30 tablet 0    diclofenac sodium (VOLTAREN) 1 % Apply 2 g topically 4 (four) times a day (Patient not taking: Reported on 10/31/2023)       glycopyrrolate (ROBINUL) 1 mg tablet Take 1 tablet (1 mg total) by mouth 2 (two) times a day (Patient not taking: Reported on 5/1/2024) 60 tablet 0    traMADol (ULTRAM) 50 mg tablet Take 1 tablet (50 mg total) by mouth every 8 (eight) hours as needed for moderate pain (Patient not taking: Reported on 10/31/2023) 90 tablet 2     No current facility-administered medications for this visit.     Current Outpatient Medications on File Prior to Visit   Medication Sig    Acetaminophen 325 MG CAPS Take by mouth as needed     amLODIPine (NORVASC) 10 mg tablet TAKE 1 TABLET (10 MG TOTAL) BY MOUTH DAILY    atorvastatin (LIPITOR) 40 mg tablet Take 1 tablet (40 mg total) by mouth daily    Cholecalciferol (VITAMIN D-3 PO) Take 2,000 unit marking on U-100 syringe by mouth daily after dinner    co-enzyme Q-10 30 MG capsule Take 30 mg by mouth daily after dinner    glucosamine-chondroitin 500-400 MG tablet Take 2 tablets by mouth daily in the early morning    ipratropium (ATROVENT) 0.06 % nasal spray USE 2 SPRAYS INTO EACH NOSTRIL UP TO 3 TIMES DAILY AS NEEDED    losartan (COZAAR) 100 MG tablet TAKE 1 TABLET (100 MG TOTAL) BY MOUTH DAILY    multivitamin (THERAGRAN) TABS Take 1 tablet by mouth daily in the early morning    Omega-3 Fatty Acids (FISH OIL) 1,000 mg Take 1,000 mg by mouth 2 (two) times a day    predniSONE 5 mg tablet Take 1 tablet (5 mg total) by mouth daily    sertraline (ZOLOFT) 50 mg tablet Take 1 tablet (50 mg total) by mouth daily    sotalol (BETAPACE) 80 mg tablet TAKE 1 TABLET TWICE DAILY    tirzepatide (Mounjaro) 7.5 MG/0.5ML Inject 0.5 mL (7.5 mg total) under the skin every 7 days    torsemide (DEMADEX) 20 mg tablet TAKE 1 TABLET TWICE DAILY (Patient taking differently: Take 20 mg by mouth daily)    triamcinolone (KENALOG) 0.1 % cream Apply topically 2 (two) times a day    warfarin (COUMADIN) 5 mg tablet TAKE 1/2 TO 1 TABLET DAILY BY MOUTH OR AS DIRECTED BY PHYSICIAN    desloratadine (CLARINEX) 5 MG tablet  "Take 1 tablet (5 mg total) by mouth daily (Patient not taking: Reported on 2/27/2024)    diclofenac sodium (VOLTAREN) 1 % Apply 2 g topically 4 (four) times a day (Patient not taking: Reported on 10/31/2023)    glycopyrrolate (ROBINUL) 1 mg tablet Take 1 tablet (1 mg total) by mouth 2 (two) times a day (Patient not taking: Reported on 5/1/2024)    traMADol (ULTRAM) 50 mg tablet Take 1 tablet (50 mg total) by mouth every 8 (eight) hours as needed for moderate pain (Patient not taking: Reported on 10/31/2023)     No current facility-administered medications on file prior to visit.     He has No Known Allergies..    Review of Systems   Constitutional:  Negative for chills and fever.   HENT:  Negative for congestion, ear pain and sore throat.    Eyes:  Negative for pain.   Respiratory:  Negative for cough and shortness of breath.    Cardiovascular:  Negative for chest pain and leg swelling.   Gastrointestinal:  Negative for abdominal pain, nausea and vomiting.   Endocrine: Negative for polyuria.   Genitourinary:  Negative for difficulty urinating, frequency and urgency.   Musculoskeletal:  Positive for arthralgias and back pain.   Skin:  Negative for rash.   Neurological:  Negative for weakness and headaches.   Psychiatric/Behavioral:  Negative for sleep disturbance. The patient is not nervous/anxious.          Objective:      /80 (BP Location: Right arm, Patient Position: Sitting, Cuff Size: Standard)   Pulse 68   Temp 98.6 °F (37 °C)   Ht 5' 7\" (1.702 m)   Wt 101 kg (223 lb)   SpO2 99%   BMI 34.93 kg/m²     Recent Results (from the past 1344 hour(s))   Protime-INR    Collection Time: 04/05/24 11:18 AM   Result Value Ref Range    Protime 34.0 (H) 11.6 - 14.5 seconds    INR 3.45 (H) 0.84 - 1.19   APTT    Collection Time: 04/26/24 10:24 AM   Result Value Ref Range    PTT 32.0 21.6 - 35.6 sec   PROTIME-INR    Collection Time: 04/26/24 10:24 AM   Result Value Ref Range    Prothrombin Time 24.1 (H) 12.0 - 14.6 " sec    INR 2.2    CBC and differential    Collection Time: 04/26/24 10:26 AM   Result Value Ref Range    Hemoglobin 14.6 12.5 - 17.0 g/dL    HCT 42.9 37.0 - 48.0 %    White Blood Cell Count 9.5 4.0 - 10.5 thou/cmm    Red Blood Cell Count 4.70 4.00 - 5.40 mill/cmm    Platelet Count 212 140 - 350 thou/cmm    SL AMB MPV 9.8 7.5 - 11.3 fL    MCV 91 80 - 100 fL    MCH 31.1 27.0 - 36.0 pg    MCHC 34.1 32.0 - 37.0 g/dL    RDW 13.5 12.0 - 16.0 %    Differential Type AUTO     Neutrophils (Absolute) 6.7 1.8 - 7.8 thou/cmm    Lymphocytes (Absolute) 1.7 1.0 - 3.0 thou/cmm    Monocytes (Absolute) 0.9 0.3 - 1.0 thou/cmm    Eosinophils (Absolute) 0.2 0.0 - 0.5 thou/cmm    Basophils ABS 0.0 0.0 - 0.1 thou/cmm    Neutrophils 70 %    Lymphocytes 18 %    Monocytes 10 %    Eosinophils 2 %    Basophils PCT 0 %   Albumin / creatinine urine ratio    Collection Time: 04/26/24 10:26 AM   Result Value Ref Range    Creatinine(Crt),U 223.0 (H) 50.0 - 200.0 mg/dL    Albumin,U,Random 1.8 <3.0 mg/dL    Microalb/Creat Ratio 8.1 <30.0 mg/gm CREA   Hemoglobin A1C    Collection Time: 04/26/24 10:26 AM   Result Value Ref Range    Hemoglobin A1C 6.1 (H) <5.7 %    Estimated Average Glucose 128 mg/dL   Comprehensive metabolic panel    Collection Time: 04/26/24 10:26 AM   Result Value Ref Range    Glucose, Random 108 (H) 65 - 99 mg/dL    BUN 21 7 - 28 mg/dL    Creatinine 0.92 0.53 - 1.30 mg/dL    Sodium 143 135 - 145 mmol/L    Potassium 4.1 3.5 - 5.2 mmol/L    Chloride 104 100 - 109 mmol/L    CO2 27 21 - 31 mmol/L    Calcium 9.1 8.5 - 10.1 mg/dL    Alkaline Phosphatase 73 35 - 120 U/L    Albumin 4.2 3.5 - 5.7 g/dL    TOTAL BILIRUBIN 0.9 0.2 - 1.0 mg/dL    Protein, Total 6.4 6.3 - 8.3 g/dL    AST 16 <41 U/L    ALT 14 <56 U/L    ANION GAP 12 (H) 3 - 11    eGFR 83 >59    Comment (Note)    Lipid Panel with Direct LDL reflex    Collection Time: 04/26/24 10:26 AM   Result Value Ref Range    Cholesterol, Total 153 <200 mg/dL    Triglycerides 195 (H) <150 mg/dL     HDL Cholesterol 41 23 - 92 mg/dL    Non HDL Chol. (LDL+VLDL) 112 <160 mg/dL    Chol HDLC Ratio 3.7     LDL Calculated 73 <130 mg/dL   TSH, 3rd generation    Collection Time: 04/26/24 10:26 AM   Result Value Ref Range    TSH 1.60 0.45 - 5.33 uIU/mL   TSH, 3RD GENERATION    Collection Time: 04/26/24 10:26 AM   Result Value Ref Range    TSH 1.60 0.45 - 5.33 uIU/mL   COMPREHENSIVE METABOLIC PANEL    Collection Time: 04/26/24 10:26 AM   Result Value Ref Range    Glucose 108 (H) 65 - 99 mg/dL    BUN 21 7 - 28 mg/dL    Creatinine 0.92 0.53 - 1.30 mg/dL    Sodium 143 135 - 145 mmol/L    Potassium 4.1 3.5 - 5.2 mmol/L    Chloride 104 100 - 109 mmol/L    Carbon Dioxide 27 21 - 31 mmol/L    Calcium 9.1 8.5 - 10.1 mg/dL    Alkaline Phosphatase 73 35 - 120 U/L    ALBUMIN 4.2 3.5 - 5.7 g/dL    Total Bilirubin 0.9 0.2 - 1.0 mg/dL    Protein, Total 6.4 6.3 - 8.3 g/dL    AST 16 <41 U/L    ALT 14 <56 U/L    ANION GAP 12 (H) 3 - 11    eGFRcr 83 >59    eGFR Comment Interpretive information: calculated GFR    HEMOGLOBIN A1C    Collection Time: 04/26/24 10:26 AM   Result Value Ref Range    Hemoglobin A1C 6.1 (H) <5.7 %    eAG, EST AVG Glucose 128 mg/dL   ALBUMIN / CREATININE URINE RATIO    Collection Time: 04/26/24 10:26 AM   Result Value Ref Range    Creatinine, Ur 223.0 (H) 50.0 - 200.0 mg/dL    Albumin,U,Random 1.8 <3.0 mg/dL    Albumin Creat Ratio 8.1 <30.0 mg/gm CREA        Physical Exam  Constitutional:       Appearance: Normal appearance.   HENT:      Head: Normocephalic.      Right Ear: External ear normal.      Left Ear: External ear normal.      Nose: Nose normal. No congestion.      Mouth/Throat:      Mouth: Mucous membranes are moist.      Pharynx: Oropharynx is clear. No oropharyngeal exudate or posterior oropharyngeal erythema.   Eyes:      Extraocular Movements: Extraocular movements intact.      Conjunctiva/sclera: Conjunctivae normal.   Cardiovascular:      Rate and Rhythm: Normal rate and regular rhythm.      Heart  sounds: Normal heart sounds. No murmur heard.  Pulmonary:      Effort: Pulmonary effort is normal.      Breath sounds: Normal breath sounds. No wheezing or rales.   Abdominal:      General: Abdomen is flat. There is no distension.      Palpations: Abdomen is soft.      Tenderness: There is no abdominal tenderness.   Musculoskeletal:         General: Normal range of motion.      Cervical back: Normal range of motion and neck supple.      Right lower leg: Edema present.      Left lower leg: Edema present.   Lymphadenopathy:      Cervical: No cervical adenopathy.   Skin:     General: Skin is warm.   Neurological:      General: No focal deficit present.      Mental Status: He is alert and oriented to person, place, and time.

## 2024-05-02 ENCOUNTER — TELEPHONE (OUTPATIENT)
Age: 84
End: 2024-05-02

## 2024-05-02 NOTE — TELEPHONE ENCOUNTER
Patient called to check with Dr Heano if he should start the Mounjaro and whether or not . And if he should continue taking it after the surgery. Please advise. Patient is having back surgery on 05/21/24. Please advise

## 2024-05-09 ENCOUNTER — ANTICOAG VISIT (OUTPATIENT)
Dept: CARDIOLOGY CLINIC | Facility: CLINIC | Age: 84
End: 2024-05-09

## 2024-05-09 ENCOUNTER — APPOINTMENT (OUTPATIENT)
Dept: LAB | Facility: CLINIC | Age: 84
End: 2024-05-09
Payer: MEDICARE

## 2024-05-09 DIAGNOSIS — I48.0 PAROXYSMAL ATRIAL FIBRILLATION (HCC): ICD-10-CM

## 2024-05-09 DIAGNOSIS — I48.0 PAROXYSMAL ATRIAL FIBRILLATION (HCC): Primary | ICD-10-CM

## 2024-05-09 LAB
INR PPP: 2.11 (ref 0.84–1.19)
PROTHROMBIN TIME: 23.2 SECONDS (ref 11.6–14.5)

## 2024-05-09 PROCEDURE — 36415 COLL VENOUS BLD VENIPUNCTURE: CPT

## 2024-05-09 PROCEDURE — 85610 PROTHROMBIN TIME: CPT

## 2024-05-23 ENCOUNTER — ANTICOAG VISIT (OUTPATIENT)
Dept: CARDIOLOGY CLINIC | Facility: CLINIC | Age: 84
End: 2024-05-23

## 2024-05-23 DIAGNOSIS — I48.0 PAROXYSMAL ATRIAL FIBRILLATION (HCC): Primary | ICD-10-CM

## 2024-05-24 ENCOUNTER — TELEMEDICINE (OUTPATIENT)
Dept: INTERNAL MEDICINE CLINIC | Facility: CLINIC | Age: 84
End: 2024-05-24
Payer: MEDICARE

## 2024-05-24 VITALS
HEART RATE: 69 BPM | DIASTOLIC BLOOD PRESSURE: 63 MMHG | BODY MASS INDEX: 35.31 KG/M2 | HEIGHT: 67 IN | WEIGHT: 225 LBS | TEMPERATURE: 97 F | SYSTOLIC BLOOD PRESSURE: 144 MMHG

## 2024-05-24 DIAGNOSIS — M48.062 SPINAL STENOSIS OF LUMBAR REGION WITH NEUROGENIC CLAUDICATION: Primary | ICD-10-CM

## 2024-05-24 DIAGNOSIS — F34.1 DYSTHYMIC DISORDER: ICD-10-CM

## 2024-05-24 DIAGNOSIS — I48.0 PAROXYSMAL ATRIAL FIBRILLATION (HCC): ICD-10-CM

## 2024-05-24 DIAGNOSIS — E11.9 TYPE 2 DIABETES, HBA1C GOAL < 7% (HCC): ICD-10-CM

## 2024-05-24 DIAGNOSIS — K59.09 OTHER CONSTIPATION: ICD-10-CM

## 2024-05-24 DIAGNOSIS — G89.18 POST-OPERATIVE PAIN: ICD-10-CM

## 2024-05-24 PROCEDURE — 99496 TRANSJ CARE MGMT HIGH F2F 7D: CPT | Performed by: INTERNAL MEDICINE

## 2024-05-24 RX ORDER — OXYCODONE HYDROCHLORIDE 5 MG/1
5 TABLET ORAL EVERY 8 HOURS PRN
COMMUNITY
Start: 2024-05-23 | End: 2024-06-02

## 2024-05-24 RX ORDER — METHOCARBAMOL 750 MG/1
750 TABLET, FILM COATED ORAL 4 TIMES DAILY
COMMUNITY
Start: 2024-05-23 | End: 2024-06-02

## 2024-05-24 RX ORDER — DOCUSATE SODIUM 100 MG/1
100 CAPSULE, LIQUID FILLED ORAL 2 TIMES DAILY
Start: 2024-05-24

## 2024-05-24 RX ORDER — POLYETHYLENE GLYCOL 3350 17 G/17G
POWDER, FOR SOLUTION ORAL
COMMUNITY
Start: 2024-05-23

## 2024-05-24 RX ORDER — DIAZEPAM 2 MG/1
2 TABLET ORAL DAILY PRN
COMMUNITY
Start: 2024-05-23 | End: 2024-06-02

## 2024-05-24 NOTE — PROGRESS NOTES
Virtual TCM Visit:    Verification of patient location:    Patient is located at Home in the following state in which I hold an active license [place]  For now from hospitalization, had lumbar spine surgery, doing well, constipation, no nausea no vomiting, no fever no chills,    Assessment & Plan   1. Spinal stenosis of lumbar region with neurogenic claudication  Comments:  Pain in the control, continue the current pain medication regimen,  2. Other constipation  Comments:  Advised him to drink more fluid, walking around, also can do Dulcolax over-the-counter as needed for constipation, needs to call me if gets worse  Orders:  -     docusate sodium (COLACE) 100 mg capsule; Take 1 capsule (100 mg total) by mouth 2 (two) times a day  3. Post-operative pain  4. Dysthymic disorder  5. Paroxysmal atrial fibrillation (HCC)  Comments:  Advised him to start the Eliquis from Sunday onwards, do not take warfarin  6. Type 2 diabetes, HbA1c goal < 7% ([facility])  7. Body mass index 36.0-36.9, adult  Comments:  Start Mounjaro next week, on Thursday         Encounter provider Saige Henao MD     Provider located at [facility] INTERNAL MEDICINE  [address]   [address]  [place] [address] [phone]    Recent Visits  No visits were found meeting these conditions.  Showing recent visits within past 7 days and meeting all other requirements  Today's Visits  Date Type Provider Dept   05/24/24 Telemedicine Saige Henao MD [facility]   Showing today's visits and meeting all other requirements  Future Appointments  No visits were found meeting these conditions.  Showing future appointments within next 150 days and meeting all other requirements       After connecting through [facility], the patient was identified by name and date of birth. Sergio Lambert was informed that this is a telemedicine visit and that the visit is being conducted through the Epic Embedded platform. He agrees to  proceed..  My office door was closed. No one else was in the room.  He acknowledged consent and understanding of privacy and security of the video platform. The patient has agreed to participate and understands they can discontinue the visit at any time.    Patient is aware this is a billable service.    History of Present Illness     Transitional Care Management Review:   Sergio Lambert is a 84 y.o. male here for TCM follow up.    During the TCM phone call patient stated:  TCM Call       Date and time call was made  5/24/2024 10:57 AM    Hospital care reviewed  Records reviewed    Patient was hospitialized at  [facility]    Date of Admission  05/21/24    Date of discharge  05/23/24    Diagnosis  s/p laminectomy, lumbar stenosis    Disposition  Home    Current Symptoms  Constipation          TCM Call       Post hospital issues  Reduced activity; Poor ADL (Activities of Daily Living) performance    Scheduled for follow up?  Yes    Did you obtain your prescribed medications  Yes    Do you need help managing your prescriptions or medications  No    I have advised the patient to call PCP with any new or worsening symptoms  Frankie Jung [facility]          Follow-up after surgery  Review of Systems   Constitutional:  Negative for chills and fever.   HENT:  Negative for congestion, ear pain and sore throat.    Eyes:  Negative for pain.   Respiratory:  Negative for cough and shortness of breath.    Cardiovascular:  Negative for chest pain and leg swelling.   Gastrointestinal:  Negative for abdominal pain, nausea and vomiting.   Endocrine: Negative for polyuria.   Genitourinary:  Negative for difficulty urinating, frequency and urgency.   Musculoskeletal:  Positive for arthralgias and back pain.   Skin:  Negative for rash.   Neurological:  Negative for weakness and headaches.   Psychiatric/Behavioral:  Negative for sleep disturbance. The patient is not nervous/anxious.      Objective     /63   Pulse 69   " Temp (!) 97 °F (36.1 °C)   Ht 5' 7\" (1.702 m)   Wt 102 kg (225 lb)   BMI 35.24 kg/m²     Physical Exam  Constitutional:       Appearance: Normal appearance.   HENT:      Head: Normocephalic.   Eyes:      Extraocular Movements: Extraocular movements intact.      Conjunctiva/sclera: Conjunctivae normal.   Neurological:      Mental Status: He is alert and oriented to person, place, and time.       Medications have been reviewed by provider in current encounter    Administrative Statements   I have spent a total time of 20 minutes on 05/24/24 In caring for this patient including Prognosis, Risks and benefits of tx options, Patient and family education, Importance of tx compliance, Impressions, Counseling / Coordination of care, Documenting in the medical record, Reviewing / ordering tests, medicine, procedures  , and Obtaining or reviewing history  .        Saige Henao MD      VIRTUAL VISIT DISCLAIMER    Sergio Lambert verbally agrees to participate in Virtual Care Services. Pt is aware that Virtual Care Services could be limited without vital signs or the ability to perform a full hands-on physical exam. Sergio Lambert understands he or the provider may request at any time to terminate the video visit and request the patient to seek care or treatment in person.  "

## 2024-06-05 ENCOUNTER — TELEPHONE (OUTPATIENT)
Age: 84
End: 2024-06-05

## 2024-06-05 NOTE — TELEPHONE ENCOUNTER
Patient called to request a bedside commode . Patient verified with medicare it would be covered. But remote leg massager is not covered. Please advise and contact patient once ordered

## 2024-06-06 ENCOUNTER — TELEPHONE (OUTPATIENT)
Age: 84
End: 2024-06-06

## 2024-06-06 NOTE — TELEPHONE ENCOUNTER
Pt's wife called in to let us know that medicare did approve pt to have a bed side commode. Wife wants to know what the next steps would be to obtain commode, please reach out and advise.

## 2024-06-07 DIAGNOSIS — E78.2 MIXED HYPERLIPIDEMIA: ICD-10-CM

## 2024-06-07 RX ORDER — ATORVASTATIN CALCIUM 40 MG/1
40 TABLET, FILM COATED ORAL DAILY
Qty: 90 TABLET | Refills: 1 | Status: SHIPPED | OUTPATIENT
Start: 2024-06-07

## 2024-06-07 NOTE — TELEPHONE ENCOUNTER
Marlys from [facility] called for order for bedside commode that was already taken care of. No further help is required

## 2024-06-07 NOTE — TELEPHONE ENCOUNTER
Called the patient number, unable to leave message if patient is calling back for an update it will be delivered to the address we have on file through a DME supplier called [id number] that will now take care of the rest .

## 2024-06-10 LAB
DME PARACHUTE DELIVERY DATE ACTUAL: NORMAL
DME PARACHUTE DELIVERY DATE REQUESTED: NORMAL
DME PARACHUTE ITEM DESCRIPTION: NORMAL
DME PARACHUTE ORDER STATUS: NORMAL
DME PARACHUTE SUPPLIER NAME: NORMAL
DME PARACHUTE SUPPLIER PHONE: NORMAL

## 2024-06-18 ENCOUNTER — TELEPHONE (OUTPATIENT)
Age: 84
End: 2024-06-18

## 2024-06-18 NOTE — TELEPHONE ENCOUNTER
[facility] called and said patients will be D/C from home therapy on 6/24/24. He will need referral for outpatient therapy with  [facility].

## 2024-06-19 DIAGNOSIS — M48.062 SPINAL STENOSIS OF LUMBAR REGION WITH NEUROGENIC CLAUDICATION: Primary | ICD-10-CM

## 2024-06-26 ENCOUNTER — OFFICE VISIT (OUTPATIENT)
Dept: INTERNAL MEDICINE CLINIC | Facility: CLINIC | Age: 84
End: 2024-06-26
Payer: MEDICARE

## 2024-06-26 VITALS
BODY MASS INDEX: 32.8 KG/M2 | WEIGHT: 209 LBS | SYSTOLIC BLOOD PRESSURE: 124 MMHG | TEMPERATURE: 98.9 F | HEIGHT: 67 IN | DIASTOLIC BLOOD PRESSURE: 70 MMHG | OXYGEN SATURATION: 99 % | HEART RATE: 72 BPM

## 2024-06-26 DIAGNOSIS — F34.1 DYSTHYMIC DISORDER: ICD-10-CM

## 2024-06-26 DIAGNOSIS — E78.2 MIXED HYPERLIPIDEMIA: ICD-10-CM

## 2024-06-26 DIAGNOSIS — E11.9 TYPE 2 DIABETES, HBA1C GOAL < 7% (HCC): ICD-10-CM

## 2024-06-26 DIAGNOSIS — I48.0 PAROXYSMAL ATRIAL FIBRILLATION (HCC): ICD-10-CM

## 2024-06-26 DIAGNOSIS — I10 ESSENTIAL HYPERTENSION: Primary | ICD-10-CM

## 2024-06-26 DIAGNOSIS — M54.16 LUMBAR RADICULOPATHY: ICD-10-CM

## 2024-06-26 DIAGNOSIS — G47.33 OSA (OBSTRUCTIVE SLEEP APNEA): ICD-10-CM

## 2024-06-26 DIAGNOSIS — I87.2 VENOUS INSUFFICIENCY OF BOTH LOWER EXTREMITIES: ICD-10-CM

## 2024-06-26 PROCEDURE — G2211 COMPLEX E/M VISIT ADD ON: HCPCS | Performed by: INTERNAL MEDICINE

## 2024-06-26 PROCEDURE — 99214 OFFICE O/P EST MOD 30 MIN: CPT | Performed by: INTERNAL MEDICINE

## 2024-06-26 NOTE — PROGRESS NOTES
Assessment/Plan:             1. Essential hypertension  Comments:  stable, continue same med  Orders:  -     CBC and differential; Future  -     Comprehensive metabolic panel; Future  -     Lipid Panel with Direct LDL reflex; Future  -     TSH, 3rd generation; Future  2. Dysthymic disorder  -     sertraline (ZOLOFT) 50 mg tablet; Take 1 tablet (50 mg total) by mouth daily  3. Paroxysmal atrial fibrillation (HCC)  Comments:  continue same med  4. Venous insufficiency of both lower extremities  5. CHASIDY (obstructive sleep apnea)  6. Lumbar radiculopathy  7. Type 2 diabetes, HbA1c goal < 7% (HCC)  Comments:  continue same med  Orders:  -     Albumin / creatinine urine ratio; Future  -     CBC and differential; Future  -     Comprehensive metabolic panel; Future  -     Lipid Panel with Direct LDL reflex; Future  -     TSH, 3rd generation; Future  -     Hemoglobin A1C; Future  8. Mixed hyperlipidemia  -     Comprehensive metabolic panel; Future  -     Lipid Panel with Direct LDL reflex; Future  -     TSH, 3rd generation; Future         Subjective:      Patient ID: Sergio Lambert is a 84 y.o. male.    Follow-up on multiple medical problems to ensure they are stable on current medications        The following portions of the patient's history were reviewed and updated as appropriate: He  has a past medical history of A-fib (HCC), Anemia, unspecified, Anxiety, Arthritis, Basal cell carcinoma (BCC) of skin of nose, Cancer (HCC), Chondrocostal junction syndrome, Coronary artery disease, CPAP (continuous positive airway pressure) dependence, Depression, Dysthymic disorder, Edema, unspecified, Headache(784.0), Hyperlipidemia, Hypertension, Impaired fasting blood sugar, Intervertebral disc disorders with radiculopathy, lumbar region, Irregular heart beat, Obesity, Prostate cancer (HCC), Sleep apnea, Spinal stenosis, Stroke (HCC), TIA (transient ischemic attack), Unspecified osteoarthritis, unspecified site, and Venous  insufficiency of both lower extremities.  He   Patient Active Problem List    Diagnosis Date Noted    Other constipation 05/24/2024    Body mass index 36.0-36.9, adult 01/03/2024    Primary hypertension 11/10/2023    CHASIDY (obstructive sleep apnea) 07/05/2023    Depression, recurrent ([facility]) 08/19/2022    Type 2 diabetes, HbA1c goal < 7% ([facility]) 05/25/2022    Cellulitis of right foot 12/17/2021    Abnormal gait 08/04/2021    Obesity, morbid ([facility]) 03/26/2021    Non-seasonal allergic rhinitis 03/26/2021    Primary osteoarthritis of both knees 11/13/2020    TIA (transient ischemic attack)     Prostate cancer ([facility])     Intervertebral disc disorders with radiculopathy, lumbar region     Impaired fasting blood sugar     Hypertension     Mixed hyperlipidemia     Obstructive sleep apnea syndrome     CPAP (continuous positive airway pressure) dependence     Dysthymic disorder     Venous insufficiency of both lower extremities     Essential hypertension 11/20/2019    Venous insufficiency 11/20/2019    Uncomplicated opioid dependence ([facility]) 06/05/2019    Encounter for long-term use of opiate analgesic 06/05/2019    Chronic pain syndrome 09/17/2018    Lumbar radiculopathy 04/16/2018    Lumbar spondylosis 04/16/2018    Right bundle branch block (RBBB) 03/19/2018    Edema of both lower legs due to peripheral venous insufficiency 03/19/2018    Chronic pain of both lower extremities 03/19/2018    Paroxysmal atrial fibrillation ([facility]) 01/20/2018    Sacroiliitis ([facility]) 04/17/2017    Spondylosis of lumbar region without myelopathy or radiculopathy 04/17/2017    Lumbar spinal stenosis 05/12/2015     He  has a past surgical history that includes Prostatectomy; Tonsillectomy and adenoidectomy; FACIAL/NECK BIOPSY (N/A, 4/3/2017); FULL THICKNESS SKIN GRAFT (N/A, 4/3/2017); Cataract extraction; CT epidural steroid injection (TIN lumbar); MOHS RECONSTRUCTION (N/A, 10/27/2020); FLAP LOCAL HEAD / NECK (N/A, 10/27/2020); Colonoscopy; and Excision basal  cell carcinoma.  His family history includes Heart attack in his father.  He  reports that he quit smoking about 44 years ago. His smoking use included cigarettes. He started smoking about 49 years ago. He has a 1.3 pack-year smoking history. He has never used smokeless tobacco. He reports that he does not currently use alcohol after a past usage of about 17.0 standard drinks of alcohol per week. He reports that he does not use drugs.  Current Outpatient Medications   Medication Sig Dispense Refill    Acetaminophen 500 MG Take 500 mg by mouth daily as needed      amLODIPine (NORVASC) 10 mg tablet TAKE 1 TABLET (10 MG TOTAL) BY MOUTH DAILY 90 tablet 3    apixaban (ELIQUIS) 5 mg Take 5 mg by mouth 2 (two) times a day      atorvastatin (LIPITOR) 40 mg tablet Take 1 tablet (40 mg total) by mouth daily 90 tablet 1    Cholecalciferol (VITAMIN D-3 PO) Take 2,000 unit marking on U-100 syringe by mouth daily after dinner      co-enzyme Q-10 30 MG capsule Take 30 mg by mouth daily after dinner      diclofenac sodium (VOLTAREN) 1 % Apply 2 g topically 4 (four) times a day      docusate sodium (COLACE) 100 mg capsule Take 1 capsule (100 mg total) by mouth 2 (two) times a day      glucosamine-chondroitin 500-400 MG tablet Take 2 tablets by mouth daily in the early morning      glycopyrrolate (ROBINUL) 1 mg tablet Take 1 tablet (1 mg total) by mouth 2 (two) times a day 60 tablet 0    ipratropium (ATROVENT) 0.06 % nasal spray USE 2 SPRAYS INTO EACH NOSTRIL UP TO 3 TIMES DAILY AS NEEDED      losartan (COZAAR) 100 MG tablet TAKE 1 TABLET (100 MG TOTAL) BY MOUTH DAILY 90 tablet 1    MAGNESIUM PO Take 250 mg by mouth      MiraLax 17 g packet TAKE 17 G BY MOUTH 2 (TWO) TIMES A DAY FOR 7 DAYS.      multivitamin (THERAGRAN) TABS Take 1 tablet by mouth daily in the early morning      Omega-3 Fatty Acids (FISH OIL) 1,000 mg Take 1,000 mg by mouth 2 (two) times a day      sertraline (ZOLOFT) 50 mg tablet Take 1 tablet (50 mg total) by mouth  daily 90 tablet 1    sotalol (BETAPACE) 80 mg tablet TAKE 1 TABLET TWICE DAILY 180 tablet 3    tirzepatide (Mounjaro) 7.5 MG/0.5ML Inject 0.5 mL (7.5 mg total) under the skin every 7 days 6 mL 1    torsemide (DEMADEX) 20 mg tablet TAKE 1 TABLET TWICE DAILY (Patient taking differently: Take 20 mg by mouth daily) 180 tablet 3    desloratadine (CLARINEX) 5 MG tablet Take 1 tablet (5 mg total) by mouth daily (Patient not taking: Reported on 2/27/2024) 30 tablet 0    traMADol (ULTRAM) 50 mg tablet Take 1 tablet (50 mg total) by mouth every 8 (eight) hours as needed for moderate pain (Patient not taking: Reported on 5/24/2024) 90 tablet 2    triamcinolone (KENALOG) 0.1 % cream Apply topically 2 (two) times a day (Patient not taking: Reported on 6/26/2024) 80 g 1     No current facility-administered medications for this visit.     Current Outpatient Medications on File Prior to Visit   Medication Sig    Acetaminophen 500 MG Take 500 mg by mouth daily as needed    amLODIPine (NORVASC) 10 mg tablet TAKE 1 TABLET (10 MG TOTAL) BY MOUTH DAILY    apixaban (ELIQUIS) 5 mg Take 5 mg by mouth 2 (two) times a day    atorvastatin (LIPITOR) 40 mg tablet Take 1 tablet (40 mg total) by mouth daily    Cholecalciferol (VITAMIN D-3 PO) Take 2,000 unit marking on U-100 syringe by mouth daily after dinner    co-enzyme Q-10 30 MG capsule Take 30 mg by mouth daily after dinner    diclofenac sodium (VOLTAREN) 1 % Apply 2 g topically 4 (four) times a day    docusate sodium (COLACE) 100 mg capsule Take 1 capsule (100 mg total) by mouth 2 (two) times a day    glucosamine-chondroitin 500-400 MG tablet Take 2 tablets by mouth daily in the early morning    glycopyrrolate (ROBINUL) 1 mg tablet Take 1 tablet (1 mg total) by mouth 2 (two) times a day    ipratropium (ATROVENT) 0.06 % nasal spray USE 2 SPRAYS INTO EACH NOSTRIL UP TO 3 TIMES DAILY AS NEEDED    losartan (COZAAR) 100 MG tablet TAKE 1 TABLET (100 MG TOTAL) BY MOUTH DAILY    MAGNESIUM PO Take  250 mg by mouth    MiraLax 17 g packet TAKE 17 G BY MOUTH 2 (TWO) TIMES A DAY FOR 7 DAYS.    multivitamin (THERAGRAN) TABS Take 1 tablet by mouth daily in the early morning    Omega-3 Fatty Acids (FISH OIL) 1,000 mg Take 1,000 mg by mouth 2 (two) times a day    sotalol (BETAPACE) 80 mg tablet TAKE 1 TABLET TWICE DAILY    tirzepatide (Mounjaro) 7.5 MG/0.5ML Inject 0.5 mL (7.5 mg total) under the skin every 7 days    torsemide (DEMADEX) 20 mg tablet TAKE 1 TABLET TWICE DAILY (Patient taking differently: Take 20 mg by mouth daily)    [DISCONTINUED] sertraline (ZOLOFT) 50 mg tablet Take 1 tablet (50 mg total) by mouth daily    desloratadine (CLARINEX) 5 MG tablet Take 1 tablet (5 mg total) by mouth daily (Patient not taking: Reported on 2/27/2024)    traMADol (ULTRAM) 50 mg tablet Take 1 tablet (50 mg total) by mouth every 8 (eight) hours as needed for moderate pain (Patient not taking: Reported on 5/24/2024)    triamcinolone (KENALOG) 0.1 % cream Apply topically 2 (two) times a day (Patient not taking: Reported on 6/26/2024)    [DISCONTINUED] Acetaminophen 325 MG CAPS Take by mouth as needed  (Patient not taking: Reported on 5/24/2024)    [DISCONTINUED] warfarin (COUMADIN) 5 mg tablet TAKE 1/2 TO 1 TABLET DAILY BY MOUTH OR AS DIRECTED BY PHYSICIAN (Patient not taking: Reported on 5/24/2024)     No current facility-administered medications on file prior to visit.     He has No Known Allergies..    Review of Systems   Constitutional:  Negative for chills and fever.   HENT:  Negative for congestion, ear pain and sore throat.    Eyes:  Negative for pain.   Respiratory:  Negative for cough and shortness of breath.    Cardiovascular:  Negative for chest pain and leg swelling.   Gastrointestinal:  Negative for abdominal pain, nausea and vomiting.   Endocrine: Negative for polyuria.   Genitourinary:  Negative for difficulty urinating, frequency and urgency.   Musculoskeletal:  Positive for arthralgias and back pain.   Skin:   "Negative for rash.   Neurological:  Negative for weakness and headaches.   Psychiatric/Behavioral:  Negative for sleep disturbance. The patient is not nervous/anxious.          Objective:      /70 (BP Location: Left arm, Patient Position: Sitting, Cuff Size: Standard)   Pulse 72   Temp 98.9 °F (37.2 °C) (Temporal)   Ht 5' 7\" (1.702 m)   Wt 94.8 kg (209 lb)   SpO2 99%   BMI 32.73 kg/m²     Recent Results (from the past 1344 hour(s))   Protime-INR    Collection Time: 05/09/24 11:57 AM   Result Value Ref Range    Protime 23.2 (H) 11.6 - 14.5 seconds    INR 2.11 (H) 0.84 - 1.19   PROTIME-INR    Collection Time: 05/21/24  5:27 AM   Result Value Ref Range    Prothrombin Time 13.4 12.0 - 14.6 sec    INR 1.0    MAGNESIUM    Collection Time: 05/23/24  3:18 AM   Result Value Ref Range    Magnesium 1.8 1.4 - 2.2 mg/dL   BASIC METABOLIC PANEL    Collection Time: 05/23/24  3:18 AM   Result Value Ref Range    Glucose 126 (H) 65 - 99 mg/dL    BUN 20 7 - 28 mg/dL    Creatinine 1.12 0.53 - 1.30 mg/dL    Sodium 139 135 - 145 mmol/L    Potassium 3.4 (L) 3.5 - 5.2 mmol/L    Chloride 108 100 - 109 mmol/L    Carbon Dioxide 23 21 - 31 mmol/L    Calcium 8.7 8.5 - 10.1 mg/dL    ANION GAP 8 3 - 11    eGFRcr 66 >59    eGFR Comment Interpretive information: calculated GFR    PHOSPHORUS    Collection Time: 05/23/24  3:18 AM   Result Value Ref Range    Phosphorus 4.0 2.3 - 4.6 mg/dL   Drop Arm Commode    Collection Time: 06/05/24  6:57 PM   Result Value Ref Range    Supplier Name [employer]     Supplier Phone Number [phone]     Order Status Delivery Successful     Delivery Note      Delivery Request Date 06/05/2024     Date Delivered  06/12/2024     Item Description Drop Arm Commode         Physical Exam  Constitutional:       Appearance: Normal appearance.   HENT:      Head: Normocephalic.      Right Ear: Tympanic membrane, ear canal and external ear normal.      Left Ear: Tympanic membrane, ear canal " and external ear normal.      Nose: Nose normal. No congestion.      Mouth/Throat:      Mouth: Mucous membranes are moist.      Pharynx: Oropharynx is clear. No oropharyngeal exudate or posterior oropharyngeal erythema.   Eyes:      Extraocular Movements: Extraocular movements intact.      Conjunctiva/sclera: Conjunctivae normal.   Cardiovascular:      Rate and Rhythm: Normal rate and regular rhythm.      Heart sounds: Normal heart sounds. No murmur heard.  Pulmonary:      Effort: Pulmonary effort is normal.      Breath sounds: Normal breath sounds. No wheezing or rales.   Abdominal:      General: Abdomen is flat. There is no distension.      Palpations: Abdomen is soft.      Tenderness: There is no abdominal tenderness.   Musculoskeletal:         General: Normal range of motion.      Cervical back: Normal range of motion and neck supple.      Right lower leg: Edema present.      Left lower leg: Edema present.   Lymphadenopathy:      Cervical: No cervical adenopathy.   Skin:     General: Skin is warm.   Neurological:      General: No focal deficit present.      Mental Status: He is alert and oriented to person, place, and time.

## 2024-07-23 NOTE — PROGRESS NOTES
Cardiology Follow Up    Sergio Lambert  1940  [id number]  [facility]  [address]  [place] [id number] [phone]    1. Paroxysmal atrial fibrillation (HCC)        2. Primary hypertension        3. Mixed hyperlipidemia        4. CHASIDY (obstructive sleep apnea)        5. Class 1 obesity with serious comorbidity and body mass index (BMI) of 32.0 to 32.9 in adult, unspecified obesity type        6. Edema of both lower legs due to peripheral venous insufficiency        7. Type 2 diabetes, HbA1c goal < 7% (HCC)        8. Intervertebral disc disorders with radiculopathy, lumbar region            Interval History:   Mr Mustapha Lambert presents to our office for a follow up visit.  Mustapha is accompanied by his wife.  He denies dyspnea with minimal or moderate exertion chest pain palpitation lightheadedness or dizziness.  Lower extremity edema is improved.  He underwent lumbar spine surgery in March with improvement in pain and improved ability to ambulate.  According to Mustapha his LE edema is greatly improved.        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 Coumadin changed to Eliquis prior to discharge    11/02/23 LVEF 60%, grade 1 abnormal relaxation, mild aortic valve regurgitation mild mitral valve regurgitation mild tricuspid valve regurgitation ascending aorta mildly dilated aortic root 3.90 cm.  Ascending aorta 4 cm.      Patient Active Problem List   Diagnosis    Paroxysmal atrial fibrillation (HCC)    Lumbar spinal stenosis     Sacroiliitis (HCC)    Spondylosis of lumbar region without myelopathy or radiculopathy    Right bundle branch block (RBBB)    Edema of both lower legs due to peripheral venous insufficiency    Chronic pain of both lower extremities    Lumbar radiculopathy    Lumbar spondylosis    Chronic pain syndrome    Uncomplicated opioid dependence (HCC)    Encounter for long-term use of opiate analgesic    Essential hypertension    Venous insufficiency    TIA (transient ischemic attack)    Prostate cancer (HCC)    Intervertebral disc disorders with radiculopathy, lumbar region    Impaired fasting blood sugar    Hypertension    Mixed hyperlipidemia    Obstructive sleep apnea syndrome    CPAP (continuous positive airway pressure) dependence    Dysthymic disorder    Venous insufficiency of both lower extremities    Primary osteoarthritis of both knees    Obesity, morbid (HCC)    Non-seasonal allergic rhinitis    Abnormal gait    Cellulitis of right foot    Type 2 diabetes, HbA1c goal < 7% ([facility])    Depression, recurrent (HCC)    CHASIDY (obstructive sleep apnea)    Primary hypertension    Body mass index 36.0-36.9, adult    Other constipation     Past Medical History:   Diagnosis Date    A-fib ([facility])     Anemia, unspecified     Anxiety     Arthritis     Basal cell carcinoma (BCC) of skin of nose     Cancer ([facility])     Chondrocostal junction syndrome     Coronary artery disease     CPAP (continuous positive airway pressure) dependence     Depression     Dysthymic disorder     Edema, unspecified     Headache(784.0)     Hyperlipidemia     Hypertension     Impaired fasting blood sugar     Intervertebral disc disorders with radiculopathy, lumbar region     Irregular heart beat     Obesity     Prostate cancer (HCC)     s/p surgery    Sleep apnea     on CPAP    Spinal stenosis     Stroke (HCC)     TIA (transient ischemic attack)     no residual problems    Unspecified osteoarthritis, unspecified site     Venous insufficiency of both lower  extremities      Social History     Socioeconomic History    Marital status: /Civil Union     Spouse name: Not on file    Number of children: Not on file    Years of education: Not on file    Highest education level: Not on file   Occupational History    Not on file   Tobacco Use    Smoking status: Former     Current packs/day: 0.00     Average packs/day: 0.3 packs/day for 5.0 years (1.3 ttl pk-yrs)     Types: Cigarettes     Start date:      Quit date:      Years since quittin.5    Smokeless tobacco: Never    Tobacco comments:     I quite some time ago   Vaping Use    Vaping status: Never Used   Substance and Sexual Activity    Alcohol use: Not Currently     Alcohol/week: 17.0 standard drinks of alcohol     Types: 10 Glasses of wine, 3 Cans of beer, 4 Shots of liquor per week     Comment: glass of wine with dinner, maybe    Drug use: No    Sexual activity: Not Currently     Partners: Female     Birth control/protection: Abstinence, None   Other Topics Concern    Not on file   Social History Narrative    Not on file     Social Determinants of Health     Financial Resource Strain: Low Risk  (2024)    Received from [facility]    Overall Financial Resource Strain (CARDIA)     Difficulty of Paying Living Expenses: Not very hard   Food Insecurity: No Food Insecurity (2024)    Received from [facility]    Hunger Vital Sign     Worried About Running Out of Food in the Last Year: Never true     Ran Out of Food in the Last Year: Never true   Transportation Needs: No Transportation Needs (2024)    Received from [facility]    PRAPARE - Transportation     Lack of Transportation (Medical): No     Lack of Transportation (Non-Medical): No   Physical Activity: Not on file   Stress: Not on file   Social Connections: Not on file   Intimate Partner Violence: Not At Risk (2024)    Received from [facility]    Humiliation,  Afraid, Rape, and Kick questionnaire     Fear of Current or Ex-Partner: No     Emotionally Abused: No     Physically Abused: No     Sexually Abused: No   Housing Stability: High Risk (5/21/2024)    Received from [facility]    Housing Stability Vital Sign     Unable to Pay for Housing in the Last Year: Yes     Number of Times Moved in the Last Year: 1     Homeless in the Last Year: Yes      Family History   Problem Relation Age of Onset    Heart attack Father      Past Surgical History:   Procedure Laterality Date    BASAL CELL CARCINOMA EXCISION      on the nose    CATARACT EXTRACTION      COLONOSCOPY      2016, 2011    CT EPIDURAL STEROID INJECTION (TIN LUMBAR)      FACIAL/NECK BIOPSY N/A 4/3/2017    Procedure: NOSE BCC EXCISION; FROZEN SECTION; RECONSTRUCTION ;  Surgeon: Marco Antonio Duckworth MD;  [facility];  Service:     FLAP LOCAL HEAD / NECK N/A 10/27/2020    Procedure: NOSE FLAP;  Surgeon: Marco Antonio Duckworth MD;  [facility];  Service: Plastics    FULL THICKNESS SKIN GRAFT N/A 4/3/2017    Procedure: FLAP VERSUS FULL THICKNESS SKIN GRAFT ;  Surgeon: Marco Antonio Duckworth MD;  [facility];  Service:     MOHS RECONSTRUCTION N/A 10/27/2020    Procedure: NOSE MOHS DEFECT RECONSTRUCTION;  Surgeon: Marco Antonio Duckworth MD;  [facility];  Service: Plastics    PROSTATECTOMY      TONSILLECTOMY AND ADENOIDECTOMY         Current Outpatient Medications:     Acetaminophen 500 MG, Take 500 mg by mouth daily as needed, Disp: , Rfl:     amLODIPine (NORVASC) 10 mg tablet, TAKE 1 TABLET (10 MG TOTAL) BY MOUTH DAILY, Disp: 90 tablet, Rfl: 3    apixaban (ELIQUIS) 5 mg, Take 5 mg by mouth 2 (two) times a day, Disp: , Rfl:     atorvastatin (LIPITOR) 40 mg tablet, Take 1 tablet (40 mg total) by mouth daily, Disp: 90 tablet, Rfl: 1    Cholecalciferol (VITAMIN D-3 PO), Take 2,000 unit marking on U-100 syringe by mouth daily after dinner, Disp: , Rfl:     co-enzyme Q-10 30 MG  capsule, Take 30 mg by mouth daily after dinner, Disp: , Rfl:     desloratadine (CLARINEX) 5 MG tablet, Take 1 tablet (5 mg total) by mouth daily (Patient not taking: Reported on 2/27/2024), Disp: 30 tablet, Rfl: 0    diclofenac sodium (VOLTAREN) 1 %, Apply 2 g topically 4 (four) times a day, Disp: , Rfl:     docusate sodium (COLACE) 100 mg capsule, Take 1 capsule (100 mg total) by mouth 2 (two) times a day, Disp: , Rfl:     glucosamine-chondroitin 500-400 MG tablet, Take 2 tablets by mouth daily in the early morning, Disp: , Rfl:     glycopyrrolate (ROBINUL) 1 mg tablet, Take 1 tablet (1 mg total) by mouth 2 (two) times a day, Disp: 60 tablet, Rfl: 0    ipratropium (ATROVENT) 0.06 % nasal spray, USE 2 SPRAYS INTO EACH NOSTRIL UP TO 3 TIMES DAILY AS NEEDED, Disp: , Rfl:     losartan (COZAAR) 100 MG tablet, TAKE 1 TABLET (100 MG TOTAL) BY MOUTH DAILY, Disp: 90 tablet, Rfl: 1    MAGNESIUM PO, Take 250 mg by mouth, Disp: , Rfl:     MiraLax 17 g packet, TAKE 17 G BY MOUTH 2 (TWO) TIMES A DAY FOR 7 DAYS., Disp: , Rfl:     multivitamin (THERAGRAN) TABS, Take 1 tablet by mouth daily in the early morning, Disp: , Rfl:     Omega-3 Fatty Acids (FISH OIL) 1,000 mg, Take 1,000 mg by mouth 2 (two) times a day, Disp: , Rfl:     sertraline (ZOLOFT) 50 mg tablet, Take 1 tablet (50 mg total) by mouth daily, Disp: 90 tablet, Rfl: 1    sotalol (BETAPACE) 80 mg tablet, TAKE 1 TABLET TWICE DAILY, Disp: 180 tablet, Rfl: 3    tirzepatide (Mounjaro) 7.5 MG/0.5ML, Inject 0.5 mL (7.5 mg total) under the skin every 7 days, Disp: 6 mL, Rfl: 1    torsemide (DEMADEX) 20 mg tablet, TAKE 1 TABLET TWICE DAILY (Patient taking differently: Take 20 mg by mouth daily), Disp: 180 tablet, Rfl: 3    traMADol (ULTRAM) 50 mg tablet, Take 1 tablet (50 mg total) by mouth every 8 (eight) hours as needed for moderate pain (Patient not taking: Reported on 5/24/2024), Disp: 90 tablet, Rfl: 2    triamcinolone (KENALOG) 0.1 % cream, Apply topically 2 (two) times a  day (Patient not taking: Reported on 6/26/2024), Disp: 80 g, Rfl: 1  No Known Allergies    Labs:  No visits with results within 2 Month(s) from this visit.   Latest known visit with results is:   Appointment on 05/09/2024   Component Date Value    Protime 05/09/2024 23.2 (H)     INR 05/09/2024 2.11 (H)      Imaging: No results found.    Review of Systems:  Review of Systems   Cardiovascular:  Positive for leg swelling.        Guzman ankle     Musculoskeletal:  Positive for arthralgias, gait problem and myalgias.        Using a quad cane to assist with ambulation        Physical Exam:  Physical Exam  Vitals reviewed.   Constitutional:       Appearance: He is obese.   Cardiovascular:      Rate and Rhythm: Normal rate and regular rhythm.      Pulses: Normal pulses.      Heart sounds: Normal heart sounds.   Pulmonary:      Effort: Pulmonary effort is normal.      Breath sounds: Normal breath sounds.   Musculoskeletal:         General: Normal range of motion.      Cervical back: Normal range of motion and neck supple.      Right lower leg: Edema present.      Left lower leg: Edema present.      Comments: 2+ Guzman lower tibial and ankle edema    Skin:     General: Skin is warm and dry.      Capillary Refill: Capillary refill takes less than 2 seconds.   Neurological:      General: No focal deficit present.      Mental Status: He is alert and oriented to person, place, and time.   Psychiatric:         Mood and Affect: Mood normal.         Behavior: Behavior normal.         Discussion/Summary:  # Paroxysmal atrial fibrillation hx of remote DCCV, [id number]= 6 ( Age, HTN, DM, TIA) previously on warfarin and now off, continue on  Eliquis 5mg BID for stroke prevention continue on sotalol 80 mg twice daily, not on beta-blocker reasons unknown will defer to primary cardiologist  # Hypertension  RUE sitting 134/60 continue on continue on amlodipine 10 mg daily, losartan 100 mg daily, DASH diet   # Hyperlipidemia 4/26/24 , TG  195, HDL 41, LDL 73 LDL around goal 70.  Continue Lipitor 40 mg daily, heart healthy diet  # Obesity on Mounjaro for weight loss 1/2024 243 to 207 pounds today   # CHASIDY Compliant with CPAP   # Edema of manuel LE due to PVI, LE Edema improved, amlodipine 10 mg daily may be contributing,  continue to monitor, off Torsemide   # DM2 HgbA1C 6.1 on 4/26/24   # L4 - L5 lumbar stenosis with neurogenic claudication, 5/21/24 sp right L4-L5 transforaminal lumbar interbody fusion with screws rods and allograft. Coumadin changed to Eliquis prior to discharge

## 2024-07-24 ENCOUNTER — OFFICE VISIT (OUTPATIENT)
Dept: CARDIOLOGY CLINIC | Facility: CLINIC | Age: 84
End: 2024-07-24
Payer: MEDICARE

## 2024-07-24 VITALS
WEIGHT: 207.7 LBS | HEIGHT: 67 IN | OXYGEN SATURATION: 98 % | BODY MASS INDEX: 32.6 KG/M2 | SYSTOLIC BLOOD PRESSURE: 126 MMHG | HEART RATE: 68 BPM | DIASTOLIC BLOOD PRESSURE: 60 MMHG

## 2024-07-24 DIAGNOSIS — R60.0 EDEMA OF BOTH LOWER LEGS DUE TO PERIPHERAL VENOUS INSUFFICIENCY: ICD-10-CM

## 2024-07-24 DIAGNOSIS — G47.33 OSA (OBSTRUCTIVE SLEEP APNEA): ICD-10-CM

## 2024-07-24 DIAGNOSIS — I48.0 PAROXYSMAL ATRIAL FIBRILLATION (HCC): Primary | ICD-10-CM

## 2024-07-24 DIAGNOSIS — E78.2 MIXED HYPERLIPIDEMIA: ICD-10-CM

## 2024-07-24 DIAGNOSIS — M51.16 INTERVERTEBRAL DISC DISORDERS WITH RADICULOPATHY, LUMBAR REGION: ICD-10-CM

## 2024-07-24 DIAGNOSIS — I87.2 EDEMA OF BOTH LOWER LEGS DUE TO PERIPHERAL VENOUS INSUFFICIENCY: ICD-10-CM

## 2024-07-24 DIAGNOSIS — E11.9 TYPE 2 DIABETES, HBA1C GOAL < 7% (HCC): ICD-10-CM

## 2024-07-24 DIAGNOSIS — E66.9 CLASS 1 OBESITY WITH SERIOUS COMORBIDITY AND BODY MASS INDEX (BMI) OF 32.0 TO 32.9 IN ADULT, UNSPECIFIED OBESITY TYPE: ICD-10-CM

## 2024-07-24 DIAGNOSIS — I10 PRIMARY HYPERTENSION: ICD-10-CM

## 2024-07-24 PROCEDURE — 99215 OFFICE O/P EST HI 40 MIN: CPT | Performed by: NURSE PRACTITIONER

## 2024-08-16 DIAGNOSIS — I10 ESSENTIAL HYPERTENSION: ICD-10-CM

## 2024-08-16 DIAGNOSIS — M17.0 PRIMARY OSTEOARTHRITIS OF BOTH KNEES: ICD-10-CM

## 2024-08-16 DIAGNOSIS — G47.33 OSA (OBSTRUCTIVE SLEEP APNEA): ICD-10-CM

## 2024-08-16 DIAGNOSIS — M51.16 INTERVERTEBRAL DISC DISORDERS WITH RADICULOPATHY, LUMBAR REGION: ICD-10-CM

## 2024-08-16 DIAGNOSIS — E11.9 TYPE 2 DIABETES, HBA1C GOAL < 7% (HCC): ICD-10-CM

## 2024-08-16 RX ORDER — TIRZEPATIDE 7.5 MG/.5ML
INJECTION, SOLUTION SUBCUTANEOUS
Qty: 2 ML | Refills: 5 | Status: SHIPPED | OUTPATIENT
Start: 2024-08-16

## 2024-08-28 DIAGNOSIS — I10 ESSENTIAL (PRIMARY) HYPERTENSION: ICD-10-CM

## 2024-08-28 RX ORDER — AMLODIPINE BESYLATE 10 MG/1
10 TABLET ORAL DAILY
Qty: 90 TABLET | Refills: 3 | Status: SHIPPED | OUTPATIENT
Start: 2024-08-28

## 2024-09-06 NOTE — TELEPHONE ENCOUNTER
LOV  03/24/23  NOV  05/17/23
Medication Refill Request     Name sertraline (ZOLOFT) 50 mg tablet   Dose/Frequency 1 tablet daily  Quantity 90  Verified pharmacy   [x]  Verified ordering Provider   [x]  Does patient have enough for the next 3 days?  Yes [x] No []
.

## 2024-09-16 LAB
ALBUMIN SERPL-MCNC: 4.1 G/DL (ref 3.5–5.7)
ALBUMIN/CREAT UR: 11.2
ALP SERPL-CCNC: 95 U/L (ref 35–120)
ALT SERPL-CCNC: 9 U/L
ANION GAP SERPL CALCULATED.3IONS-SCNC: 9 MMOL/L (ref 3–11)
AST SERPL-CCNC: 13 U/L
BASOPHILS # BLD AUTO: 0 THOU/CMM (ref 0–0.1)
BASOPHILS NFR BLD AUTO: 0 %
BILIRUB SERPL-MCNC: 1.2 MG/DL (ref 0.2–1)
BUN SERPL-MCNC: 16 MG/DL (ref 7–28)
CALCIUM SERPL-MCNC: 9.4 MG/DL (ref 8.5–10.1)
CHLORIDE SERPL-SCNC: 105 MMOL/L (ref 100–109)
CHOLEST SERPL-MCNC: 116 MG/DL
CHOLEST/HDLC SERPL: 3.1 {RATIO}
CO2 SERPL-SCNC: 27 MMOL/L (ref 21–31)
CREAT SERPL-MCNC: 0.82 MG/DL (ref 0.53–1.3)
CREAT UR-MCNC: 241.7 MG/DL (ref 50–200)
CYTOLOGY CMNT CVX/VAG CYTO-IMP: ABNORMAL
DIFFERENTIAL METHOD BLD: NORMAL
EOSINOPHIL # BLD AUTO: 0.4 THOU/CMM (ref 0–0.5)
EOSINOPHIL NFR BLD AUTO: 4 %
ERYTHROCYTE [DISTWIDTH] IN BLOOD BY AUTOMATED COUNT: 14 % (ref 12–16)
EST. AVERAGE GLUCOSE BLD GHB EST-MCNC: 117 MG/DL
GFR/BSA.PRED SERPLBLD CYS-BASED-ARV: 87 ML/MIN/{1.73_M2}
GLUCOSE SERPL-MCNC: 90 MG/DL (ref 65–99)
HBA1C MFR BLD: 5.7 %
HCT VFR BLD AUTO: 43.2 % (ref 37–48)
HDLC SERPL-MCNC: 38 MG/DL (ref 23–92)
HGB BLD-MCNC: 14.8 G/DL (ref 12.5–17)
LDLC SERPL CALC-MCNC: 54 MG/DL
LYMPHOCYTES # BLD AUTO: 1.4 THOU/CMM (ref 1–3)
LYMPHOCYTES NFR BLD AUTO: 15 %
MCH RBC QN AUTO: 29.8 PG (ref 27–36)
MCHC RBC AUTO-ENTMCNC: 34.3 G/DL (ref 32–37)
MCV RBC AUTO: 87 FL (ref 80–100)
MICROALBUMIN UR-MCNC: 2.7 MG/DL
MONOCYTES # BLD AUTO: 0.8 THOU/CMM (ref 0.3–1)
MONOCYTES NFR BLD AUTO: 9 %
NEUTROPHILS # BLD AUTO: 6.8 THOU/CMM (ref 1.8–7.8)
NEUTROPHILS NFR BLD AUTO: 72 %
NONHDLC SERPL-MCNC: 78 MG/DL
PLATELET # BLD AUTO: 196 THOU/CMM (ref 140–350)
PMV BLD REES-ECKER: 10.1 FL (ref 7.5–11.3)
POTASSIUM SERPL-SCNC: 4.2 MMOL/L (ref 3.5–5.2)
PROT SERPL-MCNC: 6.6 G/DL (ref 6.3–8.3)
RBC # BLD AUTO: 4.96 MILL/CMM (ref 4–5.4)
SODIUM SERPL-SCNC: 141 MMOL/L (ref 135–145)
TRIGL SERPL-MCNC: 122 MG/DL
TSH SERPL-ACNC: 2.4 UIU/ML (ref 0.45–5.33)
WBC # BLD AUTO: 9.5 THOU/CMM (ref 4–10.5)

## 2024-09-17 DIAGNOSIS — I48.0 PAROXYSMAL ATRIAL FIBRILLATION (HCC): Primary | ICD-10-CM

## 2024-09-17 RX ORDER — APIXABAN 5 MG/1
5 TABLET, FILM COATED ORAL 2 TIMES DAILY
Qty: 60 TABLET | Refills: 4 | Status: SHIPPED | OUTPATIENT
Start: 2024-09-17

## 2024-09-17 NOTE — TELEPHONE ENCOUNTER
Patient calling back to follow up on request. He is concerned that he only has 2 pills left. Does not want to run out. Medication was prescribed in the hospital.

## 2024-09-17 NOTE — TELEPHONE ENCOUNTER
Reason for call:   [x] Refill   [] Prior Auth  [] Other: was prescribed while in the hospital, pt's unsure who should be prescribing it now, cardiology or his PCP    Office:   [] PCP/Provider -   [] Specialty/Provider -     Medication: eliquis    Dose/Frequency: 5 mg take twice daily     Quantity: 60    Pharmacy: [facility]     Does the patient have enough for 3 days?   [] Yes   [x] No - Send as HP to POD- only two pills left

## 2024-09-18 ENCOUNTER — RA CDI HCC (OUTPATIENT)
Dept: OTHER | Facility: HOSPITAL | Age: 84
End: 2024-09-18

## 2024-09-27 ENCOUNTER — OFFICE VISIT (OUTPATIENT)
Dept: INTERNAL MEDICINE CLINIC | Facility: CLINIC | Age: 84
End: 2024-09-27
Payer: MEDICARE

## 2024-09-27 VITALS
TEMPERATURE: 98.7 F | HEIGHT: 67 IN | SYSTOLIC BLOOD PRESSURE: 122 MMHG | DIASTOLIC BLOOD PRESSURE: 68 MMHG | HEART RATE: 90 BPM | OXYGEN SATURATION: 98 % | WEIGHT: 201 LBS | BODY MASS INDEX: 31.55 KG/M2

## 2024-09-27 DIAGNOSIS — F34.1 DYSTHYMIC DISORDER: ICD-10-CM

## 2024-09-27 DIAGNOSIS — I10 PRIMARY HYPERTENSION: Primary | ICD-10-CM

## 2024-09-27 DIAGNOSIS — M17.0 PRIMARY OSTEOARTHRITIS OF BOTH KNEES: ICD-10-CM

## 2024-09-27 DIAGNOSIS — I87.2 VENOUS INSUFFICIENCY OF BOTH LOWER EXTREMITIES: ICD-10-CM

## 2024-09-27 DIAGNOSIS — I48.0 PAROXYSMAL ATRIAL FIBRILLATION (HCC): ICD-10-CM

## 2024-09-27 DIAGNOSIS — G47.33 OSA (OBSTRUCTIVE SLEEP APNEA): ICD-10-CM

## 2024-09-27 DIAGNOSIS — C61 PROSTATE CANCER (HCC): ICD-10-CM

## 2024-09-27 DIAGNOSIS — E78.2 MIXED HYPERLIPIDEMIA: ICD-10-CM

## 2024-09-27 DIAGNOSIS — M54.16 LUMBAR RADICULOPATHY: ICD-10-CM

## 2024-09-27 DIAGNOSIS — E11.9 TYPE 2 DIABETES, HBA1C GOAL < 7% (HCC): ICD-10-CM

## 2024-09-27 PROCEDURE — 99214 OFFICE O/P EST MOD 30 MIN: CPT | Performed by: INTERNAL MEDICINE

## 2024-09-27 PROCEDURE — G0439 PPPS, SUBSEQ VISIT: HCPCS | Performed by: INTERNAL MEDICINE

## 2024-09-27 RX ORDER — AMLODIPINE BESYLATE 5 MG/1
5 TABLET ORAL DAILY
Start: 2024-09-27

## 2024-09-27 NOTE — PROGRESS NOTES
Diabetic Foot Exam    Patient's shoes and socks removed.    Right Foot/Ankle   Right Foot Inspection  Skin Exam: skin normal and skin intact. No dry skin, no warmth, no callus, no erythema, no maceration, no abnormal color, no pre-ulcer, no ulcer and no callus.     Toe Exam: ROM and strength within normal limits and swelling. No tenderness, erythema and  no right toe deformity    Sensory   Monofilament testing: intact    Vascular  Capillary refills: < 3 seconds  The right DP pulse is 2+. The right PT pulse is 2+.     Left Foot/Ankle  Left Foot Inspection  Skin Exam: skin normal and skin intact. No dry skin, no warmth, no erythema, no maceration, normal color, no pre-ulcer, no ulcer and no callus.     Toe Exam: ROM and strength within normal limits and swelling. No tenderness, no erythema and no left toe deformity.     Sensory   Monofilament testing: intact    Vascular  Capillary refills: < 3 seconds  The left DP pulse is 2+. The left PT pulse is 2+.     Assign Risk Category  No deformity present  No loss of protective sensation  No weak pulses  Risk: 0  Ambulatory Visit  Name: Sergio Lambert      : 1940      MRN: [id number]  Encounter Provider: Saige Henao MD  Encounter Date: 2024   Encounter department: [facility] INTERNAL MEDICINE    Assessment & Plan  Primary hypertension    Orders:    amLODIPine (NORVASC) 5 mg tablet; Take 1 tablet (5 mg total) by mouth daily    Paroxysmal atrial fibrillation (HCC)         Venous insufficiency of both lower extremities         CHASIDY (obstructive sleep apnea)         Type 2 diabetes, HbA1c goal < 7% (HCC)    Lab Results   Component Value Date    HGBA1C 5.7 (H) 2024            Lumbar radiculopathy         Prostate cancer (HCC)         Primary osteoarthritis of both knees         Dysthymic disorder           Mixed hyperlipidemia            Preventive health issues were discussed with patient, and age appropriate screening tests were ordered as noted  in patient's After Visit Summary. Personalized health advice and appropriate referrals for health education or preventive services given if needed, as noted in patient's After Visit Summary.    History of Present Illness     Follow-up on blood test done on 9/16/2024 test discussed with him, also medical wellness exam       Patient Care Team:  Saige Henao MD as PCP - General (Internal Medicine)  MD Wilfrid Ferreira DO (Pain Medicine)  MD Humera Osborne CRNP as Nurse Practitioner (Pain Medicine)    Review of Systems   Constitutional:  Negative for chills and fever.   HENT:  Negative for congestion, ear pain and sore throat.    Eyes:  Negative for pain.   Respiratory:  Negative for cough and shortness of breath.    Cardiovascular:  Negative for chest pain and leg swelling.   Gastrointestinal:  Negative for abdominal pain, nausea and vomiting.   Endocrine: Negative for polyuria.   Genitourinary:  Negative for difficulty urinating, frequency and urgency.   Musculoskeletal:  Positive for arthralgias and back pain.   Skin:  Negative for rash.   Neurological:  Negative for weakness and headaches.   Psychiatric/Behavioral:  Negative for sleep disturbance. The patient is not nervous/anxious.      Medical History Reviewed by provider this encounter:  Tobacco  Allergies  Meds  Problems  Med Hx  Surg Hx  Fam Hx       Annual Wellness Visit Questionnaire   Sergio is here for his Subsequent Wellness visit. Last Medicare Wellness visit information reviewed, patient interviewed and updates made to the record today.      Health Risk Assessment:   Patient rates overall health as good. Patient feels that their physical health rating is much better. Patient is satisfied with their life. Eyesight was rated as same. Hearing was rated as same. Patient feels that their emotional and mental health rating is slightly better. Patients states they are never, rarely angry. Patient states they  are never, rarely unusually tired/fatigued. Pain experienced in the last 7 days has been some. Patient's pain rating has been 2/10. Patient states that he has experienced weight loss or gain in last 6 months.     Depression Screening:   PHQ-9 Score: 0      Fall Risk Screening:   In the past year, patient has experienced: history of falling in past year    Number of falls: 1  Injured during fall?: No    Feels unsteady when standing or walking?: Yes    Worried about falling?: Yes      Home Safety:  Patient has trouble with stairs inside or outside of their home. Patient has working smoke alarms and has working carbon monoxide detector. Home safety hazards include: none.     Nutrition:   Current diet is Low Cholesterol, Low Saturated Fat, Low Carb and No Added Salt.     Medications:   Patient is currently taking over-the-counter supplements. OTC medications include: see medication list. Patient is able to manage medications.     Activities of Daily Living (ADLs)/Instrumental Activities of Daily Living (IADLs):   Walk and transfer into and out of bed and chair?: Yes  Dress and groom yourself?: Yes    Bathe or shower yourself?: Yes    Feed yourself? Yes  Do your laundry/housekeeping?: Yes  Manage your money, pay your bills and track your expenses?: Yes  Make your own meals?: Yes    Do your own shopping?: Yes    Previous Hospitalizations:   Any hospitalizations or ED visits within the last 12 months?: No      Advance Care Planning:   Living will: Yes    Durable POA for healthcare: Yes    Advanced directive: Yes    ACP document given: Yes      Cognitive Screening:   Provider or family/friend/caregiver concerned regarding cognition?: No    PREVENTIVE SCREENINGS      Cardiovascular Screening:    General: Screening Not Indicated and History Lipid Disorder      Diabetes Screening:     General: Screening Not Indicated and History Diabetes      Prostate Cancer Screening:    General: History Prostate Cancer and Screening Not  Indicated      Abdominal Aortic Aneurysm (AAA) Screening:    Risk factors include: tobacco use        Lung Cancer Screening:     General: Screening Not Indicated    Screening, Brief Intervention, and Referral to Treatment (SBIRT)    Screening  Typical number of drinks in a day: 1  Typical number of drinks in a week: 6  Interpretation: Low risk drinking behavior.    AUDIT-C Screenin) How often did you have a drink containing alcohol in the past year? 2 to 3 times a week  2) How many drinks did you have on a typical day when you were drinking in the past year? 3 to 4  3) How often did you have 6 or more drinks on one occasion in the past year? less than monthly    AUDIT-C Score: 4  Interpretation: Score 4-12 (male): POSITIVE screen for alcohol misuse    AUDIT Screenin) How often during the last year have you found that you were not able to stop drinking once you had started? 0 - never  5) How often during the last year have you failed to do what was normally expected from you because of drinking? 0 - never  6) How often during the last year have you needed a first drink in the morning to get yourself going after a heavy drinking session? 0 - never  7) How often during the last year have you had a feeling of guilt or remorse after drinking? 0 - never  8) How often during the last year have you been unable to remember what happened the night before because you had been drinking? 0 - never  9) Have you or someone else been injured as a result of your drinking? 0 - no  10) Has a relative or friend or a doctor or another health worker been concerned about your drinking or suggested you cut down? 0 - no    AUDIT Score: 4  Interpretation: Low risk alcohol consumption    Single Item Drug Screening:  How often have you used an illegal drug (including marijuana) or a prescription medication for non-medical reasons in the past year? never    Single Item Drug Screen Score: 0  Interpretation: Negative screen for possible  "drug use disorder    Social Determinants of Health     Financial Resource Strain: Low Risk  (5/21/2024)    Received from [facility]    Overall Financial Resource Strain (CARDIA)     Difficulty of Paying Living Expenses: Not very hard   Food Insecurity: No Food Insecurity (9/27/2024)    Hunger Vital Sign     Worried About Running Out of Food in the Last Year: Never true     Ran Out of Food in the Last Year: Never true   Transportation Needs: No Transportation Needs (9/27/2024)    PRAPARE - Transportation     Lack of Transportation (Medical): No     Lack of Transportation (Non-Medical): No   Housing Stability: Low Risk  (9/27/2024)    Housing Stability Vital Sign     Unable to Pay for Housing in the Last Year: No     Number of Times Moved in the Last Year: 0     Homeless in the Last Year: No   Utilities: Not At Risk (9/27/2024)    [place] Utilities     Threatened with loss of utilities: No     No results found.    Objective     /68 (BP Location: Left arm, Patient Position: Sitting, Cuff Size: Adult)   Pulse 90   Temp 98.7 °F (37.1 °C) (Temporal)   Ht 5' 7\" (1.702 m)   Wt 91.2 kg (201 lb)   SpO2 98%   BMI 31.48 kg/m²     Physical Exam  Vitals and nursing note reviewed.   Constitutional:       General: He is not in acute distress.     Appearance: He is well-developed.   HENT:      Head: Normocephalic and atraumatic.      Right Ear: External ear normal.      Left Ear: External ear normal.      Mouth/Throat:      Mouth: Mucous membranes are moist.      Pharynx: Oropharynx is clear.   Eyes:      Extraocular Movements: Extraocular movements intact.      Conjunctiva/sclera: Conjunctivae normal.   Cardiovascular:      Rate and Rhythm: Normal rate and regular rhythm.      Pulses: no weak pulses.           Dorsalis pedis pulses are 2+ on the right side and 2+ on the left side.        Posterior tibial pulses are 2+ on the right side and 2+ on the left side.      Heart sounds: Normal heart sounds. No " murmur heard.  Pulmonary:      Effort: Pulmonary effort is normal. No respiratory distress.      Breath sounds: Normal breath sounds. No wheezing or rales.   Abdominal:      General: Abdomen is flat. There is no distension.      Palpations: Abdomen is soft.      Tenderness: There is no abdominal tenderness.   Musculoskeletal:         General: Swelling present.      Cervical back: Neck supple.      Right lower leg: Edema present.      Left lower leg: Edema present.   Feet:      Right foot:      Skin integrity: No ulcer, skin breakdown, erythema, warmth, callus or dry skin.      Left foot:      Skin integrity: No ulcer, skin breakdown, erythema, warmth, callus or dry skin.   Skin:     General: Skin is warm and dry.      Capillary Refill: Capillary refill takes less than 2 seconds.   Neurological:      General: No focal deficit present.      Mental Status: He is alert and oriented to person, place, and time.   Psychiatric:         Mood and Affect: Mood normal.

## 2024-10-16 DIAGNOSIS — I10 ESSENTIAL HYPERTENSION: ICD-10-CM

## 2024-10-17 RX ORDER — LOSARTAN POTASSIUM 100 MG/1
100 TABLET ORAL DAILY
Qty: 90 TABLET | Refills: 1 | Status: SHIPPED | OUTPATIENT
Start: 2024-10-17 | End: 2025-10-17

## 2024-11-20 ENCOUNTER — OFFICE VISIT (OUTPATIENT)
Dept: SLEEP CENTER | Facility: CLINIC | Age: 84
End: 2024-11-20
Payer: MEDICARE

## 2024-11-20 VITALS — HEART RATE: 61 BPM | OXYGEN SATURATION: 100 % | SYSTOLIC BLOOD PRESSURE: 122 MMHG | DIASTOLIC BLOOD PRESSURE: 70 MMHG

## 2024-11-20 DIAGNOSIS — E66.811 CLASS 1 OBESITY WITH SERIOUS COMORBIDITY AND BODY MASS INDEX (BMI) OF 31.0 TO 31.9 IN ADULT, UNSPECIFIED OBESITY TYPE: ICD-10-CM

## 2024-11-20 DIAGNOSIS — G47.33 OSA (OBSTRUCTIVE SLEEP APNEA): Primary | ICD-10-CM

## 2024-11-20 PROCEDURE — 99214 OFFICE O/P EST MOD 30 MIN: CPT | Performed by: STUDENT IN AN ORGANIZED HEALTH CARE EDUCATION/TRAINING PROGRAM

## 2024-11-20 PROCEDURE — G2211 COMPLEX E/M VISIT ADD ON: HCPCS | Performed by: STUDENT IN AN ORGANIZED HEALTH CARE EDUCATION/TRAINING PROGRAM

## 2024-11-20 NOTE — PROGRESS NOTES
[facility]  Sleep Medicine Follow up/ Established Patient Visit      Assessment/Plan:  1. CHASIDY (obstructive sleep apnea)  Mask fitting only    PAP DME Resupply/Reorder      2. Class 1 obesity with serious comorbidity and body mass index (BMI) of 31.0 to 31.9 in adult, unspecified obesity type  Mask fitting only    PAP DME Resupply/Reorder          Mustapha is a very pleasant 84-year-old gentleman with a PMHx of T2DM, HTN, TIA, RBBB, chronic pain syndrome, HLD, obesity, lumbar DDD who presents in follow up for CHASIDY (AHI 16.8, REM AHI 57.4, O2 mal 83% 10/2017).  He continues to endorse substantial benefit from PAP therapy, however with some rhinorrhea and mask leak; upon review of his compliance report, his CHASIDY remains therapeutically controlled, however his leak is substantial.    Continue CPAP at settings of  11 cmH2O  Prescription for new supplies ordered today  Reviewed CMS/insurance requirements and resupply guidelines  Information provided on the above as well as general maintenance steps  Recommended maintaining good Sleep Hygiene  Mask fitting referral placed today  Did advise against the use of the ozone  devices.   He will Return in about 1 year (around 11/20/2025).      ________________________________________________________________________________________________    Per Last Visit Note (Date: 11/10/2023):  Sergio is a pleasant 83-year-old gentleman with a PMHx of T2DM, HTN, TIA, RBBB, chronic pain syndrome, HLD, obesity, lumbar degenerative disc disease who presents in follow up for obstructive sleep apnea.  He overall is doing well with his new machine, and states that it is working much much better than his previous device, and he is quite pleased with his current treatment.  He has no concerns today.     - Continue CPAP at settings of 11 cm H2O  - Prescription for new supplies ordered today  - Reviewed CMS/insurance requirements and resupply guidelines  - Information  provided on the above as well as general maintenance steps  - he is to see me back in ~12 months, however may reach out sooner if needed.            Sleep Studies:  -PSG 10/10/2017: .5 minutes, sleep efficiency 66%.  Reduced REM sleep, high percentage of N3 noted.  AHI 16.8, supine AHI 16.8, REM AHI 57.4.  O2 mal 83%.  PLM index 170.4, PLM arousal index 20.4.     -PAP titration study 10/19/2017: Pressure of 11 cm H2O a limited respiratory events and snoring.  PLM index 100.5, PLM arousal index 5.1.     ________________________________________________________________________________________________      Interval History: Sergio Lambert is a 84 y.o. male with a PMHx of T2DM, HTN, TIA, RBBB, chronic pain syndrome, HLD, obesity, lumbar DDD who presents in follow up for CHASIDY (AHI 16.8, REM AHI 57.4, O2 mal 83% 10/2017).    SDB:  -Current experience with PAP Therapy: Very beneficial   -Mask type: Nasal Pillows  -Difficulties with mask: Does endorse rhinorrhea, which he thinks has gotten more severe as time goes on. Also endorses leaking, and at times sleeps with his mouth open.    -Device: ResMed AirSense 11; received 2023.  -Difficulties with device: Denies  -DME: [facility]  -Compliance:          [place] Sleepiness Scale:  What are your chances of dozing?   0= no chance  1= slight chance  2= moderate chance  3= high chance    Sitting and readin   Watching TV: 1  Sitting, inactive in a public place (e.g. a theatre or a meeting):0  As a passenger in a car for an hour without a break: 1  Lying down to rest in the afternoon when circumstances permit: 1   Sitting and talking to someone: 0  Sitting quietly after a lunch without alcohol: 0  In a car, while stopped for a few minutes in the traffic: 0       TOTAL    Greater or equal to 10 is positive for excessive daytime sleepiness        SLEEP HYGIENE QUESTIONS:  Bedtime: 2330   Time it takes to fall sleep: 16-30 minutes  Wake up Time: 0700   Number of  times patient wakes up per night: 0  Reason (s) why patient wakes up during the night: --     Estimated total sleep time ( in a 24 hour period of time): ~8 hours       Changes to PMH, PSH, SH: Spinal surgery in May - had an excellent experience.  Has list close to 40 pounds since last visit, after starting Mounjaro.        SLEEP RELATED ROS  Review of Systems  No Known Allergies    CURRENT MEDICATIONS:  Current Outpatient Medications   Medication Instructions    Acetaminophen 500 mg, Daily PRN    amLODIPine (NORVASC) 5 mg, Oral, Daily    atorvastatin (LIPITOR) 40 mg, Oral, Daily    Cholecalciferol (VITAMIN D-3 PO) 2,000 unit marking on U-100 syringe, Daily after dinner    co-enzyme Q-10 30 mg, Daily after dinner    desloratadine (CLARINEX) 5 mg, Oral, Daily    diclofenac sodium (VOLTAREN) 2 g, 4 times daily    docusate sodium (COLACE) 100 mg, Oral, 2 times daily    Eliquis 5 mg, Oral, 2 times daily    fish oil 1,000 mg, 2 times daily    glucosamine-chondroitin 500-400 MG tablet 2 tablets, Daily (early morning)    glycopyrrolate (ROBINUL) 1 mg, Oral, 2 times daily    ipratropium (ATROVENT) 0.06 % nasal spray USE 2 SPRAYS INTO EACH NOSTRIL UP TO 3 TIMES DAILY AS NEEDED    losartan (COZAAR) 100 mg, Oral, Daily    MAGNESIUM  mg    multivitamin (THERAGRAN) TABS 1 tablet, Daily (early morning)    sertraline (ZOLOFT) 50 mg, Oral, Daily    sotalol (BETAPACE) 80 mg tablet TAKE 1 TABLET TWICE DAILY    tirzepatide (Mounjaro) 7.5 MG/0.5ML INJECT 0.5 ML (7.5 MG TOTAL) UNDER THE SKIN EVERY 7 DAYS    torsemide (DEMADEX) 20 mg tablet TAKE 1 TABLET TWICE DAILY           PHYSICAL EXAMINATION:  Vital Signs: /70   Pulse 61   SpO2 100%     Constitutional: NAD, well appearing   Mental Status: AAOx3  Skin: Warm, dry, no rashes noted   Eyes: PERRL, normal conjunctiva  Chest: No evidence of respiratory distress, no accessory muscle use; no evidence of peripheral cyanosis  Abdomen: Soft, NT/ND  Extremities: No digital clubbing  or pedal edema  Neuro: Strength 5/5 throughout, sensation grossly intact          Electronically signed by:    Zak Payne DO  Board-Certified Neurology and Sleep Medicine  [facility]  11/20/24

## 2024-11-20 NOTE — PATIENT INSTRUCTIONS
Continue PAP Therapy  Continue CPAP at settings of 11 cmH2O  Remember to clean your mask and equipment regularly, as directed.  I am ordering a formal mask fitting appointment; this is an appointment with the DME to ensure that you have the optimal mask and fit for your face structure    -You can always discuss possible alternatives to a SoClean with them at this appointment.   -Option that doesn't use ozone: [email or web address]://[email or web address].Digital [email or web address]-health-lumin-cpap-mask-and-accessories-  You should be eligible for new supplies approximately every 3-6 months, depending on your insurance coverage. Contact your Durable Medical Equipment (DME) company for new supplies as needed.  Practice good Sleep Hygiene, as outlined below.  Follow up in 12 months.      Care and Maintenance  Headgear should be washed as needed. Daily inspection and weekly washings are recommended. Do not disassemble the straps. Machine wash in warm water, making sure to attach Velcro hooks and tabs before washing. Line dry or machine dry on a low setting.  Masks should be washed every day. Daily inspection is recommended. Leave the mask and tubing attached. Gently wash the mask with a soft cloth using warm water and mild detergent, concentrating on the mask cushion flaps. DO NOT use alcohol or bleach. Rinse thoroughly and air dry.  Tubing and headgear should be washed weekly. Daily inspection is recommended. Wash in warm water and mild detergent and rinse thoroughly. Hook the tubing to the machine and blow until dry.  Humidifier should be washed daily and filled with DISTILLED water before use. Wash with warm water and mild detergent. Disinfect weekly by soaking with a solution of 1 part white vinegar and 3 parts water for 30 minutes. Rinse thoroughly and air dry.  Disposable filters should be replaced once a month. Wash reusable foam filters with warm water and mild detergent at least once a month. Rinse thoroughly and dry with paper  towels.  Avoid  that contain fragrance or conditioners, as these will leave a residue.  NEVER iron any soft goods.      CMS Requirements    Your insurance requires a face-to-face follow up visit within a 31-90 day period after starting CPAP.  Your insurance requires compliance with CPAP, which is at least 4 hours per night for 70% of the time. This must be done over a 30 day period and must occur within the initial 31-90 day period after starting CPAP.  Your insurance also requires at least yearly follow ups to continue to pay for CPAP supplies.       PAP Supply Guidelines    Below are the guidelines for reordering your supplies. You will be responsible for your deductible, co payments, and out of pocket expenses.    Item Frequency   Nasal Mask (no headgear) 1 every 3 months   Nasal Mask Cushion 1 every 2 weeks   Full Face Mask (no headgear) 1 every 3 months   Full Face Mask Cushion 1 every month   Nasal Pillows 1 every 2 weeks   Headgear 1 every 6 months   hin Strap 1 every 6 months   magalys 1 every 3 months   Filters: Reusable 1 every 6 months   Filters: Disposable 1 every 2 weeks   Humidifier Chamber(disposable) 1 every 6 months         Good Sleep Hygiene    Wake up at the same time every day, even on the weekends.  Use your bed for sleep and intimacy only.  If you have been in bed awake for 30 minutes, get up and leave the bedroom. Choose a dull activity not involving a blue screen (TV, computer, handheld devices). Go back to bed when you feel sleepy.  Avoid caffeine, nicotine and alcohol before you go to bed.  Avoid large meals before you go to bed.  Avoid using screens (computers, tablets, smartphones, etc.) for at least 1 hour before bedtime  Exercise regularly, but do not exercise right before you go to bed.  Avoid daytime naps. If you do take a nap, sleep for 20-40 minutes, and not after dinner.

## 2024-11-21 ENCOUNTER — TELEPHONE (OUTPATIENT)
Age: 84
End: 2024-11-21

## 2024-11-21 NOTE — TELEPHONE ENCOUNTER
Reason for call:   [x] Prior Auth  [] Other:     Caller:  [] Patient  [x] Pharmacy  Name: [facility]   Address: [address] [place] [address]   Callback Number: [phone]     Medication:   tirzepatide (Mounjaro) 7.5 MG/0.5ML      Dose/Frequency: INJECT 0.5 ML (7.5 MG TOTAL) UNDER THE SKIN EVERY 7 DAYS     Quantity:  2 mL    Ordering Provider:   [x] PCP/Provider -   [] Speciality/Provider -     Has the patient tried other medications and failed? If failed, which medications did they fail?    [] No   [x] Yes -   tirzepatide 5 MG/0.5ML  , semaglutide, 0.25 or 0.5 mg/dose, (Ozempic, 0.25 or 0.5 MG/DOSE,) 2 mg/3 mL injection pen     Is the patient's insurance updated in EPIC?   [x] Yes   [] No     Is a copy of the patient's insurance scanned in EPIC?   [x] Yes   [] No

## 2024-11-22 ENCOUNTER — TELEPHONE (OUTPATIENT)
Dept: SLEEP CENTER | Facility: CLINIC | Age: 84
End: 2024-11-22

## 2024-11-22 ENCOUNTER — TELEPHONE (OUTPATIENT)
Dept: INTERNAL MEDICINE CLINIC | Facility: CLINIC | Age: 84
End: 2024-11-22

## 2024-11-26 LAB

## 2024-11-26 NOTE — TELEPHONE ENCOUNTER
PA for tirzepatide (Mounjaro) 7.5 MG/0.5ML APPROVED     Date(s) approved 8/24/24-11/22/25    Case # n/a    Patient advised by          [x]MyChart Message  []Phone call   [x]LMOM  []L/M to call office as no active Communication consent on file  []Unable to leave detailed message as VM not approved on Communication consent       Pharmacy advised by    [x]Fax  []Phone call    Approval letter scanned into Media Yes

## 2024-12-04 DIAGNOSIS — E78.2 MIXED HYPERLIPIDEMIA: ICD-10-CM

## 2024-12-04 RX ORDER — ATORVASTATIN CALCIUM 40 MG/1
40 TABLET, FILM COATED ORAL DAILY
Qty: 90 TABLET | Refills: 1 | Status: SHIPPED | OUTPATIENT
Start: 2024-12-04

## 2024-12-04 NOTE — TELEPHONE ENCOUNTER
Reason for call:   [x] Refill   [] Prior Auth  [] Other:     Office:   [x] PCP/Provider - Earlene  [] Specialty/Provider -     Medication: atorvastatin (LIPITOR) 40 mg tablet     Dose/Frequency: Take 1 tablet (40 mg total) by mouth daily,     Quantity: 90 tab    Pharmacy: [facility] [place] [facility] - [address]     Does the patient have enough for 3 days?   [] Yes   [x] No - Send as HP to POD

## 2025-01-02 ENCOUNTER — OFFICE VISIT (OUTPATIENT)
Dept: INTERNAL MEDICINE CLINIC | Facility: CLINIC | Age: 85
End: 2025-01-02
Payer: MEDICARE

## 2025-01-02 VITALS
BODY MASS INDEX: 30.29 KG/M2 | HEART RATE: 66 BPM | TEMPERATURE: 99.5 F | OXYGEN SATURATION: 97 % | SYSTOLIC BLOOD PRESSURE: 136 MMHG | WEIGHT: 193 LBS | DIASTOLIC BLOOD PRESSURE: 84 MMHG | HEIGHT: 67 IN

## 2025-01-02 DIAGNOSIS — L30.9 ACUTE DERMATITIS: Primary | ICD-10-CM

## 2025-01-02 PROCEDURE — G2211 COMPLEX E/M VISIT ADD ON: HCPCS | Performed by: INTERNAL MEDICINE

## 2025-01-02 PROCEDURE — 99212 OFFICE O/P EST SF 10 MIN: CPT | Performed by: INTERNAL MEDICINE

## 2025-01-02 RX ORDER — TIRZEPATIDE 7.5 MG/.5ML
7.5 INJECTION, SOLUTION SUBCUTANEOUS
COMMUNITY
Start: 2024-12-17

## 2025-01-02 NOTE — PROGRESS NOTES
"Assessment/Plan:    Acute  dermatitis  both  arms     Diagnoses and all orders for this visit:    Acute dermatitis    Other orders  -     Mounjaro 7.5 MG/0.5ML SOAJ; 7.5 mg          Subjective:      Patient ID: Sergio Lambert is a 84 y.o. male.    Rash  Pertinent negatives include no cough, diarrhea or vomiting.       The following portions of the patient's history were reviewed and updated as appropriate: allergies, current medications, past family history, past medical history, past social history, past surgical history, and problem list.    Review of Systems   Constitutional: Negative.    HENT:  Negative for dental problem, drooling, ear discharge and ear pain.    Eyes:  Negative for discharge, redness and itching.   Respiratory:  Negative for apnea, cough and wheezing.    Cardiovascular:  Negative for chest pain and palpitations.   Gastrointestinal:  Negative for abdominal pain, blood in stool, diarrhea and vomiting.   Endocrine: Negative for polydipsia, polyphagia and polyuria.   Genitourinary:  Negative for decreased urine volume, dysuria and frequency.   Musculoskeletal:  Negative for arthralgias, myalgias and neck stiffness.   Skin:  Positive for rash. Negative for pallor and wound.   Allergic/Immunologic: Negative for environmental allergies and food allergies.   Neurological:  Negative for facial asymmetry, light-headedness, numbness and headaches.   Hematological:  Negative for adenopathy. Does not bruise/bleed easily.   Psychiatric/Behavioral:  Negative for agitation, behavioral problems and confusion.          Objective:      /84 (BP Location: Left arm, Patient Position: Sitting, Cuff Size: Standard)   Pulse 66   Temp 99.5 °F (37.5 °C) (Temporal)   Ht 5' 7\" (1.702 m)   Wt 87.5 kg (193 lb)   SpO2 97%   BMI 30.23 kg/m²          Physical Exam  Constitutional:       Appearance: Normal appearance.   HENT:      Head: Normocephalic.      Nose: Nose normal.      Mouth/Throat:      Mouth: Mucous " membranes are moist.   Eyes:      Pupils: Pupils are equal, round, and reactive to light.   Cardiovascular:      Rate and Rhythm: Regular rhythm.      Heart sounds: Normal heart sounds.   Pulmonary:      Breath sounds: Normal breath sounds.   Abdominal:      Palpations: Abdomen is soft.   Musculoskeletal:         General: No swelling.      Cervical back: Neck supple.   Skin:     General: Skin is warm and dry.   Neurological:      General: No focal deficit present.      Mental Status: He is alert and oriented to person, place, and time.   Psychiatric:         Mood and Affect: Mood normal.       Acute  Dermatitis     Triamcinolone  cream  locally  twice  daily    Call  to  be  seen if not  better in   2  weeks.     Garry Richey MD.FACP

## 2025-01-03 ENCOUNTER — TELEPHONE (OUTPATIENT)
Dept: ADMINISTRATIVE | Facility: OTHER | Age: 85
End: 2025-01-03

## 2025-01-03 NOTE — TELEPHONE ENCOUNTER
----- Message from Irais WHITFIELD sent at 1/2/2025  2:03 PM EST -----  01/02/25 2:03 PM    Hello, our patient Sergio Lambert has had Diabetic Foot Exam completed/performed. Please assist in updating the patient chart by making an External outreach to  [facility] Dr. Bates located in [place]. The date of service is 09/2024 or 10/2024.    Thank you,  Irais Carter MA  [facility] INTERNAL [facility]

## 2025-01-03 NOTE — TELEPHONE ENCOUNTER
Upon review of the In Basket request we were able to note that no further action is required. The patient chart is up to date as a result of a previous request.      Any additional questions or concerns should be emailed to the Practice Liaisons via the appropriate education email address, please do not reply via In Basket.    Thank you  Alexa Warner MA   [facility]

## 2025-01-07 DIAGNOSIS — I10 PRIMARY HYPERTENSION: ICD-10-CM

## 2025-01-07 NOTE — TELEPHONE ENCOUNTER
Pt called and stated his amLODIPine 10 mg was changed to 5 mg but he is unsure if he should take 1 tab only or 2 tabs daily. Pt is requesting a call from office with the info requested.    Reason for call:   [x] Refill   [] Prior Auth  [] Other:     Office:   [x] PCP/Provider - : Saige Henao MD   [] Specialty/Provider -     Medication: amLODIPine 5 mg    Dose/Frequency: 1 tab daily    Quantity: 90 tabs    Pharmacy: [facility]       Does the patient have enough for 3 days?   [] Yes   [x] No - Send as HP to POD

## 2025-01-08 DIAGNOSIS — F34.1 DYSTHYMIC DISORDER: ICD-10-CM

## 2025-01-08 DIAGNOSIS — I48.0 PAROXYSMAL ATRIAL FIBRILLATION (HCC): ICD-10-CM

## 2025-01-08 RX ORDER — AMLODIPINE BESYLATE 5 MG/1
5 TABLET ORAL DAILY
Qty: 30 TABLET | Refills: 0 | Status: SHIPPED | OUTPATIENT
Start: 2025-01-08

## 2025-01-08 NOTE — TELEPHONE ENCOUNTER
Reason for call:   [x] Refill   [] Prior Auth  [] Other:     Office:   [x] PCP/Provider -   [] Specialty/Provider -     Medication: Sertraline    Dose/Frequency: 50 mg    Quantity: 90 tablets    Pharmacy: [facility] [place] [facility] - [address]      Does the patient have enough for 3 days?   [] Yes   [x] No - Send as HP to POD

## 2025-01-09 RX ORDER — APIXABAN 5 MG/1
5 TABLET, FILM COATED ORAL 2 TIMES DAILY
Qty: 60 TABLET | Refills: 5 | Status: SHIPPED | OUTPATIENT
Start: 2025-01-09

## 2025-01-22 DIAGNOSIS — I10 ESSENTIAL HYPERTENSION: ICD-10-CM

## 2025-01-23 RX ORDER — LOSARTAN POTASSIUM 100 MG/1
100 TABLET ORAL DAILY
Qty: 90 TABLET | Refills: 1 | Status: SHIPPED | OUTPATIENT
Start: 2025-01-23 | End: 2026-01-23

## 2025-01-24 ENCOUNTER — TELEPHONE (OUTPATIENT)
Age: 85
End: 2025-01-24

## 2025-01-24 DIAGNOSIS — R05.1 ACUTE COUGH: Primary | ICD-10-CM

## 2025-01-24 RX ORDER — BENZONATATE 200 MG/1
200 CAPSULE ORAL 3 TIMES DAILY PRN
Qty: 20 CAPSULE | Refills: 0 | Status: SHIPPED | OUTPATIENT
Start: 2025-01-24

## 2025-01-24 NOTE — TELEPHONE ENCOUNTER
Patient had called in and stated that he is now developing a deep cough, that he is occasionally coughing phlegm up.     Patient is worried and doesn't want this to go into his lungs, and was wondering if Dr. Henao might be able to send something in for the time being. Please reach out to patient in regards to the deep cough

## 2025-01-28 ENCOUNTER — OFFICE VISIT (OUTPATIENT)
Dept: INTERNAL MEDICINE CLINIC | Facility: CLINIC | Age: 85
End: 2025-01-28
Payer: MEDICARE

## 2025-01-28 VITALS
HEART RATE: 58 BPM | BODY MASS INDEX: 29.98 KG/M2 | WEIGHT: 191 LBS | TEMPERATURE: 99 F | HEIGHT: 67 IN | DIASTOLIC BLOOD PRESSURE: 72 MMHG | OXYGEN SATURATION: 99 % | SYSTOLIC BLOOD PRESSURE: 120 MMHG

## 2025-01-28 DIAGNOSIS — E11.9 TYPE 2 DIABETES, HBA1C GOAL < 7% (HCC): ICD-10-CM

## 2025-01-28 DIAGNOSIS — F11.20 UNCOMPLICATED OPIOID DEPENDENCE (HCC): ICD-10-CM

## 2025-01-28 DIAGNOSIS — C61 PROSTATE CANCER (HCC): ICD-10-CM

## 2025-01-28 DIAGNOSIS — G47.33 OSA (OBSTRUCTIVE SLEEP APNEA): ICD-10-CM

## 2025-01-28 DIAGNOSIS — M48.062 SPINAL STENOSIS OF LUMBAR REGION WITH NEUROGENIC CLAUDICATION: ICD-10-CM

## 2025-01-28 DIAGNOSIS — M46.1 SACROILIITIS (HCC): ICD-10-CM

## 2025-01-28 DIAGNOSIS — I50.32 CHRONIC DIASTOLIC CHF (CONGESTIVE HEART FAILURE) (HCC): ICD-10-CM

## 2025-01-28 DIAGNOSIS — E78.2 MIXED HYPERLIPIDEMIA: ICD-10-CM

## 2025-01-28 DIAGNOSIS — F34.1 DYSTHYMIC DISORDER: ICD-10-CM

## 2025-01-28 DIAGNOSIS — I48.0 PAROXYSMAL ATRIAL FIBRILLATION (HCC): ICD-10-CM

## 2025-01-28 DIAGNOSIS — M17.0 PRIMARY OSTEOARTHRITIS OF BOTH KNEES: ICD-10-CM

## 2025-01-28 DIAGNOSIS — J10.1 INFLUENZA A: ICD-10-CM

## 2025-01-28 DIAGNOSIS — M51.16 INTERVERTEBRAL DISC DISORDERS WITH RADICULOPATHY, LUMBAR REGION: ICD-10-CM

## 2025-01-28 DIAGNOSIS — F33.9 DEPRESSION, RECURRENT (HCC): ICD-10-CM

## 2025-01-28 DIAGNOSIS — E66.811 CLASS 1 OBESITY WITH SERIOUS COMORBIDITY AND BODY MASS INDEX (BMI) OF 31.0 TO 31.9 IN ADULT, UNSPECIFIED OBESITY TYPE: ICD-10-CM

## 2025-01-28 DIAGNOSIS — E66.01 OBESITY, MORBID (HCC): ICD-10-CM

## 2025-01-28 DIAGNOSIS — R29.898 BILATERAL LEG WEAKNESS: Primary | ICD-10-CM

## 2025-01-28 DIAGNOSIS — I87.2 VENOUS INSUFFICIENCY OF BOTH LOWER EXTREMITIES: ICD-10-CM

## 2025-01-28 DIAGNOSIS — I10 ESSENTIAL HYPERTENSION: ICD-10-CM

## 2025-01-28 PROCEDURE — 99496 TRANSJ CARE MGMT HIGH F2F 7D: CPT | Performed by: INTERNAL MEDICINE

## 2025-01-28 NOTE — PROGRESS NOTES
Transitional Care Management Review:  Sergio Lambert is a 84 y.o. male here for TCM follow up.     During the TCM phone call patient stated:    TCM Call       Date and time call was made  1/22/2025 11:42 AM    Hospital care reviewed  Records reviewed    Patient was hospitialized at  [facility]    Date of Admission  01/17/25    Date of discharge  01/21/25    Diagnosis  bilateral leg weakness    Disposition  Home    Current Symptoms  Weakness; Fatigue; Cough          TCM Call       Post hospital issues  Reduced activity; Poor ADL (Activities of Daily Living) performance    Scheduled for follow up?  Yes    Did you obtain your prescribed medications  Yes    Do you need help managing your prescriptions or medications  Yes    Why type of assitance do you need  wife    Is transportation to your appointment needed  No    I have advised the patient to call PCP with any new or worsening symptoms  Frankie Henao MD      Assessment/Plan:             1. Bilateral leg weakness  Comments:  Better, advised him to walk around  2. Influenza A  Comments:  better  3. Paroxysmal atrial fibrillation (HCC)  Comments:  continue same med  4. Spinal stenosis of lumbar region with neurogenic claudication  5. Dysthymic disorder  Comments:  continue same med  6. Prostate cancer (HCC)  7. Primary osteoarthritis of both knees  -     Tirzepatide 7.5 MG/0.5ML SOAJ; Inject 7.5 mg under the skin once a week  8. Mixed hyperlipidemia  -     Lipid Panel with Direct LDL reflex; Future  9. Venous insufficiency of both lower extremities  10. Type 2 diabetes, HbA1c goal < 7% ([facility])  -     Tirzepatide 7.5 MG/0.5ML SOAJ; Inject 7.5 mg under the skin once a week  -     Albumin / creatinine urine ratio; Future  -     Lipid Panel with Direct LDL reflex; Future  11. Class 1 obesity with serious comorbidity and body mass index (BMI) of 31.0 to 31.9 in adult, unspecified obesity type  Comments:  continue same  med  Orders:  -     Tirzepatide 7.5 MG/0.5ML SOAJ; Inject 7.5 mg under the skin once a week  12. Intervertebral disc disorders with radiculopathy, lumbar region  -     Tirzepatide 7.5 MG/0.5ML SOAJ; Inject 7.5 mg under the skin once a week  13. Body mass index 36.0-36.9, adult  14. Essential hypertension  -     Tirzepatide 7.5 MG/0.5ML SOAJ; Inject 7.5 mg under the skin once a week  15. CHASIDY (obstructive sleep apnea)  -     Tirzepatide 7.5 MG/0.5ML SOAJ; Inject 7.5 mg under the skin once a week  16. Chronic diastolic CHF (congestive heart failure) ([facility])  17. Uncomplicated opioid dependence ([facility])  18. Sacroiliitis ([facility])  19. Depression, recurrent ([facility])  20. Obesity, morbid ([facility])         Subjective:      Patient ID: Sergio Lambert is a 84 y.o. male.    Follow-up from hospitalization, cough with clear sputum, weakness little better,        The following portions of the patient's history were reviewed and updated as appropriate: He  has a past medical history of A-fib ([facility]), Anemia, unspecified, Anxiety, Arthritis, Basal cell carcinoma (BCC) of skin of nose, Cancer ([facility]), Chondrocostal junction syndrome, Coronary artery disease, CPAP (continuous positive airway pressure) dependence, Depression, Diabetes mellitus ([facility]), Dysthymic disorder, Edema, unspecified, Headache(784.0), Hyperlipidemia, Hypertension, Impaired fasting blood sugar, Intervertebral disc disorders with radiculopathy, lumbar region, Irregular heart beat, Obesity, Prostate cancer ([facility]), Sleep apnea, Spinal stenosis, Stroke ([facility]), TIA (transient ischemic attack), Unspecified osteoarthritis, unspecified site, and Venous insufficiency of both lower extremities.  He   Patient Active Problem List    Diagnosis Date Noted    Chronic diastolic CHF (congestive heart failure) ([facility]) 01/28/2025    Class 1 obesity with serious comorbidity and body mass index (BMI) of 31.0 to 31.9 in adult, unspecified obesity type 11/20/2024    Other constipation 05/24/2024    Body  mass index 36.0-36.9, adult 01/03/2024    Primary hypertension 11/10/2023    CHASIDY (obstructive sleep apnea) 07/05/2023    Depression, recurrent ([facility]) 08/19/2022    Type 2 diabetes, HbA1c goal < 7% ([facility]) 05/25/2022    Cellulitis of right foot 12/17/2021    Abnormal gait 08/04/2021    Obesity, morbid ([facility]) 03/26/2021    Non-seasonal allergic rhinitis 03/26/2021    Primary osteoarthritis of both knees 11/13/2020    TIA (transient ischemic attack)     Prostate cancer ([facility])     Intervertebral disc disorders with radiculopathy, lumbar region     Impaired fasting blood sugar     Hypertension     Mixed hyperlipidemia     Obstructive sleep apnea syndrome     CPAP (continuous positive airway pressure) dependence     Dysthymic disorder     Venous insufficiency of both lower extremities     Essential hypertension 11/20/2019    Venous insufficiency 11/20/2019    Uncomplicated opioid dependence ([facility]) 06/05/2019    Encounter for long-term use of opiate analgesic 06/05/2019    Chronic pain syndrome 09/17/2018    Lumbar radiculopathy 04/16/2018    Lumbar spondylosis 04/16/2018    Right bundle branch block (RBBB) 03/19/2018    Edema of both lower legs due to peripheral venous insufficiency 03/19/2018    Chronic pain of both lower extremities 03/19/2018    Paroxysmal atrial fibrillation (HCC) 01/20/2018    Sacroiliitis ([facility]) 04/17/2017    Spondylosis of lumbar region without myelopathy or radiculopathy 04/17/2017    Lumbar spinal stenosis 05/12/2015     He  has a past surgical history that includes Prostatectomy; Tonsillectomy and adenoidectomy; FACIAL/NECK BIOPSY (N/A, 04/03/2017); FULL THICKNESS SKIN GRAFT (N/A, 04/03/2017); Cataract extraction; CT epidural steroid injection (TIN lumbar); MOHS RECONSTRUCTION (N/A, 10/27/2020); FLAP LOCAL HEAD / NECK (N/A, 10/27/2020); Colonoscopy; Excision basal cell carcinoma; Spine surgery; and Cardiac valve replacement.  His family history includes Heart attack in his father.  He  reports that he  quit smoking about 45 years ago. His smoking use included cigarettes. He started smoking about 50 years ago. He has a 1.3 pack-year smoking history. He has never used smokeless tobacco. He reports that he does not currently use alcohol after a past usage of about 17.0 standard drinks of alcohol per week. He reports that he does not use drugs.  Current Outpatient Medications   Medication Sig Dispense Refill    Acetaminophen 500 MG Take 500 mg by mouth daily as needed      amLODIPine (NORVASC) 5 mg tablet Take 1 tablet (5 mg total) by mouth daily 30 tablet 0    apixaban (Eliquis) 5 mg TAKE 1 TABLET (5 MG TOTAL) BY MOUTH 2 (TWO) TIMES A DAY. 60 tablet 5    atorvastatin (LIPITOR) 40 mg tablet Take 1 tablet (40 mg total) by mouth daily 90 tablet 1    benzonatate (TESSALON) 200 MG capsule Take 1 capsule (200 mg total) by mouth 3 (three) times a day as needed for cough 20 capsule 0    Cholecalciferol (VITAMIN D-3 PO) Take 2,000 unit marking on U-100 syringe by mouth daily after dinner      co-enzyme Q-10 30 MG capsule Take 30 mg by mouth daily after dinner      losartan (COZAAR) 100 MG tablet TAKE 1 TABLET (100 MG TOTAL) BY MOUTH DAILY 90 tablet 1    MAGNESIUM PO Take 250 mg by mouth      multivitamin (THERAGRAN) TABS Take 1 tablet by mouth daily in the early morning      sertraline (ZOLOFT) 50 mg tablet Take 1 tablet (50 mg total) by mouth daily 90 tablet 1    sotalol (BETAPACE) 80 mg tablet TAKE 1 TABLET TWICE DAILY 180 tablet 3    Tirzepatide 7.5 MG/0.5ML SOAJ Inject 7.5 mg under the skin once a week 2 mL 3    desloratadine (CLARINEX) 5 MG tablet Take 1 tablet (5 mg total) by mouth daily (Patient not taking: Reported on 2/27/2024) 30 tablet 0    diclofenac sodium (VOLTAREN) 1 % Apply 2 g topically 4 (four) times a day (Patient not taking: Reported on 7/24/2024)      docusate sodium (COLACE) 100 mg capsule Take 1 capsule (100 mg total) by mouth 2 (two) times a day (Patient not taking: Reported on 7/24/2024)       "glucosamine-chondroitin 500-400 MG tablet Take 2 tablets by mouth daily in the early morning (Patient not taking: Reported on 1/28/2025)      glycopyrrolate (ROBINUL) 1 mg tablet Take 1 tablet (1 mg total) by mouth 2 (two) times a day (Patient not taking: Reported on 7/24/2024) 60 tablet 0    ipratropium (ATROVENT) 0.06 % nasal spray USE 2 SPRAYS INTO EACH NOSTRIL UP TO 3 TIMES DAILY AS NEEDED (Patient not taking: Reported on 7/24/2024)      Omega-3 Fatty Acids (FISH OIL) 1,000 mg Take 1,000 mg by mouth 2 (two) times a day (Patient not taking: Reported on 7/24/2024)      torsemide (DEMADEX) 20 mg tablet TAKE 1 TABLET TWICE DAILY (Patient not taking: Reported on 7/24/2024) 180 tablet 3     No current facility-administered medications for this visit.     He has no known allergies..    Review of Systems   Constitutional:  Negative for chills and fever.   HENT:  Negative for congestion, ear pain and sore throat.    Eyes:  Negative for pain.   Respiratory:  Positive for shortness of breath. Negative for cough.    Cardiovascular:  Negative for chest pain and leg swelling.   Gastrointestinal:  Negative for abdominal pain, nausea and vomiting.   Endocrine: Negative for polyuria.   Genitourinary:  Negative for difficulty urinating, frequency and urgency.   Musculoskeletal:  Positive for arthralgias and back pain.   Skin:  Negative for rash.   Neurological:  Negative for weakness and headaches.   Psychiatric/Behavioral:  Negative for sleep disturbance. The patient is not nervous/anxious.          Objective:      /72 (BP Location: Left arm, Patient Position: Sitting, Cuff Size: Standard)   Pulse 58   Temp 99 °F (37.2 °C) (Temporal)   Ht 5' 7\" (1.702 m)   Wt 86.6 kg (191 lb)   SpO2 99%   BMI 29.91 kg/m²     Recent Results (from the past 2 weeks)   COMPREHENSIVE METABOLIC PANEL    Collection Time: 01/17/25  3:08 AM   Result Value Ref Range    Glucose 112 (H) 65 - 99 mg/dL    BUN 17 7 - 28 mg/dL    Creatinine 1.05 " 0.53 - 1.30 mg/dL    Sodium 138 135 - 145 mmol/L    Potassium 3.7 3.5 - 5.2 mmol/L    Chloride 105 100 - 109 mmol/L    Carbon Dioxide 24 21 - 31 mmol/L    Calcium 8.9 8.5 - 10.5 mg/dL    Alkaline Phosphatase 91 35 - 120 U/L    ALBUMIN 4.1 3.5 - 5.7 g/dL    Total Bilirubin 0.9 0.2 - 1.0 mg/dL    Protein, Total 6.6 6.3 - 8.3 g/dL    AST 16 <41 U/L    ALT 8 <56 U/L    ANION GAP 9 3 - 11    eGFRcr 71 >59    eGFR Comment Interpretive information: calculated GFR    HEMOGLOBIN A1C    Collection Time: 01/17/25 10:39 AM   Result Value Ref Range    Hemoglobin A1C 5.7 (H) <5.7 %    eAG, EST AVG Glucose 117 mg/dL        Physical Exam  Constitutional:       Appearance: Normal appearance.   HENT:      Head: Normocephalic.      Right Ear: External ear normal.      Left Ear: External ear normal.      Nose: Nose normal. No congestion.      Mouth/Throat:      Mouth: Mucous membranes are moist.      Pharynx: Oropharynx is clear. No oropharyngeal exudate or posterior oropharyngeal erythema.   Eyes:      Extraocular Movements: Extraocular movements intact.      Conjunctiva/sclera: Conjunctivae normal.   Cardiovascular:      Rate and Rhythm: Normal rate and regular rhythm.      Heart sounds: Normal heart sounds. No murmur heard.  Pulmonary:      Effort: Pulmonary effort is normal.      Breath sounds: Normal breath sounds. No wheezing or rales.   Abdominal:      General: Abdomen is flat. There is no distension.      Palpations: Abdomen is soft.      Tenderness: There is no abdominal tenderness.   Musculoskeletal:         General: Normal range of motion.      Cervical back: Normal range of motion and neck supple.      Right lower leg: Edema present.      Left lower leg: Edema present.   Lymphadenopathy:      Cervical: No cervical adenopathy.   Skin:     General: Skin is warm.   Neurological:      General: No focal deficit present.      Mental Status: He is alert and oriented to person, place, and time.

## 2025-02-14 ENCOUNTER — RA CDI HCC (OUTPATIENT)
Dept: OTHER | Facility: HOSPITAL | Age: 85
End: 2025-02-14

## 2025-02-14 LAB
ALBUMIN/CREAT UR: 8
CREAT UR-MCNC: 113 MG/DL (ref 50–200)
MICROALBUMIN UR-MCNC: 0.9 MG/DL

## 2025-02-15 LAB
CHOLEST SERPL-MCNC: 119 MG/DL
CHOLEST/HDLC SERPL: 3.1 {RATIO}
HDLC SERPL-MCNC: 39 MG/DL (ref 23–92)
LDLC SERPL CALC-MCNC: 58 MG/DL
NONHDLC SERPL-MCNC: 80 MG/DL
TRIGL SERPL-MCNC: 111 MG/DL

## 2025-02-18 DIAGNOSIS — I48.0 PAROXYSMAL ATRIAL FIBRILLATION (HCC): ICD-10-CM

## 2025-02-19 ENCOUNTER — OFFICE VISIT (OUTPATIENT)
Dept: CARDIOLOGY CLINIC | Facility: CLINIC | Age: 85
End: 2025-02-19
Payer: MEDICARE

## 2025-02-19 VITALS
OXYGEN SATURATION: 96 % | BODY MASS INDEX: 30.04 KG/M2 | WEIGHT: 191.4 LBS | HEIGHT: 67 IN | SYSTOLIC BLOOD PRESSURE: 160 MMHG | DIASTOLIC BLOOD PRESSURE: 70 MMHG | HEART RATE: 61 BPM

## 2025-02-19 DIAGNOSIS — I48.0 PAROXYSMAL ATRIAL FIBRILLATION (HCC): Primary | ICD-10-CM

## 2025-02-19 DIAGNOSIS — I10 ESSENTIAL HYPERTENSION: ICD-10-CM

## 2025-02-19 DIAGNOSIS — G47.33 OBSTRUCTIVE SLEEP APNEA SYNDROME: ICD-10-CM

## 2025-02-19 PROCEDURE — 93000 ELECTROCARDIOGRAM COMPLETE: CPT | Performed by: INTERNAL MEDICINE

## 2025-02-19 PROCEDURE — 99214 OFFICE O/P EST MOD 30 MIN: CPT | Performed by: INTERNAL MEDICINE

## 2025-02-19 RX ORDER — AMOXICILLIN 250 MG
1 CAPSULE ORAL 2 TIMES DAILY
COMMUNITY
Start: 2025-01-21 | End: 2026-01-21

## 2025-02-19 NOTE — PROGRESS NOTES
[facility]    CHIEF COMPLAINT:   Chief Complaint   Patient presents with    Follow-up     6 mon ov  No cardiac concerns at the moment.       HPI:  Sergio Lambert is a 84 y.o. male with a past medical history of obesity, CHASIDY on CPAP, remote history TIA, hypertension, hyperlipidemia, paroxysmal atrial fibrillation.    OV - 6/13/23: Follows with Neurosurgery at [facility] for L4-L5 lumbar stenosis which has been refractory to epidural injections and pain medications. He has progressive lower extremity weakness. + recent falls. He is being considered for lumbar surgery and presents today for pre operative risk assessment. He has a remote history TIA. No history myocardial infarction, congestive heart failure, diabetes on insulin, advanced CKD. He has a history of chronic venous insufficiency and swelling in his bilateral legs. He takes torsemide daily and raises his legs with improvement in swelling. He has CHASIDY and uses CPAP nightly. Sotalol and warfarin for paroxysmal atrial fibrillation.    OV - 10/13/23 Presents today for follow up and to discuss blood thinner options. He feels well and has no new complaints. His ECG today shows atrial fibrillation. He was unaware and has not interval change in symptoms since our last office visit. No visual changes, lightheadedness, syncope, chest pain, palpitations, shortness of breath, orthopnea. + leg edema     OV - 1/24/24: Started Mounjaro for weight loss. No lightheadedness, syncope, chest pain, palpitations, shortness of breath, orthopnea. Leg swelling is baseline and improves with elevation     Interval history: He had Flu A infection and was admitted to [facility] in January due to weakness. Recovered fully from this. Wife is unfortunately in the hospital after a fall. He is understandably upset but seems to be in good spirits otherwise. He has no complaints of lightheadedness, syncope, chest pain, palpitations, shortness of breath, orthopnea, PND. Leg edema  improved from prior.    The following portions of the patient's history were reviewed and updated as appropriate: allergies, current medications, past family history, past medical history, past social history, past surgical history, and problem list.    SINCE LAST OV I REVIEWED WITH THE PATIENT THE INTERIM LABS, TEST RESULTS, CONSULTANT(S) NOTES AND PERFORMED AN INTERIM REVIEW OF HISTORY    Past Medical History:   Diagnosis Date    A-fib (HCC)     Anemia, unspecified     Anxiety     Arthritis     Basal cell carcinoma (BCC) of skin of nose     Cancer (HCC)     Chondrocostal junction syndrome     Coronary artery disease     CPAP (continuous positive airway pressure) dependence     Depression     Diabetes mellitus (HCC)     Dysthymic disorder     Edema, unspecified     Headache(784.0)     Hyperlipidemia     Hypertension     Impaired fasting blood sugar     Intervertebral disc disorders with radiculopathy, lumbar region     Irregular heart beat     Obesity     Prostate cancer (HCC)     s/p surgery    Sleep apnea     on CPAP    Spinal stenosis     Stroke (HCC)     TIA (transient ischemic attack)     no residual problems    Unspecified osteoarthritis, unspecified site     Venous insufficiency of both lower extremities        Past Surgical History:   Procedure Laterality Date    BASAL CELL CARCINOMA EXCISION      on the nose    CARDIAC VALVE REPLACEMENT      CATARACT EXTRACTION      COLONOSCOPY      2016, 2011    CT EPIDURAL STEROID INJECTION (TIN LUMBAR)      FACIAL/NECK BIOPSY N/A 04/03/2017    Procedure: NOSE BCC EXCISION; FROZEN SECTION; RECONSTRUCTION ;  Surgeon: Marco Antonio Duckworth MD;  [facility];  Service:     FLAP LOCAL HEAD / NECK N/A 10/27/2020    Procedure: NOSE FLAP;  Surgeon: Marco Antonio Duckworth MD;  Location: [facility];  Service: Plastics    FULL THICKNESS SKIN GRAFT N/A 04/03/2017    Procedure: FLAP VERSUS FULL THICKNESS SKIN GRAFT ;  Surgeon: Marco Antonio Duckworth MD;  [facility];   Service:     MOHS RECONSTRUCTION N/A 10/27/2020    Procedure: NOSE MOHS DEFECT RECONSTRUCTION;  Surgeon: Marco Antonio Duckworth MD;  Location: [facility];  Service: Plastics    PROSTATECTOMY      SPINE SURGERY      TONSILLECTOMY AND ADENOIDECTOMY         Social History     Socioeconomic History    Marital status: /Civil Union     Spouse name: Not on file    Number of children: Not on file    Years of education: Not on file    Highest education level: Not on file   Occupational History    Not on file   Tobacco Use    Smoking status: Former     Current packs/day: 0.00     Average packs/day: 0.3 packs/day for 5.2 years (1.3 ttl pk-yrs)     Types: Cigarettes     Start date:      Quit date:      Years since quittin.1    Smokeless tobacco: Never    Tobacco comments:     I quite some time ago   Vaping Use    Vaping status: Never Used   Substance and Sexual Activity    Alcohol use: Not Currently     Alcohol/week: 17.0 standard drinks of alcohol     Types: 10 Glasses of wine, 3 Cans of beer, 4 Shots of liquor per week     Comment: glass of wine with dinner, maybe    Drug use: No    Sexual activity: Not Currently     Partners: Female     Birth control/protection: Abstinence, None   Other Topics Concern    Not on file   Social History Narrative    Not on file     Social Drivers of Health     Financial Resource Strain: Not At Risk (2025)    Received from [facility]    Financial Insecurity     In the last 12 months did you skip medications to save money?: No     In the last 12 months was there a time when you needed to see a doctor but could not because of cost?: No   Food Insecurity: No Food Insecurity (2025)    Received from [facility]    Food Insecurity     In the last 12 months did you ever eat less than you felt you should because there wasn't enough money for food?: No   Transportation Needs: No Transportation Needs (2025)    Received from [facility]    Transportation Needs     In the last 12 months have you ever had to go without healthcare because you didn't have a way to get there?: No   Physical Activity: Not on file   Stress: Not on file   Social Connections: Socially Integrated (1/22/2025)    Received from [facility]    Social Connection     Do you often feel lonely?: No   Intimate Partner Violence: Not At Risk (1/17/2025)    Received from [facility]    Humiliation, Afraid, Rape, and Kick questionnaire     Fear of Current or Ex-Partner: No     Emotionally Abused: No     Physically Abused: No     Sexually Abused: No   Housing Stability: Not At Risk (1/22/2025)    Received from [facility]    Housing Stability     Are you worried that in the next 2 months you may not have stable housing?: No   Recent Concern: Housing Stability - High Risk (1/17/2025)    Received from [facility]    Housing Stability Vital Sign     Unable to Pay for Housing in the Last Year: No     Number of Times Moved in the Last Year: 0     Homeless in the Last Year: Yes       Family History   Problem Relation Age of Onset    Heart attack Father        No Known Allergies    Current Outpatient Medications   Medication Sig Dispense Refill    Acetaminophen 500 MG Take 500 mg by mouth daily as needed      apixaban (Eliquis) 5 mg TAKE 1 TABLET (5 MG TOTAL) BY MOUTH 2 (TWO) TIMES A DAY. 60 tablet 5    atorvastatin (LIPITOR) 40 mg tablet Take 1 tablet (40 mg total) by mouth daily 90 tablet 1    Cholecalciferol (VITAMIN D-3 PO) Take 2,000 unit marking on U-100 syringe by mouth daily after dinner      co-enzyme Q-10 30 MG capsule Take 30 mg by mouth daily after dinner      losartan (COZAAR) 100 MG tablet TAKE 1 TABLET (100 MG TOTAL) BY MOUTH DAILY 90 tablet 1    MAGNESIUM PO Take 250 mg by mouth      multivitamin (THERAGRAN) TABS Take 1 tablet by mouth daily in the early morning      senna-docusate sodium (SENOKOT  "S) 8.6-50 mg per tablet Take 1 tablet by mouth 2 (two) times a day      sertraline (ZOLOFT) 50 mg tablet Take 1 tablet (50 mg total) by mouth daily 90 tablet 1    sotalol (BETAPACE) 80 mg tablet TAKE 1 TABLET TWICE DAILY 180 tablet 3    Tirzepatide 7.5 MG/0.5ML SOAJ Inject 7.5 mg under the skin once a week 2 mL 3    amLODIPine (NORVASC) 5 mg tablet Take 1 tablet (5 mg total) by mouth daily (Patient not taking: Reported on 2/19/2025) 30 tablet 0    benzonatate (TESSALON) 200 MG capsule Take 1 capsule (200 mg total) by mouth 3 (three) times a day as needed for cough (Patient not taking: Reported on 2/19/2025) 20 capsule 0    desloratadine (CLARINEX) 5 MG tablet Take 1 tablet (5 mg total) by mouth daily (Patient not taking: Reported on 2/27/2024) 30 tablet 0    diclofenac sodium (VOLTAREN) 1 % Apply 2 g topically 4 (four) times a day (Patient not taking: Reported on 7/24/2024)      docusate sodium (COLACE) 100 mg capsule Take 1 capsule (100 mg total) by mouth 2 (two) times a day (Patient not taking: Reported on 7/24/2024)      glucosamine-chondroitin 500-400 MG tablet Take 2 tablets by mouth daily in the early morning (Patient not taking: Reported on 1/28/2025)      glycopyrrolate (ROBINUL) 1 mg tablet Take 1 tablet (1 mg total) by mouth 2 (two) times a day (Patient not taking: Reported on 7/24/2024) 60 tablet 0    ipratropium (ATROVENT) 0.06 % nasal spray USE 2 SPRAYS INTO EACH NOSTRIL UP TO 3 TIMES DAILY AS NEEDED (Patient not taking: Reported on 7/24/2024)      Omega-3 Fatty Acids (FISH OIL) 1,000 mg Take 1,000 mg by mouth 2 (two) times a day (Patient not taking: Reported on 7/24/2024)      torsemide (DEMADEX) 20 mg tablet TAKE 1 TABLET TWICE DAILY (Patient not taking: Reported on 7/24/2024) 180 tablet 3     No current facility-administered medications for this visit.       /70 (BP Location: Right arm, Patient Position: Sitting, Cuff Size: Standard)   Pulse 61   Ht 5' 7\" (1.702 m)   Wt 86.8 kg (191 lb 6.4 " oz)   SpO2 96%   BMI 29.98 kg/m²     Review of Systems   All other systems reviewed and are negative.      Physical Exam  Vitals reviewed.   Constitutional:       General: He is not in acute distress.     Appearance: Normal appearance. He is well-developed. He is not toxic-appearing.   HENT:      Head: Normocephalic and atraumatic.   Eyes:      General: No scleral icterus.  Cardiovascular:      Rate and Rhythm: Normal rate and regular rhythm.      Pulses: Normal pulses.      Heart sounds: Normal heart sounds. No murmur heard.     No gallop.   Pulmonary:      Effort: Pulmonary effort is normal. No respiratory distress.      Breath sounds: Normal breath sounds. No wheezing or rales.   Abdominal:      General: Bowel sounds are normal. There is no distension.      Palpations: Abdomen is soft.      Tenderness: There is no abdominal tenderness. There is no guarding.   Musculoskeletal:      Right lower leg: Edema (trace) present.      Left lower leg: Edema (trace) present.   Skin:     General: Skin is warm and dry.      Capillary Refill: Capillary refill takes less than 2 seconds.      Coloration: Skin is not jaundiced or pale.   Neurological:      Mental Status: He is alert.   Psychiatric:         Mood and Affect: Mood normal.         Behavior: Behavior normal.          Lab Results   Component Value Date    K 3.7 01/17/2025     01/17/2025    CO2 24 01/17/2025    BUN 17 01/17/2025    CREATININE 1.05 01/17/2025    CALCIUM 8.9 01/17/2025    ALT 8 01/17/2025    AST 16 01/17/2025    INR 1.0 05/21/2024       Lab Results   Component Value Date    HDL 39 02/14/2025    LDLCALC 58 02/14/2025    TRIG 111 02/14/2025       Lab Results   Component Value Date    WBC 9.5 09/16/2024    HGB 14.8 09/16/2024    HCT 43.2 09/16/2024     09/16/2024       Lab Results   Component Value Date     01/17/2025    HGBA1C 5.7 (H) 01/17/2025     Cardiac studies:   Results for orders placed or performed in visit on 02/19/25   POCT  ECG    Impression    Normal sinus rhythm  RBBB       TTE - 11/2/23:    Left Ventricle: Left ventricular cavity size is normal. Wall thickness is mildly increased. There is concentric remodeling. The left ventricular ejection fraction is 60%. Systolic function is normal. Wall motion is normal. Diastolic function is mildly abnormal, consistent with grade I (abnormal) relaxation.    Left Atrium: The atrium is mildly dilated (35-41 mL/m2).    Aortic Valve: There is mild regurgitation.    Mitral Valve: There is mild annular calcification. There is mild regurgitation.    Tricuspid Valve: There is mild regurgitation. The estimated right ventricular systolic pressure is 36.00 mmHg.    Aorta: The aortic root is mildly dilated. The ascending aorta is mildly dilated. The aortic root is 3.90 cm. The ascending aorta is 4 cm.     ECG - 10/13/23: Afib, RBBB/LPFB    TTE - 7/12/21: Normal left ventricular cavity size and wall thickness.  Normal wall motion and systolic function.  LVEF 60%, grade 1 DD.  Normal right ventricular cavity size and systolic function.  Mild tricuspid and aortic regurgitation.    ASSESSMENT AND PLAN:    Paroxysmal atrial fibrillation (HCC): History of persistent Afib with remote history of cardioversion. He was placed on Sotalol and has been doing well with no symptomatic recurrence. ECG in Oct 2023 showed rate controlled Afib and new LPFB. Repeat transthoracic echo in Nov 2023 showed normal LV/RV systolic function, mildly dilated LA, mild AI, mild MR, mild TR. Aortic root and ascending top normal for BSA. Sinus rhythm during echo.   -ECG today shows NSR, RBBB, QTC acceptable  -Labs pertinent for eGFR 71, K 3.7, Cr 1.05  -Continue Sotalol 80 mg BID  -Now off warfarin, continue Apixaban    Essential hypertension: Blood pressure moderately elevated today. Appears recently had decreased in amlodipine dose. Continue Losartan 100 mg.      CHASIDY (obstructive sleep apnea): Continue CPAP      Delfina Arellano MD

## 2025-02-21 RX ORDER — SOTALOL HYDROCHLORIDE 80 MG/1
80 TABLET ORAL 2 TIMES DAILY
Qty: 180 TABLET | Refills: 3 | Status: SHIPPED | OUTPATIENT
Start: 2025-02-21

## 2025-03-12 DIAGNOSIS — E78.2 MIXED HYPERLIPIDEMIA: ICD-10-CM

## 2025-03-13 RX ORDER — ATORVASTATIN CALCIUM 40 MG/1
40 TABLET, FILM COATED ORAL DAILY
Qty: 90 TABLET | Refills: 1 | Status: SHIPPED | OUTPATIENT
Start: 2025-03-13

## 2025-04-07 DIAGNOSIS — F34.1 DYSTHYMIC DISORDER: ICD-10-CM

## 2025-04-11 DIAGNOSIS — I10 ESSENTIAL HYPERTENSION: ICD-10-CM

## 2025-04-11 DIAGNOSIS — E78.2 MIXED HYPERLIPIDEMIA: ICD-10-CM

## 2025-04-11 DIAGNOSIS — E11.9 TYPE 2 DIABETES, HBA1C GOAL < 7% (HCC): Primary | ICD-10-CM

## 2025-06-26 DIAGNOSIS — M17.0 PRIMARY OSTEOARTHRITIS OF BOTH KNEES: ICD-10-CM

## 2025-06-26 DIAGNOSIS — G47.33 OSA (OBSTRUCTIVE SLEEP APNEA): ICD-10-CM

## 2025-06-26 DIAGNOSIS — E11.9 TYPE 2 DIABETES, HBA1C GOAL < 7% (HCC): ICD-10-CM

## 2025-06-26 DIAGNOSIS — M51.16 INTERVERTEBRAL DISC DISORDERS WITH RADICULOPATHY, LUMBAR REGION: ICD-10-CM

## 2025-06-26 DIAGNOSIS — I10 ESSENTIAL HYPERTENSION: ICD-10-CM

## 2025-06-26 DIAGNOSIS — E66.811 CLASS 1 OBESITY WITH SERIOUS COMORBIDITY AND BODY MASS INDEX (BMI) OF 31.0 TO 31.9 IN ADULT, UNSPECIFIED OBESITY TYPE: ICD-10-CM

## 2025-06-27 RX ORDER — TIRZEPATIDE 7.5 MG/.5ML
INJECTION, SOLUTION SUBCUTANEOUS
Qty: 2 ML | Refills: 3 | Status: SHIPPED | OUTPATIENT
Start: 2025-06-27

## 2025-07-07 ENCOUNTER — APPOINTMENT (OUTPATIENT)
Dept: LAB | Facility: CLINIC | Age: 85
End: 2025-07-07
Payer: MEDICARE

## 2025-07-07 DIAGNOSIS — I10 ESSENTIAL HYPERTENSION: ICD-10-CM

## 2025-07-07 DIAGNOSIS — E11.9 TYPE 2 DIABETES, HBA1C GOAL < 7% (HCC): ICD-10-CM

## 2025-07-07 DIAGNOSIS — E78.2 MIXED HYPERLIPIDEMIA: ICD-10-CM

## 2025-07-07 LAB
ALBUMIN SERPL BCG-MCNC: 4.2 G/DL (ref 3.5–5)
ALP SERPL-CCNC: 87 U/L (ref 34–104)
ALT SERPL W P-5'-P-CCNC: 11 U/L (ref 7–52)
ANION GAP SERPL CALCULATED.3IONS-SCNC: 9 MMOL/L (ref 4–13)
AST SERPL W P-5'-P-CCNC: 18 U/L (ref 13–39)
BASOPHILS # BLD AUTO: 0.05 THOUSANDS/ÂΜL (ref 0–0.1)
BASOPHILS NFR BLD AUTO: 1 % (ref 0–1)
BILIRUB SERPL-MCNC: 1.08 MG/DL (ref 0.2–1)
BUN SERPL-MCNC: 15 MG/DL (ref 5–25)
CALCIUM SERPL-MCNC: 8.9 MG/DL (ref 8.4–10.2)
CHLORIDE SERPL-SCNC: 106 MMOL/L (ref 96–108)
CHOLEST SERPL-MCNC: 134 MG/DL (ref ?–200)
CO2 SERPL-SCNC: 26 MMOL/L (ref 21–32)
CREAT SERPL-MCNC: 0.8 MG/DL (ref 0.6–1.3)
CREAT UR-MCNC: 190 MG/DL
EOSINOPHIL # BLD AUTO: 0.35 THOUSAND/ÂΜL (ref 0–0.61)
EOSINOPHIL NFR BLD AUTO: 5 % (ref 0–6)
ERYTHROCYTE [DISTWIDTH] IN BLOOD BY AUTOMATED COUNT: 12.5 % (ref 11.6–15.1)
EST. AVERAGE GLUCOSE BLD GHB EST-MCNC: 117 MG/DL
GFR SERPL CREATININE-BSD FRML MDRD: 82 ML/MIN/1.73SQ M
GLUCOSE P FAST SERPL-MCNC: 99 MG/DL (ref 65–99)
HBA1C MFR BLD: 5.7 %
HCT VFR BLD AUTO: 47.2 % (ref 36.5–49.3)
HDLC SERPL-MCNC: 44 MG/DL
HGB BLD-MCNC: 15.6 G/DL (ref 12–17)
IMM GRANULOCYTES # BLD AUTO: 0.03 THOUSAND/UL (ref 0–0.2)
IMM GRANULOCYTES NFR BLD AUTO: 0 % (ref 0–2)
LDLC SERPL CALC-MCNC: 62 MG/DL (ref 0–100)
LYMPHOCYTES # BLD AUTO: 1.52 THOUSANDS/ÂΜL (ref 0.6–4.47)
LYMPHOCYTES NFR BLD AUTO: 19 % (ref 14–44)
MCH RBC QN AUTO: 30.5 PG (ref 26.8–34.3)
MCHC RBC AUTO-ENTMCNC: 33.1 G/DL (ref 31.4–37.4)
MCV RBC AUTO: 92 FL (ref 82–98)
MICROALBUMIN UR-MCNC: 20.7 MG/L
MICROALBUMIN/CREAT 24H UR: 11 MG/G CREATININE (ref 0–30)
MONOCYTES # BLD AUTO: 0.75 THOUSAND/ÂΜL (ref 0.17–1.22)
MONOCYTES NFR BLD AUTO: 10 % (ref 4–12)
NEUTROPHILS # BLD AUTO: 5.13 THOUSANDS/ÂΜL (ref 1.85–7.62)
NEUTS SEG NFR BLD AUTO: 65 % (ref 43–75)
NRBC BLD AUTO-RTO: 0 /100 WBCS
PLATELET # BLD AUTO: 225 THOUSANDS/UL (ref 149–390)
PMV BLD AUTO: 11.3 FL (ref 8.9–12.7)
POTASSIUM SERPL-SCNC: 4.2 MMOL/L (ref 3.5–5.3)
PROT SERPL-MCNC: 6.7 G/DL (ref 6.4–8.4)
RBC # BLD AUTO: 5.12 MILLION/UL (ref 3.88–5.62)
SODIUM SERPL-SCNC: 141 MMOL/L (ref 135–147)
TRIGL SERPL-MCNC: 141 MG/DL (ref ?–150)
TSH SERPL DL<=0.05 MIU/L-ACNC: 2.21 UIU/ML (ref 0.45–4.5)
WBC # BLD AUTO: 7.83 THOUSAND/UL (ref 4.31–10.16)

## 2025-07-07 PROCEDURE — 85025 COMPLETE CBC W/AUTO DIFF WBC: CPT

## 2025-07-07 PROCEDURE — 83036 HEMOGLOBIN GLYCOSYLATED A1C: CPT

## 2025-07-07 PROCEDURE — 82570 ASSAY OF URINE CREATININE: CPT

## 2025-07-07 PROCEDURE — 80053 COMPREHEN METABOLIC PANEL: CPT

## 2025-07-07 PROCEDURE — 82043 UR ALBUMIN QUANTITATIVE: CPT

## 2025-07-07 PROCEDURE — 36415 COLL VENOUS BLD VENIPUNCTURE: CPT

## 2025-07-07 PROCEDURE — 84443 ASSAY THYROID STIM HORMONE: CPT

## 2025-07-07 PROCEDURE — 80061 LIPID PANEL: CPT

## 2025-07-10 DIAGNOSIS — I10 ESSENTIAL HYPERTENSION: ICD-10-CM

## 2025-07-10 DIAGNOSIS — I48.0 PAROXYSMAL ATRIAL FIBRILLATION (HCC): ICD-10-CM

## 2025-07-11 RX ORDER — LOSARTAN POTASSIUM 100 MG/1
100 TABLET ORAL DAILY
Qty: 90 TABLET | Refills: 1 | Status: SHIPPED | OUTPATIENT
Start: 2025-07-11

## 2025-07-11 RX ORDER — APIXABAN 5 MG/1
5 TABLET, FILM COATED ORAL 2 TIMES DAILY
Qty: 60 TABLET | Refills: 5 | Status: SHIPPED | OUTPATIENT
Start: 2025-07-11

## 2025-07-16 ENCOUNTER — OFFICE VISIT (OUTPATIENT)
Dept: INTERNAL MEDICINE CLINIC | Facility: CLINIC | Age: 85
End: 2025-07-16
Payer: MEDICARE

## 2025-07-16 VITALS
HEIGHT: 67 IN | BODY MASS INDEX: 29.51 KG/M2 | TEMPERATURE: 98.4 F | DIASTOLIC BLOOD PRESSURE: 76 MMHG | OXYGEN SATURATION: 98 % | SYSTOLIC BLOOD PRESSURE: 134 MMHG | HEART RATE: 60 BPM | WEIGHT: 188 LBS

## 2025-07-16 DIAGNOSIS — I48.0 PAROXYSMAL ATRIAL FIBRILLATION (HCC): ICD-10-CM

## 2025-07-16 DIAGNOSIS — C61 PROSTATE CANCER (HCC): ICD-10-CM

## 2025-07-16 DIAGNOSIS — G47.33 OBSTRUCTIVE SLEEP APNEA SYNDROME: ICD-10-CM

## 2025-07-16 DIAGNOSIS — I50.32 CHRONIC DIASTOLIC CHF (CONGESTIVE HEART FAILURE) (HCC): ICD-10-CM

## 2025-07-16 DIAGNOSIS — Z01.00 DIABETIC EYE EXAM (HCC): Primary | ICD-10-CM

## 2025-07-16 DIAGNOSIS — E11.9 DIABETIC EYE EXAM (HCC): Primary | ICD-10-CM

## 2025-07-16 DIAGNOSIS — M17.0 PRIMARY OSTEOARTHRITIS OF BOTH KNEES: ICD-10-CM

## 2025-07-16 DIAGNOSIS — M51.16 INTERVERTEBRAL DISC DISORDERS WITH RADICULOPATHY, LUMBAR REGION: ICD-10-CM

## 2025-07-16 DIAGNOSIS — E78.2 MIXED HYPERLIPIDEMIA: ICD-10-CM

## 2025-07-16 DIAGNOSIS — I10 ESSENTIAL HYPERTENSION: ICD-10-CM

## 2025-07-16 DIAGNOSIS — I87.2 VENOUS INSUFFICIENCY OF BOTH LOWER EXTREMITIES: ICD-10-CM

## 2025-07-16 PROCEDURE — G2211 COMPLEX E/M VISIT ADD ON: HCPCS | Performed by: INTERNAL MEDICINE

## 2025-07-16 PROCEDURE — 99214 OFFICE O/P EST MOD 30 MIN: CPT | Performed by: INTERNAL MEDICINE

## 2025-07-16 NOTE — ASSESSMENT & PLAN NOTE
Wt Readings from Last 3 Encounters:   07/16/25 85.3 kg (188 lb)   02/19/25 86.8 kg (191 lb 6.4 oz)   01/28/25 86.6 kg (191 lb)       Continue same med

## 2025-07-16 NOTE — PROGRESS NOTES
"Name: Sergio Lambert      : 1940      MRN: [id number]  Encounter Provider: Saige Henao MD  Encounter Date: 2025   Encounter department: [facility]  :  Assessment & Plan  Diabetic eye exam (HCC)         Essential hypertension  Continue same med       Paroxysmal atrial fibrillation (HCC)  Continue same med       Venous insufficiency of both lower extremities         Chronic diastolic CHF (congestive heart failure) (HCC)  Wt Readings from Last 3 Encounters:   25 85.3 kg (188 lb)   25 86.8 kg (191 lb 6.4 oz)   25 86.6 kg (191 lb)       Continue same med           Prostate cancer (HCC)         Primary osteoarthritis of both knees         Intervertebral disc disorders with radiculopathy, lumbar region         Mixed hyperlipidemia  Continue same med       Obstructive sleep apnea syndrome  Continue CPAP              History of Present Illness   Follow-up on blood test done on 2025 test discussed with him      Review of Systems   Constitutional:  Negative for chills and fever.   HENT:  Negative for congestion, ear pain and sore throat.    Eyes:  Negative for pain.   Respiratory:  Positive for shortness of breath. Negative for cough.    Cardiovascular:  Negative for chest pain and leg swelling.   Gastrointestinal:  Negative for abdominal pain, nausea and vomiting.   Endocrine: Negative for polyuria.   Genitourinary:  Negative for difficulty urinating, frequency and urgency.   Musculoskeletal:  Positive for arthralgias and back pain.   Skin:  Negative for rash.   Neurological:  Negative for weakness and headaches.   Psychiatric/Behavioral:  Negative for sleep disturbance. The patient is not nervous/anxious.        Objective   /76 (BP Location: Right arm, Patient Position: Sitting, Cuff Size: Standard)   Pulse 60   Temp 98.4 °F (36.9 °C) (Temporal)   Ht 5' 7\" (1.702 m)   Wt 85.3 kg (188 lb)   SpO2 98%   BMI 29.44 kg/m²      Physical Exam  Vitals " and nursing note reviewed.   Constitutional:       General: He is not in acute distress.     Appearance: He is well-developed.   HENT:      Head: Normocephalic and atraumatic.      Right Ear: External ear normal.      Left Ear: External ear normal.      Mouth/Throat:      Mouth: Mucous membranes are moist.      Pharynx: Oropharynx is clear.     Eyes:      Extraocular Movements: Extraocular movements intact.      Conjunctiva/sclera: Conjunctivae normal.       Cardiovascular:      Rate and Rhythm: Normal rate and regular rhythm.      Heart sounds: Normal heart sounds. No murmur heard.  Pulmonary:      Effort: Pulmonary effort is normal. No respiratory distress.      Breath sounds: Normal breath sounds. No wheezing or rales.   Abdominal:      General: Abdomen is flat. There is no distension.      Palpations: Abdomen is soft.      Tenderness: There is no abdominal tenderness.     Musculoskeletal:         General: No swelling.      Cervical back: Neck supple.      Right lower leg: Edema present.      Left lower leg: Edema present.     Skin:     General: Skin is warm and dry.      Capillary Refill: Capillary refill takes less than 2 seconds.     Neurological:      General: No focal deficit present.      Mental Status: He is alert and oriented to person, place, and time.     Psychiatric:         Mood and Affect: Mood normal.

## 2025-07-30 DIAGNOSIS — M51.16 INTERVERTEBRAL DISC DISORDERS WITH RADICULOPATHY, LUMBAR REGION: ICD-10-CM

## 2025-07-30 DIAGNOSIS — E66.811 CLASS 1 OBESITY WITH SERIOUS COMORBIDITY AND BODY MASS INDEX (BMI) OF 31.0 TO 31.9 IN ADULT, UNSPECIFIED OBESITY TYPE: ICD-10-CM

## 2025-07-30 DIAGNOSIS — E11.9 TYPE 2 DIABETES, HBA1C GOAL < 7% (HCC): ICD-10-CM

## 2025-07-30 DIAGNOSIS — M17.0 PRIMARY OSTEOARTHRITIS OF BOTH KNEES: ICD-10-CM

## 2025-07-30 DIAGNOSIS — G47.33 OSA (OBSTRUCTIVE SLEEP APNEA): ICD-10-CM

## 2025-07-30 DIAGNOSIS — I10 ESSENTIAL HYPERTENSION: ICD-10-CM

## 2025-07-31 RX ORDER — TIRZEPATIDE 7.5 MG/.5ML
INJECTION, SOLUTION SUBCUTANEOUS
Qty: 2 ML | Refills: 3 | Status: SHIPPED | OUTPATIENT
Start: 2025-07-31

## (undated) DEVICE — SCD SEQUENTIAL COMPRESSION COMFORT SLEEVE MEDIUM KNEE LENGTH: Brand: KENDALL SCD

## (undated) DEVICE — GLOVE SRG BIOGEL 7

## (undated) DEVICE — TUBING SUCTION 5MM X 12 FT

## (undated) DEVICE — INTENDED FOR TISSUE SEPARATION, AND OTHER PROCEDURES THAT REQUIRE A SHARP SURGICAL BLADE TO PUNCTURE OR CUT.: Brand: BARD-PARKER ® CARBON RIB-BACK BLADES

## (undated) DEVICE — INTENDED FOR TISSUE SEPARATION, AND OTHER PROCEDURES THAT REQUIRE A SHARP SURGICAL BLADE TO PUNCTURE OR CUT.: Brand: BARD-PARKER SAFETY BLADES SIZE 15, STERILE

## (undated) DEVICE — TIBURON SPLIT SHEET: Brand: CONVERTORS

## (undated) DEVICE — POV-IOD SWAB STICKS

## (undated) DEVICE — BETHLEHEM UNIVERSAL OUTPATIENT: Brand: CARDINAL HEALTH

## (undated) DEVICE — SPONGE STICK WITH PVP-I: Brand: KENDALL

## (undated) DEVICE — NEEDLE 27 G X 1 1/4

## (undated) DEVICE — OCCLUSIVE GAUZE STRIP,3% BISMUTH TRIBROMOPHENATE IN PETROLATUM BLEND: Brand: XEROFORM

## (undated) DEVICE — SYRINGE 10ML LL

## (undated) DEVICE — ELECTRODE BLADE MOD E-Z CLEAN 2.5IN 6.4CM -0012M

## (undated) DEVICE — MICRODISSECTION NEEDLE STRAIGHT SLEEVE: Brand: COLORADO

## (undated) DEVICE — 3M™ STERI-STRIP™ REINFORCED ADHESIVE SKIN CLOSURES, R1547, 1/2 IN X 4 IN (12 MM X 100 MM), 6 STRIPS/ENVELOPE: Brand: 3M™ STERI-STRIP™

## (undated) DEVICE — NEEDLE 25G X 1 1/2

## (undated) DEVICE — TONGUE DEPRESSOR STERILE

## (undated) DEVICE — UNDYED MONOFILAMENT (POLYDIOXANONE), ABSORBABLE SYNTHETIC SURGICAL SUTURE: Brand: PDS

## (undated) DEVICE — REM POLYHESIVE ADULT PATIENT RETURN ELECTRODE: Brand: VALLEYLAB

## (undated) DEVICE — LIGHT HANDLE COVER SLEEVE DISP BLUE STELLAR

## (undated) DEVICE — CHLORAPREP HI-LITE 26ML ORANGE

## (undated) DEVICE — CONMED ACCESSORY ELECTRODE, FLAT BLADE WITH EXTENDED INSULATION: Brand: CONMED

## (undated) DEVICE — PAD GROUNDING ADULT

## (undated) DEVICE — 10FR FRAZIER SUCTION HANDLE: Brand: CARDINAL HEALTH

## (undated) DEVICE — SYRINGE BULB 2 OZ

## (undated) DEVICE — IV CATH INTROCAN 18G X 1 1/4 SAFETY

## (undated) DEVICE — STRL UNIVERSAL OUTPATIENT PACK: Brand: CARDINAL HEALTH

## (undated) DEVICE — 3M™ STERI-STRIP™ COMPOUND BENZOIN TINCTURE 40 BAGS/CARTON 4 CARTONS/CASE C1544: Brand: 3M™ STERI-STRIP™

## (undated) DEVICE — DRESSING SILON DUAL-DRESS 50 FOAM 5.5 X 6 IN

## (undated) DEVICE — ELECTRODE NEEDLE MEGAFINE 2IN E-Z CLEAN MEGADYNE -0118

## (undated) DEVICE — PENCIL ELECTROSURG E-Z CLEAN -0035H

## (undated) DEVICE — ASTOUND STANDARD SURGICAL GOWN, XL: Brand: CONVERTORS

## (undated) DEVICE — PLUMEPEN PRO 10FT

## (undated) DEVICE — SPONGE LAP 18 X 18 IN

## (undated) DEVICE — VIAL DECANTER